# Patient Record
Sex: FEMALE | Race: WHITE | NOT HISPANIC OR LATINO | Employment: OTHER | URBAN - METROPOLITAN AREA
[De-identification: names, ages, dates, MRNs, and addresses within clinical notes are randomized per-mention and may not be internally consistent; named-entity substitution may affect disease eponyms.]

---

## 2018-03-13 ENCOUNTER — OFFICE VISIT (OUTPATIENT)
Dept: ENDOCRINOLOGY | Facility: CLINIC | Age: 83
End: 2018-03-13
Attending: INTERNAL MEDICINE
Payer: MEDICARE

## 2018-03-13 VITALS
BODY MASS INDEX: 31.93 KG/M2 | HEIGHT: 57 IN | SYSTOLIC BLOOD PRESSURE: 130 MMHG | HEART RATE: 57 BPM | WEIGHT: 148 LBS | DIASTOLIC BLOOD PRESSURE: 80 MMHG

## 2018-03-13 DIAGNOSIS — E78.5 HYPERLIPIDEMIA, UNSPECIFIED HYPERLIPIDEMIA TYPE: ICD-10-CM

## 2018-03-13 DIAGNOSIS — M81.0 OSTEOPOROSIS, UNSPECIFIED OSTEOPOROSIS TYPE, UNSPECIFIED PATHOLOGICAL FRACTURE PRESENCE: ICD-10-CM

## 2018-03-13 DIAGNOSIS — E04.2 MULTINODULAR GOITER: Primary | ICD-10-CM

## 2018-03-13 PROCEDURE — 99214 OFFICE O/P EST MOD 30 MIN: CPT | Performed by: INTERNAL MEDICINE

## 2018-03-13 RX ORDER — LORAZEPAM 1 MG/1
1 TABLET ORAL AS NEEDED
COMMUNITY
Start: 2017-03-22 | End: 2018-04-19 | Stop reason: ALTCHOICE

## 2018-03-13 RX ORDER — PANTOPRAZOLE SODIUM 40 MG/1
40 TABLET, DELAYED RELEASE ORAL AS NEEDED
COMMUNITY
Start: 2017-09-06 | End: 2023-04-11 | Stop reason: ALTCHOICE

## 2018-03-13 RX ORDER — ASPIRIN 81 MG/1
81 TABLET ORAL
COMMUNITY
Start: 2017-04-04 | End: 2018-05-02 | Stop reason: ALTCHOICE

## 2018-03-13 RX ORDER — MELOXICAM 15 MG/1
15 TABLET ORAL
COMMUNITY
Start: 2017-03-22 | End: 2019-02-04

## 2018-03-13 RX ORDER — EZETIMIBE 10 MG/1
10 TABLET ORAL 2 TIMES WEEKLY
COMMUNITY
Start: 2017-07-14 | End: 2018-08-02 | Stop reason: ENTERED-IN-ERROR

## 2018-03-13 ASSESSMENT — ENCOUNTER SYMPTOMS
TROUBLE SWALLOWING: 0
CONSTIPATION: 0
TREMORS: 0
DIARRHEA: 0
UNEXPECTED WEIGHT CHANGE: 0
ABDOMINAL PAIN: 0
SHORTNESS OF BREATH: 0
PALPITATIONS: 0
NAUSEA: 0
ARTHRALGIAS: 1
VOICE CHANGE: 0
SLEEP DISTURBANCE: 0
COUGH: 0
FATIGUE: 0
DIZZINESS: 0

## 2018-03-13 NOTE — LETTER
March 14, 2018     Kunal La, DO  [address] [facility] [address]    Patient: Teresa Bird   YOB: 1935   Date of Visit: 3/13/2018       Dear Dr. La:    Thank you for referring Teresa Bird to me for evaluation. Below are my notes for this consultation.    If you have questions, please do not hesitate to call me. I look forward to following your patient along with you.         Sincerely,        Usha Nobles MD        CC: No Recipients  Usha Nobles MD  3/13/2018 10:53 PM  Sign at close encounter     Patient ID: Teresa Bird is a 82 y.o. female.      HPI   I did FNA of her left thyroid nodule on 9/6/17 and it was negative. No increased neck swelling, dysphagia or choking sensation. She has difficulty tolerating statins and follows with dr. Beck. Her arthritis is stable. 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 She has history of reflux and postnasal drip. She is s/p hysterectomy for uterine cancer, did not need chemo or radiation.       Office ultrasound today  Small nodules on the right side  Dominant nodule in left lower lobe is solid, not vascular, measures 1.6x2.1x3.7 cm.     Review of Systems   Constitutional: Negative for fatigue and unexpected weight change.   HENT: Negative for trouble swallowing and voice change.    Eyes: Negative for visual disturbance.   Respiratory: Negative for cough and shortness of breath.    Cardiovascular: Negative for palpitations and leg swelling.   Gastrointestinal: Negative for abdominal pain, constipation, diarrhea and nausea.   Endocrine: Negative for cold intolerance and heat intolerance.   Musculoskeletal: Positive for arthralgias.   Skin: Negative for rash.   Neurological: Negative for dizziness and tremors.   Psychiatric/Behavioral: Negative for sleep disturbance.       Physical Exam   Constitutional: She appears well-developed.   HENT:   Head: Normocephalic and atraumatic.   Eyes: Conjunctivae and EOM are  normal. Pupils are equal, round, and reactive to light.   Neck: No tracheal deviation present. No thyromegaly present.   Left low lying thyroid nodule, best palpable with swallowing   Cardiovascular: Normal rate, regular rhythm and normal heart sounds.    Pulmonary/Chest: Effort normal and breath sounds normal.   Musculoskeletal: She exhibits no edema.   Lymphadenopathy:     She has no cervical adenopathy.   Neurological: She displays normal reflexes.   Skin: No rash noted.   Psychiatric: She has a normal mood and affect.           Assessment/Plan   Problem List     Multinodular goiter - Primary    Overview     She had a CT scan of the chest and abdomen done on 3/09/17 as a routine followup after her surgery for uterine cancer in December of 2013.  The CT scan showed evidence of an abnormal left thyroid lobe that was enlarged with several nodules.    FNA of 3.7 cm left thyroid nodule was done on 9/6/17    Office ultrasound 3/13/17 shows stable  nodules             Current Assessment & Plan     TSH on 10/2/17 is normal    I reassured patient about her negative biopsy and normal thyroid function along with stable ultrasound.     She will follow up with me in 1 year, will do office ultrasound for monitoring         Hyperlipidemia    Overview     Did not tolerate statins         Relevant Medications    ezetimibe (ZETIA) 10 mg tablet    Osteoporosis    Overview     Did not tolerate multiple meds in the past including Fosamax  No fracture                 Usha Nobles MD  3/13/2018  12:40 PM

## 2018-03-13 NOTE — PROGRESS NOTES
Patient ID: Teresa Bird is a 82 y.o. female.      HPI   I did FNA of her left thyroid nodule on 9/6/17 and it was negative. No increased neck swelling, dysphagia or choking sensation. She has difficulty tolerating statins and follows with dr. Beck. Her arthritis is stable. 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 She has history of reflux and postnasal drip. She is s/p hysterectomy for uterine cancer, did not need chemo or radiation.       Office ultrasound today  Small nodules on the right side  Dominant nodule in left lower lobe is solid, not vascular, measures 1.6x2.1x3.7 cm.     Review of Systems   Constitutional: Negative for fatigue and unexpected weight change.   HENT: Negative for trouble swallowing and voice change.    Eyes: Negative for visual disturbance.   Respiratory: Negative for cough and shortness of breath.    Cardiovascular: Negative for palpitations and leg swelling.   Gastrointestinal: Negative for abdominal pain, constipation, diarrhea and nausea.   Endocrine: Negative for cold intolerance and heat intolerance.   Musculoskeletal: Positive for arthralgias.   Skin: Negative for rash.   Neurological: Negative for dizziness and tremors.   Psychiatric/Behavioral: Negative for sleep disturbance.       Physical Exam   Constitutional: She appears well-developed.   HENT:   Head: Normocephalic and atraumatic.   Eyes: Conjunctivae and EOM are normal. Pupils are equal, round, and reactive to light.   Neck: No tracheal deviation present. No thyromegaly present.   Left low lying thyroid nodule, best palpable with swallowing   Cardiovascular: Normal rate, regular rhythm and normal heart sounds.    Pulmonary/Chest: Effort normal and breath sounds normal.   Musculoskeletal: She exhibits no edema.   Lymphadenopathy:     She has no cervical adenopathy.   Neurological: She displays normal reflexes.   Skin: No rash noted.   Psychiatric: She has a normal mood and affect.            Assessment/Plan   Problem List     Multinodular goiter - Primary    Overview     She had a CT scan of the chest and abdomen done on 3/09/17 as a routine followup after her surgery for uterine cancer in December of 2013.  The CT scan showed evidence of an abnormal left thyroid lobe that was enlarged with several nodules.    FNA of 3.7 cm left thyroid nodule was done on 9/6/17    Office ultrasound 3/13/17 shows stable  nodules             Current Assessment & Plan     TSH on 10/2/17 is normal    I reassured patient about her negative biopsy and normal thyroid function along with stable ultrasound.     She will follow up with me in 1 year, will do office ultrasound for monitoring         Hyperlipidemia    Overview     Did not tolerate statins         Relevant Medications    ezetimibe (ZETIA) 10 mg tablet    Osteoporosis    Overview     Did not tolerate multiple meds in the past including Fosamax  No fracture                 Usha Nobles MD  3/13/2018  12:40 PM

## 2018-03-13 NOTE — PATIENT INSTRUCTIONS
1. Office ultrasound today shows the nodules are stable in size  2. No indication to repeat biopsy and no indication for surgery  3. Follow up with me in one year, will do office ultrasound again

## 2018-03-14 NOTE — ASSESSMENT & PLAN NOTE
TSH on 10/2/17 is normal    I reassured patient about her negative biopsy and normal thyroid function along with stable ultrasound.     She will follow up with me in 1 year, will do office ultrasound for monitoring

## 2018-04-02 PROBLEM — C55 MALIGNANT NEOPLASM OF UTERUS (CMS/HCC): Status: ACTIVE | Noted: 2017-03-22

## 2018-04-02 PROBLEM — Q25.79 PULMONARY ARTERY ABNORMALITY: Status: ACTIVE | Noted: 2017-04-04

## 2018-04-03 ENCOUNTER — TRANSCRIBE ORDERS (OUTPATIENT)
Dept: SCHEDULING | Age: 83
End: 2018-04-03

## 2018-04-03 DIAGNOSIS — Q25.79 CONGENITAL ANOMALY OF PULMONARY ARTERY: Primary | ICD-10-CM

## 2018-04-04 ENCOUNTER — TELEPHONE (OUTPATIENT)
Dept: CARDIOLOGY | Facility: CLINIC | Age: 83
End: 2018-04-04

## 2018-04-04 NOTE — TELEPHONE ENCOUNTER
Spoke to pt in regards to her BW prior to her CTA she will be getting some drawn on Friday 4/6 for her cardiologist at [facility], and a CMP was ordered in that. I told pt we should get the results, but I will keep an eye out and make sure we do.

## 2018-04-07 LAB
ALBUMIN SERPL-MCNC: 3.8 G/DL (ref 3.6–5.1)
ALBUMIN/GLOB SERPL: 1.5 (CALC) (ref 1–2.5)
ALP SERPL-CCNC: 68 U/L (ref 33–130)
ALT SERPL-CCNC: 15 U/L (ref 6–29)
AST SERPL-CCNC: 17 U/L (ref 10–35)
BASOPHILS # BLD AUTO: 22 CELLS/UL (ref 0–200)
BASOPHILS NFR BLD AUTO: 0.4 %
BILIRUB SERPL-MCNC: 0.7 MG/DL (ref 0.2–1.2)
BUN SERPL-MCNC: 18 MG/DL (ref 7–25)
BUN/CREAT SERPL: ABNORMAL (CALC) (ref 6–22)
CALCIUM SERPL-MCNC: 9.5 MG/DL (ref 8.6–10.4)
CHLORIDE SERPL-SCNC: 107 MMOL/L (ref 98–110)
CHOLEST SERPL-MCNC: 175 MG/DL
CHOLEST/HDLC SERPL: 2.4 (CALC)
CO2 SERPL-SCNC: 32 MMOL/L (ref 20–31)
CREAT SERPL-MCNC: 0.68 MG/DL (ref 0.6–0.88)
EOSINOPHIL # BLD AUTO: 129 CELLS/UL (ref 15–500)
EOSINOPHIL NFR BLD AUTO: 2.3 %
ERYTHROCYTE [DISTWIDTH] IN BLOOD BY AUTOMATED COUNT: 12.5 % (ref 11–15)
GFR SERPL CREATININE-BSD FRML MDRD: 83 ML/MIN/1.73M2
GLOBULIN SER CALC-MCNC: 2.6 G/DL (CALC) (ref 1.9–3.7)
GLUCOSE SERPL-MCNC: 87 MG/DL (ref 65–99)
HCT VFR BLD AUTO: 44.5 % (ref 35–45)
HDLC SERPL-MCNC: 73 MG/DL
HGB BLD-MCNC: 15 G/DL (ref 11.7–15.5)
LDLC SERPL CALC-MCNC: 84 MG/DL (CALC)
LYMPHOCYTES # BLD AUTO: 1814 CELLS/UL (ref 850–3900)
LYMPHOCYTES NFR BLD AUTO: 32.4 %
MCH RBC QN AUTO: 31.8 PG (ref 27–33)
MCHC RBC AUTO-ENTMCNC: 33.7 G/DL (ref 32–36)
MCV RBC AUTO: 94.5 FL (ref 80–100)
MONOCYTES # BLD AUTO: 319 CELLS/UL (ref 200–950)
MONOCYTES NFR BLD AUTO: 5.7 %
NEUTROPHILS # BLD AUTO: 3315 CELLS/UL (ref 1500–7800)
NEUTROPHILS NFR BLD AUTO: 59.2 %
NONHDLC SERPL-MCNC: 102 MG/DL (CALC)
PLATELET # BLD AUTO: 275 THOUSAND/UL (ref 140–400)
PMV BLD REES-ECKER: 10.9 FL (ref 7.5–12.5)
POTASSIUM SERPL-SCNC: 5.8 MMOL/L (ref 3.5–5.3)
PROT SERPL-MCNC: 6.4 G/DL (ref 6.1–8.1)
RBC # BLD AUTO: 4.71 MILLION/UL (ref 3.8–5.1)
SODIUM SERPL-SCNC: 143 MMOL/L (ref 135–146)
TRIGL SERPL-MCNC: 90 MG/DL
WBC # BLD AUTO: 5.6 THOUSAND/UL (ref 3.8–10.8)

## 2018-04-08 PROBLEM — I25.10 CORONARY ARTERY DISEASE INVOLVING NATIVE CORONARY ARTERY OF NATIVE HEART WITHOUT ANGINA PECTORIS: Status: ACTIVE | Noted: 2018-04-08

## 2018-04-08 PROBLEM — F17.210 DEPENDENCE ON NICOTINE FROM CIGARETTES: Status: ACTIVE | Noted: 2018-04-08

## 2018-04-08 PROBLEM — M81.0 OSTEOPOROSIS: Status: RESOLVED | Noted: 2018-03-13 | Resolved: 2018-04-08

## 2018-04-11 NOTE — PROGRESS NOTES
Cardiology Note        Teresa Bird is a 82 y.o. 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 Fong, no intervention is indicated she'll be due for follow-up CTA and visit in March 2018  She also thyroid nodule with negative biopsy with Dr. Ha Wheatley,  Echocardiogram done today artery 4.2 cm normal LV systolic function top normal aortic root and sinus of Valsalva 3.7 cm    Cardiac History:      Patient Active Problem List    Diagnosis Date Noted   • APC (atrial premature contractions) 04/16/2018   • Coronary artery disease involving native coronary artery of native heart without angina pectoris 04/08/2018   • Dependence on nicotine from cigarettes 04/08/2018   • Multinodular goiter 03/13/2018   • Hyperlipidemia 03/13/2018   • Pulmonary artery abnormality 04/04/2017   • Malignant neoplasm of uterus (CMS/HCC) (HCC) 03/22/2017       Allergy    Penicillin; Pravastatin sodium; and Rosuvastatin calcium          MED LIST       Current Outpatient Prescriptions   Medication Sig Dispense Refill   • aspirin (ASPIR-81) 81 mg enteric coated tablet 81 mg.     • ezetimibe (ZETIA) 10 mg tablet 10 mg 2 (two) times a week.       • LORazepam (ATIVAN) 1 mg tablet 1 mg as needed.       • meloxicam (MOBIC) 15 mg tablet 15 mg.     • pantoprazole (PROTONIX) 40 mg EC tablet 40 mg.     • metoprolol succinate XL (TOPROL-XL) 25 mg 24 hr tablet Take 1 tablet (25 mg total) by mouth daily. 90 tablet 2     No current facility-administered medications for this visit.                 Review of Systems   Constitution: Negative for malaise/fatigue, weight gain and weight loss.   HENT: Negative for hearing loss and hoarse voice.    Eyes: Negative for visual disturbance.    Cardiovascular: Negative for chest pain, claudication, cyanosis, dyspnea on exertion, irregular heartbeat, leg swelling, near-syncope, orthopnea, palpitations, paroxysmal nocturnal dyspnea and syncope.   Respiratory: Negative for cough, hemoptysis, shortness of breath, sleep disturbances due to breathing, snoring, sputum production and wheezing.    Endocrine: Negative for cold intolerance and heat intolerance.   Hematologic/Lymphatic: Negative.  Negative for bleeding problem. Does not bruise/bleed easily.   Skin: Negative.  Negative for rash.   Musculoskeletal: Negative for arthritis, falls, joint pain, muscle cramps and myalgias.   Gastrointestinal: Negative for abdominal pain, anorexia, change in bowel habit, constipation, diarrhea, dysphagia, heartburn, jaundice and nausea.   Genitourinary: Negative for frequency and nocturia.   Neurological: Negative for dizziness, focal weakness, headaches, light-headedness, numbness, tremors and vertigo.   Psychiatric/Behavioral: Negative for memory loss. The patient does not have insomnia and is not nervous/anxious.    Allergic/Immunologic: Negative for hives.       Labs   Lab Results   Component Value Date    WBC 5.6 04/06/2018    HGB 15.0 04/06/2018    HCT 44.5 04/06/2018     04/06/2018    CHOL 175 04/06/2018    TRIG 90 04/06/2018    HDL 73 04/06/2018    LDLCALC 84 04/06/2018    ALT 15 04/06/2018    AST 17 04/06/2018     04/06/2018    K 5.8 (H) 04/06/2018     04/06/2018    CREATININE 0.68 04/06/2018    BUN 18 04/06/2018    CO2 32 (H) 04/06/2018    TSH 1.35 10/02/2017       Lab Results   Component Value Date    GLUCOSE 87 04/06/2018    CALCIUM 9.5 04/06/2018     04/06/2018    K 5.8 (H) 04/06/2018    CO2 32 (H) 04/06/2018     04/06/2018    BUN 18 04/06/2018    CREATININE 0.68 04/06/2018         Objective   Vitals:    04/16/18 1556   BP: 140/80   Pulse: (!) 53   SpO2: 95%     Physical Exam   Constitutional: She is oriented to person, place, and  time. She appears well-developed and well-nourished. No distress.   HENT:   Head: Normocephalic and atraumatic.   Nose: Nose normal.   Eyes: Conjunctivae are normal. No scleral icterus.   Neck: No JVD present.   Cardiovascular: Normal rate, normal heart sounds and intact distal pulses.  An irregular rhythm present. Exam reveals no gallop and no friction rub.    No murmur heard.  Pulmonary/Chest: Effort normal. No stridor. No respiratory distress. She has no wheezes. She has no rales. She exhibits no tenderness.   Abdominal: There is no tenderness.   Musculoskeletal: She exhibits no edema or deformity.   Neurological: She is alert and oriented to person, place, and time.   Skin: Skin is warm and dry.   Psychiatric: She has a normal mood and affect.         Cardiac Procedures      Chest CTA March 2017 dilated pulmonary artery 4.7 thyroid nodules coronary artery calcifications renal cyst    Stress echocardiogram March 2017 moderate pulmonic insufficiency pulmonary artery for mild mitral regurgitation negative for ischemia    Echo 4/18 pulm a 4.2 aorta 3.7cm    Ultrasound thyroid July 2017 11 mm nodule                Echo/MUGA:  Echo (Normal EF, Pulmonary artery about 4 cm moderate pulmonic insufficiency and mild mitral regurgitation preserved ejection fraction) - 3/22/2017   Stress Test:  SEH (Normal EF, pulm artery aneurysm 4.0 grouped apc 4-6 beats occ vpc neg ischemia rbbb) - 7/14/2017   CT/MRI:  Chest CT (dilated pul artery 4.7, thyroid nodules, renal cystsL cor a calcifications) - 3/22/2017   Other:  US thyroid (r nodule 11 mm) - 7/14/2017                 EKG frequent grouped APCs 3-4 beats right bundle branch block      Assessment/Plan:      Pulmonary artery abnormality  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 Tana locke for follow-up visit with him in imaging prior to visit May 1  Because it is a low pressure vessel  no intervention needed at this time    APC (atrial premature contractions)  EKG today showed frequent APCs group APCs this could lead to atrial fibrillation with frequent atrial ectopy, she is always reluctant to start medications but she is willing to begin Toprol 25 mg at bedtime check monitor on the medication follow-up in a few weeks    Coronary artery disease involving native coronary artery of native heart without angina pectoris  Coronary calcium seen on CAT scan she had negative ischemic evaluation with stress echocardiogram July 2017 no chest pain    Hyperlipidemia  She can only tolerate low-dose Zetia LDL cholesterol 84    Multinodular goiter  Had negative biopsy follows with Dr.El Wheatley         Frequent APCs improved APCs concerned about the possibility of developing atrial fibrillation, add Toprol 25 mg at bedtime she is concerned about adding new medication so will use low-dose.  Follow-up with monitor in 1 week see her back a few weeks after that  She will be following up with CTA of dilated pulmonary artery in April seeing Dr. Fajardo May 1    Thank you for allowing me to participate in the care of this patient.  I hope this information is helpful.    Nick Beck MD Merged with [facility]   4/16/2018      Cc Kunal La DO

## 2018-04-16 ENCOUNTER — HOSPITAL ENCOUNTER (OUTPATIENT)
Dept: CARDIOLOGY | Facility: CLINIC | Age: 83
Discharge: HOME | End: 2018-04-16
Payer: MEDICARE

## 2018-04-16 ENCOUNTER — OFFICE VISIT (OUTPATIENT)
Dept: CARDIOLOGY | Facility: CLINIC | Age: 83
End: 2018-04-16
Attending: INTERNAL MEDICINE
Payer: MEDICARE

## 2018-04-16 VITALS
HEIGHT: 60 IN | SYSTOLIC BLOOD PRESSURE: 140 MMHG | BODY MASS INDEX: 28.86 KG/M2 | WEIGHT: 147 LBS | DIASTOLIC BLOOD PRESSURE: 80 MMHG | OXYGEN SATURATION: 95 % | HEART RATE: 53 BPM

## 2018-04-16 VITALS
BODY MASS INDEX: 28.32 KG/M2 | HEIGHT: 61 IN | DIASTOLIC BLOOD PRESSURE: 82 MMHG | WEIGHT: 150 LBS | SYSTOLIC BLOOD PRESSURE: 142 MMHG

## 2018-04-16 DIAGNOSIS — Z00.00 ROUTINE GENERAL MEDICAL EXAMINATION AT A HEALTH CARE FACILITY: ICD-10-CM

## 2018-04-16 DIAGNOSIS — E78.2 MIXED HYPERLIPIDEMIA: ICD-10-CM

## 2018-04-16 DIAGNOSIS — Q25.79 PULMONARY ARTERY ABNORMALITY: ICD-10-CM

## 2018-04-16 DIAGNOSIS — E04.2 MULTINODULAR GOITER: ICD-10-CM

## 2018-04-16 DIAGNOSIS — I49.1 APC (ATRIAL PREMATURE CONTRACTIONS): ICD-10-CM

## 2018-04-16 DIAGNOSIS — I25.10 CORONARY ARTERY DISEASE INVOLVING NATIVE CORONARY ARTERY OF NATIVE HEART WITHOUT ANGINA PECTORIS: Primary | ICD-10-CM

## 2018-04-16 LAB
AORTIC ROOT ANNULUS: 3.7 CM
ASCENDING AORTA: 3.5 CM
AV PEAK GRADIENT: 6 MMHG
AV PEAK VELOCITY-S: 1.19 M/S
AV VALVE AREA: 2.15 CM2
BSA FOR ECHO PROCEDURE: 1.71 M2
CUSP SEPARATION: 2 CM
E WAVE DECELERATION TIME: 180 MS
E/A RATIO: 1.1
E/E' RATIO: 17.8
E/LAT E' RATIO: 9
EDV (BP): 52 ML
EF (A4C): 60.4 %
EF A2C: 61.6 %
EJECTION FRACTION: 60.4 %
EST RIGHT VENT SYSTOLIC PRESSURE BY TRICUSPID REGURGITATION JET: 39 MMHG
ESV (BP): 20.6 CM3
INTERVENTRICULAR SEPTUM: 1 CM
LA ESV (BP): 51.7 CM3
LA ESV INDEX (A2C): 26.67 CM3/M2
LA ESV INDEX (BP): 30.23 CM3/M2
LA/AORTA RATIO: 1.22
LAAS-AP2: 17.1 CM2
LAAS-AP4: 20.5 CM2
LAD 2D: 4.5 CM
LALD A4C: 5.41 CM
LALD A4C: 5.81 CM
LAV-S: 45.6 CM3
LEFT ATRIAL AREA: 17.1 CM2
LEFT ATRIUM VOLUME INDEX: 30.23 CM3/M2
LEFT ATRIUM VOLUME: 51.7 CM3
LEFT INTERNAL DIMENSION IN SYSTOLE: 3.36 CM (ref 2.35–3.56)
LEFT VENTRICLE DIASTOLIC VOLUME INDEX: 32.63 ML/M2
LEFT VENTRICLE DIASTOLIC VOLUME: 55.8 ML
LEFT VENTRICLE SYSTOLIC VOLUME INDEX: 12.92 CM3/M2
LEFT VENTRICLE SYSTOLIC VOLUME: 22.1 CM3
LEFT VENTRICULAR INTERNAL DIMENSION IN DIASTOLE: 5.12 CM (ref 3.97–5.5)
LEFT VENTRICULAR POSTERIOR WALL IN END DIASTOLE: 0.91 CM (ref 0.52–0.96)
LV DIASTOLIC VOLUME: 48.4 ML
LV ESV (APICAL 2 CHAMBER): 18.6 CM3
LVAD-AP2: 18.1 CM2
LVAD-AP4: 19.8 CM2
LVAS-AP2: 10.3 CM2
LVEDVI(A2C): 28.3 ML/M2
LVEDVI(BP): 30.41 ML/M2
LVESVI(A2C): 10.88 CM3/M2
LVESVI(BP): 12.05 CM3/M2
LVLD-AP2: 6.19 CM
LVLD-AP4: 6.14 CM
LVLS-AP2: 5.26 CM
LVLS-AP4: 5.06 CM
LVOT 2D: 1.9 CM
LVOT A: 2.84 CM2
LVOT PEAK VELOCITY: 0.9 M/S
LVOT PG: 3 MMHG
MITRAL VALVE PEAK A WAVE VELOCITY: 0.76 M/S
MPAD: 4.2 CM
MV E'TISSUE VEL-LAT: 0.09 M/S
MV E'TISSUE VEL-MED: 0.05 M/S
MV PEAK A VEL: 0.76 M/S
MV PEAK E VEL: 0.82 M/S
POSTERIOR WALL: 0.9 CM
PULMONARY REGURGITATION LATE DIASTOLIC VELOCITY: 1.07 M/S
RAP: 5 MMHG
RVOT VMAX: 0.66 M/S
TR MAX PG: 34 MMHG
TRICUSPID VALVE PEAK REGURGITATION VELOCITY: 2.93 M/S
Z-SCORE OF LEFT VENTRICULAR DIMENSION IN END DIASTOLE: 0.92
Z-SCORE OF LEFT VENTRICULAR DIMENSION IN END SYSTOLE: 1.18
Z-SCORE OF LEFT VENTRICULAR POSTERIOR WALL IN END DIASTOLE: 1.34

## 2018-04-16 PROCEDURE — 93000 ELECTROCARDIOGRAM COMPLETE: CPT | Performed by: INTERNAL MEDICINE

## 2018-04-16 PROCEDURE — 93306 TTE W/DOPPLER COMPLETE: CPT | Performed by: INTERNAL MEDICINE

## 2018-04-16 PROCEDURE — 99214 OFFICE O/P EST MOD 30 MIN: CPT | Performed by: INTERNAL MEDICINE

## 2018-04-16 RX ORDER — METOPROLOL SUCCINATE 25 MG/1
25 TABLET, EXTENDED RELEASE ORAL DAILY
Qty: 90 TABLET | Refills: 2 | Status: SHIPPED | OUTPATIENT
Start: 2018-04-16 | End: 2018-05-02 | Stop reason: ALTCHOICE

## 2018-04-16 ASSESSMENT — ENCOUNTER SYMPTOMS
VERTIGO: 0
MYALGIAS: 0
DIARRHEA: 0
HEMATOLOGIC/LYMPHATIC NEGATIVE: 1
FREQUENCY: 0
PALPITATIONS: 0
LIGHT-HEADEDNESS: 0
FOCAL WEAKNESS: 0
HEMOPTYSIS: 0
JAUNDICE: 0
DIZZINESS: 0
DYSPNEA ON EXERTION: 0
NERVOUS/ANXIOUS: 0
COUGH: 0
SYNCOPE: 0
NUMBNESS: 0
SHORTNESS OF BREATH: 0
CONSTIPATION: 0
HOARSE VOICE: 0
PND: 0
ABDOMINAL PAIN: 0
CHANGE IN BOWEL HABIT: 0
FALLS: 0
TREMORS: 0
MUSCLE CRAMPS: 0
IRREGULAR HEARTBEAT: 0
ANOREXIA: 0
BRUISES/BLEEDS EASILY: 0
SNORING: 0
NEAR-SYNCOPE: 0
MEMORY LOSS: 0
HEADACHES: 0
NAUSEA: 0
CLAUDICATION: 0
SLEEP DISTURBANCES DUE TO BREATHING: 0
WEIGHT LOSS: 0
WEIGHT GAIN: 0
ORTHOPNEA: 0
WHEEZING: 0
HEARTBURN: 0
SPUTUM PRODUCTION: 0
INSOMNIA: 0

## 2018-04-16 NOTE — ASSESSMENT & PLAN NOTE
Coronary calcium seen on CAT scan she had negative ischemic evaluation with stress echocardiogram July 2017 no chest pain

## 2018-04-16 NOTE — LETTER
April 16, 2018     Kunal La, DO  [address] [facility] [address]    Patient: Teresa Bird   YOB: 1935   Date of Visit: 4/16/2018       Dear Ericka  Thank you for referring Teresa Bird to me for evaluation. Below are my notes for this consultation.    If you have questions, please do not hesitate to call me. I look forward to following your patient along with you.         Sincerely,        Nick Beck MD        CC: MD Usha No MD Andrea J. Becker, MD  4/16/2018  4:21 PM  Sign at close encounter      Cardiology Note        Teresa Bird is a 82 y.o. 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 Fong, no intervention is indicated she'll be due for follow-up CTA and visit in March 2018  She also thyroid nodule with negative biopsy with Dr. Ha Wheatley,  Echocardiogram done today artery 4.2 cm normal LV systolic function top normal aortic root and sinus of Valsalva 3.7 cm    Cardiac History:      Patient Active Problem List    Diagnosis Date Noted   • APC (atrial premature contractions) 04/16/2018   • Coronary artery disease involving native coronary artery of native heart without angina pectoris 04/08/2018   • Dependence on nicotine from cigarettes 04/08/2018   • Multinodular goiter 03/13/2018   • Hyperlipidemia 03/13/2018   • Pulmonary artery abnormality 04/04/2017   • Malignant neoplasm of uterus (CMS/HCC) (HCC) 03/22/2017       Allergy    Penicillin; Pravastatin sodium; and Rosuvastatin calcium          MED LIST       Current Outpatient Prescriptions   Medication Sig Dispense Refill   • aspirin (ASPIR-81) 81 mg enteric coated tablet 81 mg.     • ezetimibe (ZETIA) 10 mg tablet 10 mg  2 (two) times a week.       • LORazepam (ATIVAN) 1 mg tablet 1 mg as needed.       • meloxicam (MOBIC) 15 mg tablet 15 mg.     • pantoprazole (PROTONIX) 40 mg EC tablet 40 mg.     • metoprolol succinate XL (TOPROL-XL) 25 mg 24 hr tablet Take 1 tablet (25 mg total) by mouth daily. 90 tablet 2     No current facility-administered medications for this visit.                 Review of Systems   Constitution: Negative for malaise/fatigue, weight gain and weight loss.   HENT: Negative for hearing loss and hoarse voice.    Eyes: Negative for visual disturbance.   Cardiovascular: Negative for chest pain, claudication, cyanosis, dyspnea on exertion, irregular heartbeat, leg swelling, near-syncope, orthopnea, palpitations, paroxysmal nocturnal dyspnea and syncope.   Respiratory: Negative for cough, hemoptysis, shortness of breath, sleep disturbances due to breathing, snoring, sputum production and wheezing.    Endocrine: Negative for cold intolerance and heat intolerance.   Hematologic/Lymphatic: Negative.  Negative for bleeding problem. Does not bruise/bleed easily.   Skin: Negative.  Negative for rash.   Musculoskeletal: Negative for arthritis, falls, joint pain, muscle cramps and myalgias.   Gastrointestinal: Negative for abdominal pain, anorexia, change in bowel habit, constipation, diarrhea, dysphagia, heartburn, jaundice and nausea.   Genitourinary: Negative for frequency and nocturia.   Neurological: Negative for dizziness, focal weakness, headaches, light-headedness, numbness, tremors and vertigo.   Psychiatric/Behavioral: Negative for memory loss. The patient does not have insomnia and is not nervous/anxious.    Allergic/Immunologic: Negative for hives.       Labs   Lab Results   Component Value Date    WBC 5.6 04/06/2018    HGB 15.0 04/06/2018    HCT 44.5 04/06/2018     04/06/2018    CHOL 175 04/06/2018    TRIG 90 04/06/2018    HDL 73 04/06/2018    LDLCALC 84 04/06/2018    ALT 15 04/06/2018    AST 17  04/06/2018     04/06/2018    K 5.8 (H) 04/06/2018     04/06/2018    CREATININE 0.68 04/06/2018    BUN 18 04/06/2018    CO2 32 (H) 04/06/2018    TSH 1.35 10/02/2017       Lab Results   Component Value Date    GLUCOSE 87 04/06/2018    CALCIUM 9.5 04/06/2018     04/06/2018    K 5.8 (H) 04/06/2018    CO2 32 (H) 04/06/2018     04/06/2018    BUN 18 04/06/2018    CREATININE 0.68 04/06/2018         Objective   Vitals:    04/16/18 1556   BP: 140/80   Pulse: (!) 53   SpO2: 95%     Physical Exam   Constitutional: She is oriented to person, place, and time. She appears well-developed and well-nourished. No distress.   HENT:   Head: Normocephalic and atraumatic.   Nose: Nose normal.   Eyes: Conjunctivae are normal. No scleral icterus.   Neck: No JVD present.   Cardiovascular: Normal rate, normal heart sounds and intact distal pulses.  An irregular rhythm present. Exam reveals no gallop and no friction rub.    No murmur heard.  Pulmonary/Chest: Effort normal. No stridor. No respiratory distress. She has no wheezes. She has no rales. She exhibits no tenderness.   Abdominal: There is no tenderness.   Musculoskeletal: She exhibits no edema or deformity.   Neurological: She is alert and oriented to person, place, and time.   Skin: Skin is warm and dry.   Psychiatric: She has a normal mood and affect.         Cardiac Procedures      Chest CTA March 2017 dilated pulmonary artery 4.7 thyroid nodules coronary artery calcifications renal cyst    Stress echocardiogram March 2017 moderate pulmonic insufficiency pulmonary artery for mild mitral regurgitation negative for ischemia    Echo 4/18 pulm a 4.2 aorta 3.7cm    Ultrasound thyroid July 2017 11 mm nodule                Echo/MUGA:  Echo (Normal EF, Pulmonary artery about 4 cm moderate pulmonic insufficiency and mild mitral regurgitation preserved ejection fraction) - 3/22/2017   Stress Test:  SEH (Normal EF, pulm artery aneurysm 4.0 grouped apc 4-6 beats occ vpc  neg ischemia rbbb) - 7/14/2017   CT/MRI:  Chest CT (dilated pul artery 4.7, thyroid nodules, renal cystsL cor a calcifications) - 3/22/2017   Other:  US thyroid (r nodule 11 mm) - 7/14/2017                 EKG frequent grouped APCs 3-4 beats right bundle branch block      Assessment/Plan:      Pulmonary artery abnormality  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 Fajardo due for follow-up visit with him in imaging prior to visit May 1  Because it is a low pressure vessel no intervention needed at this time    APC (atrial premature contractions)  EKG today showed frequent APCs group APCs this could lead to atrial fibrillation with frequent atrial ectopy, she is always reluctant to start medications but she is willing to begin Toprol 25 mg at bedtime check monitor on the medication follow-up in a few weeks    Coronary artery disease involving native coronary artery of native heart without angina pectoris  Coronary calcium seen on CAT scan she had negative ischemic evaluation with stress echocardiogram July 2017 no chest pain    Hyperlipidemia  She can only tolerate low-dose Zetia LDL cholesterol 84    Multinodular goiter  Had negative biopsy follows with Dr.El Wheatley         Frequent APCs improved APCs concerned about the possibility of developing atrial fibrillation, add Toprol 25 mg at bedtime she is concerned about adding new medication so will use low-dose.  Follow-up with monitor in 1 week see her back a few weeks after that  She will be following up with CTA of dilated pulmonary artery in April seeing Dr. Fajardo May 1    Thank you for allowing me to participate in the care of this patient.  I hope this information is helpful.    Nick Beck MD Located within [facility]   4/16/2018      Cc Kunal La DO

## 2018-04-16 NOTE — ASSESSMENT & PLAN NOTE
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 Tana locke for follow-up visit with him in imaging prior to visit May 1  Because it is a low pressure vessel no intervention needed at this time

## 2018-04-16 NOTE — ASSESSMENT & PLAN NOTE
EKG today showed frequent APCs group APCs this could lead to atrial fibrillation with frequent atrial ectopy, she is always reluctant to start medications but she is willing to begin Toprol 25 mg at bedtime check monitor on the medication follow-up in a few weeks

## 2018-04-16 NOTE — PATIENT INSTRUCTIONS
New toprol 25 mg at night for frequent atrial premature contractions runs of   Grouped apc 4-6 beats    Follow up one week for 24 hour monitor then follow up 2-3 weeks

## 2018-04-20 ENCOUNTER — HOSPITAL ENCOUNTER (OUTPATIENT)
Dept: CARDIOLOGY | Facility: CLINIC | Age: 83
Discharge: HOME | End: 2018-04-20
Attending: INTERNAL MEDICINE
Payer: MEDICARE

## 2018-04-20 DIAGNOSIS — I49.1 APC (ATRIAL PREMATURE CONTRACTIONS): ICD-10-CM

## 2018-04-23 ENCOUNTER — TELEPHONE (OUTPATIENT)
Dept: CARDIOLOGY | Facility: CLINIC | Age: 83
End: 2018-04-23

## 2018-04-23 NOTE — TELEPHONE ENCOUNTER
Spoke to pt, She agrees to try taking 1/2 tab  For 12.5 mg  Toprol xl nightly with a snack at bedtime. Agrees to call with any further complaints.

## 2018-04-23 NOTE — TELEPHONE ENCOUNTER
Pt of Dr Beck. Pt called to report 2 days not feeling well. She is light headed, has an upset stomach and gas.  She takes it at night and would like a call back today with instructions.

## 2018-04-24 ENCOUNTER — TELEPHONE (OUTPATIENT)
Dept: CARDIOLOGY | Facility: CLINIC | Age: 83
End: 2018-04-24

## 2018-04-24 ENCOUNTER — HOSPITAL ENCOUNTER (OUTPATIENT)
Dept: RADIOLOGY | Facility: HOSPITAL | Age: 83
Discharge: HOME | End: 2018-04-24
Attending: THORACIC SURGERY (CARDIOTHORACIC VASCULAR SURGERY)
Payer: MEDICARE

## 2018-04-24 DIAGNOSIS — Q25.79 CONGENITAL ANOMALY OF PULMONARY ARTERY: ICD-10-CM

## 2018-04-24 PROBLEM — I48.0 PAF (PAROXYSMAL ATRIAL FIBRILLATION) (CMS/HCC): Status: ACTIVE | Noted: 2018-04-24

## 2018-04-24 PROCEDURE — 71275 CT ANGIOGRAPHY CHEST: CPT

## 2018-04-24 PROCEDURE — 63600105 HC IODINE BASED CONTRAST: Performed by: THORACIC SURGERY (CARDIOTHORACIC VASCULAR SURGERY)

## 2018-04-24 RX ADMIN — IOHEXOL 100 ML: 350 INJECTION, SOLUTION INTRAVENOUS at 09:22

## 2018-04-24 NOTE — TELEPHONE ENCOUNTER
Spoke to pt. Discussed afib/flutter seen on heart monitor. She states understanding. Discussed the used of blood thinner med to reduce risk of blood clot and stroke assoc. With afib. She is aware that we will discuss this at her 5/2/18 appt. She agrees to increase her Metoprolol dose from 12.5mg tab at HS to 12.5mg BID. She will call me to report any problems.

## 2018-04-24 NOTE — TELEPHONE ENCOUNTER
Received report of abn rhythm from [employer] Rep Charles.     New Onset Intermittant afib/aflutter.   EKG on Card[employer]  Site to print and scan.  Will contact pt to make aware.

## 2018-04-30 NOTE — PROGRESS NOTES
Cardiology Note    Kunal La DO        Teresa Bird is a 82 y.o. 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 she also has thyroid nodules with negative biopsy evaluated by Dr.El Lloyd in the past    Echocardiogram done April 2018 pulmonary artery stable 4.2 cm normal LV systolic function top normal aortic root size    CT angiogram done prior to this visit showed stable dilated pulmonary artery 4.5 and thyroid mass in the left side this is known to be goiter had negative biopsy in the past follows with Dr.El Lloyd    She had a negative ischemic workup with stress echocardiogram March 2017 showed moderate pulmonic insufficiency and mild mitral regurgitation    Patient Active Problem List    Diagnosis Date Noted   • PAF (paroxysmal atrial fibrillation) (CMS/HCC) (HCC) 04/24/2018   • APC (atrial premature contractions) 04/16/2018   • Coronary artery disease involving native coronary artery of native heart without angina pectoris 04/08/2018   • Dependence on nicotine from cigarettes 04/08/2018   • Multinodular goiter 03/13/2018   • Hyperlipidemia 03/13/2018   • Pulmonary artery abnormality 04/04/2017   • Malignant neoplasm of uterus (CMS/HCC) (HCC) 03/22/2017     Problem List     Multinodular goiter    Overview Addendum 4/8/2018 12:24 PM by Nick Beck MD     She had a CT scan of the chest and abdomen done on 3/09/17 as a routine followup after her surgery for uterine cancer in December of 2013.  The CT scan showed evidence of an abnormal left thyroid lobe that was enlarged with several nodules.    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 CTA cor a calcifications dr porras  Echo 4/18 pul a 4.2 mildly dilatd aortic root         Coronary artery disease involving native coronary artery of native heart without angina pectoris    Overview Signed 4/8/2018 12:22 PM by Nick Beck MD     Calcifications seen on cat scan 7/17  Stress echo neg 7/17 rbbb occ vpc neg ischemia         Dependence on nicotine from cigarettes    APC (atrial premature contractions)    PAF (paroxysmal atrial fibrillation) (CMS/HCC) ([facility])    Overview Signed 4/24/2018  4:43 PM by Nick Beck MD     Monitor 4/18 short bursts paf .28% of total               Allergy    Penicillin; Pravastatin sodium; and Rosuvastatin calcium          MED LIST       Current Outpatient Prescriptions   Medication Sig Dispense Refill   • ezetimibe (ZETIA) 10 mg tablet 10 mg 2 (two) times a week.       • LORazepam (ATIVAN) 0.5 mg tablet Take 0.5 mg by mouth every 6 (six) hours as needed.     • meloxicam (MOBIC) 15 mg tablet 15 mg. 1/2 bid      • pantoprazole (PROTONIX) 40 mg EC tablet 40 mg.     • apixaban (ELIQUIS) 5 mg tablet Take 1 tablet (5 mg total) by mouth 2 (two) times a day. 180 tablet 5   • metoprolol succinate XL (TOPROL-XL) 25 mg 24 hr tablet Take 1 tablet (25 mg total) by mouth See administration instructions for further information. 25 mg at night 1/2 im am 90 tablet 5     No current facility-administered medications for this visit.                 Review of Systems   Constitution: Negative for malaise/fatigue, weight gain and weight loss.   HENT: Negative for hearing loss and hoarse voice.    Eyes: Negative for visual disturbance.    Cardiovascular: Positive for palpitations. Negative for chest pain, claudication, cyanosis, dyspnea on exertion, irregular heartbeat, leg swelling, near-syncope, orthopnea, paroxysmal nocturnal dyspnea and syncope.   Respiratory: Negative for cough, hemoptysis, shortness of breath, sleep disturbances due to breathing, snoring, sputum production and wheezing.    Endocrine: Negative for cold intolerance and heat intolerance.   Hematologic/Lymphatic: Negative.  Negative for bleeding problem. Does not bruise/bleed easily.   Skin: Negative.  Negative for rash.   Musculoskeletal: Negative for arthritis, falls, joint pain, muscle cramps and myalgias.   Gastrointestinal: Negative for abdominal pain, anorexia, change in bowel habit, constipation, diarrhea, dysphagia, heartburn, jaundice and nausea.   Genitourinary: Negative for frequency and nocturia.   Neurological: Negative for dizziness, focal weakness, headaches, light-headedness, numbness, tremors and vertigo.   Psychiatric/Behavioral: Negative for memory loss. The patient does not have insomnia and is not nervous/anxious.    Allergic/Immunologic: Negative for hives.       Labs   Lab Results   Component Value Date    WBC 5.6 04/06/2018    HGB 15.0 04/06/2018    HCT 44.5 04/06/2018     04/06/2018    CHOL 175 04/06/2018    TRIG 90 04/06/2018    HDL 73 04/06/2018    LDLCALC 84 04/06/2018    ALT 15 04/06/2018    AST 17 04/06/2018     04/06/2018    K 5.8 (H) 04/06/2018     04/06/2018    CREATININE 0.68 04/06/2018    CO2 32 (H) 04/06/2018    TSH 1.35 10/02/2017       Lab Results   Component Value Date    GLUCOSE 87 04/06/2018    CALCIUM 9.5 04/06/2018     04/06/2018    K 5.8 (H) 04/06/2018    CO2 32 (H) 04/06/2018     04/06/2018    CREATININE 0.68 04/06/2018         Objective   Vitals:    05/02/18 0949   BP: 102/68   Pulse: 62   SpO2: 95%     Physical Exam   Constitutional: She is oriented to person, place, and time. She appears well-developed  and well-nourished. No distress.   HENT:   Head: Normocephalic and atraumatic.   Nose: Nose normal.   Eyes: Conjunctivae are normal. No scleral icterus.   Neck: No JVD present.   Cardiovascular: Normal rate, regular rhythm, normal heart sounds and intact distal pulses.  Exam reveals no gallop and no friction rub.    No murmur heard.  Pulmonary/Chest: Effort normal. No stridor. No respiratory distress. She has no wheezes. She has no rales. She exhibits no tenderness.   Abdominal: There is no tenderness.   Musculoskeletal: She exhibits no edema or deformity.   Neurological: She is alert and oriented to person, place, and time.   Skin: Skin is warm and dry.   Psychiatric: She has a normal mood and affect.         Cardiac Procedures  Chest CTA April 2018 no change from 2017 dilated pulmonary artery 4.7 thyroid nodules coronary artery calcifications renal cyst Lrhyr mass     Stress echocardiogram March 2017 moderate pulmonic insufficiency pulmonary artery for mild mitral regurgitation negative for ischemia     Echo 4/18 pulm a 4.2 aorta 3.7cm     Ultrasound thyroid July 2017 11 mm nodule          EKG      Assessment/Plan:      PAF (paroxysmal atrial fibrillation) (CMS/HCC) (HCC)  Frequent APCs on EKG last visit so I placed the monitor that showed short bursts of nonsustained atrial fibrillation about 1% of total heartbeats went over risks and benefits and began anticoagulation with eliquis for prevention of stroke  She has difficulties with Toprol on having some GI upset but agrees to increase the dose slightly she will take 12-1/2 in the morning and 20 5 at night  He has no known obstructive CAD with negative ischemic evaluation, stress echo in July 2017    Pulmonary artery abnormality  Follow-up CT angiogram chest performed aortic pulmonary artery 4.5 unchanged thyroid mass unchanged follows with Dr. Womack due for repeat imaging in 1 year    Multinodular goiter  She has had biopsy in 2017 that was negative she  follows with Dr. Ha Wheatley    Coronary artery disease involving native coronary artery of native heart without angina pectoris  This was diagnosed by calcifications seen on her CAT scan in the past stress echocardiogram negative for ischemia July 2017 is on statin therapy she can only tolerate low-dose zetia    Hyperlipidemia  She does have probable nonobstructive CAD based on calcification seen on CAT scan negative ischemic workup in July 2017 does not tolerate statins can only tolerate low-dose zetia LDL cholesterol 80         I increased her Toprol slightly since she has some mild intolerance to both GI and some fatigue  I asked her to take 25 mg at night 12-1/2 in the morning and we started on anticoagulation for paroxysmal atrial fibrillation, with eliquis  She is due for follow-up CTA in April 2019 her pulmonary artery dilatation stable  I will see her back in 3 months to see how she is doing    Thank you for allowing me to participate in the care of this patient.  I hope this information is helpful.    Nick Beck MD [facility]   5/2/2018  Cc Kunal La DO

## 2018-05-01 ENCOUNTER — OFFICE VISIT (OUTPATIENT)
Dept: CARDIOLOGY | Facility: CLINIC | Age: 83
End: 2018-05-01
Attending: THORACIC SURGERY (CARDIOTHORACIC VASCULAR SURGERY)
Payer: MEDICARE

## 2018-05-01 VITALS
HEART RATE: 60 BPM | RESPIRATION RATE: 16 BRPM | WEIGHT: 147 LBS | HEIGHT: 60 IN | BODY MASS INDEX: 28.86 KG/M2 | SYSTOLIC BLOOD PRESSURE: 128 MMHG | OXYGEN SATURATION: 96 % | DIASTOLIC BLOOD PRESSURE: 78 MMHG

## 2018-05-01 DIAGNOSIS — I28.1 PULMONARY ARTERY ANEURYSM (CMS/HCC): Primary | ICD-10-CM

## 2018-05-01 PROCEDURE — 99213 OFFICE O/P EST LOW 20 MIN: CPT | Performed by: PHYSICIAN ASSISTANT

## 2018-05-01 ASSESSMENT — ENCOUNTER SYMPTOMS
EYES NEGATIVE: 1
RESPIRATORY NEGATIVE: 1
NEUROLOGICAL NEGATIVE: 1
MUSCULOSKELETAL NEGATIVE: 1
ENDOCRINE NEGATIVE: 1
CONSTITUTIONAL NEGATIVE: 1
PALPITATIONS: 1
PSYCHIATRIC NEGATIVE: 1
ALLERGIC/IMMUNOLOGIC NEGATIVE: 1
GASTROINTESTINAL NEGATIVE: 1

## 2018-05-01 NOTE — LETTER
"May 3, 2018     Nick Beck MD  [address] [facility] [place] [address]    Patient: Teresa Bird   YOB: 1935   Date of Visit: 5/1/2018       Dear Dr. Beck:    Thank you for referring Teresa Bird to me for evaluation. Below are my notes for this consultation.    If you have questions, please do not hesitate to call me. I look forward to following your patient along with you.         Sincerely,        Junior Fajardo MD        CC: DO Carey Betancourt PA C  5/1/2018 11:32 AM  Sign at close encounter     Cardiology Note       Reason for visit:   Chief Complaint   Patient presents with   • Follow-up     Dialted Pulmonary Aneurysm      HPI    Teresa Bird is a 82 y.o. female who presents for annual follow-up due to a known pulmonary artery aneurysm.  The patient currently denies any complaints of chest pain, lightheadedness, shortness of breath.  She follows regularly with Dr. Beck and informs me she was recently diagnosed with atrial fibrillation.  She currently does not note palpitations but was having symptoms previously.  She noted it as intermittent palpitations and \"gurgling\" which she had previously attributed to acid reflux.     Past Medical History:   Diagnosis Date   • Arthritis    • Cancer, uterine (CMS/HCC) (HCC)    • Closed right ankle fracture age 62   • GERD (gastroesophageal reflux disease)    • Lipid disorder     difficulty tolerating statins   • Melanoma (CMS/HCC) (HCC)     left shoulder   • Osteoporosis     did not tolerate fosamax     Past Surgical History:   Procedure Laterality Date   • CHOLECYSTECTOMY     • HYSTERECTOMY  12/2013     Penicillin; Pravastatin sodium; and Rosuvastatin calcium  Current Outpatient Prescriptions   Medication Sig Dispense Refill   • aspirin (ASPIR-81) 81 mg enteric coated tablet 81 mg.     • ezetimibe (ZETIA) 10 mg tablet 10 mg 2 (two) times a week.       • LORazepam (ATIVAN) 0.5 mg tablet Take 0.5 mg " by mouth every 6 (six) hours as needed.     • meloxicam (MOBIC) 15 mg tablet 15 mg.     • metoprolol succinate XL (TOPROL-XL) 25 mg 24 hr tablet Take 1 tablet (25 mg total) by mouth daily. (Patient taking differently: Take 12.5 mg by mouth 2 (two) times a day.  ) 90 tablet 2   • pantoprazole (PROTONIX) 40 mg EC tablet 40 mg.       No current facility-administered medications for this visit.      Social History     Social History   • Marital status:      Spouse name: N/A   • Number of children: N/A   • Years of education: N/A     Social History Main Topics   • Smoking status: Current Some Day Smoker   • Smokeless tobacco: Never Used   • Alcohol use Yes   • Drug use: No   • Sexual activity: Defer     Other Topics Concern   • None     Social History Narrative   • None     Family History   Problem Relation Age of Onset   • Heart attack Mother    • Diabetes Mother    • Lung cancer Mother    • COPD Mother    • Diabetes Father    • Prostate cancer Brother          Review of Systems   Constitution: Negative.   HENT: Negative.    Eyes: Negative.    Cardiovascular: Positive for palpitations.   Respiratory: Negative.    Endocrine: Negative.    Skin: Negative.    Musculoskeletal: Negative.    Gastrointestinal: Negative.    Genitourinary: Negative.    Neurological: Negative.    Psychiatric/Behavioral: Negative.    Allergic/Immunologic: Negative.       Objective    Vitals:    05/01/18 1102   BP: 128/78   Pulse: 60   Resp: 16   SpO2: 96%      Physical Exam   Constitutional: She is oriented to person, place, and time. She appears well-developed and well-nourished. No distress.   HENT:   Head: Normocephalic and atraumatic.   Right Ear: External ear normal.   Left Ear: External ear normal.   Nose: Nose normal.   Mouth/Throat: Oropharynx is clear and moist.   Eyes: Conjunctivae and EOM are normal. Pupils are equal, round, and reactive to light.   Neck: Normal range of motion. Neck supple.   Cardiovascular: Normal rate, regular  rhythm and intact distal pulses.  Exam reveals no gallop and no friction rub.    No murmur heard.  Pulmonary/Chest: Effort normal. No respiratory distress. She has no wheezes. She has no rales. She exhibits no tenderness.   Abdominal: Soft. Bowel sounds are normal. She exhibits no distension and no mass. There is no tenderness. There is no rebound and no guarding.   Musculoskeletal: Normal range of motion. She exhibits no edema or tenderness.   Neurological: She is alert and oriented to person, place, and time. She has normal reflexes.   Skin: Skin is warm and dry. No rash noted. She is not diaphoretic. No erythema. No pallor.   Psychiatric: She has a normal mood and affect. Her behavior is normal. Judgment and thought content normal.               Imaging  CTA of the chest performed on 4/24/2018 was reviewed.  There is no significant interval change in the size of the pulmonary artery.  At maximum, it measures 4.6-4.7 cm.     Assessment/Plan    No problem-specific Assessment & Plan notes found for this encounter.      Ms. 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  She remains asymptomatic in this regard.  She is following with Dr. Beck for management of new onset atrial fibrillation.  I did review her CTA of the chest which shows no significant interval change, however this will be reviewed with Dr. Fajardo and we will call the patient.  We are planning to continue with annual follow-up and repeat CTA of the chest at that time.  She was advised to contact us with any questions or concerns in the meantime.          Thank you for allowing me to participate in the care of this patient.  I hope this information is helpful.     ETHEL Cody 5/1/2018  11:28 AM

## 2018-05-01 NOTE — PROGRESS NOTES
"   Cardiology Note       Reason for visit:   Chief Complaint   Patient presents with   • Follow-up     Dialted Pulmonary Aneurysm      HPI    Teresa Bird is a 82 y.o. female who presents for annual follow-up due to a known pulmonary artery aneurysm.  The patient currently denies any complaints of chest pain, lightheadedness, shortness of breath.  She follows regularly with Dr. Beck and informs me she was recently diagnosed with atrial fibrillation.  She currently does not note palpitations but was having symptoms previously.  She noted it as intermittent palpitations and \"gurgling\" which she had previously attributed to acid reflux.     Past Medical History:   Diagnosis Date   • Arthritis    • Cancer, uterine (CMS/HCC) (HCC)    • Closed right ankle fracture age 62   • GERD (gastroesophageal reflux disease)    • Lipid disorder     difficulty tolerating statins   • Melanoma (CMS/HCC) (HCC)     left shoulder   • Osteoporosis     did not tolerate fosamax     Past Surgical History:   Procedure Laterality Date   • CHOLECYSTECTOMY     • HYSTERECTOMY  12/2013     Penicillin; Pravastatin sodium; and Rosuvastatin calcium  Current Outpatient Prescriptions   Medication Sig Dispense Refill   • aspirin (ASPIR-81) 81 mg enteric coated tablet 81 mg.     • ezetimibe (ZETIA) 10 mg tablet 10 mg 2 (two) times a week.       • LORazepam (ATIVAN) 0.5 mg tablet Take 0.5 mg by mouth every 6 (six) hours as needed.     • meloxicam (MOBIC) 15 mg tablet 15 mg.     • metoprolol succinate XL (TOPROL-XL) 25 mg 24 hr tablet Take 1 tablet (25 mg total) by mouth daily. (Patient taking differently: Take 12.5 mg by mouth 2 (two) times a day.  ) 90 tablet 2   • pantoprazole (PROTONIX) 40 mg EC tablet 40 mg.       No current facility-administered medications for this visit.      Social History     Social History   • Marital status:      Spouse name: N/A   • Number of children: N/A   • Years of education: N/A     Social History Main Topics "   • Smoking status: Current Some Day Smoker   • Smokeless tobacco: Never Used   • Alcohol use Yes   • Drug use: No   • Sexual activity: Defer     Other Topics Concern   • None     Social History Narrative   • None     Family History   Problem Relation Age of Onset   • Heart attack Mother    • Diabetes Mother    • Lung cancer Mother    • COPD Mother    • Diabetes Father    • Prostate cancer Brother          Review of Systems   Constitution: Negative.   HENT: Negative.    Eyes: Negative.    Cardiovascular: Positive for palpitations.   Respiratory: Negative.    Endocrine: Negative.    Skin: Negative.    Musculoskeletal: Negative.    Gastrointestinal: Negative.    Genitourinary: Negative.    Neurological: Negative.    Psychiatric/Behavioral: Negative.    Allergic/Immunologic: Negative.       Objective    Vitals:    05/01/18 1102   BP: 128/78   Pulse: 60   Resp: 16   SpO2: 96%      Physical Exam   Constitutional: She is oriented to person, place, and time. She appears well-developed and well-nourished. No distress.   HENT:   Head: Normocephalic and atraumatic.   Right Ear: External ear normal.   Left Ear: External ear normal.   Nose: Nose normal.   Mouth/Throat: Oropharynx is clear and moist.   Eyes: Conjunctivae and EOM are normal. Pupils are equal, round, and reactive to light.   Neck: Normal range of motion. Neck supple.   Cardiovascular: Normal rate, regular rhythm and intact distal pulses.  Exam reveals no gallop and no friction rub.    No murmur heard.  Pulmonary/Chest: Effort normal. No respiratory distress. She has no wheezes. She has no rales. She exhibits no tenderness.   Abdominal: Soft. Bowel sounds are normal. She exhibits no distension and no mass. There is no tenderness. There is no rebound and no guarding.   Musculoskeletal: Normal range of motion. She exhibits no edema or tenderness.   Neurological: She is alert and oriented to person, place, and time. She has normal reflexes.   Skin: Skin is warm and  dry. No rash noted. She is not diaphoretic. No erythema. No pallor.   Psychiatric: She has a normal mood and affect. Her behavior is normal. Judgment and thought content normal.               Imaging  CTA of the chest performed on 4/24/2018 was reviewed.  There is no significant interval change in the size of the pulmonary artery.  At maximum, it measures 4.6-4.7 cm.     Assessment/Plan    No problem-specific Assessment & Plan notes found for this encounter.      Ms. 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  She remains asymptomatic in this regard.  She is following with Dr. Beck for management of new onset atrial fibrillation.  I did review her CTA of the chest which shows no significant interval change, however this will be reviewed with Dr. Fajardo and we will call the patient.  We are planning to continue with annual follow-up and repeat CTA of the chest at that time.  She was advised to contact us with any questions or concerns in the meantime.          Thank you for allowing me to participate in the care of this patient.  I hope this information is helpful.     ETHEL Cody 5/1/2018  11:28 AM

## 2018-05-02 ENCOUNTER — OFFICE VISIT (OUTPATIENT)
Dept: CARDIOLOGY | Facility: CLINIC | Age: 83
End: 2018-05-02
Payer: MEDICARE

## 2018-05-02 VITALS
HEART RATE: 62 BPM | SYSTOLIC BLOOD PRESSURE: 102 MMHG | BODY MASS INDEX: 28.71 KG/M2 | OXYGEN SATURATION: 95 % | WEIGHT: 147 LBS | DIASTOLIC BLOOD PRESSURE: 68 MMHG

## 2018-05-02 DIAGNOSIS — I25.10 CORONARY ARTERY DISEASE INVOLVING NATIVE CORONARY ARTERY OF NATIVE HEART WITHOUT ANGINA PECTORIS: ICD-10-CM

## 2018-05-02 DIAGNOSIS — F17.210 CIGARETTE NICOTINE DEPENDENCE WITHOUT COMPLICATION: Primary | ICD-10-CM

## 2018-05-02 DIAGNOSIS — Q25.79 PULMONARY ARTERY ABNORMALITY: ICD-10-CM

## 2018-05-02 DIAGNOSIS — I49.1 APC (ATRIAL PREMATURE CONTRACTIONS): ICD-10-CM

## 2018-05-02 DIAGNOSIS — C53.0 MALIGNANT NEOPLASM OF ENDOCERVIX (CMS/HCC): ICD-10-CM

## 2018-05-02 DIAGNOSIS — I48.0 PAF (PAROXYSMAL ATRIAL FIBRILLATION) (CMS/HCC): ICD-10-CM

## 2018-05-02 PROCEDURE — 99214 OFFICE O/P EST MOD 30 MIN: CPT | Performed by: INTERNAL MEDICINE

## 2018-05-02 RX ORDER — METOPROLOL SUCCINATE 25 MG/1
25 TABLET, EXTENDED RELEASE ORAL SEE ADMIN INSTRUCTIONS
Qty: 90 TABLET | Refills: 5 | Status: SHIPPED | OUTPATIENT
Start: 2018-05-02 | End: 2018-11-01 | Stop reason: DRUGHIGH

## 2018-05-02 ASSESSMENT — ENCOUNTER SYMPTOMS
HEMOPTYSIS: 0
NERVOUS/ANXIOUS: 0
WEIGHT LOSS: 0
VERTIGO: 0
NEAR-SYNCOPE: 0
IRREGULAR HEARTBEAT: 0
BRUISES/BLEEDS EASILY: 0
SLEEP DISTURBANCES DUE TO BREATHING: 0
HEADACHES: 0
FOCAL WEAKNESS: 0
NUMBNESS: 0
HOARSE VOICE: 0
DYSPNEA ON EXERTION: 0
SHORTNESS OF BREATH: 0
CHANGE IN BOWEL HABIT: 0
NAUSEA: 0
MUSCLE CRAMPS: 0
COUGH: 0
FREQUENCY: 0
ORTHOPNEA: 0
PALPITATIONS: 1
WHEEZING: 0
HEARTBURN: 0
TREMORS: 0
FALLS: 0
DIZZINESS: 0
SNORING: 0
HEMATOLOGIC/LYMPHATIC NEGATIVE: 1
PND: 0
SPUTUM PRODUCTION: 0
ABDOMINAL PAIN: 0
ANOREXIA: 0
SYNCOPE: 0
DIARRHEA: 0
CONSTIPATION: 0
JAUNDICE: 0
MEMORY LOSS: 0
WEIGHT GAIN: 0
CLAUDICATION: 0
INSOMNIA: 0
MYALGIAS: 0
LIGHT-HEADEDNESS: 0

## 2018-05-02 NOTE — ASSESSMENT & PLAN NOTE
Frequent APCs on EKG last visit so I placed the monitor that showed short bursts of nonsustained atrial fibrillation about 1% of total heartbeats went over risks and benefits and began anticoagulation with eliquis for prevention of stroke  She has difficulties with Toprol on having some GI upset but agrees to increase the dose slightly she will take 12-1/2 in the morning and 20 5 at night  He has no known obstructive CAD with negative ischemic evaluation, stress echo in July 2017

## 2018-05-02 NOTE — LETTER
May 2, 2018     Kunal La DO  [address] [facility] [place] [address]    Patient: Teresa Bird   YOB: 1935   Date of Visit: 5/2/2018       Dear Dr. La:    Thank you for referring Teresa Bird to me for evaluation. Below are my notes for this consultation.    If you have questions, please do not hesitate to call me. I look forward to following your patient along with you.         Sincerely,        Nick Beck MD        CC: MD Usha No MD Andrea J. Becker, MD  5/2/2018 10:46 AM  Sign at close encounter      Cardiology Note    Kunal La DO        Teresa Bird is a 82 y.o. 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 she also has thyroid nodules with negative biopsy evaluated by Dr.El Wheatley in the past    Echocardiogram done April 2018 pulmonary artery stable 4.2 cm normal LV systolic function top normal aortic root size    CT angiogram done prior to this visit showed stable dilated pulmonary artery 4.5 and thyroid mass in the left side this is known to be goiter had negative biopsy in the past follows with Dr.El Wheatley    She had a negative ischemic workup with stress echocardiogram March 2017 showed moderate pulmonic insufficiency and mild mitral regurgitation    Patient Active Problem List    Diagnosis Date Noted   • PAF (paroxysmal atrial fibrillation) (CMS/HCC) (HCC) 04/24/2018   • APC (atrial premature contractions) 04/16/2018   • Coronary artery disease involving native coronary artery of native heart without angina pectoris 04/08/2018   • Dependence on nicotine from cigarettes 04/08/2018   • Multinodular goiter 03/13/2018   • Hyperlipidemia 03/13/2018   • Pulmonary artery  abnormality 04/04/2017   • Malignant neoplasm of uterus (CMS/HCC) (HCC) 03/22/2017     Problem List     Multinodular goiter    Overview Addendum 4/8/2018 12:24 PM by Nick Beck MD     She had a CT scan of the chest and abdomen done on 3/09/17 as a routine followup after her surgery for uterine cancer in December of 2013.  The CT scan showed evidence of an abnormal left thyroid lobe that was enlarged with several nodules.    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 CTA cor a calcifications dr porras  Echo 4/18 pul a 4.2 mildly dilatd aortic root         Coronary artery disease involving native coronary artery of native heart without angina pectoris    Overview Signed 4/8/2018 12:22 PM by Nick Beck MD     Calcifications seen on cat scan 7/17  Stress echo neg 7/17 rbbb occ vpc neg ischemia         Dependence on nicotine from cigarettes    APC (atrial premature contractions)    PAF (paroxysmal atrial fibrillation) (CMS/HCC) (HCC)    Overview Signed 4/24/2018  4:43 PM by Nick Beck MD     Monitor 4/18 short bursts paf .28% of total               Allergy    Penicillin; Pravastatin sodium; and Rosuvastatin calcium          MED LIST       Current Outpatient Prescriptions   Medication Sig Dispense Refill   • ezetimibe (ZETIA) 10 mg tablet 10 mg 2 (two) times a week.       • LORazepam (ATIVAN) 0.5 mg tablet Take 0.5 mg by mouth every 6 (six) hours as needed.     • meloxicam (MOBIC) 15 mg tablet 15 mg. 1/2 bid      • pantoprazole (PROTONIX) 40 mg EC tablet 40  mg.     • apixaban (ELIQUIS) 5 mg tablet Take 1 tablet (5 mg total) by mouth 2 (two) times a day. 180 tablet 5   • metoprolol succinate XL (TOPROL-XL) 25 mg 24 hr tablet Take 1 tablet (25 mg total) by mouth See administration instructions for further information. 25 mg at night 1/2 im am 90 tablet 5     No current facility-administered medications for this visit.                 Review of Systems   Constitution: Negative for malaise/fatigue, weight gain and weight loss.   HENT: Negative for hearing loss and hoarse voice.    Eyes: Negative for visual disturbance.   Cardiovascular: Positive for palpitations. Negative for chest pain, claudication, cyanosis, dyspnea on exertion, irregular heartbeat, leg swelling, near-syncope, orthopnea, paroxysmal nocturnal dyspnea and syncope.   Respiratory: Negative for cough, hemoptysis, shortness of breath, sleep disturbances due to breathing, snoring, sputum production and wheezing.    Endocrine: Negative for cold intolerance and heat intolerance.   Hematologic/Lymphatic: Negative.  Negative for bleeding problem. Does not bruise/bleed easily.   Skin: Negative.  Negative for rash.   Musculoskeletal: Negative for arthritis, falls, joint pain, muscle cramps and myalgias.   Gastrointestinal: Negative for abdominal pain, anorexia, change in bowel habit, constipation, diarrhea, dysphagia, heartburn, jaundice and nausea.   Genitourinary: Negative for frequency and nocturia.   Neurological: Negative for dizziness, focal weakness, headaches, light-headedness, numbness, tremors and vertigo.   Psychiatric/Behavioral: Negative for memory loss. The patient does not have insomnia and is not nervous/anxious.    Allergic/Immunologic: Negative for hives.       Labs   Lab Results   Component Value Date    WBC 5.6 04/06/2018    HGB 15.0 04/06/2018    HCT 44.5 04/06/2018     04/06/2018    CHOL 175 04/06/2018    TRIG 90 04/06/2018    HDL 73 04/06/2018    LDLCALC 84 04/06/2018    ALT 15  04/06/2018    AST 17 04/06/2018     04/06/2018    K 5.8 (H) 04/06/2018     04/06/2018    CREATININE 0.68 04/06/2018    CO2 32 (H) 04/06/2018    TSH 1.35 10/02/2017       Lab Results   Component Value Date    GLUCOSE 87 04/06/2018    CALCIUM 9.5 04/06/2018     04/06/2018    K 5.8 (H) 04/06/2018    CO2 32 (H) 04/06/2018     04/06/2018    CREATININE 0.68 04/06/2018         Objective   Vitals:    05/02/18 0949   BP: 102/68   Pulse: 62   SpO2: 95%     Physical Exam   Constitutional: She is oriented to person, place, and time. She appears well-developed and well-nourished. No distress.   HENT:   Head: Normocephalic and atraumatic.   Nose: Nose normal.   Eyes: Conjunctivae are normal. No scleral icterus.   Neck: No JVD present.   Cardiovascular: Normal rate, regular rhythm, normal heart sounds and intact distal pulses.  Exam reveals no gallop and no friction rub.    No murmur heard.  Pulmonary/Chest: Effort normal. No stridor. No respiratory distress. She has no wheezes. She has no rales. She exhibits no tenderness.   Abdominal: There is no tenderness.   Musculoskeletal: She exhibits no edema or deformity.   Neurological: She is alert and oriented to person, place, and time.   Skin: Skin is warm and dry.   Psychiatric: She has a normal mood and affect.         Cardiac Procedures  Chest CTA April 2018 no change from 2017 dilated pulmonary artery 4.7 thyroid nodules coronary artery calcifications renal cyst Lrhyr mass     Stress echocardiogram March 2017 moderate pulmonic insufficiency pulmonary artery for mild mitral regurgitation negative for ischemia     Echo 4/18 pulm a 4.2 aorta 3.7cm     Ultrasound thyroid July 2017 11 mm nodule          EKG      Assessment/Plan:      PAF (paroxysmal atrial fibrillation) (CMS/HCC) (HCC)  Frequent APCs on EKG last visit so I placed the monitor that showed short bursts of nonsustained atrial fibrillation about 1% of total heartbeats went over risks and benefits  and began anticoagulation with eliquis for prevention of stroke  She has difficulties with Toprol on having some GI upset but agrees to increase the dose slightly she will take 12-1/2 in the morning and 20 5 at night  He has no known obstructive CAD with negative ischemic evaluation, stress echo in July 2017    Pulmonary artery abnormality  Follow-up CT angiogram chest performed aortic pulmonary artery 4.5 unchanged thyroid mass unchanged follows with Dr. Womack due for repeat imaging in 1 year    Multinodular goiter  She has had biopsy in 2017 that was negative she follows with Dr. Ha Wheatley    Coronary artery disease involving native coronary artery of native heart without angina pectoris  This was diagnosed by calcifications seen on her CAT scan in the past stress echocardiogram negative for ischemia July 2017 is on statin therapy she can only tolerate low-dose zetia    Hyperlipidemia  She does have probable nonobstructive CAD based on calcification seen on CAT scan negative ischemic workup in July 2017 does not tolerate statins can only tolerate low-dose zetia LDL cholesterol 80         I increased her Toprol slightly since she has some mild intolerance to both GI and some fatigue  I asked her to take 25 mg at night 12-1/2 in the morning and we started on anticoagulation for paroxysmal atrial fibrillation, with eliquis  She is due for follow-up CTA in April 2019 her pulmonary artery dilatation stable  I will see her back in 3 months to see how she is doing    Thank you for allowing me to participate in the care of this patient.  I hope this information is helpful.    Nick Beck MD [facility]   5/2/2018  Cc Kunal La DO

## 2018-05-02 NOTE — ASSESSMENT & PLAN NOTE
She does have probable nonobstructive CAD based on calcification seen on CAT scan negative ischemic workup in July 2017 does not tolerate statins can only tolerate low-dose zetia LDL cholesterol 80

## 2018-05-02 NOTE — ASSESSMENT & PLAN NOTE
This was diagnosed by calcifications seen on her CAT scan in the past stress echocardiogram negative for ischemia July 2017 is on statin therapy she can only tolerate low-dose zetia

## 2018-05-02 NOTE — ASSESSMENT & PLAN NOTE
Follow-up CT angiogram chest performed aortic pulmonary artery 4.5 unchanged thyroid mass unchanged follows with Dr. Womack due for repeat imaging in 1 year

## 2018-05-08 ENCOUNTER — TELEPHONE (OUTPATIENT)
Dept: SCHEDULING | Facility: CLINIC | Age: 83
End: 2018-05-08

## 2018-05-08 NOTE — TELEPHONE ENCOUNTER
LMOM for pharmacy staff that we have not rec'd any prior auth forms from them regarding eliquis. May call  [phone] as needed or fax forms to [phone]

## 2018-05-08 NOTE — TELEPHONE ENCOUNTER
Pt is calling because she said that [facility] has been faxing over prior authorization forms that need to be filled out for the med, eliquis.  Please fill out forms, fax, and Please call once this is completed.  Thank you.

## 2018-05-10 ENCOUNTER — TELEPHONE (OUTPATIENT)
Dept: CARDIOTHORACIC SURGERY | Facility: CLINIC | Age: 83
End: 2018-05-10

## 2018-05-10 ENCOUNTER — TELEPHONE (OUTPATIENT)
Dept: CARDIOLOGY | Facility: CLINIC | Age: 83
End: 2018-05-10

## 2018-05-10 NOTE — TELEPHONE ENCOUNTER
Called and left patient a message on her direct line- advised that her findings by CT were reviewed by Dr. Jacobs and he agrees no change has been noted, stable. Advised patient to continue with the plan to see her in one year.

## 2018-05-10 NOTE — TELEPHONE ENCOUNTER
Fax received regarding denial for coverage on pt eliquis. The drug approved would be xarelto due to medicare formulary list.. Her creat cl. 69.46. She could be switched to xarelto 20mg daily. For PAF.    Spoke to pt by phone- I will supply her with Samples for 2 more weeks and she still has 1 week supply for a total of 3 weeks. When Dr. Beck returns 5/21/18 we will address RX change. Pt states agreement with this plan.     Additionally, discussed drug interaction with meloxicam and eliquis.

## 2018-05-22 ENCOUNTER — TELEPHONE (OUTPATIENT)
Dept: SCHEDULING | Facility: CLINIC | Age: 83
End: 2018-05-22

## 2018-05-22 NOTE — TELEPHONE ENCOUNTER
----- Message from Magaly Akhtar RN sent at 5/22/2018  6:15 PM EDT -----  Pt prior auth for eliquis denies. No containdication to Xarelto and that is covered by insur. Can I order her xarelto 20mg po daily? I have creat clearance is 69.46. Dx is PAF.

## 2018-05-22 NOTE — TELEPHONE ENCOUNTER
Dr Beck pt. calling Magaly Steel pt is back from trip and wanted to touch base w/ Office about medication option

## 2018-05-23 NOTE — TELEPHONE ENCOUNTER
Called in rx xarelto 20mg daily to pharmacy and called pt to make aware, discussed dosing instructions. Renewed toprol xl rx also. Pt states no further needs. Discontinues eliquis, pt states aware to not take eliquis the day she begins xarelto.

## 2018-07-26 NOTE — PROGRESS NOTES
Cardiology Note    Kunal La DO          Teresa Bird is a 82 y.o. female 1935  She returns for follow-up of paroxysmal atrial fibrillation picked up by cardiac monitor in May   she complained of palpitations and monitor showed a 1% atrial fibrillation at that time I started beta-blocker and anticoagulation  She is followed for idiopathic dilated pulmonary artery and sees Dr. Fajardo it was last imaged April 2018 due for follow-up study April 2019    She has CAD based on calcifications seen on CAT scan with negative ischemic evaluation, stress echo July 2017 she does not tolerate statins she is on zetia  she does not tolerate the zetia with fatigue and muscle aches.  She continues to smoke                 Patient Active Problem List    Diagnosis Date Noted   • RBBB 08/02/2018   • Hyperkalemia 08/02/2018   • PAF (paroxysmal atrial fibrillation) (CMS/HCC) (HCC) 04/24/2018   • APC (atrial premature contractions) 04/16/2018   • Coronary artery disease involving native coronary artery of native heart without angina pectoris 04/08/2018   • Dependence on nicotine from cigarettes 04/08/2018   • Multinodular goiter 03/13/2018   • Mixed hyperlipidemia 03/13/2018   • Pulmonary artery abnormality 04/04/2017   • Malignant neoplasm of uterus (CMS/HCC) (HCC) 03/22/2017     Problem List     Multinodular goiter    Overview Addendum 5/2/2018  7:12 AM by Nick Beck MD     She had a CT scan of the chest and abdomen done on 3/09/17 as a routine followup after her surgery for uterine cancer in December of 2013.  The CT scan showed evidence of an abnormal left thyroid lobe that was enlarged with several nodules.    FNA of 3.7 cm left thyroid nodule was done on 9/6/17    Office ultrasound 3/13/17 shows stable  Nodules  Dr Ha Lloyd             Hyperlipidemia    Overview Signed 3/13/2018 10:48 PM by Usha Nobles MD     Did not tolerate statins         Malignant neoplasm of uterus (CMS/HCC) (HCC)    Overview  Addendum 4/8/2018 12:24 PM by Nick Beck MD     2013         Pulmonary artery abnormality    Overview Addendum 5/2/2018  7:02 AM by Nick Beck MD     Pulmonary artery aneurysm CTA April 2018 NO GRETTA TEMPLETON 45022.7 3/17 CTA cor a calcifications  l THYORID MASSdr plestis  Echo 4/18 pul a 4.2 mildly dilatd aortic root         Coronary artery disease involving native coronary artery of native heart without angina pectoris    Overview Signed 4/8/2018 12:22 PM by Nick Beck MD     Calcifications seen on cat scan 7/17  Stress echo neg 7/17 rbbb occ vpc neg ischemia         Dependence on nicotine from cigarettes    APC (atrial premature contractions)    PAF (paroxysmal atrial fibrillation) (CMS/HCC) (HCC)    Overview Signed 4/24/2018  4:43 PM by Nick Beck MD     Monitor 4/18 short bursts paf .28% of total               Allergy    Penicillin; Pravastatin sodium; Rosuvastatin calcium; and Zetia [ezetimibe]          MED LIST       Current Outpatient Prescriptions   Medication Sig Dispense Refill   • LORazepam (ATIVAN) 0.5 mg tablet Take 0.5 mg by mouth every 6 (six) hours as needed.     • metoprolol succinate XL (TOPROL-XL) 25 mg 24 hr tablet Take 1 tablet (25 mg total) by mouth See administration instructions for further information. 25 mg at night 1/2 im am 90 tablet 5   • pantoprazole (PROTONIX) 40 mg EC tablet 40 mg.     • rivaroxaban (XARELTO) 20 mg tablet Take 20 mg by mouth daily with dinner.     • meloxicam (MOBIC) 15 mg tablet 15 mg. 1/2 bid        No current facility-administered medications for this visit.                 Review of Systems   Constitution: Negative for malaise/fatigue, weight gain and weight loss.   HENT: Negative for hearing loss and hoarse voice.    Eyes: Negative for visual disturbance.   Cardiovascular: Negative for chest pain, claudication, cyanosis, dyspnea on exertion, irregular heartbeat, leg swelling, near-syncope, orthopnea, palpitations, paroxysmal nocturnal dyspnea  and syncope.   Respiratory: Negative for cough, hemoptysis, shortness of breath, sleep disturbances due to breathing, snoring, sputum production and wheezing.    Endocrine: Negative for cold intolerance and heat intolerance.   Hematologic/Lymphatic: Negative.  Negative for bleeding problem. Does not bruise/bleed easily.   Skin: Negative.  Negative for rash.   Musculoskeletal: Positive for myalgias. Negative for arthritis, falls, joint pain and muscle cramps.   Gastrointestinal: Negative for abdominal pain, anorexia, change in bowel habit, constipation, diarrhea, dysphagia, heartburn, jaundice and nausea.   Genitourinary: Negative for frequency and nocturia.   Neurological: Negative for dizziness, focal weakness, headaches, light-headedness, numbness, tremors and vertigo.   Psychiatric/Behavioral: Negative for memory loss. The patient does not have insomnia and is not nervous/anxious.    Allergic/Immunologic: Negative for hives.       Labs   Lab Results   Component Value Date    WBC 5.6 04/06/2018    HGB 15.0 04/06/2018    HCT 44.5 04/06/2018     04/06/2018    CHOL 175 04/06/2018    TRIG 90 04/06/2018    HDL 73 04/06/2018    LDLCALC 84 04/06/2018    ALT 15 04/06/2018    AST 17 04/06/2018     04/06/2018    K 5.8 (H) 04/06/2018     04/06/2018    CREATININE 0.68 04/06/2018    CO2 32 (H) 04/06/2018    TSH 1.35 10/02/2017       Lab Results   Component Value Date    GLUCOSE 87 04/06/2018    CALCIUM 9.5 04/06/2018     04/06/2018    K 5.8 (H) 04/06/2018    CO2 32 (H) 04/06/2018     04/06/2018    CREATININE 0.68 04/06/2018         Objective   Vitals:    08/02/18 1041   BP: 140/86   BP Location: Left upper arm   Patient Position: Sitting   Pulse: 60   SpO2: 97%   Weight: 66.7 kg (147 lb)   Height: 1.524 m (5')     Physical Exam   Constitutional: She is oriented to person, place, and time. She appears well-developed and well-nourished. No distress.   HENT:   Head: Normocephalic and atraumatic.    Nose: Nose normal.   Eyes: Conjunctivae are normal. No scleral icterus.   Neck: No JVD present.   Cardiovascular: Normal rate, regular rhythm, normal heart sounds and intact distal pulses.  Exam reveals no gallop and no friction rub.    No murmur heard.  Systolic click   Pulmonary/Chest: Effort normal. No stridor. No respiratory distress. She has no wheezes. She has no rales. She exhibits no tenderness.   Abdominal: There is no tenderness.   Musculoskeletal: She exhibits no edema or deformity.   Neurological: She is alert and oriented to person, place, and time.   Skin: Skin is warm and dry.   Psychiatric: She has a normal mood and affect.         Cardiac Procedures  Cardiac Procedures  Chest CTA April 2018 no change from 2017 dilated pulmonary artery 4.7 thyroid nodules coronary artery calcifications renal cyst Lrhyr mass     Stress echocardiogram March 2017 moderate pulmonic insufficiency pulmonary artery for mild mitral regurgitation negative for ischemia     Echo 4/18 pulm a 4.2 aorta 3.7cm     Ultrasound thyroid July 2017 11 mm nodule       EKGnsr rbbb no change      Assessment/Plan:      Pulmonary artery abnormality  She is dilatation of pulmonary artery last imaged in March 2017 4.2 cm follows with Dr. Fajardo for repeat imaging next spring    PAF (paroxysmal atrial fibrillation) (CMS/HCC) (HCC)  This was identified on cardiac monitor April 2018 was 2% of her total beats, beta-blocker alone she is clinically controlled no recurrence that she is aware of she is anticoagulated on xarelto  Negative stress echocardiogram in April 2018 March 2017, echocardiogram April 2018 dilated coronary artery that was noted four-point 2 aortic root 3.7 for echocardiogram with next visit    Coronary artery disease involving native coronary artery of native heart without angina pectoris  This was identified by calcifications seen on CAT scan last ischemic evaluation negative July 2017 she is right bundle branch block repeat  stress test in a few years    Mixed hyperlipidemia  She has mild hyper LDL was 80 she was intolerant to statins and intolerant to zetia does not meet criteria for PCSK9 inhibitors at this time will recheck lab work today    Hyperkalemia  With her last lab work potassium is 5.8 had never been elevated before recheck today she will call me for those results    Dependence on nicotine from cigarettes  Unchanged    Multinodular goiter  Negative biopsy in 2017 follows with Dr. Ha Wheatley         Lab work drawn today mainly to recheck a potassium  that was elevated last time   not on any medications to exacerbate this follow-up in 6 months   she will had CTA of chest prior to that visit and follow-up with Dr. Fajardo for dialted PULM artery   I will do echocardiogram at that visit we will repeat ischemic evaluation in a few years    Thank you for allowing me to participate in the care of this patient.  I hope this information is helpful.    Nick Beck MD [facility]   8/2/2018  Cc Kunal La, DO

## 2018-08-02 ENCOUNTER — OFFICE VISIT (OUTPATIENT)
Dept: CARDIOLOGY | Facility: CLINIC | Age: 83
End: 2018-08-02
Payer: MEDICARE

## 2018-08-02 VITALS
SYSTOLIC BLOOD PRESSURE: 140 MMHG | HEART RATE: 60 BPM | DIASTOLIC BLOOD PRESSURE: 86 MMHG | WEIGHT: 147 LBS | BODY MASS INDEX: 28.86 KG/M2 | OXYGEN SATURATION: 97 % | HEIGHT: 60 IN

## 2018-08-02 DIAGNOSIS — I49.1 APC (ATRIAL PREMATURE CONTRACTIONS): ICD-10-CM

## 2018-08-02 DIAGNOSIS — I25.10 CORONARY ARTERY DISEASE INVOLVING NATIVE CORONARY ARTERY OF NATIVE HEART WITHOUT ANGINA PECTORIS: ICD-10-CM

## 2018-08-02 DIAGNOSIS — Q25.79 PULMONARY ARTERY ABNORMALITY: ICD-10-CM

## 2018-08-02 DIAGNOSIS — I48.0 PAF (PAROXYSMAL ATRIAL FIBRILLATION) (CMS/HCC): Primary | ICD-10-CM

## 2018-08-02 PROBLEM — G45.9 TIA (TRANSIENT ISCHEMIC ATTACK): Status: ACTIVE | Noted: 2018-08-02

## 2018-08-02 PROBLEM — I45.10 RBBB: Status: ACTIVE | Noted: 2018-08-02

## 2018-08-02 PROBLEM — E87.5 HYPERKALEMIA: Status: ACTIVE | Noted: 2018-08-02

## 2018-08-02 PROBLEM — E78.2 MIXED HYPERLIPIDEMIA: Status: ACTIVE | Noted: 2018-03-13

## 2018-08-02 PROCEDURE — 93000 ELECTROCARDIOGRAM COMPLETE: CPT | Performed by: INTERNAL MEDICINE

## 2018-08-02 PROCEDURE — 99214 OFFICE O/P EST MOD 30 MIN: CPT | Performed by: INTERNAL MEDICINE

## 2018-08-02 ASSESSMENT — ENCOUNTER SYMPTOMS
MUSCLE CRAMPS: 0
NAUSEA: 0
PALPITATIONS: 0
WHEEZING: 0
TREMORS: 0
CONSTIPATION: 0
ORTHOPNEA: 0
DIZZINESS: 0
JAUNDICE: 0
NUMBNESS: 0
WEIGHT LOSS: 0
SNORING: 0
FREQUENCY: 0
CHANGE IN BOWEL HABIT: 0
HEADACHES: 0
ANOREXIA: 0
WEIGHT GAIN: 0
HEMOPTYSIS: 0
SYNCOPE: 0
FOCAL WEAKNESS: 0
HEMATOLOGIC/LYMPHATIC NEGATIVE: 1
LIGHT-HEADEDNESS: 0
MYALGIAS: 1
HEARTBURN: 0
DIARRHEA: 0
SLEEP DISTURBANCES DUE TO BREATHING: 0
BRUISES/BLEEDS EASILY: 0
SPUTUM PRODUCTION: 0
VERTIGO: 0
HOARSE VOICE: 0
SHORTNESS OF BREATH: 0
PND: 0
NEAR-SYNCOPE: 0
CLAUDICATION: 0
NERVOUS/ANXIOUS: 0
IRREGULAR HEARTBEAT: 0
FALLS: 0
DYSPNEA ON EXERTION: 0
MEMORY LOSS: 0
COUGH: 0
INSOMNIA: 0
ABDOMINAL PAIN: 0

## 2018-08-02 NOTE — ASSESSMENT & PLAN NOTE
She has mild hyper LDL was 80 she was intolerant to statins and intolerant to zetia does not meet criteria for PCSK9 inhibitors at this time will recheck lab work today

## 2018-08-02 NOTE — ASSESSMENT & PLAN NOTE
This was identified on cardiac monitor April 2018 was 2% of her total beats, beta-blocker alone she is clinically controlled no recurrence that she is aware of she is anticoagulated on xarelto  Negative stress echocardiogram in April 2018 March 2017, echocardiogram April 2018 dilated coronary artery that was noted four-point 2 aortic root 3.7 for echocardiogram with next visit

## 2018-08-02 NOTE — ASSESSMENT & PLAN NOTE
With her last lab work potassium is 5.8 had never been elevated before recheck today she will call me for those results

## 2018-08-02 NOTE — ASSESSMENT & PLAN NOTE
This was identified by calcifications seen on CAT scan last ischemic evaluation negative July 2017 she is right bundle branch block repeat stress test in a few years

## 2018-08-02 NOTE — ASSESSMENT & PLAN NOTE
She is dilatation of pulmonary artery last imaged in March 2017 4.2 cm follows with Dr. Fajardo for repeat imaging next spring

## 2018-08-02 NOTE — PATIENT INSTRUCTIONS
6 months labs  Today and 6 months  and visit and echo  Call Monday for results of labs drawn today

## 2018-08-06 ENCOUNTER — TELEPHONE (OUTPATIENT)
Dept: CARDIOLOGY | Facility: CLINIC | Age: 83
End: 2018-08-06

## 2018-08-06 ENCOUNTER — TELEPHONE (OUTPATIENT)
Dept: SCHEDULING | Facility: CLINIC | Age: 83
End: 2018-08-06

## 2018-08-06 LAB
ALBUMIN SERPL-MCNC: 4 G/DL (ref 3.5–4.7)
ALBUMIN/GLOB SERPL: 1.5 {RATIO} (ref 1.2–2.2)
ALP SERPL-CCNC: 62 IU/L (ref 39–117)
ALT SERPL-CCNC: 12 IU/L (ref 0–32)
AST SERPL-CCNC: 17 IU/L (ref 0–40)
BASOPHILS # BLD AUTO: 0 X10E3/UL (ref 0–0.2)
BASOPHILS NFR BLD AUTO: 0 %
BILIRUB SERPL-MCNC: 0.5 MG/DL (ref 0–1.2)
BUN SERPL-MCNC: 20 MG/DL (ref 8–27)
BUN/CREAT SERPL: 31 (ref 12–28)
CALCIUM SERPL-MCNC: 9.7 MG/DL (ref 8.7–10.3)
CHLORIDE SERPL-SCNC: 103 MMOL/L (ref 96–106)
CHOLEST SERPL-MCNC: 181 MG/DL (ref 100–199)
CO2 SERPL-SCNC: 26 MMOL/L (ref 20–29)
CREAT SERPL-MCNC: 0.64 MG/DL (ref 0.57–1)
EOSINOPHIL # BLD AUTO: 0.1 X10E3/UL (ref 0–0.4)
EOSINOPHIL NFR BLD AUTO: 2 %
ERYTHROCYTE [DISTWIDTH] IN BLOOD BY AUTOMATED COUNT: 13.6 % (ref 12.3–15.4)
GLOBULIN SER CALC-MCNC: 2.7 G/DL (ref 1.5–4.5)
GLUCOSE SERPL-MCNC: 90 MG/DL (ref 65–99)
HCT VFR BLD AUTO: 46.1 % (ref 34–46.6)
HDLC SERPL-MCNC: 67 MG/DL
HGB BLD-MCNC: 14.5 G/DL (ref 11.1–15.9)
IMM GRANULOCYTES # BLD: 0 X10E3/UL (ref 0–0.1)
IMM GRANULOCYTES NFR BLD: 0 %
LAB CORP EGFR IF AFRICN AM: 97 ML/MIN/1.73
LAB CORP EGFR IF NONAFRICN AM: 85 ML/MIN/1.73
LDLC SERPL CALC-MCNC: 97 MG/DL (ref 0–99)
LYMPHOCYTES # BLD AUTO: 2.1 X10E3/UL (ref 0.7–3.1)
LYMPHOCYTES NFR BLD AUTO: 28 %
MCH RBC QN AUTO: 31.4 PG (ref 26.6–33)
MCHC RBC AUTO-ENTMCNC: 31.5 G/DL (ref 31.5–35.7)
MCV RBC AUTO: 100 FL (ref 79–97)
MONOCYTES # BLD AUTO: 0.4 X10E3/UL (ref 0.1–0.9)
MONOCYTES NFR BLD AUTO: 5 %
NEUTROPHILS # BLD AUTO: 4.9 X10E3/UL (ref 1.4–7)
NEUTROPHILS NFR BLD AUTO: 65 %
PLATELET # BLD AUTO: 269 X10E3/UL (ref 150–379)
POTASSIUM SERPL-SCNC: 5.8 MMOL/L (ref 3.5–5.2)
PROT SERPL-MCNC: 6.7 G/DL (ref 6–8.5)
RBC # BLD AUTO: 4.62 X10E6/UL (ref 3.77–5.28)
SODIUM SERPL-SCNC: 144 MMOL/L (ref 134–144)
TRIGL SERPL-MCNC: 86 MG/DL (ref 0–149)
TSH SERPL DL<=0.005 MIU/L-ACNC: 1.58 UIU/ML (ref 0.45–4.5)
VLDLC SERPL CALC-MCNC: 17 MG/DL (ref 5–40)
WBC # BLD AUTO: 7.5 X10E3/UL (ref 3.4–10.8)

## 2018-08-06 NOTE — TELEPHONE ENCOUNTER
Spoke to pt, reviewed labwork. Counseled on following a low potassium diet. Discussed foods to avoid/ She states understanding.

## 2018-08-07 ENCOUNTER — TELEPHONE (OUTPATIENT)
Dept: CARDIOLOGY | Facility: CLINIC | Age: 83
End: 2018-08-07

## 2018-08-07 DIAGNOSIS — E87.5 HYPERKALEMIA: Primary | ICD-10-CM

## 2018-08-07 NOTE — TELEPHONE ENCOUNTER
Can you call her tell her that her potassium is still rhiannon  Rarely beta blocker can do this ask her to stop  toprol and re check in 3 weeks

## 2018-08-07 NOTE — TELEPHONE ENCOUNTER
Spoke to pt again regarding potassium. Discussed stopping metoprolol per Dr. Beck; there is some evidence that hyperkalemia may be caused by beta blocker. Pt states understanding. [place]. She will stop the drug upon her return and then call to report any symptoms. She will hold drug for 3 weeks and then get potassium redrawn. I will mail req and instructions to her home.

## 2018-09-04 ENCOUNTER — TELEPHONE (OUTPATIENT)
Dept: SCHEDULING | Facility: CLINIC | Age: 83
End: 2018-09-04

## 2018-09-04 NOTE — TELEPHONE ENCOUNTER
Susan please see below, pt of Dr Beck, per telephone call from 8/7 metoprolol was stopped due to possible hyperkalemia and pt was to have labs drawn in 3 weeks, pt restarted metoprolol due to flutters in chest. Please advise, thanks.

## 2018-09-04 NOTE — TELEPHONE ENCOUNTER
Shamika that's fine but PLEASE have her go have her blood work drawn today or tomorrow so we can check her potassium.

## 2018-09-04 NOTE — TELEPHONE ENCOUNTER
Pt calling to speak with the doctor.Pt states she was instructed to stop taking her medication metoprolol.Pt states after being off for 2 days for she stared to get flutters in the chest.  Pt went back on the medication and everything is back to normal.

## 2018-09-04 NOTE — TELEPHONE ENCOUNTER
I spoke to patient, she did not stop the metoprolol back on 8/7, she has only been off med for 2 days and then went right back on medication due to symptoms, she wants to know should she still get labs drawn urgently, will it show anything since she was only off for 2 days? Thanks

## 2018-09-04 NOTE — TELEPHONE ENCOUNTER
Please have her recheck potassium level to be sure it is not any higher. Can be anytime this week.

## 2018-09-06 LAB — POTASSIUM SERPL-SCNC: 4.6 MMOL/L (ref 3.5–5.3)

## 2018-09-20 ENCOUNTER — TELEPHONE (OUTPATIENT)
Dept: SCHEDULING | Facility: CLINIC | Age: 83
End: 2018-09-20

## 2018-11-01 ENCOUNTER — TELEPHONE (OUTPATIENT)
Dept: CARDIOLOGY | Facility: CLINIC | Age: 83
End: 2018-11-01

## 2018-11-01 DIAGNOSIS — E78.5 HYPERLIPIDEMIA, UNSPECIFIED HYPERLIPIDEMIA TYPE: Primary | ICD-10-CM

## 2018-11-01 RX ORDER — METOPROLOL SUCCINATE 25 MG/1
25 TABLET, EXTENDED RELEASE ORAL 2 TIMES DAILY
Qty: 90 TABLET | Refills: 5
Start: 2018-11-01 | End: 2018-11-28 | Stop reason: SDUPTHER

## 2018-11-01 NOTE — TELEPHONE ENCOUNTER
Spoke to pt, pt stated that she takes one 25 mg tab toprol at night and 1/2 tab of toprol in AM. Pt noticed that for the past week around 4 pm she started to get palpitations and burping, symptoms that she had before due to Afib. Pt asks if she should increase her AM dose of toprol from 1/2 tab to 1 full tab.   Of note, pt was asked to stop her toprol for 3 weeks in August due to hyperkalemia 5.8 and then recheck blood work. Pt stated that she only stopped it for 5-6 days and started to get her symptoms back and was told by a physician to restart her toprol and to get her potassium check at the time which was 4.6. Please advise.    Thanks.

## 2018-11-01 NOTE — TELEPHONE ENCOUNTER
Spoke to pt, advised pt to take her toprol 25 mg twice a day and follow up with Dr. Beck after lab work in 3 weeks. Will mail lab scripts to pt and pt will call back to schedule a follow up ov appt. Pt understands and has no further questions at this time.

## 2018-11-01 NOTE — TELEPHONE ENCOUNTER
HAVE HER TAKE  25 BID  TOPROL CHANGE IN ON HER CHART AFTER YOU REACH HER  ASK HER TO GET LAB WORK IN 3 WEEKS  FOLLOW UP APPT  WITH [facility] WORK

## 2018-11-08 NOTE — LETTER
August 2, 2018     Kunal La DO  [address] [facility] [address]    Patient: Teresa Bird   YOB: 1935   Date of Visit: 8/2/2018       Dear Dr. La:    Thank you for referring Teresa Bird to me for evaluation. Below are my notes for this consultation.    If you have questions, please do not hesitate to call me. I look forward to following your patient along with you.         Sincerely,        Nick Beck MD        CC: MD Usha No MD Andrea J. Becker, MD  8/2/2018 11:57 AM  Sign at close encounter      Cardiology Note    Kunal La DO          Teresa Bird is a 82 y.o. female 1935  She returns for follow-up of paroxysmal atrial fibrillation picked up by cardiac monitor in May   she complained of palpitations and monitor showed a 1% atrial fibrillation at that time I started beta-blocker and anticoagulation  She is followed for idiopathic dilated pulmonary artery and sees Dr. Fajardo it was last imaged April 2018 due for follow-up study April 2019    She has CAD based on calcifications seen on CAT scan with negative ischemic evaluation, stress echo July 2017 she does not tolerate statins she is on zetia  she does not tolerate the zetia with fatigue and muscle aches.  She continues to smoke                 Patient Active Problem List    Diagnosis Date Noted   • RBBB 08/02/2018   • Hyperkalemia 08/02/2018   • PAF (paroxysmal atrial fibrillation) (CMS/HCC) ([facility]) 04/24/2018   • APC (atrial premature contractions) 04/16/2018   • Coronary artery disease involving native coronary artery of native heart without angina pectoris 04/08/2018   • Dependence on nicotine from cigarettes 04/08/2018   • Multinodular goiter 03/13/2018   • Mixed hyperlipidemia 03/13/2018   • Pulmonary artery abnormality 04/04/2017   • Malignant neoplasm of uterus (CMS/HCC) ([facility]) 03/22/2017     Problem List     Multinodular goiter    Overview Addendum 5/2/2018   7:12 AM by Nick Beck MD     She had a CT scan of the chest and abdomen done on 3/09/17 as a routine followup after her surgery for uterine cancer in December of 2013.  The CT scan showed evidence of an abnormal left thyroid lobe that was enlarged with several nodules.    FNA of 3.7 cm left thyroid nodule was done on 9/6/17    Office ultrasound 3/13/17 shows stable  Nodules  Dr Ha Lloyd             Hyperlipidemia    Overview Signed 3/13/2018 10:48 PM by Usha Nobles MD     Did not tolerate statins         Malignant neoplasm of uterus (CMS/HCC) (HCC)    Overview Addendum 4/8/2018 12:24 PM by Nick Beck MD     2013         Pulmonary artery abnormality    Overview Addendum 5/2/2018  7:02 AM by Nick Beck MD     Pulmonary artery aneurysm CTA April 2018 NO GLYNN E 24883.7 3/17 CTA cor a calcifications  l THYORID MASSdr plestis  Echo 4/18 pul a 4.2 mildly dilatd aortic root         Coronary artery disease involving native coronary artery of native heart without angina pectoris    Overview Signed 4/8/2018 12:22 PM by Nick Beck MD     Calcifications seen on cat scan 7/17  Stress echo neg 7/17 rbbb occ vpc neg ischemia         Dependence on nicotine from cigarettes    APC (atrial premature contractions)    PAF (paroxysmal atrial fibrillation) (CMS/HCC) (HCC)    Overview Signed 4/24/2018  4:43 PM by Nick Beck MD     Monitor 4/18 short bursts paf .28% of total               Allergy    Penicillin; Pravastatin sodium; Rosuvastatin calcium; and Zetia [ezetimibe]          MED LIST       Current Outpatient Prescriptions   Medication Sig Dispense Refill   • LORazepam (ATIVAN) 0.5 mg tablet Take 0.5 mg by mouth every 6 (six) hours as needed.     • metoprolol succinate XL (TOPROL-XL) 25 mg 24 hr tablet Take 1 tablet (25 mg total) by mouth See administration instructions for further information. 25 mg at night 1/2 im am 90 tablet 5   • pantoprazole (PROTONIX) 40 mg EC tablet 40 mg.     • rivaroxaban  (XARELTO) 20 mg tablet Take 20 mg by mouth daily with dinner.     • meloxicam (MOBIC) 15 mg tablet 15 mg. 1/2 bid        No current facility-administered medications for this visit.                 Review of Systems   Constitution: Negative for malaise/fatigue, weight gain and weight loss.   HENT: Negative for hearing loss and hoarse voice.    Eyes: Negative for visual disturbance.   Cardiovascular: Negative for chest pain, claudication, cyanosis, dyspnea on exertion, irregular heartbeat, leg swelling, near-syncope, orthopnea, palpitations, paroxysmal nocturnal dyspnea and syncope.   Respiratory: Negative for cough, hemoptysis, shortness of breath, sleep disturbances due to breathing, snoring, sputum production and wheezing.    Endocrine: Negative for cold intolerance and heat intolerance.   Hematologic/Lymphatic: Negative.  Negative for bleeding problem. Does not bruise/bleed easily.   Skin: Negative.  Negative for rash.   Musculoskeletal: Positive for myalgias. Negative for arthritis, falls, joint pain and muscle cramps.   Gastrointestinal: Negative for abdominal pain, anorexia, change in bowel habit, constipation, diarrhea, dysphagia, heartburn, jaundice and nausea.   Genitourinary: Negative for frequency and nocturia.   Neurological: Negative for dizziness, focal weakness, headaches, light-headedness, numbness, tremors and vertigo.   Psychiatric/Behavioral: Negative for memory loss. The patient does not have insomnia and is not nervous/anxious.    Allergic/Immunologic: Negative for hives.       Labs   Lab Results   Component Value Date    WBC 5.6 04/06/2018    HGB 15.0 04/06/2018    HCT 44.5 04/06/2018     04/06/2018    CHOL 175 04/06/2018    TRIG 90 04/06/2018    HDL 73 04/06/2018    LDLCALC 84 04/06/2018    ALT 15 04/06/2018    AST 17 04/06/2018     04/06/2018    K 5.8 (H) 04/06/2018     04/06/2018    CREATININE 0.68 04/06/2018    CO2 32 (H) 04/06/2018    TSH 1.35 10/02/2017       Lab  Results   Component Value Date    GLUCOSE 87 04/06/2018    CALCIUM 9.5 04/06/2018     04/06/2018    K 5.8 (H) 04/06/2018    CO2 32 (H) 04/06/2018     04/06/2018    CREATININE 0.68 04/06/2018         Objective   Vitals:    08/02/18 1041   BP: 140/86   BP Location: Left upper arm   Patient Position: Sitting   Pulse: 60   SpO2: 97%   Weight: 66.7 kg (147 lb)   Height: 1.524 m (5')     Physical Exam   Constitutional: She is oriented to person, place, and time. She appears well-developed and well-nourished. No distress.   HENT:   Head: Normocephalic and atraumatic.   Nose: Nose normal.   Eyes: Conjunctivae are normal. No scleral icterus.   Neck: No JVD present.   Cardiovascular: Normal rate, regular rhythm, normal heart sounds and intact distal pulses.  Exam reveals no gallop and no friction rub.    No murmur heard.  Systolic click   Pulmonary/Chest: Effort normal. No stridor. No respiratory distress. She has no wheezes. She has no rales. She exhibits no tenderness.   Abdominal: There is no tenderness.   Musculoskeletal: She exhibits no edema or deformity.   Neurological: She is alert and oriented to person, place, and time.   Skin: Skin is warm and dry.   Psychiatric: She has a normal mood and affect.         Cardiac Procedures  Cardiac Procedures  Chest CTA April 2018 no change from 2017 dilated pulmonary artery 4.7 thyroid nodules coronary artery calcifications renal cyst Lrhyr mass     Stress echocardiogram March 2017 moderate pulmonic insufficiency pulmonary artery for mild mitral regurgitation negative for ischemia     Echo 4/18 pulm a 4.2 aorta 3.7cm     Ultrasound thyroid July 2017 11 mm nodule       EKGnsr rbbb no change      Assessment/Plan:      Pulmonary artery abnormality  She is dilatation of pulmonary artery last imaged in March 2017 4.2 cm follows with Dr. Fajardo for repeat imaging next spring    PAF (paroxysmal atrial fibrillation) (CMS/HCC) (HCC)  This was identified on cardiac monitor  April 2018 was 2% of her total beats, beta-blocker alone she is clinically controlled no recurrence that she is aware of she is anticoagulated on xarelto  Negative stress echocardiogram in April 2018 March 2017, echocardiogram April 2018 dilated coronary artery that was noted four-point 2 aortic root 3.7 for echocardiogram with next visit    Coronary artery disease involving native coronary artery of native heart without angina pectoris  This was identified by calcifications seen on CAT scan last ischemic evaluation negative July 2017 she is right bundle branch block repeat stress test in a few years    Mixed hyperlipidemia  She has mild hyper LDL was 80 she was intolerant to statins and intolerant to zetia does not meet criteria for PCSK9 inhibitors at this time will recheck lab work today    Hyperkalemia  With her last lab work potassium is 5.8 had never been elevated before recheck today she will call me for those results    Dependence on nicotine from cigarettes  Unchanged    Multinodular goiter  Negative biopsy in 2017 follows with Dr. Ha Wheatley         Lab work drawn today mainly to recheck a potassium  that was elevated last time   not on any medications to exacerbate this follow-up in 6 months   she will had CTA of chest prior to that visit and follow-up with Dr. Fajardo for dialted PULM artery   I will do echocardiogram at that visit we will repeat ischemic evaluation in a few years    Thank you for allowing me to participate in the care of this patient.  I hope this information is helpful.    Nick Beck MD [facility]   8/2/2018  Cc Kunal La DO              Yes

## 2018-11-29 LAB
ALBUMIN SERPL-MCNC: 3.8 G/DL (ref 3.6–5.1)
ALBUMIN/GLOB SERPL: 1.6 (CALC) (ref 1–2.5)
ALP SERPL-CCNC: 58 U/L (ref 33–130)
ALT SERPL-CCNC: 15 U/L (ref 6–29)
AST SERPL-CCNC: 17 U/L (ref 10–35)
BASOPHILS # BLD AUTO: 29 CELLS/UL (ref 0–200)
BASOPHILS NFR BLD AUTO: 0.5 %
BILIRUB SERPL-MCNC: 0.8 MG/DL (ref 0.2–1.2)
BUN SERPL-MCNC: 20 MG/DL (ref 7–25)
BUN/CREAT SERPL: ABNORMAL (CALC) (ref 6–22)
CALCIUM SERPL-MCNC: 9.5 MG/DL (ref 8.6–10.4)
CHLORIDE SERPL-SCNC: 106 MMOL/L (ref 98–110)
CHOLEST SERPL-MCNC: 187 MG/DL
CHOLEST/HDLC SERPL: 2.8 (CALC)
CO2 SERPL-SCNC: 33 MMOL/L (ref 20–32)
CREAT SERPL-MCNC: 0.71 MG/DL (ref 0.6–0.88)
EOSINOPHIL # BLD AUTO: 120 CELLS/UL (ref 15–500)
EOSINOPHIL NFR BLD AUTO: 2.1 %
ERYTHROCYTE [DISTWIDTH] IN BLOOD BY AUTOMATED COUNT: 12.3 % (ref 11–15)
GFR SERPL CREATININE-BSD FRML MDRD: 80 ML/MIN/1.73M2
GLOBULIN SER CALC-MCNC: 2.4 G/DL (CALC) (ref 1.9–3.7)
GLUCOSE SERPL-MCNC: 92 MG/DL (ref 65–99)
HCT VFR BLD AUTO: 44.4 % (ref 35–45)
HDLC SERPL-MCNC: 68 MG/DL
HGB BLD-MCNC: 15 G/DL (ref 11.7–15.5)
LDLC SERPL CALC-MCNC: 103 MG/DL (CALC)
LYMPHOCYTES # BLD AUTO: 1870 CELLS/UL (ref 850–3900)
LYMPHOCYTES NFR BLD AUTO: 32.8 %
MCH RBC QN AUTO: 31.6 PG (ref 27–33)
MCHC RBC AUTO-ENTMCNC: 33.8 G/DL (ref 32–36)
MCV RBC AUTO: 93.5 FL (ref 80–100)
MONOCYTES # BLD AUTO: 308 CELLS/UL (ref 200–950)
MONOCYTES NFR BLD AUTO: 5.4 %
NEUTROPHILS # BLD AUTO: 3374 CELLS/UL (ref 1500–7800)
NEUTROPHILS NFR BLD AUTO: 59.2 %
NONHDLC SERPL-MCNC: 119 MG/DL (CALC)
PLATELET # BLD AUTO: 261 THOUSAND/UL (ref 140–400)
PMV BLD REES-ECKER: 10.9 FL (ref 7.5–12.5)
POTASSIUM SERPL-SCNC: 5.1 MMOL/L (ref 3.5–5.3)
PROT SERPL-MCNC: 6.2 G/DL (ref 6.1–8.1)
RBC # BLD AUTO: 4.75 MILLION/UL (ref 3.8–5.1)
SODIUM SERPL-SCNC: 143 MMOL/L (ref 135–146)
TRIGL SERPL-MCNC: 74 MG/DL
TSH SERPL-ACNC: 1.72 MIU/L (ref 0.4–4.5)
WBC # BLD AUTO: 5.7 THOUSAND/UL (ref 3.8–10.8)

## 2018-11-29 RX ORDER — METOPROLOL SUCCINATE 25 MG/1
25 TABLET, EXTENDED RELEASE ORAL 2 TIMES DAILY
Qty: 180 TABLET | Refills: 3 | Status: SHIPPED | OUTPATIENT
Start: 2018-11-29 | End: 2019-12-13 | Stop reason: SDUPTHER

## 2018-12-05 ENCOUNTER — OFFICE VISIT (OUTPATIENT)
Dept: CARDIOLOGY | Facility: CLINIC | Age: 83
End: 2018-12-05
Payer: MEDICARE

## 2018-12-05 VITALS
HEART RATE: 62 BPM | WEIGHT: 147 LBS | BODY MASS INDEX: 28.86 KG/M2 | OXYGEN SATURATION: 95 % | DIASTOLIC BLOOD PRESSURE: 80 MMHG | HEIGHT: 60 IN | SYSTOLIC BLOOD PRESSURE: 130 MMHG

## 2018-12-05 DIAGNOSIS — I49.1 APC (ATRIAL PREMATURE CONTRACTIONS): ICD-10-CM

## 2018-12-05 DIAGNOSIS — I48.0 PAF (PAROXYSMAL ATRIAL FIBRILLATION) (CMS/HCC): ICD-10-CM

## 2018-12-05 DIAGNOSIS — I25.10 CORONARY ARTERY DISEASE INVOLVING NATIVE CORONARY ARTERY OF NATIVE HEART WITHOUT ANGINA PECTORIS: ICD-10-CM

## 2018-12-05 DIAGNOSIS — F17.210 CIGARETTE NICOTINE DEPENDENCE WITHOUT COMPLICATION: Primary | ICD-10-CM

## 2018-12-05 PROBLEM — G45.9 TIA (TRANSIENT ISCHEMIC ATTACK): Status: RESOLVED | Noted: 2018-08-02 | Resolved: 2018-12-05

## 2018-12-05 PROBLEM — F17.201 NICOTINE DEPENDENCE IN REMISSION: Status: ACTIVE | Noted: 2018-04-08

## 2018-12-05 PROCEDURE — 99214 OFFICE O/P EST MOD 30 MIN: CPT | Performed by: INTERNAL MEDICINE

## 2018-12-05 PROCEDURE — 93000 ELECTROCARDIOGRAM COMPLETE: CPT | Performed by: INTERNAL MEDICINE

## 2018-12-05 ASSESSMENT — ENCOUNTER SYMPTOMS
TREMORS: 0
ABDOMINAL PAIN: 0
NERVOUS/ANXIOUS: 0
WHEEZING: 0
HEMOPTYSIS: 0
DYSPNEA ON EXERTION: 0
FALLS: 0
HEADACHES: 0
MYALGIAS: 0
PALPITATIONS: 0
SNORING: 0
JAUNDICE: 0
WEIGHT GAIN: 0
HOARSE VOICE: 0
BRUISES/BLEEDS EASILY: 0
CHANGE IN BOWEL HABIT: 0
ANOREXIA: 0
SHORTNESS OF BREATH: 0
HEARTBURN: 0
SPUTUM PRODUCTION: 0
LIGHT-HEADEDNESS: 0
ORTHOPNEA: 0
PND: 0
COUGH: 0
HEMATOLOGIC/LYMPHATIC NEGATIVE: 1
VERTIGO: 0
CLAUDICATION: 0
WEIGHT LOSS: 0
DIZZINESS: 0
FOCAL WEAKNESS: 0
SYNCOPE: 0
NUMBNESS: 0
DIARRHEA: 0
NAUSEA: 0
INSOMNIA: 0
NEAR-SYNCOPE: 0
MEMORY LOSS: 0
IRREGULAR HEARTBEAT: 0
FREQUENCY: 0
MUSCLE CRAMPS: 0
CONSTIPATION: 0
SLEEP DISTURBANCES DUE TO BREATHING: 0

## 2018-12-05 NOTE — ASSESSMENT & PLAN NOTE
Follows with Dr. Ha Wheatley had negative biopsy in the past and stable ultrasound from March 2017

## 2018-12-05 NOTE — ASSESSMENT & PLAN NOTE
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 Fajardo, no intervention required for repeat CTA and follow-up visit with him this spring

## 2018-12-05 NOTE — ASSESSMENT & PLAN NOTE
She has CAD based on coronary calcification seen on CAT scan negative ischemic evaluation March 2017 will order repeat one at next visit does not tolerate statins or zetia, LDL cholesterol 100 we spoke briefly about PCS canine inhibitor she would like to hold off for now

## 2018-12-05 NOTE — LETTER
December 5, 2018     Kunal La DO  [address] [facility] [address]    Patient: Teresa Bird   YOB: 1935   Date of Visit: 12/5/2018       Dear Dominik  Thank you for referring Teresa Bird to me for evaluation. Below are my notes for this consultation.    If you have questions, please do not hesitate to call me. I look forward to following your patient along with you.         Sincerely,        Nick Beck MD        CC: MD Usha No MD Becker, Andrea J, MD  12/5/2018 11:10 AM  Sign at close encounter      Cardiology Note    Kunal La DO          Teresa Bird is a 83 y.o. female 1935     Returns for visit and to review her lab work  She had an elevated potassium of 5.8 repeated and is 5.1  She is followed for dilated pulmonary artery of 4.2cm sees   She is due to see him in May 2019  No intervention has been required  She has a history of paroxysmal atrial fibrillation picked up on monitor in April 2018 she is anticoagulated on beta-blocker  Had some symptomatic palpitations we increased her beta-blocker, she is now asymptomatic  She had negative ischemic workup, stress echocardiogram April 2018  She does have arteriosclerosis with calcification seen on her coronaries by CAT scan  She is treated for mixed hyperlipidemia is hyperkalemia multinodular goiter follows with Dr. Ha Wheatley  Quit smoke one year aGO                    Patient Active Problem List    Diagnosis Date Noted   • RBBB 08/02/2018   • Hyperkalemia 08/02/2018   • PAF (paroxysmal atrial fibrillation) (CMS/HCC) ([facility]) 04/24/2018   • Coronary artery disease involving native coronary artery of native heart without angina pectoris 04/08/2018   • Nicotine dependence in remission 04/08/2018   • Multinodular goiter 03/13/2018   • Mixed hyperlipidemia 03/13/2018   • Pulmonary artery abnormality 04/04/2017   • Malignant neoplasm of uterus (CMS/HCC) ([facility]) 03/22/2017        Allergy    Penicillin; Pravastatin sodium; Rosuvastatin calcium; and Zetia [ezetimibe]          MED LIST       Current Outpatient Prescriptions   Medication Sig Dispense Refill   • LORazepam (ATIVAN) 0.5 mg tablet Take 0.5 mg by mouth every 6 (six) hours as needed.     • meloxicam (MOBIC) 15 mg tablet 15 mg. 1/2 bid      • metoprolol succinate XL (TOPROL-XL) 25 mg 24 hr tablet Take 1 tablet (25 mg total) by mouth 2 (two) times a day. 180 tablet 3   • pantoprazole (PROTONIX) 40 mg EC tablet 40 mg.     • rivaroxaban (XARELTO) 20 mg tablet Take 20 mg by mouth daily with dinner.       No current facility-administered medications for this visit.                 Review of Systems   Constitution: Negative for malaise/fatigue, weight gain and weight loss.   HENT: Negative for hearing loss and hoarse voice.    Eyes: Negative for visual disturbance.   Cardiovascular: Negative for chest pain, claudication, cyanosis, dyspnea on exertion, irregular heartbeat, leg swelling, near-syncope, orthopnea, palpitations, paroxysmal nocturnal dyspnea and syncope.   Respiratory: Negative for cough, hemoptysis, shortness of breath, sleep disturbances due to breathing, snoring, sputum production and wheezing.    Endocrine: Negative for cold intolerance and heat intolerance.   Hematologic/Lymphatic: Negative.  Negative for bleeding problem. Does not bruise/bleed easily.   Skin: Negative.  Negative for rash.   Musculoskeletal: Negative for arthritis, falls, joint pain, muscle cramps and myalgias.   Gastrointestinal: Negative for abdominal pain, anorexia, change in bowel habit, constipation, diarrhea, dysphagia, heartburn, jaundice and nausea.   Genitourinary: Negative for frequency and nocturia.   Neurological: Negative for dizziness, focal weakness, headaches, light-headedness, numbness, tremors and vertigo.   Psychiatric/Behavioral: Negative for memory loss. The patient does not have insomnia and is not nervous/anxious.     Allergic/Immunologic: Negative for hives.       Labs   Lab Results   Component Value Date    WBC 5.7 11/28/2018    HGB 15.0 11/28/2018    HCT 44.4 11/28/2018     11/28/2018    CHOL 187 11/28/2018    TRIG 74 11/28/2018    HDL 68 11/28/2018    LDLCALC 103 (H) 11/28/2018    ALT 15 11/28/2018    AST 17 11/28/2018     11/28/2018    K 5.1 11/28/2018     11/28/2018    CREATININE 0.71 11/28/2018    BUN 20 08/02/2018    CO2 33 (H) 11/28/2018    TSH 1.72 11/28/2018       Lab Results   Component Value Date    GLUCOSE 92 11/28/2018    CALCIUM 9.5 11/28/2018     11/28/2018    K 5.1 11/28/2018    CO2 33 (H) 11/28/2018     11/28/2018    BUN 20 08/02/2018    CREATININE 0.71 11/28/2018         Objective   Vitals:    12/05/18 1031   BP: 130/80   BP Location: Left upper arm   Patient Position: Sitting   Pulse: 62   SpO2: 95%   Weight: 66.7 kg (147 lb)   Height: 1.524 m (5')     Physical Exam   Constitutional: She is oriented to person, place, and time. She appears well-developed and well-nourished. No distress.   HENT:   Head: Normocephalic and atraumatic.   Nose: Nose normal.   Eyes: Conjunctivae are normal. No scleral icterus.   Neck: No JVD present.   Cardiovascular: Normal rate, regular rhythm, normal heart sounds and intact distal pulses.  Exam reveals no gallop and no friction rub.    No murmur heard.  Systolic click   Pulmonary/Chest: Effort normal. No stridor. No respiratory distress. She has no wheezes. She has no rales. She exhibits no tenderness.   Abdominal: There is no tenderness.   Musculoskeletal: She exhibits no edema or deformity.   Neurological: She is alert and oriented to person, place, and time.   Skin: Skin is warm and dry.   Psychiatric: She has a normal mood and affect.         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 Fajardo, no intervention required for repeat CTA and follow-up visit with him this spring    Coronary artery disease involving native coronary artery of native heart without angina pectoris  She has CAD based on coronary calcification seen on CAT scan negative ischemic evaluation March 2017 will order repeat one at next visit does not tolerate statins or zetia, LDL cholesterol 100 we spoke briefly about PCS canine inhibitor she would like to hold off for now    PAF (paroxysmal atrial fibrillation) (CMS/HCC) (HCC)  Clinically she had some palpitation she increased her beta-blocker feeling better we diagnosed paroxysmal atrial fibrillation on the monitor from April 2018 about 2% of the time, she is anticoagulated with Xarelto    RBBB  Unchanged for rest echo next visit ischemic evaluation negative March 2017 will probably repeat ischemic evaluation in a year no symptoms    Multinodular goiter  Follows with Dr. Ha Wheatley had negative biopsy in the past and stable ultrasound from March 2017    Hyperkalemia  Repeat levels normal    Malignant neoplasm of uterus (CMS/HCC) (HCC)  Surgery 2013         I will see her back in February with resting echocardiogram she will get CTA of follow-up with Dr. Fajardo for dilated pulmonary artery, sprain, may repeat ischemic evaluation in a year    Thank you for allowing me to participate in the care of this patient.  I hope this information is helpful.    Nick Beck MD [facility]    12/5/2018  Cc Kunal La, DO

## 2018-12-05 NOTE — ASSESSMENT & PLAN NOTE
Unchanged for rest echo next visit ischemic evaluation negative March 2017 will probably repeat ischemic evaluation in a year no symptoms

## 2018-12-05 NOTE — ASSESSMENT & PLAN NOTE
Clinically she had some palpitation she increased her beta-blocker feeling better we diagnosed paroxysmal atrial fibrillation on the monitor from April 2018 about 2% of the time, she is anticoagulated with Xarelto

## 2019-01-14 ENCOUNTER — TELEPHONE (OUTPATIENT)
Dept: SCHEDULING | Facility: CLINIC | Age: 84
End: 2019-01-14

## 2019-01-14 NOTE — TELEPHONE ENCOUNTER
She is in the emergency room at [facility] spoke to ER nurse practitioner records sent over chest pain with no acute EKG changes some burst of atrial fibrillation she has a history of paroxysmal AF

## 2019-01-14 NOTE — TELEPHONE ENCOUNTER
Rec'd a call from Geeta, [facility] [place] from [facility] ED called to have Dr. Beck paged.      Pt is at the ED for weakness and fatigue     Dr. Beck was paged at [phone]

## 2019-01-17 ENCOUNTER — TELEPHONE (OUTPATIENT)
Dept: SCHEDULING | Facility: CLINIC | Age: 84
End: 2019-01-17

## 2019-01-17 ENCOUNTER — TRANSCRIBE ORDERS (OUTPATIENT)
Dept: CARDIOLOGY | Facility: CLINIC | Age: 84
End: 2019-01-17

## 2019-01-17 DIAGNOSIS — R07.9 CHEST PAIN: Primary | ICD-10-CM

## 2019-01-17 NOTE — TELEPHONE ENCOUNTER
Pt was at the ED for coughing, burping and pressure in chest.  Pt states that Dr. Beck is aware.      Pt was instructed to f/u in 2 weeks which will place pt on the day of an already scheduled appt 2/6.      Pt reports symptoms has subsided, however, pt would like to know if Dr. Beck would like to see her sooner than 2/6.     Please contact pt at  to advise.

## 2019-01-17 NOTE — TELEPHONE ENCOUNTER
I called pt-  I spoke with Dr. Beck she said time frame is ok but switch echo to stress echo.    Pt is now scheduled for stress echo/ov - ply mtg- 2/4/19

## 2019-01-29 NOTE — PROGRESS NOTES
Cardiology Note    Kunal La DO Constmichelle Bird is a 83 y.o. 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 this was done January 2019  Dilated pulmonary artery stable from 2017     stress echocardiogram performed today  Somewhat technically limited but no obvious ischemia  On today's study and no chest pain she was in the emergency room for an episode of chest pain    Since I last saw her she was in the [facility] emergency room and admitted for chest pain cardiac workup was negative they did a CTA of her chest that was stable with pulmonary artery aneurysm this was done in January 2019    She has known coronary disease based on calcifications seen on CAT scan her last ischemic evaluation was negative stress echocardiogram March 2017  She also has a history of paroxysmal atrial fibrillation seen on a monitor in April 2018  And remains on chronic anticoagulation with Xarelto   she is treated for mixed hyperlipidemia     she has multinodular goiter with negative biopsy in 2017 follows with Dr. Ha Wheatley                 Patient Active Problem List    Diagnosis Date Noted   • RBBB 08/02/2018   • PAF (paroxysmal atrial fibrillation) (CMS/HCC) ([facility]) 04/24/2018   • Coronary artery disease involving native coronary artery of native heart without angina pectoris 04/08/2018   • Nicotine dependence in remission 04/08/2018   • Multinodular goiter 03/13/2018   • Mixed hyperlipidemia 03/13/2018   • Pulmonary artery abnormality 04/04/2017   • Malignant neoplasm of uterus (CMS/HCC) ([facility]) 03/22/2017       Allergy  Penicillin; Pravastatin sodium; Rosuvastatin calcium; Penicillins; and Zetia [ezetimibe]    MED LIST     Current Outpatient Prescriptions   Medication Sig Dispense Refill   • cetirizine (ZyrTEC) 5 mg  tablet Take 5 mg by mouth daily.     • LORazepam (ATIVAN) 0.5 mg tablet Take 0.5 mg by mouth every 6 (six) hours as needed.     • metoprolol succinate XL (TOPROL-XL) 25 mg 24 hr tablet Take 1 tablet (25 mg total) by mouth 2 (two) times a day. 180 tablet 3   • pantoprazole (PROTONIX) 40 mg EC tablet Take 40 mg by mouth as needed.       • rivaroxaban (XARELTO) 20 mg tablet Take 20 mg by mouth daily with dinner.     • alirocumab (PRALUENT PEN) 150 mg/mL pen injector Inject 150 pens under the skin every 14 (fourteen) days. 4 pen 6     No current facility-administered medications for this visit.         Review of Systems   Constitution: Negative for malaise/fatigue, weight gain and weight loss.   HENT: Negative for hearing loss and hoarse voice.    Eyes: Negative for visual disturbance.   Cardiovascular: Negative for chest pain, claudication, cyanosis, dyspnea on exertion, irregular heartbeat, leg swelling, near-syncope, orthopnea, palpitations, paroxysmal nocturnal dyspnea and syncope.   Respiratory: Negative for cough, hemoptysis, shortness of breath, sleep disturbances due to breathing, snoring, sputum production and wheezing.    Endocrine: Negative for cold intolerance and heat intolerance.   Hematologic/Lymphatic: Negative.  Negative for bleeding problem. Does not bruise/bleed easily.   Skin: Negative.  Negative for rash.   Musculoskeletal: Negative for arthritis, falls, joint pain, muscle cramps and myalgias.   Gastrointestinal: Negative for abdominal pain, anorexia, change in bowel habit, constipation, diarrhea, dysphagia, heartburn, jaundice and nausea.   Genitourinary: Negative for frequency and nocturia.   Neurological: Negative for dizziness, focal weakness, headaches, light-headedness, numbness, tremors and vertigo.   Psychiatric/Behavioral: Negative for memory loss. The patient does not have insomnia and is not nervous/anxious.    Allergic/Immunologic: Negative for hives.       Labs   Lab Results   Component  "Value Date    WBC 5.7 11/28/2018    HGB 15.0 11/28/2018    HCT 44.4 11/28/2018     11/28/2018    CHOL 187 11/28/2018    TRIG 74 11/28/2018    HDL 68 11/28/2018    LDLCALC 103 (H) 11/28/2018    ALT 15 11/28/2018    AST 17 11/28/2018     11/28/2018    K 5.1 11/28/2018     11/28/2018    CREATININE 0.71 11/28/2018    BUN 20 08/02/2018    CO2 33 (H) 11/28/2018    TSH 1.72 11/28/2018       Lab Results   Component Value Date    GLUCOSE 92 11/28/2018    CALCIUM 9.5 11/28/2018     11/28/2018    K 5.1 11/28/2018    CO2 33 (H) 11/28/2018     11/28/2018    BUN 20 08/02/2018    CREATININE 0.71 11/28/2018       Objective   Vitals:    02/04/19 1516   BP: 128/70   BP Location: Right upper arm   Patient Position: Standing   Pulse: 60   SpO2: 96%   Weight: 67 kg (147 lb 12.8 oz)   Height: 1.549 m (5' 1\")     Physical Exam   Constitutional: She is oriented to person, place, and time. She appears well-developed and well-nourished. No distress.   HENT:   Head: Normocephalic and atraumatic.   Nose: Nose normal.   Eyes: Conjunctivae are normal. No scleral icterus.   Neck: No JVD present.   Cardiovascular: Normal rate, regular rhythm, normal heart sounds and intact distal pulses.  Exam reveals no gallop and no friction rub.    No murmur heard.  Systolic click   Pulmonary/Chest: Effort normal. No stridor. No respiratory distress. She has no wheezes. She has no rales. She exhibits no tenderness.   Abdominal: There is no tenderness.   Musculoskeletal: She exhibits no edema or deformity.   Neurological: She is alert and oriented to person, place, and time.   Skin: Skin is warm and dry.   Psychiatric: She has a normal mood and affect.       Cardiac Procedures  Cardiac Procedures  Cardiac Procedures      STRESS    Stress echocardiogram March 2017 moderate pulmonic insufficiency pulmonary artery for mild mitral regurgitation negative for ischemia     Stress echo 2/19 vpc in recovery neg ishcmia  VPCs during test " apical septal abnormal wall motion related to that      ECHO  Echo 4/18 pulm a 4.2 aorta 3.7cm       OTHER  Ultrasound thyroid July 2017 11 mm nodule       CAT SCAN   Chest CTA April 2018 no change from 2017 dilated pulmonary artery 4.7 thyroid nodules coronary artery calcifications renal cyst Lrhyr mass    cta 1/19 [facility] pul artery 4.2 mildy dilated aortic root    EKGnsr rbbb no change  EKG    Assessment/Plan:  Coronary artery disease involving native coronary artery of native heart without angina pectoris  She has CAD diagnosed by calcification seen on CAT scan  She was recently in the [facility] ER for chest pain that actually lasted for days acute cardiac workup was negative CTA of thoracic aorta negative no pulmonary embolism 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 Fajardo  On stress echo today pulmonary artery 4.1 cm  She had a CAT scan of her aorta at [facility] January 2019 asked to get a copy to bring his appointment she is seeing him in May    PAF (paroxysmal atrial fibrillation) (CMS/HCC) ([facility])  This was picked up on monitor in April 2018 1% of total heartbeats remains on anticoagulation    RBBB  No change    Multinodular goiter  Follows with Dr. Ha Wheatley negative biopsy in 2017    Mixed hyperlipidemia  Mixed hyperlipidemia with LDL cholesterol over 100 with diagnosis of coronary artery disease not at goal with she had severe GI intolerance to multiple statins and also to zetia  Sent a prescription in for praluent     I will see her back in 6 months with lab work and echocardiogram    I am trying to get her the PCSK(inhibitors to treat her hyperlipidemia for her diagnosis of CAD and intolerance to statins and Zetia    She is following up with Dr. Fajardo  this spring  I asked her to bring the copy of the CTA of her pulmonary artery done in [facility] last month    Thank you for allowing me to participate in the care of this patient.  I hope this information is helpful.    Nick Beck MD formerly [facility]   2/4/2019  Cc Kunal La, DO

## 2019-02-04 ENCOUNTER — OFFICE VISIT (OUTPATIENT)
Dept: CARDIOLOGY | Facility: CLINIC | Age: 84
End: 2019-02-04
Payer: MEDICARE

## 2019-02-04 ENCOUNTER — HOSPITAL ENCOUNTER (OUTPATIENT)
Dept: CARDIOLOGY | Facility: CLINIC | Age: 84
Discharge: HOME | End: 2019-02-04
Payer: MEDICARE

## 2019-02-04 VITALS
BODY MASS INDEX: 27.9 KG/M2 | OXYGEN SATURATION: 96 % | HEIGHT: 61 IN | SYSTOLIC BLOOD PRESSURE: 128 MMHG | HEART RATE: 60 BPM | WEIGHT: 147.8 LBS | DIASTOLIC BLOOD PRESSURE: 70 MMHG

## 2019-02-04 VITALS — WEIGHT: 147 LBS | BODY MASS INDEX: 28.86 KG/M2 | HEIGHT: 60 IN

## 2019-02-04 DIAGNOSIS — R07.9 CHEST PAIN: ICD-10-CM

## 2019-02-04 DIAGNOSIS — Q25.79 PULMONARY ARTERY ABNORMALITY: Primary | ICD-10-CM

## 2019-02-04 DIAGNOSIS — I45.10 RBBB: ICD-10-CM

## 2019-02-04 DIAGNOSIS — I25.10 CORONARY ARTERY DISEASE INVOLVING NATIVE CORONARY ARTERY OF NATIVE HEART WITHOUT ANGINA PECTORIS: ICD-10-CM

## 2019-02-04 DIAGNOSIS — I48.0 PAF (PAROXYSMAL ATRIAL FIBRILLATION) (CMS/HCC): ICD-10-CM

## 2019-02-04 PROBLEM — E87.5 HYPERKALEMIA: Status: RESOLVED | Noted: 2018-08-02 | Resolved: 2019-02-04

## 2019-02-04 LAB
AORTIC ROOT ANNULUS: 3.4 CM
ASCENDING AORTA: 3.4 CM
AV PEAK GRADIENT: 7 MMHG
AV PEAK VELOCITY-S: 1.35 M/S
AV VALVE AREA: 2.31 CM2
BSA FOR ECHO PROCEDURE: 1.68 M2
E WAVE DECELERATION TIME: 254 MS
E/A RATIO: 0.9
E/E' RATIO: 21.5
E/LAT E' RATIO: 8.2
EDV (BP): 76.4 CM3
EF (A4C): 69.2 %
EF A2C: 63.1 %
EJECTION FRACTION: 66 %
ESV (BP): 26 CM3
LA ESV (BP): 75.7 CM3
LA ESV INDEX (A2C): 40.83 CM3/M2
LA ESV INDEX (BP): 45.06 CM3/M2
LA/AORTA RATIO: 1.32
LAAS-AP2: 21.7 CM2
LAAS-AP4: 23.9 CM2
LAD 2D: 4.5 CM
LALD A4C: 5.28 CM
LALD A4C: 6 CM
LAV-S: 68.6 CM3
LEFT ATRIUM VOLUME INDEX: 45.24 CM3/M2
LEFT ATRIUM VOLUME: 76 CM3
LEFT VENTRICLE DIASTOLIC VOLUME INDEX: 42.92 CM3/M2
LEFT VENTRICLE DIASTOLIC VOLUME: 72.1 CM3
LEFT VENTRICLE SYSTOLIC VOLUME INDEX: 13.21 CM3/M2
LEFT VENTRICLE SYSTOLIC VOLUME: 22.2 CM3
LV DIASTOLIC VOLUME: 78.8 CM3
LV ESV (APICAL 2 CHAMBER): 29.1 CM3
LVAD-AP2: 24.5 CM2
LVAD-AP4: 23.3 CM2
LVAS-AP2: 13.5 CM2
LVAS-AP4: 11.6 CM2
LVEDVI(A2C): 46.9 CM3/M2
LVEDVI(BP): 45.48 CM3/M2
LVESVI(A2C): 17.32 CM3/M2
LVESVI(BP): 15.48 CM3/M2
LVLD-AP2: 6.37 CM
LVLD-AP4: 6.19 CM
LVLS-AP2: 5.51 CM
LVLS-AP4: 5.23 CM
LVOT 2D: 1.9 CM
LVOT A: 2.84 CM2
LVOT PEAK VELOCITY: 1.1 M/S
MV E'TISSUE VEL-LAT: 0.09 M/S
MV E'TISSUE VEL-MED: 0.03 M/S
MV PEAK A VEL: 0.78 M/S
MV PEAK E VEL: 0.73 M/S
PV PEAK GRADIENT: 5 MMHG
PV PV: 1.12 M/S
RVOT VMAX: 0.83 M/S
STRESS ANGINA INDEX: 0
STRESS BASELINE BP: NORMAL MMHG
STRESS BASELINE HR: 57 BPM
STRESS O2 SAT REST: 96 %
STRESS PERCENT HR: 99 %
STRESS POST ESTIMATED WORKLOAD: 10 METS
STRESS POST EXERCISE DUR MIN: 6 MIN
STRESS POST EXERCISE DUR SEC: 10 SEC
STRESS POST PEAK BP: NORMAL MMHG
STRESS POST PEAK HR: 138 BPM
STRESS TARGET HR: 118 BPM
TR MAX PG: 26 MMHG
TRICUSPID VALVE PEAK REGURGITATION VELOCITY: 2.57 M/S
TV REST PULMONARY ARTERY PRESSURE: 31 MMHG

## 2019-02-04 PROCEDURE — 99214 OFFICE O/P EST MOD 30 MIN: CPT | Performed by: INTERNAL MEDICINE

## 2019-02-04 PROCEDURE — 93350 STRESS TTE ONLY: CPT | Performed by: INTERNAL MEDICINE

## 2019-02-04 RX ORDER — CETIRIZINE HYDROCHLORIDE 5 MG/1
5 TABLET ORAL DAILY
COMMUNITY
End: 2023-11-14 | Stop reason: SDUPTHER

## 2019-02-04 ASSESSMENT — ENCOUNTER SYMPTOMS
HEADACHES: 0
SYNCOPE: 0
PND: 0
NUMBNESS: 0
NAUSEA: 0
NEAR-SYNCOPE: 0
BRUISES/BLEEDS EASILY: 0
SNORING: 0
HEMOPTYSIS: 0
ABDOMINAL PAIN: 0
SPUTUM PRODUCTION: 0
WEIGHT GAIN: 0
DIARRHEA: 0
CONSTIPATION: 0
TREMORS: 0
INSOMNIA: 0
MUSCLE CRAMPS: 0
SHORTNESS OF BREATH: 0
JAUNDICE: 0
ANOREXIA: 0
WHEEZING: 0
NERVOUS/ANXIOUS: 0
IRREGULAR HEARTBEAT: 0
LIGHT-HEADEDNESS: 0
DIZZINESS: 0
ORTHOPNEA: 0
WEIGHT LOSS: 0
MYALGIAS: 0
COUGH: 0
FREQUENCY: 0
VERTIGO: 0
HEMATOLOGIC/LYMPHATIC NEGATIVE: 1
CLAUDICATION: 0
CHANGE IN BOWEL HABIT: 0
MEMORY LOSS: 0
FALLS: 0
DYSPNEA ON EXERTION: 0
HOARSE VOICE: 0
SLEEP DISTURBANCES DUE TO BREATHING: 0
HEARTBURN: 0
PALPITATIONS: 0
FOCAL WEAKNESS: 0

## 2019-02-04 NOTE — ASSESSMENT & PLAN NOTE
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

## 2019-02-04 NOTE — ASSESSMENT & PLAN NOTE
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 Fajardo  On stress echo today pulmonary artery 4.1 cm  She had a CAT scan of her aorta at [facility] January 2019 asked to get a copy to bring his appointment she is seeing him in May

## 2019-02-04 NOTE — LETTER
February 4, 2019     Kunal La DO  [address] [facility] [facility] [address]    Patient: Teresa Bird   YOB: 1935   Date of Visit: 2/4/2019       Dear Dominik:    Thank you for referring Teresa Bird to me for evaluation. Below are my notes for this consultation.    If you have questions, please do not hesitate to call me. I look forward to following your patient along with you.         Sincerely,        Nick Beck MD        CC: MD Usha No MD Becker, Andrea J, MD  2/4/2019  4:53 PM  Sign at close encounter      Cardiology Note    Kunal La DO          Teresa Bird is a 83 y.o. 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 this was done January 2019  Dilated pulmonary artery stable from 2017     stress echocardiogram performed today  Somewhat technically limited but no obvious ischemia  On today's study and no chest pain she was in the emergency room for an episode of chest pain    Since I last saw her she was in the [facility] emergency room and admitted for chest pain cardiac workup was negative they did a CTA of her chest that was stable with pulmonary artery aneurysm this was done in January 2019    She has known coronary disease based on calcifications seen on CAT scan her last ischemic evaluation was negative stress echocardiogram March 2017  She also has a history of paroxysmal atrial fibrillation seen on a monitor in April 2018  And remains on chronic anticoagulation with Xarelto   she is treated for mixed hyperlipidemia     she has multinodular goiter with negative biopsy in 2017 follows with Dr. Ha Wheatley                 Patient Active Problem List    Diagnosis Date Noted   • RBBB 08/02/2018   • PAF (paroxysmal atrial fibrillation)  (CMS/[facility]) ([facility]) 04/24/2018   • Coronary artery disease involving native coronary artery of native heart without angina pectoris 04/08/2018   • Nicotine dependence in remission 04/08/2018   • Multinodular goiter 03/13/2018   • Mixed hyperlipidemia 03/13/2018   • Pulmonary artery abnormality 04/04/2017   • Malignant neoplasm of uterus (CMS/[facility]) ([facility]) 03/22/2017       Allergy  Penicillin; Pravastatin sodium; Rosuvastatin calcium; Penicillins; and Zetia [ezetimibe]    MED LIST     Current Outpatient Prescriptions   Medication Sig Dispense Refill   • cetirizine (ZyrTEC) 5 mg tablet Take 5 mg by mouth daily.     • LORazepam (ATIVAN) 0.5 mg tablet Take 0.5 mg by mouth every 6 (six) hours as needed.     • metoprolol succinate XL (TOPROL-XL) 25 mg 24 hr tablet Take 1 tablet (25 mg total) by mouth 2 (two) times a day. 180 tablet 3   • pantoprazole (PROTONIX) 40 mg EC tablet Take 40 mg by mouth as needed.       • rivaroxaban (XARELTO) 20 mg tablet Take 20 mg by mouth daily with dinner.     • alirocumab (PRALUENT PEN) 150 mg/mL pen injector Inject 150 pens under the skin every 14 (fourteen) days. 4 pen 6     No current facility-administered medications for this visit.         Review of Systems   Constitution: Negative for malaise/fatigue, weight gain and weight loss.   HENT: Negative for hearing loss and hoarse voice.    Eyes: Negative for visual disturbance.   Cardiovascular: Negative for chest pain, claudication, cyanosis, dyspnea on exertion, irregular heartbeat, leg swelling, near-syncope, orthopnea, palpitations, paroxysmal nocturnal dyspnea and syncope.   Respiratory: Negative for cough, hemoptysis, shortness of breath, sleep disturbances due to breathing, snoring, sputum production and wheezing.    Endocrine: Negative for cold intolerance and heat intolerance.   Hematologic/Lymphatic: Negative.  Negative for bleeding problem. Does not bruise/bleed easily.   Skin: Negative.  Negative for rash.   Musculoskeletal: Negative  "for arthritis, falls, joint pain, muscle cramps and myalgias.   Gastrointestinal: Negative for abdominal pain, anorexia, change in bowel habit, constipation, diarrhea, dysphagia, heartburn, jaundice and nausea.   Genitourinary: Negative for frequency and nocturia.   Neurological: Negative for dizziness, focal weakness, headaches, light-headedness, numbness, tremors and vertigo.   Psychiatric/Behavioral: Negative for memory loss. The patient does not have insomnia and is not nervous/anxious.    Allergic/Immunologic: Negative for hives.       Labs   Lab Results   Component Value Date    WBC 5.7 11/28/2018    HGB 15.0 11/28/2018    HCT 44.4 11/28/2018     11/28/2018    CHOL 187 11/28/2018    TRIG 74 11/28/2018    HDL 68 11/28/2018    LDLCALC 103 (H) 11/28/2018    ALT 15 11/28/2018    AST 17 11/28/2018     11/28/2018    K 5.1 11/28/2018     11/28/2018    CREATININE 0.71 11/28/2018    BUN 20 08/02/2018    CO2 33 (H) 11/28/2018    TSH 1.72 11/28/2018       Lab Results   Component Value Date    GLUCOSE 92 11/28/2018    CALCIUM 9.5 11/28/2018     11/28/2018    K 5.1 11/28/2018    CO2 33 (H) 11/28/2018     11/28/2018    BUN 20 08/02/2018    CREATININE 0.71 11/28/2018       Objective   Vitals:    02/04/19 1516   BP: 128/70   BP Location: Right upper arm   Patient Position: Standing   Pulse: 60   SpO2: 96%   Weight: 67 kg (147 lb 12.8 oz)   Height: 1.549 m (5' 1\")     Physical Exam   Constitutional: She is oriented to person, place, and time. She appears well-developed and well-nourished. No distress.   HENT:   Head: Normocephalic and atraumatic.   Nose: Nose normal.   Eyes: Conjunctivae are normal. No scleral icterus.   Neck: No JVD present.   Cardiovascular: Normal rate, regular rhythm, normal heart sounds and intact distal pulses.  Exam reveals no gallop and no friction rub.    No murmur heard.  Systolic click   Pulmonary/Chest: Effort normal. No stridor. No respiratory distress. She has no " wheezes. She has no rales. She exhibits no tenderness.   Abdominal: There is no tenderness.   Musculoskeletal: She exhibits no edema or deformity.   Neurological: She is alert and oriented to person, place, and time.   Skin: Skin is warm and dry.   Psychiatric: She has a normal mood and affect.       Cardiac Procedures  Cardiac Procedures  Cardiac Procedures      STRESS    Stress echocardiogram March 2017 moderate pulmonic insufficiency pulmonary artery for mild mitral regurgitation negative for ischemia     Stress echo 2/19 vpc in recovery neg ishcmia  VPCs during test apical septal abnormal wall motion related to that      ECHO  Echo 4/18 pulm a 4.2 aorta 3.7cm       OTHER  Ultrasound thyroid July 2017 11 mm nodule       CAT SCAN   Chest CTA April 2018 no change from 2017 dilated pulmonary artery 4.7 thyroid nodules coronary artery calcifications renal cyst Lrhyr mass    cta 1/19 [facility] pul artery 4.2 mildy dilated aortic root    EKGnsr rbbb no change  EKG    Assessment/Plan:  Coronary artery disease involving native coronary artery of native heart without angina pectoris  She has CAD diagnosed by calcification seen on CAT scan  She was recently in the [facility] ER for chest pain that actually lasted for days acute cardiac workup was negative CTA of thoracic aorta negative no pulmonary embolism 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 Fajardo  On stress echo today pulmonary artery 4.1 cm  She had a CAT scan of her aorta at [facility] January 2019 asked to get a copy to bring his appointment she is seeing him in May    PAF (paroxysmal atrial fibrillation) (CMS/HCC) ([facility])  This was picked up on monitor in April 2018 1% of total heartbeats remains on  anticoagulation    RBBB  No change    Multinodular goiter  Follows with Dr. Ha Wheatley negative biopsy in 2017    Mixed hyperlipidemia  Mixed hyperlipidemia with LDL cholesterol over 100 with diagnosis of coronary artery disease not at goal with she had severe GI intolerance to multiple statins and also to zetia  Sent a prescription in for praluent     I will see her back in 6 months with lab work and echocardiogram    I am trying to get her the PCSK(inhibitors to treat her hyperlipidemia for her diagnosis of CAD and intolerance to statins and Zetia    She is following up with Dr. Jacobs this spring  I asked her to bring the copy of the CTA of her pulmonary artery done in [facility] last month    Thank you for allowing me to participate in the care of this patient.  I hope this information is helpful.    Nick Beck MD [facility]   2/4/2019  Kunal Kramer,

## 2019-02-04 NOTE — ASSESSMENT & PLAN NOTE
Mixed hyperlipidemia with LDL cholesterol over 100 with diagnosis of coronary artery disease not at goal with she had severe GI intolerance to multiple statins and also to zetia  Sent a prescription in for praluent

## 2019-02-06 ENCOUNTER — TELEPHONE (OUTPATIENT)
Dept: SCHEDULING | Facility: CLINIC | Age: 84
End: 2019-02-06

## 2019-02-06 NOTE — TELEPHONE ENCOUNTER
Spoke to pharmacist, Praluent prescription called in to be filled, Praluent 150 mg/ml pen, inject 150 mg under the skin every 14 days. No further questions, thanks.

## 2019-02-06 NOTE — TELEPHONE ENCOUNTER
Pt of Dr. Beck. Call from pharmacist who is reviewing prior auth for patient Rx of Praluent sent by Michael Magana RN. Wanted to know the following?    1.) What reactions specifically did pt have to pravastatin and rosuvastatin? - (I advised per office notes and Allergy profile listed in [facility] and [facility], pt had severe GI distress, reflux with all statins and myalgias noted with Crestor).    2.) What dose of pravastatin and rosuvastatin were tried? - (Advised per notes scanned from [facility] that pt was on rosuvastatin 10mg daily. Could not find dose for pravastatin).     Thank you.

## 2019-02-06 NOTE — TELEPHONE ENCOUNTER
Received a fax from [facility] pharmacy, pt's prior auth for Praluent is approved for 2/6/19 to 8/6/19, thanks.

## 2019-02-06 NOTE — TELEPHONE ENCOUNTER
Ruy from [facility] pharmacy received fax for Prior Auth for Praluent pen. They would like to know if they need to fill the medication as well. Please call [facility] to advise. Ruy [phone]

## 2019-02-08 NOTE — TELEPHONE ENCOUNTER
Remi from [facility] requesting to speak to Otto Magana in regards to prior auth. Please call Remi to advise.  P [phone]

## 2019-02-08 NOTE — TELEPHONE ENCOUNTER
Spoke to pharmacy, pt has a copay of 585 for Praluent. Pt unable to afford it. Will call pt to discuss PASS program and see if she is eligible.

## 2019-02-08 NOTE — TELEPHONE ENCOUNTER
Spoke to pt regarding enrolling for PASS program for Praluent, pt may be eligible, will mail PASS form to pt home and advised her once completed she can drop off the form in the office and we will complete the other portion. Pt understands and has no further questions at this time.

## 2019-04-03 ENCOUNTER — TELEPHONE (OUTPATIENT)
Dept: CARDIOLOGY | Facility: CLINIC | Age: 84
End: 2019-04-03

## 2019-04-03 NOTE — TELEPHONE ENCOUNTER
Spoke to pt, pt called in regarding the status of her PASS program for Praluent. PASS program had denied the medication due to no prior authorization was on file. Informed pt that I submitted her prior authorization approval letter for Praluent to PASS program to keep on file about a week ago and is pending status. Will call PASS again today to check on the status. Pt understands and has no further questions at this time.

## 2019-04-03 NOTE — TELEPHONE ENCOUNTER
LMOM inform pt that I called PASS program regarding the status of her application, it is still processing, they will let us know the status by next week. Will give pt a call back next week to give an update.

## 2019-04-03 NOTE — TELEPHONE ENCOUNTER
[facility] [facility] approved praulent.pt would like to talk to Dr. Beck before starting med. Pt can be reached at [phone]

## 2019-04-05 ENCOUNTER — TELEPHONE (OUTPATIENT)
Dept: CARDIOLOGY | Facility: CLINIC | Age: 84
End: 2019-04-05

## 2019-04-05 NOTE — TELEPHONE ENCOUNTER
Spoke to pt, she is eligible and enrolled in the PASS program for Praluent until the end of the year, a specialist from PASS program will contact pt to arrange medication delivery. I also provided pt with the the contact number for live nurse assistance for Praluent and instructions on how to use the pen. Pt is thankful and states understanding, no further questions at this time.

## 2019-04-12 ENCOUNTER — TELEPHONE (OUTPATIENT)
Dept: CARDIOLOGY | Facility: CLINIC | Age: 84
End: 2019-04-12

## 2019-04-12 NOTE — TELEPHONE ENCOUNTER
Patient had a CT (with and without contrast) at [facility] on January 14.  Does she have to have another test prior to 5/14/19 appointment at ?

## 2019-04-15 ENCOUNTER — TELEPHONE (OUTPATIENT)
Dept: SCHEDULING | Facility: CLINIC | Age: 84
End: 2019-04-15

## 2019-04-15 NOTE — TELEPHONE ENCOUNTER
Pt calling to be precerted at  for CTA.    Please contact pt once completed   
Pt has medicare and [employer] supplemental insurance. [employer]   Called pt to inform.    
No

## 2019-04-18 ENCOUNTER — OFFICE VISIT (OUTPATIENT)
Dept: ENDOCRINOLOGY | Facility: CLINIC | Age: 84
End: 2019-04-18
Payer: MEDICARE

## 2019-04-18 VITALS
HEIGHT: 60 IN | SYSTOLIC BLOOD PRESSURE: 118 MMHG | BODY MASS INDEX: 29.06 KG/M2 | WEIGHT: 148 LBS | HEART RATE: 68 BPM | DIASTOLIC BLOOD PRESSURE: 68 MMHG

## 2019-04-18 DIAGNOSIS — E04.2 MULTINODULAR GOITER: Primary | ICD-10-CM

## 2019-04-18 DIAGNOSIS — E78.2 MIXED HYPERLIPIDEMIA: ICD-10-CM

## 2019-04-18 PROCEDURE — 99214 OFFICE O/P EST MOD 30 MIN: CPT | Performed by: INTERNAL MEDICINE

## 2019-04-18 NOTE — PROGRESS NOTES
ENDOCRINOLOGY NOTE       Subjective    HPI:  Teresa Bird is a 83 y.o. female who presents for follow up for thyroid disease    Last visit: 3/13/18     I did FNA of her left thyroid nodule on 9/6/17 and it was negative. No increased neck swelling, dysphagia or choking sensation. She has difficulty tolerating statins and follows with Dr. Beck and cardiology. Her arthritis is stable, she mostly has left knee pain. 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 She has history of reflux and postnasal drip. She is s/p hysterectomy for uterine cancer, did not need chemo or radiation.  She gained 3 pounds.       Medications:    Current Outpatient Prescriptions:   •  alirocumab (PRALUENT PEN) 150 mg/mL pen injector, Inject 150 pens under the skin every 14 (fourteen) days., Disp: 4 pen, Rfl: 6  •  cetirizine (ZyrTEC) 5 mg tablet, Take 5 mg by mouth daily., Disp: , Rfl:   •  LORazepam (ATIVAN) 0.5 mg tablet, Take 0.5 mg by mouth every 6 (six) hours as needed., Disp: , Rfl:   •  metoprolol succinate XL (TOPROL-XL) 25 mg 24 hr tablet, Take 1 tablet (25 mg total) by mouth 2 (two) times a day., Disp: 180 tablet, Rfl: 3  •  pantoprazole (PROTONIX) 40 mg EC tablet, Take 40 mg by mouth as needed.  , Disp: , Rfl:   •  rivaroxaban (XARELTO) 20 mg tablet, Take 20 mg by mouth daily with dinner., Disp: , Rfl:      Allergies:  Penicillin; Pravastatin sodium; Rosuvastatin calcium; Penicillins; and Zetia [ezetimibe]     Medical History:  Past Medical History:   Diagnosis Date   • Arthritis    • Cancer, uterine (CMS/HCC) (HCC)    • Closed right ankle fracture age 62   • GERD (gastroesophageal reflux disease)    • Lipid disorder     difficulty tolerating statins   • Melanoma (CMS/HCC) (HCC)     left shoulder   • Osteoporosis     did not tolerate fosamax       Surgical History:   Past Surgical History:   Procedure Laterality Date   • CHOLECYSTECTOMY     • HYSTERECTOMY  12/2013       Family History:  Family History    Problem Relation Age of Onset   • Heart attack Mother    • Diabetes Mother    • Lung cancer Mother    • COPD Mother    • Diabetes Father    • Prostate cancer Brother        Social history:  Social History     Social History   • Marital status:      Spouse name: N/A   • Number of children: N/A   • Years of education: N/A     Occupational History   • Not on file.     Social History Main Topics   • Smoking status: Former Smoker   • Smokeless tobacco: Never Used      Comment: Quit 2018   • Alcohol use Yes   • Drug use: No   • Sexual activity: Defer     Other Topics Concern   • Not on file     Social History Narrative   • No narrative on file       Review of Systems  All other systems reviewed and negative except as noted in HPI    Objective   Vitals:    04/18/19 1155   BP: 118/68   BP Location: Right upper arm   Patient Position: Sitting   Pulse: 68   Weight: 67.1 kg (148 lb)   Height: 1.524 m (5')     Body mass index is 28.9 kg/m².    Physical Exam   Constitutional: She appears well-developed.   HENT:   Head: Normocephalic and atraumatic.   Eyes: Conjunctivae and EOM are normal. Pupils are equal, round, and reactive to light.   Neck: No tracheal deviation present. No thyromegaly present.   Left low lying thyroid nodule, best palpable with swallowing   Cardiovascular: Normal rate, regular rhythm and normal heart sounds.    Pulmonary/Chest: Effort normal and breath sounds normal.   Musculoskeletal: She exhibits no edema.   Lymphadenopathy:     She has no cervical adenopathy.   Neurological: She displays normal reflexes.   Skin: No rash noted.   Psychiatric: She has a normal mood and affect.     Lab Results   Component Value Date    TSH 1.72 11/28/2018    TSH 1.580 08/02/2018    TRIG 74 11/28/2018    TRIG 86 08/02/2018    LDLCALC 103 (H) 11/28/2018    LDLCALC 97 08/02/2018    ALT 15 11/28/2018        Lab Results   Component Value Date    CREATININE 0.71 11/28/2018    K 5.1 11/28/2018     Lab Results   Component  Value Date    TSH 1.72 11/28/2018   Office ultrasound 3/13/2018  Small nodules on the right side  Dominant nodule in left lower lobe is solid, not vascular, measures 1.6x2.1x3.7 cm.     Office thyroid ultrasound today:  Small nodules on the right side, stable, dominant nodule in the left lower lobe, solid, difficult to measure this time measuring smaller  at 1.9 x 1.97 x 2.82 cm    Assessment/Plan    Multinodular goiter - Primary  She had a CT scan of the chest and abdomen done on 3/09/17 as a routine followup after her surgery for uterine cancer in December of 2013.  The CT scan showed evidence of an abnormal left thyroid lobe that was enlarged with several nodules.     FNA of 3.7 cm left thyroid nodule was done on 9/6/17     Her thyroid ultrasound has been stable, no symptoms of anterior neck compression.      She will follow up with me in 1 year, will do office ultrasound for monitoring      Hyperlipidemia  Did not tolerate statins      Osteoporosis  Did not tolerate multiple meds in the past including Fosamax  No fracture        Total encounter time 25 minutes, greater than 50% spent counseling/coordination of care.  I reviewed the patient's labs/imaging/medications/allergies/problem list.   Patient Instructions   Thyroid ultrasound is stable    Thyroid blood test was good in August    Follow up in one year in [facility], will do office ultrasound again.       Usha Nobles MD  4/18/2019

## 2019-04-18 NOTE — LETTER
April 20, 2019     Kunal La, DO  [address] [facility] [address]    Patient: Teresa Bird   YOB: 1935   Date of Visit: 4/18/2019       Dear Dr. La:    Thank you for referring Teresa Bird to me for evaluation. Below are my notes for this consultation.    If you have questions, please do not hesitate to call me. I look forward to following your patient along with you.         Sincerely,        Usha Nobles MD        CC: No Recipients  Usha Nobles MD  4/20/2019  9:42 AM  Signed       ENDOCRINOLOGY NOTE       Subjective    HPI:  Teresa Bird is a 83 y.o. female who presents for follow up for thyroid disease    Last visit: 3/13/18     I did FNA of her left thyroid nodule on 9/6/17 and it was negative. No increased neck swelling, dysphagia or choking sensation. She has difficulty tolerating statins and follows with Dr. Beck and cardiology. Her arthritis is stable, she mostly has left knee pain. 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 She has history of reflux and postnasal drip. She is s/p hysterectomy for uterine cancer, did not need chemo or radiation.  She gained 3 pounds.       Medications:    Current Outpatient Prescriptions:   •  alirocumab (PRALUENT PEN) 150 mg/mL pen injector, Inject 150 pens under the skin every 14 (fourteen) days., Disp: 4 pen, Rfl: 6  •  cetirizine (ZyrTEC) 5 mg tablet, Take 5 mg by mouth daily., Disp: , Rfl:   •  LORazepam (ATIVAN) 0.5 mg tablet, Take 0.5 mg by mouth every 6 (six) hours as needed., Disp: , Rfl:   •  metoprolol succinate XL (TOPROL-XL) 25 mg 24 hr tablet, Take 1 tablet (25 mg total) by mouth 2 (two) times a day., Disp: 180 tablet, Rfl: 3  •  pantoprazole (PROTONIX) 40 mg EC tablet, Take 40 mg by mouth as needed.  , Disp: , Rfl:   •  rivaroxaban (XARELTO) 20 mg tablet, Take 20 mg by mouth daily with dinner., Disp: , Rfl:      Allergies:  Penicillin; Pravastatin sodium; Rosuvastatin calcium; Penicillins; and  Zetia [ezetimibe]     Medical History:  Past Medical History:   Diagnosis Date   • Arthritis    • Cancer, uterine (CMS/HCC) (HCC)    • Closed right ankle fracture age 62   • GERD (gastroesophageal reflux disease)    • Lipid disorder     difficulty tolerating statins   • Melanoma (CMS/HCC) (HCC)     left shoulder   • Osteoporosis     did not tolerate fosamax       Surgical History:   Past Surgical History:   Procedure Laterality Date   • CHOLECYSTECTOMY     • HYSTERECTOMY  12/2013       Family History:  Family History   Problem Relation Age of Onset   • Heart attack Mother    • Diabetes Mother    • Lung cancer Mother    • COPD Mother    • Diabetes Father    • Prostate cancer Brother        Social history:  Social History     Social History   • Marital status:      Spouse name: N/A   • Number of children: N/A   • Years of education: N/A     Occupational History   • Not on file.     Social History Main Topics   • Smoking status: Former Smoker   • Smokeless tobacco: Never Used      Comment: Quit 2018   • Alcohol use Yes   • Drug use: No   • Sexual activity: Defer     Other Topics Concern   • Not on file     Social History Narrative   • No narrative on file       Review of Systems  All other systems reviewed and negative except as noted in HPI    Objective   Vitals:    04/18/19 1155   BP: 118/68   BP Location: Right upper arm   Patient Position: Sitting   Pulse: 68   Weight: 67.1 kg (148 lb)   Height: 1.524 m (5')     Body mass index is 28.9 kg/m².    Physical Exam   Constitutional: She appears well-developed.   HENT:   Head: Normocephalic and atraumatic.   Eyes: Conjunctivae and EOM are normal. Pupils are equal, round, and reactive to light.   Neck: No tracheal deviation present. No thyromegaly present.   Left low lying thyroid nodule, best palpable with swallowing   Cardiovascular: Normal rate, regular rhythm and normal heart sounds.    Pulmonary/Chest: Effort normal and breath sounds normal.   Musculoskeletal:  She exhibits no edema.   Lymphadenopathy:     She has no cervical adenopathy.   Neurological: She displays normal reflexes.   Skin: No rash noted.   Psychiatric: She has a normal mood and affect.     Lab Results   Component Value Date    TSH 1.72 11/28/2018    TSH 1.580 08/02/2018    TRIG 74 11/28/2018    TRIG 86 08/02/2018    LDLCALC 103 (H) 11/28/2018    LDLCALC 97 08/02/2018    ALT 15 11/28/2018        Lab Results   Component Value Date    CREATININE 0.71 11/28/2018    K 5.1 11/28/2018     Lab Results   Component Value Date    TSH 1.72 11/28/2018   Office ultrasound 3/13/2018  Small nodules on the right side  Dominant nodule in left lower lobe is solid, not vascular, measures 1.6x2.1x3.7 cm.     Office thyroid ultrasound today:  Small nodules on the right side, stable, dominant nodule in the left lower lobe, solid, difficult to measure this time measuring smaller  at 1.9 x 1.97 x 2.82 cm    Assessment/Plan    Multinodular goiter - Primary  She had a CT scan of the chest and abdomen done on 3/09/17 as a routine followup after her surgery for uterine cancer in December of 2013.  The CT scan showed evidence of an abnormal left thyroid lobe that was enlarged with several nodules.     FNA of 3.7 cm left thyroid nodule was done on 9/6/17     Her thyroid ultrasound has been stable, no symptoms of anterior neck compression.      She will follow up with me in 1 year, will do office ultrasound for monitoring      Hyperlipidemia  Did not tolerate statins      Osteoporosis  Did not tolerate multiple meds in the past including Fosamax  No fracture        Total encounter time 25 minutes, greater than 50% spent counseling/coordination of care.  I reviewed the patient's labs/imaging/medications/allergies/problem list.   Patient Instructions   Thyroid ultrasound is stable    Thyroid blood test was good in August    Follow up in one year in [facility], will do office ultrasound again.       Usha Nobles MD  4/18/2019

## 2019-04-18 NOTE — PATIENT INSTRUCTIONS
Thyroid ultrasound is stable    Thyroid blood test was good in August    Follow up in one year in [facility], will do office ultrasound again.

## 2019-04-19 ENCOUNTER — TELEPHONE (OUTPATIENT)
Dept: TRANSFER UNIT | Age: 84
End: 2019-04-19

## 2019-04-20 LAB
BUN SERPL-MCNC: 22 MG/DL
CHLORIDE SERPL-SCNC: 107 MMOL/L
CO2 SERPL-SCNC: 29
CREAT SERPL-MCNC: 0.7 MG/DL
EXTERNAL CALCIUM: 9.3 MG/DL
GFR SERPL CREATININE-BSD FRML MDRD: 95 ML/MIN/1.73M*2
GLUCOSE SERPL-MCNC: 85 MG/DL
POTASSIUM SERPL-SCNC: 5.3 MMOL/L
SODIUM SERPL-SCNC: 142 MMOL/L

## 2019-04-30 ENCOUNTER — HOSPITAL ENCOUNTER (OUTPATIENT)
Dept: RADIOLOGY | Facility: HOSPITAL | Age: 84
Discharge: HOME | End: 2019-04-30
Attending: PHYSICIAN ASSISTANT
Payer: MEDICARE

## 2019-04-30 DIAGNOSIS — I28.1 PULMONARY ARTERY ANEURYSM (CMS/HCC): ICD-10-CM

## 2019-04-30 PROCEDURE — 63600105 HC IODINE BASED CONTRAST: Performed by: PHYSICIAN ASSISTANT

## 2019-04-30 PROCEDURE — 71275 CT ANGIOGRAPHY CHEST: CPT

## 2019-04-30 RX ADMIN — IOHEXOL 100 ML: 350 INJECTION, SOLUTION INTRAVENOUS at 11:07

## 2019-05-21 ENCOUNTER — OFFICE VISIT (OUTPATIENT)
Dept: CARDIOLOGY | Facility: CLINIC | Age: 84
End: 2019-05-21
Payer: MEDICARE

## 2019-05-21 VITALS
BODY MASS INDEX: 28.86 KG/M2 | WEIGHT: 147 LBS | DIASTOLIC BLOOD PRESSURE: 66 MMHG | TEMPERATURE: 98.3 F | RESPIRATION RATE: 16 BRPM | SYSTOLIC BLOOD PRESSURE: 120 MMHG | OXYGEN SATURATION: 96 % | HEART RATE: 107 BPM | HEIGHT: 60 IN

## 2019-05-21 DIAGNOSIS — I28.1 PULMONARY ARTERY ANEURYSM (CMS/HCC): Primary | ICD-10-CM

## 2019-05-21 PROCEDURE — 99213 OFFICE O/P EST LOW 20 MIN: CPT | Performed by: PHYSICIAN ASSISTANT

## 2019-05-21 ASSESSMENT — ENCOUNTER SYMPTOMS
NEUROLOGICAL NEGATIVE: 1
MUSCULOSKELETAL NEGATIVE: 1
PSYCHIATRIC NEGATIVE: 1
ENDOCRINE NEGATIVE: 1
EYES NEGATIVE: 1
GASTROINTESTINAL NEGATIVE: 1
ALLERGIC/IMMUNOLOGIC NEGATIVE: 1
CONSTITUTIONAL NEGATIVE: 1
RESPIRATORY NEGATIVE: 1

## 2019-05-21 NOTE — PROGRESS NOTES
Cardiology Note       Reason for visit:   Chief Complaint   Patient presents with   • Follow-up     Pulmonary Artery Aneurysm      HPI    Teresa Bird is a 83 y.o. 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  She currently denies any complaints of shortness of breath, chest pain, lightheadedness or syncope.  She follows regularly with Dr. Nick Beck.  She continues to do well and denies any complaints at this time.     Past Medical History:   Diagnosis Date   • Arthritis    • Cancer, uterine (CMS/HCC) (HCC)    • Closed right ankle fracture age 62   • GERD (gastroesophageal reflux disease)    • Lipid disorder     difficulty tolerating statins   • Melanoma (CMS/HCC) (HCC)     left shoulder   • Osteoporosis     did not tolerate fosamax     Past Surgical History:   Procedure Laterality Date   • CHOLECYSTECTOMY     • HYSTERECTOMY  12/2013     Penicillin; Pravastatin sodium; Rosuvastatin calcium; Penicillins; and Zetia [ezetimibe]  Current Outpatient Prescriptions   Medication Sig Dispense Refill   • alirocumab (PRALUENT PEN) 150 mg/mL pen injector Inject 150 pens under the skin every 14 (fourteen) days. 4 pen 6   • cetirizine (ZyrTEC) 5 mg tablet Take 5 mg by mouth daily.     • LORazepam (ATIVAN) 0.5 mg tablet Take 0.5 mg by mouth every 6 (six) hours as needed.     • metoprolol succinate XL (TOPROL-XL) 25 mg 24 hr tablet Take 1 tablet (25 mg total) by mouth 2 (two) times a day. 180 tablet 3   • rivaroxaban (XARELTO) 20 mg tablet Take 20 mg by mouth daily with dinner.     • pantoprazole (PROTONIX) 40 mg EC tablet Take 40 mg by mouth as needed.         No current facility-administered medications for this visit.      Social History     Social History   • Marital status:      Spouse name: N/A   • Number of children: N/A   • Years of education: N/A     Social History Main Topics   • Smoking status: Former Smoker   • Smokeless tobacco: Never Used      Comment: Quit 2018    • Alcohol use Yes   • Drug use: No   • Sexual activity: Defer     Other Topics Concern   • None     Social History Narrative   • None     Family History   Problem Relation Age of Onset   • Heart attack Mother    • Diabetes Mother    • Lung cancer Mother    • COPD Mother    • Diabetes Father    • Prostate cancer Brother          Review of Systems   Constitution: Negative.   HENT: Negative.    Eyes: Negative.    Respiratory: Negative.    Endocrine: Negative.    Skin: Negative.    Musculoskeletal: Negative.    Gastrointestinal: Negative.    Genitourinary: Negative.    Neurological: Negative.    Psychiatric/Behavioral: Negative.    Allergic/Immunologic: Negative.       Objective    Vitals:    05/21/19 1117   BP: 120/66   Pulse: (!) 107   Resp: 16   Temp: 36.8 °C (98.3 °F)   SpO2: 96%      Physical Exam   Constitutional: She is oriented to person, place, and time. She appears well-developed and well-nourished. No distress.   HENT:   Head: Normocephalic and atraumatic.   Right Ear: External ear normal.   Left Ear: External ear normal.   Nose: Nose normal.   Mouth/Throat: Oropharynx is clear and moist.   Eyes: Pupils are equal, round, and reactive to light. Conjunctivae and EOM are normal.   Neck: Normal range of motion. Neck supple.   Cardiovascular: Normal rate, regular rhythm and intact distal pulses.  Exam reveals no gallop and no friction rub.    No murmur heard.  Pulmonary/Chest: Effort normal. No respiratory distress. She has no wheezes. She has no rales. She exhibits no tenderness.   Abdominal: Soft. Bowel sounds are normal. She exhibits no distension and no mass. There is no tenderness. There is no rebound and no guarding.   Musculoskeletal: Normal range of motion. She exhibits no edema or tenderness.   Neurological: She is alert and oriented to person, place, and time. She has normal reflexes.   Skin: Skin is warm and dry. No rash noted. She is not diaphoretic. No erythema. No pallor.   Psychiatric: She has a  normal mood and affect. Her behavior is normal. Judgment and thought content normal.               Imaging  CTA chest performed 4/30/2019 was reviewed with the patient.  At maximum diameter the main pulmonary artery measures 4.7 cm.  On last years imaging, this measured approximately 4.6 to 4.7 cm.  Of note, the aortic root measures 4.3 cm.  There is no evidence of dissection.  There are no new suspicious pulmonary nodules or masses identified.  The central airways are patent.    Stress echocardiogram performed on 2/4/2019 was also reviewed.  This was negative for evidence of infarction or ischemia.  The patient completed 10 mets of exercise and achieve the target heart rate.  EF is 65%.  There was mild mitral regurgitation.  There was mild tricuspid regurgitation.  The patient had rare PVCs during the stress.  ST segment depression was noted during stress, nondiagnostic due to baseline ECG abnormalities.  There is also moderate pulmonic valve regurgitation.     Assessment/Plan    No problem-specific Assessment & Plan notes found for this encounter.      Ms. Bird is an 83-year-old female who presents for follow-up evaluation for a known pulmonary artery aneurysm.  There has been minimal interval growth and this measures 4.7 cm.  The aortic root measures 4.3 cm in the coronal plane.  We would like to continue seeing her on a yearly basis with repeat CTA chest.  We again reiterated the importance of maintaining good blood pressure control and avoiding any heavy lifting or straining.  She was advised to contact us with any questions or concerns in the meantime.          Thank you for allowing me to participate in the care of this patient.  I hope this information is helpful.     ETHEL Cody 5/21/2019  3:06 PM

## 2019-05-21 NOTE — LETTER
May 21, 2019     Nick Beck MD  [address] [facility] [place] [address]    Patient: Teresa Bird   YOB: 1935   Date of Visit: 5/21/2019       Dear Dr. Beck:    Thank you for referring Teresa Bird to me for evaluation. Below are my notes for this consultation.    If you have questions, please do not hesitate to call me. I look forward to following your patient along with you.         Sincerely,        ETHEL Cody        CC: No Recipients  Carey Guzman PA C  5/21/2019  3:18 PM  Sign at close encounter     Cardiology Note       Reason for visit:   Chief Complaint   Patient presents with   • Follow-up     Pulmonary Artery Aneurysm      HPI    Teresa Bird is a 83 y.o. 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  She currently denies any complaints of shortness of breath, chest pain, lightheadedness or syncope.  She follows regularly with Dr. Nick Beck.  She continues to do well and denies any complaints at this time.     Past Medical History:   Diagnosis Date   • Arthritis    • Cancer, uterine (CMS/HCC) (HCC)    • Closed right ankle fracture age 62   • GERD (gastroesophageal reflux disease)    • Lipid disorder     difficulty tolerating statins   • Melanoma (CMS/HCC) (HCC)     left shoulder   • Osteoporosis     did not tolerate fosamax     Past Surgical History:   Procedure Laterality Date   • CHOLECYSTECTOMY     • HYSTERECTOMY  12/2013     Penicillin; Pravastatin sodium; Rosuvastatin calcium; Penicillins; and Zetia [ezetimibe]  Current Outpatient Prescriptions   Medication Sig Dispense Refill   • alirocumab (PRALUENT PEN) 150 mg/mL pen injector Inject 150 pens under the skin every 14 (fourteen) days. 4 pen 6   • cetirizine (ZyrTEC) 5 mg tablet Take 5 mg by mouth daily.     • LORazepam (ATIVAN) 0.5 mg tablet Take 0.5 mg by mouth every 6 (six) hours as needed.     • metoprolol succinate XL (TOPROL-XL) 25 mg 24 hr tablet  Take 1 tablet (25 mg total) by mouth 2 (two) times a day. 180 tablet 3   • rivaroxaban (XARELTO) 20 mg tablet Take 20 mg by mouth daily with dinner.     • pantoprazole (PROTONIX) 40 mg EC tablet Take 40 mg by mouth as needed.         No current facility-administered medications for this visit.      Social History     Social History   • Marital status:      Spouse name: N/A   • Number of children: N/A   • Years of education: N/A     Social History Main Topics   • Smoking status: Former Smoker   • Smokeless tobacco: Never Used      Comment: Quit 2018   • Alcohol use Yes   • Drug use: No   • Sexual activity: Defer     Other Topics Concern   • None     Social History Narrative   • None     Family History   Problem Relation Age of Onset   • Heart attack Mother    • Diabetes Mother    • Lung cancer Mother    • COPD Mother    • Diabetes Father    • Prostate cancer Brother          Review of Systems   Constitution: Negative.   HENT: Negative.    Eyes: Negative.    Respiratory: Negative.    Endocrine: Negative.    Skin: Negative.    Musculoskeletal: Negative.    Gastrointestinal: Negative.    Genitourinary: Negative.    Neurological: Negative.    Psychiatric/Behavioral: Negative.    Allergic/Immunologic: Negative.       Objective    Vitals:    05/21/19 1117   BP: 120/66   Pulse: (!) 107   Resp: 16   Temp: 36.8 °C (98.3 °F)   SpO2: 96%      Physical Exam   Constitutional: She is oriented to person, place, and time. She appears well-developed and well-nourished. No distress.   HENT:   Head: Normocephalic and atraumatic.   Right Ear: External ear normal.   Left Ear: External ear normal.   Nose: Nose normal.   Mouth/Throat: Oropharynx is clear and moist.   Eyes: Pupils are equal, round, and reactive to light. Conjunctivae and EOM are normal.   Neck: Normal range of motion. Neck supple.   Cardiovascular: Normal rate, regular rhythm and intact distal pulses.  Exam reveals no gallop and no friction rub.    No murmur  heard.  Pulmonary/Chest: Effort normal. No respiratory distress. She has no wheezes. She has no rales. She exhibits no tenderness.   Abdominal: Soft. Bowel sounds are normal. She exhibits no distension and no mass. There is no tenderness. There is no rebound and no guarding.   Musculoskeletal: Normal range of motion. She exhibits no edema or tenderness.   Neurological: She is alert and oriented to person, place, and time. She has normal reflexes.   Skin: Skin is warm and dry. No rash noted. She is not diaphoretic. No erythema. No pallor.   Psychiatric: She has a normal mood and affect. Her behavior is normal. Judgment and thought content normal.               Imaging  CTA chest performed 4/30/2019 was reviewed with the patient.  At maximum diameter the main pulmonary artery measures 4.7 cm.  On last years imaging, this measured approximately 4.6 to 4.7 cm.  Of note, the aortic root measures 4.3 cm.  There is no evidence of dissection.  There are no new suspicious pulmonary nodules or masses identified.  The central airways are patent.    Stress echocardiogram performed on 2/4/2019 was also reviewed.  This was negative for evidence of infarction or ischemia.  The patient completed 10 mets of exercise and achieve the target heart rate.  EF is 65%.  There was mild mitral regurgitation.  There was mild tricuspid regurgitation.  The patient had rare PVCs during the stress.  ST segment depression was noted during stress, nondiagnostic due to baseline ECG abnormalities.  There is also moderate pulmonic valve regurgitation.     Assessment/Plan    No problem-specific Assessment & Plan notes found for this encounter.      Ms. Bird is an 83-year-old female who presents for follow-up evaluation for a known pulmonary artery aneurysm.  There has been minimal interval growth and this measures 4.7 cm.  The aortic root measures 4.3 cm in the coronal plane.  We would like to continue seeing her on a yearly basis with repeat CTA  chest.  We again reiterated the importance of maintaining good blood pressure control and avoiding any heavy lifting or straining.  She was advised to contact us with any questions or concerns in the meantime.          Thank you for allowing me to participate in the care of this patient.  I hope this information is helpful.     ETHEL Cody 5/21/2019  3:06 PM

## 2019-06-20 ENCOUNTER — TELEPHONE (OUTPATIENT)
Dept: CARDIOLOGY | Facility: CLINIC | Age: 84
End: 2019-06-20

## 2019-06-20 NOTE — TELEPHONE ENCOUNTER
Dr. Beck, received a fax from [facility] requesting cardiac clearance for colonoscopy/EGD. Pt was last seen 2/2019, pt is on Xarelto for PAF. Please advise, thanks.

## 2019-06-21 NOTE — TELEPHONE ENCOUNTER
Spoke to Dr. Beck, pt is cleared for colonoscopy, hold Xarelto 48 hours prior to procedure. Cardiac clearance letter and last OV note faxed to [phone] as requested.

## 2019-07-27 LAB
ALBUMIN SERPL-MCNC: 3.7 G/DL (ref 3.6–5.1)
ALBUMIN/GLOB SERPL: 1.6 (CALC) (ref 1–2.5)
ALP SERPL-CCNC: 57 U/L (ref 33–130)
ALT SERPL-CCNC: 11 U/L (ref 6–29)
AST SERPL-CCNC: 13 U/L (ref 10–35)
BASOPHILS # BLD AUTO: 28 CELLS/UL (ref 0–200)
BASOPHILS NFR BLD AUTO: 0.4 %
BILIRUB SERPL-MCNC: 0.6 MG/DL (ref 0.2–1.2)
BUN SERPL-MCNC: 18 MG/DL (ref 7–25)
BUN/CREAT SERPL: ABNORMAL (CALC) (ref 6–22)
CALCIUM SERPL-MCNC: 9.3 MG/DL (ref 8.6–10.4)
CHLORIDE SERPL-SCNC: 108 MMOL/L (ref 98–110)
CHOLEST SERPL-MCNC: 129 MG/DL
CHOLEST/HDLC SERPL: 1.9 (CALC)
CO2 SERPL-SCNC: 32 MMOL/L (ref 20–32)
CREAT SERPL-MCNC: 0.66 MG/DL (ref 0.6–0.88)
EOSINOPHIL # BLD AUTO: 117 CELLS/UL (ref 15–500)
EOSINOPHIL NFR BLD AUTO: 1.7 %
ERYTHROCYTE [DISTWIDTH] IN BLOOD BY AUTOMATED COUNT: 12.1 % (ref 11–15)
GLOBULIN SER CALC-MCNC: 2.3 G/DL (CALC) (ref 1.9–3.7)
GLUCOSE SERPL-MCNC: 90 MG/DL (ref 65–99)
HCT VFR BLD AUTO: 41.2 % (ref 35–45)
HDLC SERPL-MCNC: 67 MG/DL
HGB BLD-MCNC: 13.8 G/DL (ref 11.7–15.5)
LDLC SERPL CALC-MCNC: 48 MG/DL (CALC)
LYMPHOCYTES # BLD AUTO: 1677 CELLS/UL (ref 850–3900)
LYMPHOCYTES NFR BLD AUTO: 24.3 %
MCH RBC QN AUTO: 31.8 PG (ref 27–33)
MCHC RBC AUTO-ENTMCNC: 33.5 G/DL (ref 32–36)
MCV RBC AUTO: 94.9 FL (ref 80–100)
MONOCYTES # BLD AUTO: 414 CELLS/UL (ref 200–950)
MONOCYTES NFR BLD AUTO: 6 %
NEUTROPHILS # BLD AUTO: 4664 CELLS/UL (ref 1500–7800)
NEUTROPHILS NFR BLD AUTO: 67.6 %
NONHDLC SERPL-MCNC: 62 MG/DL (CALC)
PLATELET # BLD AUTO: 244 THOUSAND/UL (ref 140–400)
PMV BLD REES-ECKER: 11 FL (ref 7.5–12.5)
POTASSIUM SERPL-SCNC: 4.7 MMOL/L (ref 3.5–5.3)
PROT SERPL-MCNC: 6 G/DL (ref 6.1–8.1)
QUEST EGFR NON-AFR. AMERICAN: 83 ML/MIN/1.73M2
RBC # BLD AUTO: 4.34 MILLION/UL (ref 3.8–5.1)
SODIUM SERPL-SCNC: 144 MMOL/L (ref 135–146)
TRIGL SERPL-MCNC: 60 MG/DL
TSH SERPL-ACNC: 1.51 MIU/L (ref 0.4–4.5)
WBC # BLD AUTO: 6.9 THOUSAND/UL (ref 3.8–10.8)

## 2019-07-29 ASSESSMENT — ENCOUNTER SYMPTOMS
NERVOUS/ANXIOUS: 0
TREMORS: 0
FOCAL WEAKNESS: 0
SLEEP DISTURBANCES DUE TO BREATHING: 0
INSOMNIA: 0
SHORTNESS OF BREATH: 0
FALLS: 0
NUMBNESS: 0
MYALGIAS: 0
SYNCOPE: 0
WEIGHT GAIN: 0
NEAR-SYNCOPE: 0
DIARRHEA: 0
SPUTUM PRODUCTION: 0
CONSTIPATION: 0
PALPITATIONS: 0
PND: 0
IRREGULAR HEARTBEAT: 0
WEIGHT LOSS: 0
HEMATOLOGIC/LYMPHATIC NEGATIVE: 1
HEARTBURN: 0
ANOREXIA: 0
JAUNDICE: 0
VERTIGO: 0
BRUISES/BLEEDS EASILY: 0
HEADACHES: 0
ABDOMINAL PAIN: 0
HEMOPTYSIS: 0
WHEEZING: 0
ORTHOPNEA: 0
NAUSEA: 0
MUSCLE CRAMPS: 0
LIGHT-HEADEDNESS: 0
MEMORY LOSS: 0
CHANGE IN BOWEL HABIT: 0
DIZZINESS: 0
HOARSE VOICE: 0
CLAUDICATION: 0
COUGH: 0
FREQUENCY: 0
SNORING: 0
DYSPNEA ON EXERTION: 0

## 2019-07-29 NOTE — PROGRESS NOTES
Cardiology Note    Kunal La DO          Teresa Bird is a 83 y.o. female 1935     She returns to review her lab work and for echocardiogram images personally reviewed  She has CAD was intolerant to Crestor and Zetia  On the PCSK(inhibitor Praluent LDL cholesterol is 43!!!!!  I love it!!!!!!!    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 Ha Wheatley  Due for follow-up imaging April 2019 other thyroid  Mixed hyperlipidemia she is intolerant with GI issues on Zetia and Crestor started her on Praluent    She is to only 7 colonoscopy next week she will be off her Xarelto 48 hours prior to procedure if no biopsy is to be restarted at night                     Patient Active Problem List    Diagnosis Date Noted   • RBBB 08/02/2018   • PAF (paroxysmal atrial fibrillation) (CMS/HCC) (HCC) 04/24/2018   • Coronary artery disease involving native coronary artery of native heart without angina pectoris 04/08/2018   • Nicotine dependence in remission 04/08/2018   • Multinodular goiter 03/13/2018   • Mixed hyperlipidemia 03/13/2018   • Pulmonary artery abnormality 04/04/2017   • Malignant neoplasm of uterus (CMS/HCC)  ([facility]) 03/22/2017       Allergy  Penicillin; Pravastatin sodium; Rosuvastatin calcium; Penicillins; and Zetia [ezetimibe]    MED LIST     Current Outpatient Prescriptions   Medication Sig Dispense Refill   • alirocumab (PRALUENT PEN) 150 mg/mL pen injector Inject 150 pens under the skin every 14 (fourteen) days. 4 pen 6   • cetirizine (ZyrTEC) 5 mg tablet Take 5 mg by mouth daily.     • LORazepam (ATIVAN) 0.5 mg tablet Take 0.5 mg by mouth every 6 (six) hours as needed.     • metoprolol succinate XL (TOPROL-XL) 25 mg 24 hr tablet Take 1 tablet (25 mg total) by mouth 2 (two) times a day. 180 tablet 3   • pantoprazole (PROTONIX) 40 mg EC tablet Take 40 mg by mouth as needed.       • rivaroxaban (XARELTO) 20 mg tablet Take 1 tablet (20 mg total) by mouth daily with dinner. 90 tablet 1     No current facility-administered medications for this visit.         Review of Systems   Constitution: Negative for malaise/fatigue, weight gain and weight loss.   HENT: Negative for hearing loss and hoarse voice.    Eyes: Negative for visual disturbance.   Cardiovascular: Negative for chest pain, claudication, cyanosis, dyspnea on exertion, irregular heartbeat, leg swelling, near-syncope, orthopnea, palpitations, paroxysmal nocturnal dyspnea and syncope.   Respiratory: Negative for cough, hemoptysis, shortness of breath, sleep disturbances due to breathing, snoring, sputum production and wheezing.    Endocrine: Negative for cold intolerance and heat intolerance.   Hematologic/Lymphatic: Negative.  Negative for bleeding problem. Does not bruise/bleed easily.   Skin: Negative.  Negative for rash.   Musculoskeletal: Negative for arthritis, falls, joint pain, muscle cramps and myalgias.   Gastrointestinal: Negative for abdominal pain, anorexia, change in bowel habit, constipation, diarrhea, dysphagia, heartburn, jaundice and nausea.   Genitourinary: Negative for frequency and nocturia.   Neurological: Negative for dizziness, focal  weakness, headaches, light-headedness, numbness, tremors and vertigo.   Psychiatric/Behavioral: Negative for memory loss. The patient does not have insomnia and is not nervous/anxious.    Allergic/Immunologic: Negative for hives.       Labs   Lab Results   Component Value Date    WBC 6.9 07/26/2019    HGB 13.8 07/26/2019    HCT 41.2 07/26/2019     07/26/2019    CHOL 129 07/26/2019    TRIG 60 07/26/2019    HDL 67 07/26/2019    LDLCALC 48 07/26/2019    ALT 11 07/26/2019    AST 13 07/26/2019     07/26/2019    K 4.7 07/26/2019     07/26/2019    CREATININE 0.66 07/26/2019    BUN 18 07/26/2019    CO2 32 07/26/2019    TSH 1.51 07/26/2019       Lab Results   Component Value Date    GLUCOSE 90 07/26/2019    CALCIUM 9.3 07/26/2019     07/26/2019    K 4.7 07/26/2019    CO2 32 07/26/2019     07/26/2019    BUN 18 07/26/2019    CREATININE 0.66 07/26/2019       Objective   Vitals:    08/02/19 1304   BP: 102/80   Pulse: (!) 58   SpO2: 96%   Weight: 66.7 kg (147 lb)   Height: 1.524 m (5')     Physical Exam   Constitutional: She is oriented to person, place, and time. She appears well-developed and well-nourished. No distress.   HENT:   Head: Normocephalic and atraumatic.   Nose: Nose normal.   Eyes: Conjunctivae are normal. No scleral icterus.   Neck: No JVD present.   Cardiovascular: Normal rate, regular rhythm, normal heart sounds and intact distal pulses.  Exam reveals no gallop and no friction rub.    No murmur heard.  Systolic click   Pulmonary/Chest: Effort normal. No stridor. No respiratory distress. She has no wheezes. She has no rales. She exhibits no tenderness.   Abdominal: There is no tenderness.   Musculoskeletal: She exhibits no edema or deformity.   Neurological: She is alert and oriented to person, place, and time.   Skin: Skin is warm and dry.   Psychiatric: She has a normal mood and affect.       Cardiac Procedures  Cardiac Procedures  Cardiac Procedures      STRESS    Stress  echocardiogram March 2017 moderate pulmonic insufficiency pulmonary artery for mild mitral regurgitation negative for ischemia     Stress echo 2/19 vpc in recovery neg ishcmia  VPCs during test apical septal abnormal wall motion related to that      ECHO  Echo 4/18 pulm a 4.2 aorta 3.7cm  Echo 8/19 dilated LA mild mr aorta 3.7 PA 4.1 mild PI prom eus valve     OTHER  Ultrasound thyroid July 2017 11 mm nodule       CAT SCAN   Chest CTA April 2018 no change from 2017 dilated pulmonary artery 4.7 thyroid nodules coronary artery calcifications renal cyst Lrhyr mass    [facility] dilated aortic root      cta 5/19 pulmonary artery 47 mm ectatic right pulmonary artery 31 mm aortic root 4.3    EKGnsr rbbb no change  EKG    Assessment/Plan:          Pulmonary artery abnormality  She is followed for pulmonary artery aneurysm and thoracic aneurysm last imaged May 2019 follows with Dr. Fajardo  Echo today all numbers [facility] pulmonary artery four-point 1 aortic root 3.7 by CTA aortic root was 4  Continue yearly imaging  Next year we will get coronary CT angiogram to image her coronary arteries also will be due May 2020    Coronary artery disease involving native coronary artery of native heart without angina pectoris  This was diagnosed by calcification seen on CAT scan stress echo was negative in February 2019 she does have right bundle branch block  Plan coronary CT angiogram with imaging of her pulmonary thoracic aneurysm in May 2020    PAF (paroxysmal atrial fibrillation) (CMS/HCC) (HCC)  Seen on a monitor in April 2018 2% of total heartbeats no clinical recurrence remains anticoagulated with Xarelto      Mixed hyperlipidemia  Phenomenal result to the response to the PCSK9 inhibitor presently LDL cholesterol is now 40 she is intolerant to statins and Zetia with GI upset    Multinodular goiter  Had negative biopsy 2017 follows with Dr. Ha Wheatley      No cardiovascular contraindications to colonoscopy  she stopped her Xarelto 48 hours prior to procedure  I made no changes in her medication  She will have imaging of her pulmonary artery and aortic aneurysms in May  I will arrange for a coronary CT angiogram at that time so we can assess her coronary artery disease also  Follow-up in 6 months with lab work  Repeat echocardiogram 1 year            Cc Kunal La, DO

## 2019-08-02 ENCOUNTER — OFFICE VISIT (OUTPATIENT)
Dept: CARDIOLOGY | Facility: CLINIC | Age: 84
End: 2019-08-02
Payer: MEDICARE

## 2019-08-02 ENCOUNTER — HOSPITAL ENCOUNTER (OUTPATIENT)
Dept: CARDIOLOGY | Facility: CLINIC | Age: 84
Discharge: HOME | End: 2019-08-02
Attending: INTERNAL MEDICINE
Payer: MEDICARE

## 2019-08-02 VITALS
BODY MASS INDEX: 28.86 KG/M2 | HEIGHT: 60 IN | SYSTOLIC BLOOD PRESSURE: 102 MMHG | HEART RATE: 58 BPM | WEIGHT: 147 LBS | DIASTOLIC BLOOD PRESSURE: 80 MMHG | OXYGEN SATURATION: 96 %

## 2019-08-02 VITALS
BODY MASS INDEX: 28.86 KG/M2 | WEIGHT: 147 LBS | DIASTOLIC BLOOD PRESSURE: 66 MMHG | HEIGHT: 60 IN | SYSTOLIC BLOOD PRESSURE: 120 MMHG

## 2019-08-02 DIAGNOSIS — E78.2 MIXED HYPERLIPIDEMIA: Primary | ICD-10-CM

## 2019-08-02 DIAGNOSIS — Q25.79 PULMONARY ARTERY ABNORMALITY: ICD-10-CM

## 2019-08-02 DIAGNOSIS — I25.10 CORONARY ARTERY DISEASE INVOLVING NATIVE CORONARY ARTERY OF NATIVE HEART WITHOUT ANGINA PECTORIS: ICD-10-CM

## 2019-08-02 DIAGNOSIS — I48.0 PAF (PAROXYSMAL ATRIAL FIBRILLATION) (CMS/HCC): ICD-10-CM

## 2019-08-02 DIAGNOSIS — I45.10 RBBB: ICD-10-CM

## 2019-08-02 PROBLEM — I77.810 DILATED AORTIC ROOT (CMS/HCC): Status: ACTIVE | Noted: 2019-08-02

## 2019-08-02 LAB
AORTIC ROOT ANNULUS: 3.7 CM
ASCENDING AORTA: 3.6 CM
AV PEAK GRADIENT: 8 MMHG
AV PEAK VELOCITY-S: 1.42 M/S
AV VALVE AREA: 2.08 CM2
BSA FOR ECHO PROCEDURE: 1.68 M2
CUSP SEPARATION: 2 CM
E WAVE DECELERATION TIME: 236 MS
E/A RATIO: 0.9
E/E' RATIO: 8.6
E/LAT E' RATIO: 6.9
EDV (BP): 77.1 CM3
EF (A4C): 71.1 %
EF A2C: 67.1 %
EJECTION FRACTION: 68.9 %
EST RIGHT VENT SYSTOLIC PRESSURE BY TRICUSPID REGURGITATION JET: 36 MMHG
ESV (BP): 24 CM3
FRACTIONAL SHORTENING: 33.8 %
INTERVENTRICULAR SEPTUM: 1.19 CM
LA ESV (BP): 91.6 CM3
LA ESV INDEX (A2C): 63.1 CM3/M2
LA ESV INDEX (BP): 54.52 CM3/M2
LA/AORTA RATIO: 1.14
LAAS-AP2: 27.7 CM2
LAAS-AP4: 24.5 CM2
LAD 2D: 4.2 CM
LALD A4C: 5.85 CM
LALD A4C: 6.09 CM
LAV-S: 106 CM3
LEFT ATRIUM VOLUME INDEX: 45.6 CM3/M2
LEFT ATRIUM VOLUME: 76.6 CM3
LEFT INTERNAL DIMENSION IN SYSTOLE: 2.55 CM (ref 2.33–3.52)
LEFT VENTRICLE DIASTOLIC VOLUME INDEX: 50 CM3/M2
LEFT VENTRICLE DIASTOLIC VOLUME: 84 CM3
LEFT VENTRICLE SYSTOLIC VOLUME INDEX: 14.46 CM3/M2
LEFT VENTRICLE SYSTOLIC VOLUME: 24.3 CM3
LEFT VENTRICULAR INTERNAL DIMENSION IN DIASTOLE: 3.85 CM (ref 3.91–5.43)
LEFT VENTRICULAR POSTERIOR WALL IN END DIASTOLE: 1.17 CM (ref 0.51–0.94)
LV DIASTOLIC VOLUME: 70.9 CM3
LV ESV (APICAL 2 CHAMBER): 23.3 CM3
LVAD-AP2: 23.3 CM2
LVAD-AP4: 26.1 CM2
LVAS-AP2: 10.9 CM2
LVAS-AP4: 11.9 CM2
LVEDVI(A2C): 42.2 CM3/M2
LVEDVI(BP): 45.89 CM3/M2
LVESVI(A2C): 13.87 CM3/M2
LVESVI(BP): 14.29 CM3/M2
LVLD-AP2: 6.56 CM
LVLD-AP4: 6.67 CM
LVLS-AP2: 4.94 CM
LVLS-AP4: 5.12 CM
LVOT 2D: 1.9 CM
LVOT A: 2.84 CM2
LVOT PEAK VELOCITY: 1.04 M/S
MPAD: 4.1 CM
MV E'TISSUE VEL-LAT: 0.11 M/S
MV E'TISSUE VEL-MED: 0.09 M/S
MV PEAK A VEL: 0.88 M/S
MV PEAK E VEL: 0.76 M/S
POSTERIOR WALL: 1.17 CM
RAP: 5 MMHG
SEPTAL TISSUE DOPPLER FREE WALL LATE DIA VELOCITY (APICAL 4 CHAMBER VIEW): 0.14 M/S
TR MAX PG: 31 MMHG
TRICUSPID VALVE PEAK REGURGITATION VELOCITY: 2.8 M/S
Z-SCORE OF LEFT VENTRICULAR DIMENSION IN END DIASTOLE: -1.8
Z-SCORE OF LEFT VENTRICULAR DIMENSION IN END SYSTOLE: -0.9
Z-SCORE OF LEFT VENTRICULAR POSTERIOR WALL IN END DIASTOLE: 2.74

## 2019-08-02 PROCEDURE — 99214 OFFICE O/P EST MOD 30 MIN: CPT | Performed by: INTERNAL MEDICINE

## 2019-08-02 PROCEDURE — 93306 TTE W/DOPPLER COMPLETE: CPT | Performed by: INTERNAL MEDICINE

## 2019-08-02 PROCEDURE — 93000 ELECTROCARDIOGRAM COMPLETE: CPT | Performed by: INTERNAL MEDICINE

## 2019-08-02 NOTE — ASSESSMENT & PLAN NOTE
This was diagnosed by calcification seen on CAT scan stress echo was negative in February 2019 she does have right bundle branch block  Plan coronary CT angiogram with imaging of her pulmonary thoracic aneurysm in May 2020

## 2019-08-02 NOTE — ASSESSMENT & PLAN NOTE
Phenomenal result to the response to the PCSK9 inhibitor presently LDL cholesterol is now 40 she is intolerant to statins and Zetia with GI upset

## 2019-08-02 NOTE — ASSESSMENT & PLAN NOTE
Seen on a monitor in April 2018 2% of total heartbeats no clinical recurrence remains anticoagulated with Xarelto

## 2019-08-02 NOTE — ASSESSMENT & PLAN NOTE
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 Fajardo  Echo today all numbers [facility] pulmonary artery four-point 1 aortic root 3.7 by CTA aortic root was 4  Continue yearly imaging  Next year we will get coronary CT angiogram to image her coronary arteries also will be due May 2020

## 2019-08-02 NOTE — LETTER
August 2, 2019     Kunal La DO  [address] [facility] [address]    Patient: Teresa Bird  YOB: 1935  Date of Visit: 8/2/2019      Dear Dominik    Thank you for referring Teresa Bird to me for evaluation. Below are my notes for this consultation.    If you have questions, please do not hesitate to call me. I look forward to following your patient along with you.         Sincerely,        Nick Beck MD        CC: MD Usha No MD Jong Nam, MD Becker, Nick CRUZ MD  8/2/2019  1:38 PM  Sign at close encounter      Cardiology Note    Kunal La DO          Teresa Bird is a 83 y.o. female 1935     She returns to review her lab work and for echocardiogram images personally reviewed  She has CAD was intolerant to Crestor and Zetia  On the PCSK(inhibitor Praluent LDL cholesterol is 43!!!!!  I love it!!!!!!!    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 Ha Wheatley  Due for follow-up imaging April 2019 other thyroid  Mixed hyperlipidemia she is intolerant with GI issues on Zetia and Crestor started her on Praluent    She is to only 7 colonoscopy  next week she will be off her Xarelto 48 hours prior to procedure if no biopsy is to be restarted at night                     Patient Active Problem List    Diagnosis Date Noted   • RBBB 08/02/2018   • PAF (paroxysmal atrial fibrillation) (CMS/HCC) ([facility]) 04/24/2018   • Coronary artery disease involving native coronary artery of native heart without angina pectoris 04/08/2018   • Nicotine dependence in remission 04/08/2018   • Multinodular goiter 03/13/2018   • Mixed hyperlipidemia 03/13/2018   • Pulmonary artery abnormality 04/04/2017   • Malignant neoplasm of uterus (CMS/HCC) ([facility]) 03/22/2017       Allergy  Penicillin; Pravastatin sodium; Rosuvastatin calcium; Penicillins; and Zetia [ezetimibe]    MED LIST     Current Outpatient Prescriptions   Medication Sig Dispense Refill   • alirocumab (PRALUENT PEN) 150 mg/mL pen injector Inject 150 pens under the skin every 14 (fourteen) days. 4 pen 6   • cetirizine (ZyrTEC) 5 mg tablet Take 5 mg by mouth daily.     • LORazepam (ATIVAN) 0.5 mg tablet Take 0.5 mg by mouth every 6 (six) hours as needed.     • metoprolol succinate XL (TOPROL-XL) 25 mg 24 hr tablet Take 1 tablet (25 mg total) by mouth 2 (two) times a day. 180 tablet 3   • pantoprazole (PROTONIX) 40 mg EC tablet Take 40 mg by mouth as needed.       • rivaroxaban (XARELTO) 20 mg tablet Take 1 tablet (20 mg total) by mouth daily with dinner. 90 tablet 1     No current facility-administered medications for this visit.         Review of Systems   Constitution: Negative for malaise/fatigue, weight gain and weight loss.   HENT: Negative for hearing loss and hoarse voice.    Eyes: Negative for visual disturbance.   Cardiovascular: Negative for chest pain, claudication, cyanosis, dyspnea on exertion, irregular heartbeat, leg swelling, near-syncope, orthopnea, palpitations, paroxysmal nocturnal dyspnea and syncope.   Respiratory: Negative for cough, hemoptysis, shortness of breath, sleep disturbances due to breathing,  snoring, sputum production and wheezing.    Endocrine: Negative for cold intolerance and heat intolerance.   Hematologic/Lymphatic: Negative.  Negative for bleeding problem. Does not bruise/bleed easily.   Skin: Negative.  Negative for rash.   Musculoskeletal: Negative for arthritis, falls, joint pain, muscle cramps and myalgias.   Gastrointestinal: Negative for abdominal pain, anorexia, change in bowel habit, constipation, diarrhea, dysphagia, heartburn, jaundice and nausea.   Genitourinary: Negative for frequency and nocturia.   Neurological: Negative for dizziness, focal weakness, headaches, light-headedness, numbness, tremors and vertigo.   Psychiatric/Behavioral: Negative for memory loss. The patient does not have insomnia and is not nervous/anxious.    Allergic/Immunologic: Negative for hives.       Labs   Lab Results   Component Value Date    WBC 6.9 07/26/2019    HGB 13.8 07/26/2019    HCT 41.2 07/26/2019     07/26/2019    CHOL 129 07/26/2019    TRIG 60 07/26/2019    HDL 67 07/26/2019    LDLCALC 48 07/26/2019    ALT 11 07/26/2019    AST 13 07/26/2019     07/26/2019    K 4.7 07/26/2019     07/26/2019    CREATININE 0.66 07/26/2019    BUN 18 07/26/2019    CO2 32 07/26/2019    TSH 1.51 07/26/2019       Lab Results   Component Value Date    GLUCOSE 90 07/26/2019    CALCIUM 9.3 07/26/2019     07/26/2019    K 4.7 07/26/2019    CO2 32 07/26/2019     07/26/2019    BUN 18 07/26/2019    CREATININE 0.66 07/26/2019       Objective   Vitals:    08/02/19 1304   BP: 102/80   Pulse: (!) 58   SpO2: 96%   Weight: 66.7 kg (147 lb)   Height: 1.524 m (5')     Physical Exam   Constitutional: She is oriented to person, place, and time. She appears well-developed and well-nourished. No distress.   HENT:   Head: Normocephalic and atraumatic.   Nose: Nose normal.   Eyes: Conjunctivae are normal. No scleral icterus.   Neck: No JVD present.   Cardiovascular: Normal rate, regular rhythm, normal heart sounds  and intact distal pulses.  Exam reveals no gallop and no friction rub.    No murmur heard.  Systolic click   Pulmonary/Chest: Effort normal. No stridor. No respiratory distress. She has no wheezes. She has no rales. She exhibits no tenderness.   Abdominal: There is no tenderness.   Musculoskeletal: She exhibits no edema or deformity.   Neurological: She is alert and oriented to person, place, and time.   Skin: Skin is warm and dry.   Psychiatric: She has a normal mood and affect.       Cardiac Procedures  Cardiac Procedures  Cardiac Procedures      STRESS    Stress echocardiogram March 2017 moderate pulmonic insufficiency pulmonary artery for mild mitral regurgitation negative for ischemia     Stress echo 2/19 vpc in recovery neg ishcmia  VPCs during test apical septal abnormal wall motion related to that      ECHO  Echo 4/18 pulm a 4.2 aorta 3.7cm  Echo 8/19 dilated LA mild mr aorta 3.7 PA 4.1 mild PI prom eus valve     OTHER  Ultrasound thyroid July 2017 11 mm nodule       CAT SCAN   Chest CTA April 2018 no change from 2017 dilated pulmonary artery 4.7 thyroid nodules coronary artery calcifications renal cyst Lrhyr mass    cta 1/19 [facility] pul artery 4.2 mildy dilated aortic root      cta 5/19 pulmonary artery 47 mm ectatic right pulmonary artery 31 mm aortic root 4.3    EKGnsr rbbb no change  EKG    Assessment/Plan:          Pulmonary artery abnormality  She is followed for pulmonary artery aneurysm and thoracic aneurysm last imaged May 2019 follows with Dr. Fajardo  Echo today all numbers [facility] pulmonary artery four-point 1 aortic root 3.7 by CTA aortic root was 4  Continue yearly imaging  Next year we will get coronary CT angiogram to image her coronary arteries also will be due May 2020    Coronary artery disease involving native coronary artery of native heart without angina pectoris  This was diagnosed by calcification seen on CAT scan stress echo was negative in February 2019 she does have right bundle  branch block  Plan coronary CT angiogram with imaging of her pulmonary thoracic aneurysm in May 2020    PAF (paroxysmal atrial fibrillation) (CMS/HCC) (HCC)  Seen on a monitor in April 2018 2% of total heartbeats no clinical recurrence remains anticoagulated with Xarelto      Mixed hyperlipidemia  Phenomenal result to the response to the PCSK9 inhibitor presently LDL cholesterol is now 40 she is intolerant to statins and Zetia with GI upset    Multinodular goiter  Had negative biopsy 2017 follows with Dr. Ha Wheatley      No cardiovascular contraindications to colonoscopy she stopped her Xarelto 48 hours prior to procedure  I made no changes in her medication  She will have imaging of her pulmonary artery and aortic aneurysms in May  I will arrange for a coronary CT angiogram at that time so we can assess her coronary artery disease also  Follow-up in 6 months with lab work  Repeat echocardiogram 1 year            Cc Kunal La, DO

## 2019-11-07 ENCOUNTER — TELEPHONE (OUTPATIENT)
Dept: SCHEDULING | Facility: CLINIC | Age: 84
End: 2019-11-07

## 2019-11-07 NOTE — TELEPHONE ENCOUNTER
Pt call re she will no longer be eligible for PASS (patient assistant support)program she was apart of to receive PRALUENT.    Pt would like to discuss getting back on PASS with assistant from .    Please call pt to discuss  #[phone]

## 2019-11-08 NOTE — TELEPHONE ENCOUNTER
Spoke to pt regarding PASS program for 2020. Informed pt prior auth for Praluent is required prior to applying for PASS program yearly. We will work on the prior auth and keep pt updated. Rx sent into [facility] pharmacy. Pt understands and has no further questions at this time.

## 2019-11-12 NOTE — TELEPHONE ENCOUNTER
Pt calling to further discuss this issue, wants to spk with Martha, Pt states she has enough meds for nov,dec,and jan.  Please call pt back at [phone] to further discuss

## 2019-11-12 NOTE — TELEPHONE ENCOUNTER
Spoke to pt, received a fax from [facility] pharmacy, prior auth for Praluent is approved through 12/31/2020, copay is 111 per month, pt would like to apply for PASS program for 2020. Mailed PASS application form to pt's home. Pt understands and has no further questions at this time.

## 2019-12-04 NOTE — TELEPHONE ENCOUNTER
LM application form for Praluent year 2020 faxed to PASS patient assistance program, will update pt when we received the status of the application. Advised pt to call back with further questions if needed.

## 2019-12-13 RX ORDER — METOPROLOL SUCCINATE 25 MG/1
25 TABLET, EXTENDED RELEASE ORAL 2 TIMES DAILY
Qty: 182 TABLET | Refills: 3 | Status: SHIPPED | OUTPATIENT
Start: 2019-12-13 | End: 2020-01-31 | Stop reason: SDUPTHER

## 2019-12-13 NOTE — TELEPHONE ENCOUNTER
Medicine Refill Request    Last Office Visit: Visit date not found  Next Office Visit: Visit date not found        Current Outpatient Medications:   •  alirocumab (PRALUENT PEN) 150 mg/mL pen injector, Inject 150 pens under the skin every 14 (fourteen) days., Disp: 4 pen, Rfl: 6  •  cetirizine (ZyrTEC) 5 mg tablet, Take 5 mg by mouth daily., Disp: , Rfl:   •  LORazepam (ATIVAN) 0.5 mg tablet, Take 0.5 mg by mouth every 6 (six) hours as needed., Disp: , Rfl:   •  metoprolol succinate XL (TOPROL-XL) 25 mg 24 hr tablet, Take 1 tablet (25 mg total) by mouth 2 (two) times a day., Disp: 180 tablet, Rfl: 3  •  pantoprazole (PROTONIX) 40 mg EC tablet, Take 40 mg by mouth as needed.  , Disp: , Rfl:   •  rivaroxaban (XARELTO) 20 mg tablet, Take 1 tablet (20 mg total) by mouth daily with dinner., Disp: 90 tablet, Rfl: 1      BP Readings from Last 3 Encounters:   08/02/19 120/66   08/02/19 102/80   05/21/19 120/66       Recent Lab results:  Lab Results   Component Value Date    CHOL 129 07/26/2019   ,   Lab Results   Component Value Date    HDL 67 07/26/2019   ,   Lab Results   Component Value Date    LDLCALC 48 07/26/2019   ,   Lab Results   Component Value Date    TRIG 60 07/26/2019        Lab Results   Component Value Date    GLUCOSE 90 07/26/2019   , No results found for: HGBA1C      Lab Results   Component Value Date    CREATININE 0.66 07/26/2019       Lab Results   Component Value Date    TSH 1.51 07/26/2019

## 2020-01-23 LAB
ALBUMIN SERPL-MCNC: 4 G/DL (ref 3.6–5.1)
ALBUMIN/GLOB SERPL: 1.5 (CALC) (ref 1–2.5)
ALP SERPL-CCNC: 61 U/L (ref 33–130)
ALT SERPL-CCNC: 11 U/L (ref 6–29)
AST SERPL-CCNC: 14 U/L (ref 10–35)
BASOPHILS # BLD AUTO: 20 CELLS/UL (ref 0–200)
BASOPHILS NFR BLD AUTO: 0.3 %
BILIRUB SERPL-MCNC: 0.8 MG/DL (ref 0.2–1.2)
BUN SERPL-MCNC: 17 MG/DL (ref 7–25)
BUN/CREAT SERPL: NORMAL (CALC) (ref 6–22)
CALCIUM SERPL-MCNC: 9.6 MG/DL (ref 8.6–10.4)
CHLORIDE SERPL-SCNC: 106 MMOL/L (ref 98–110)
CHOLEST SERPL-MCNC: 143 MG/DL
CHOLEST/HDLC SERPL: 2 (CALC)
CO2 SERPL-SCNC: 30 MMOL/L (ref 20–32)
CREAT SERPL-MCNC: 0.65 MG/DL (ref 0.6–0.88)
EOSINOPHIL # BLD AUTO: 117 CELLS/UL (ref 15–500)
EOSINOPHIL NFR BLD AUTO: 1.8 %
ERYTHROCYTE [DISTWIDTH] IN BLOOD BY AUTOMATED COUNT: 12.1 % (ref 11–15)
GLOBULIN SER CALC-MCNC: 2.7 G/DL (CALC) (ref 1.9–3.7)
GLUCOSE SERPL-MCNC: 90 MG/DL (ref 65–99)
HCT VFR BLD AUTO: 46.7 % (ref 35–45)
HDLC SERPL-MCNC: 71 MG/DL
HGB BLD-MCNC: 15.7 G/DL (ref 11.7–15.5)
LDLC SERPL CALC-MCNC: 57 MG/DL (CALC)
LYMPHOCYTES # BLD AUTO: 1729 CELLS/UL (ref 850–3900)
LYMPHOCYTES NFR BLD AUTO: 26.6 %
MCH RBC QN AUTO: 31.8 PG (ref 27–33)
MCHC RBC AUTO-ENTMCNC: 33.6 G/DL (ref 32–36)
MCV RBC AUTO: 94.5 FL (ref 80–100)
MONOCYTES # BLD AUTO: 293 CELLS/UL (ref 200–950)
MONOCYTES NFR BLD AUTO: 4.5 %
NEUTROPHILS # BLD AUTO: 4342 CELLS/UL (ref 1500–7800)
NEUTROPHILS NFR BLD AUTO: 66.8 %
NONHDLC SERPL-MCNC: 72 MG/DL (CALC)
PLATELET # BLD AUTO: 286 THOUSAND/UL (ref 140–400)
PMV BLD REES-ECKER: 11.4 FL (ref 7.5–12.5)
POTASSIUM SERPL-SCNC: 4.7 MMOL/L (ref 3.5–5.3)
PROT SERPL-MCNC: 6.7 G/DL (ref 6.1–8.1)
QUEST EGFR NON-AFR. AMERICAN: 83 ML/MIN/1.73M2
RBC # BLD AUTO: 4.94 MILLION/UL (ref 3.8–5.1)
SODIUM SERPL-SCNC: 142 MMOL/L (ref 135–146)
TRIGL SERPL-MCNC: 73 MG/DL
TSH SERPL-ACNC: 1.01 MIU/L (ref 0.4–4.5)
WBC # BLD AUTO: 6.5 THOUSAND/UL (ref 3.8–10.8)

## 2020-01-28 ASSESSMENT — ENCOUNTER SYMPTOMS
NEAR-SYNCOPE: 0
DYSPNEA ON EXERTION: 0
WEIGHT LOSS: 0
ORTHOPNEA: 0
CLAUDICATION: 0
DIARRHEA: 0
ANOREXIA: 0
FALLS: 0
VERTIGO: 0
MEMORY LOSS: 0
NAUSEA: 0
IRREGULAR HEARTBEAT: 0
HOARSE VOICE: 0
LIGHT-HEADEDNESS: 0
JAUNDICE: 0
HEMOPTYSIS: 0
WHEEZING: 0
SPUTUM PRODUCTION: 0
FREQUENCY: 0
COUGH: 0
PND: 0
HEMATOLOGIC/LYMPHATIC NEGATIVE: 1
DIZZINESS: 0
SLEEP DISTURBANCES DUE TO BREATHING: 0
CONSTIPATION: 0
INSOMNIA: 0
MUSCLE CRAMPS: 0
HEARTBURN: 0
CHANGE IN BOWEL HABIT: 0
ABDOMINAL PAIN: 0
FOCAL WEAKNESS: 0
PALPITATIONS: 0
NUMBNESS: 0
HEADACHES: 0
WEIGHT GAIN: 0
NERVOUS/ANXIOUS: 0
MYALGIAS: 0
SYNCOPE: 0
TREMORS: 0
SHORTNESS OF BREATH: 0
SNORING: 0
BRUISES/BLEEDS EASILY: 0

## 2020-01-28 NOTE — PROGRESS NOTES
Cardiology Note    Kunal La DO          Teresa Bird is a 84 y.o. 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 Ha Wheatley      Lab work reviewed January 2020 cholesterol at goal LDL 57 thyroid studies normal      Electronically signed by Nick Beck MD at 8/2/2019  1:39 PM       Office Visit on 8/2/2019                                     Patient Active Problem List    Diagnosis Date Noted   • Dilated aortic root (CMS/HCC) 08/02/2019   • RBBB 08/02/2018   • PAF (paroxysmal atrial fibrillation) (CMS/[facility]) 04/24/2018   • Coronary artery disease involving native coronary artery of native heart without angina pectoris 04/08/2018   • Nicotine dependence in remission 04/08/2018   • Multinodular goiter 03/13/2018   • Mixed hyperlipidemia 03/13/2018   • Pulmonary artery abnormality 04/04/2017   • Malignant neoplasm of uterus (CMS/HCC) 03/22/2017       Allergy  Penicillin; Pravastatin sodium; Rosuvastatin calcium; Penicillins; and Zetia [ezetimibe]    MED LIST     Current Outpatient Medications   Medication Sig Dispense Refill   • alirocumab (PRALUENT PEN) 150 mg/mL pen injector Inject 150 pens under the skin every 14 (fourteen) days. 4 pen 6   • cetirizine (ZyrTEC) 5 mg tablet Take 5 mg by mouth daily.     • LORazepam (ATIVAN) 0.5 mg tablet Take 0.5 mg by mouth every  6 (six) hours as needed.     • metoprolol succinate XL (TOPROL-XL) 25 mg 24 hr tablet Take 1 tablet (25 mg total) by mouth 2 (two) times a day. 182 tablet 3   • pantoprazole (PROTONIX) 40 mg EC tablet Take 40 mg by mouth as needed.       • rivaroxaban (XARELTO) 20 mg tablet Take 1 tablet (20 mg total) by mouth daily with dinner. 90 tablet 1     No current facility-administered medications for this visit.         Review of Systems   Constitution: Negative for malaise/fatigue, weight gain and weight loss.   HENT: Negative for hearing loss and hoarse voice.    Eyes: Negative for visual disturbance.   Cardiovascular: Negative for chest pain, claudication, cyanosis, dyspnea on exertion, irregular heartbeat, leg swelling, near-syncope, orthopnea, palpitations, paroxysmal nocturnal dyspnea and syncope.   Respiratory: Negative for cough, hemoptysis, shortness of breath, sleep disturbances due to breathing, snoring, sputum production and wheezing.    Endocrine: Negative for cold intolerance and heat intolerance.   Hematologic/Lymphatic: Negative.  Negative for bleeding problem. Does not bruise/bleed easily.   Skin: Negative.  Negative for rash.   Musculoskeletal: Negative for arthritis, falls, joint pain, muscle cramps and myalgias.   Gastrointestinal: Negative for abdominal pain, anorexia, change in bowel habit, constipation, diarrhea, dysphagia, heartburn, jaundice and nausea.   Genitourinary: Negative for frequency and nocturia.   Neurological: Negative for dizziness, focal weakness, headaches, light-headedness, numbness, tremors and vertigo.   Psychiatric/Behavioral: Negative for memory loss. The patient does not have insomnia and is not nervous/anxious.    Allergic/Immunologic: Negative for hives.       Labs   Lab Results   Component Value Date    WBC 6.5 01/22/2020    HGB 15.7 (H) 01/22/2020    HCT 46.7 (H) 01/22/2020     01/22/2020    CHOL 143 01/22/2020    TRIG 73 01/22/2020    HDL 71 01/22/2020    LDLCALC 57  01/22/2020    ALT 11 01/22/2020    AST 14 01/22/2020     01/22/2020    K 4.7 01/22/2020     01/22/2020    CREATININE 0.65 01/22/2020    BUN 17 01/22/2020    CO2 30 01/22/2020    TSH 1.01 01/22/2020       Lab Results   Component Value Date    GLUCOSE 90 01/22/2020    CALCIUM 9.6 01/22/2020     01/22/2020    K 4.7 01/22/2020    CO2 30 01/22/2020     01/22/2020    BUN 17 01/22/2020    CREATININE 0.65 01/22/2020       Objective   There were no vitals filed for this visit.  Physical Exam   Constitutional: She is oriented to person, place, and time. She appears well-developed and well-nourished. No distress.   HENT:   Head: Normocephalic and atraumatic.   Nose: Nose normal.   Eyes: Conjunctivae are normal. No scleral icterus.   Neck: No JVD present.   Cardiovascular: Normal rate, regular rhythm, normal heart sounds and intact distal pulses. Exam reveals no gallop and no friction rub.   No murmur heard.  Systolic click   Pulmonary/Chest: Effort normal. No stridor. No respiratory distress. She has no wheezes. She has no rales. She exhibits no tenderness.   Abdominal: There is no tenderness.   Musculoskeletal: She exhibits no edema or deformity.   Neurological: She is alert and oriented to person, place, and time.   Skin: Skin is warm and dry.   Psychiatric: She has a normal mood and affect.       Cardiac Procedures  Cardiac Procedures  Cardiac Procedures      STRESS    Stress echocardiogram March 2017 moderate pulmonic insufficiency pulmonary artery for mild mitral regurgitation negative for ischemia     Stress echo 2/19 vpc in recovery neg ishcmia  VPCs during test apical septal abnormal wall motion related to that      ECHO  Echo 4/18 pulm a 4.2 aorta 3.7cm    Echo 8/19 dilated LA mild mr aorta 3.7 PA 4.1   mild PI prom eus valve     OTHER  Ultrasound thyroid July 2017 11 mm nodule       CAT SCAN   Chest CTA April 2018 no change from 2017 dilated pulmonary artery 4.7 thyroid nodules coronary artery  calcifications renal cyst Lrhyr mass    cta 1/19 [facility] pul artery 4.2 mildy dilated aortic root      cta 5/19 pulmonary artery 47 mm ectatic right pulmonary artery 31 mm aortic root 4.3    EKGnsr rbbbnon spec st t changes stable nochange  EKG    Assessment/Plan:          Pulmonary artery abnormality  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 Fajardo  Echo today all numbers [facility] pulmonary artery four-point 1 aortic root 3.7 by CTA aortic root was 4  Continue yearly imaging  Next year we will get coronary CT angiogram to image her coronary arteries also will be due May 2020    Coronary artery disease involving native coronary artery of native heart without angina pectoris  This was diagnosed by calcification seen on CAT scan stress echo was negative in February 2019 she does have right bundle branch block  Plan coronary CT angiogram with imaging of her pulmonary thoracic aneurysm in May 2020    PAF (paroxysmal atrial fibrillation) (CMS/HCC) (HCC)  Seen on a monitor in April 2018 2% of total heartbeats no clinical recurrence remains anticoagulated with Xarelto      Mixed hyperlipidemia  Phenomenal result to the response to the PCSK9 inhibitor presently LDL cholesterol is now 40 she is intolerant to statins and Zetia with GI upset    Multinodular goiter  Had negative biopsy 2017 follows with Dr. Ha Wheatley      She is due for a follow-up with with CT surgery in May we will try to get coronary CT angiogram to examine her coronary arteries and pulmonary artery aneurysm prior to that visit I will see her back in 6 months lab work and echocardiogram  We are working on the [employer] program to help her pay for her Praluent        Cc Kunal La DO

## 2020-01-31 ENCOUNTER — TELEPHONE (OUTPATIENT)
Dept: CARDIOLOGY | Facility: CLINIC | Age: 85
End: 2020-01-31

## 2020-01-31 ENCOUNTER — OFFICE VISIT (OUTPATIENT)
Dept: CARDIOLOGY | Facility: CLINIC | Age: 85
End: 2020-01-31
Payer: COMMERCIAL

## 2020-01-31 VITALS
BODY MASS INDEX: 29.25 KG/M2 | DIASTOLIC BLOOD PRESSURE: 90 MMHG | WEIGHT: 149 LBS | HEIGHT: 60 IN | OXYGEN SATURATION: 97 % | SYSTOLIC BLOOD PRESSURE: 142 MMHG | HEART RATE: 97 BPM

## 2020-01-31 DIAGNOSIS — Q25.79 PULMONARY ARTERY ABNORMALITY: ICD-10-CM

## 2020-01-31 DIAGNOSIS — E78.2 MIXED HYPERLIPIDEMIA: ICD-10-CM

## 2020-01-31 DIAGNOSIS — I45.10 RBBB: ICD-10-CM

## 2020-01-31 DIAGNOSIS — E04.2 MULTINODULAR GOITER: ICD-10-CM

## 2020-01-31 DIAGNOSIS — I25.10 CORONARY ARTERY DISEASE INVOLVING NATIVE CORONARY ARTERY OF NATIVE HEART WITHOUT ANGINA PECTORIS: Primary | ICD-10-CM

## 2020-01-31 DIAGNOSIS — I77.810 DILATED AORTIC ROOT (CMS/HCC): ICD-10-CM

## 2020-01-31 PROCEDURE — 93000 ELECTROCARDIOGRAM COMPLETE: CPT | Performed by: INTERNAL MEDICINE

## 2020-01-31 PROCEDURE — 99214 OFFICE O/P EST MOD 30 MIN: CPT | Performed by: INTERNAL MEDICINE

## 2020-01-31 RX ORDER — METOPROLOL SUCCINATE 25 MG/1
25 TABLET, EXTENDED RELEASE ORAL 2 TIMES DAILY
Qty: 182 TABLET | Refills: 3 | Status: SHIPPED | OUTPATIENT
Start: 2020-01-31 | End: 2020-11-09 | Stop reason: SDUPTHER

## 2020-01-31 NOTE — TELEPHONE ENCOUNTER
Pls call for precert- Echo-[place] [facility]-Aug    Pls [facility] for precert[id number] will go in May 2020.

## 2020-01-31 NOTE — LETTER
January 31, 2020     Kunal La DO  [address] [address]    Patient: Teresa Bird  YOB: 1935  Date of Visit: 1/31/2020      Dear Dominik    Thank you for referring Teresa Bird to me for evaluation. Below are my notes for this consultation.    If you have questions, please do not hesitate to call me. I look forward to following your patient along with you.         Sincerely,        Nick Beck MD        CC: MD Usha No MD Jong Nam, MD Maysoon M Dayoub, PA C Becker, Andrea J, MD  1/31/2020 12:34 PM  Sign at close encounter      Cardiology Note    Kunal La DO          Teresa Brid is a 84 y.o. 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 Ha Wheatley      Lab work reviewed January 2020 cholesterol at goal LDL 57 thyroid studies normal      Electronically signed by Nick Beck MD at 8/2/2019  1:39 PM       Office Visit on 8/2/2019                                     Patient Active Problem List    Diagnosis Date Noted   • Dilated aortic root (CMS/HCC) 08/02/2019   • RBBB 08/02/2018   • PAF (paroxysmal atrial fibrillation) (CMS/HCC) 04/24/2018   • Coronary artery disease involving native coronary artery of native heart without angina pectoris 04/08/2018   • Nicotine dependence in  remission 04/08/2018   • Multinodular goiter 03/13/2018   • Mixed hyperlipidemia 03/13/2018   • Pulmonary artery abnormality 04/04/2017   • Malignant neoplasm of uterus (CMS/HCC) 03/22/2017       Allergy  Penicillin; Pravastatin sodium; Rosuvastatin calcium; Penicillins; and Zetia [ezetimibe]    MED LIST     Current Outpatient Medications   Medication Sig Dispense Refill   • alirocumab (PRALUENT PEN) 150 mg/mL pen injector Inject 150 pens under the skin every 14 (fourteen) days. 4 pen 6   • cetirizine (ZyrTEC) 5 mg tablet Take 5 mg by mouth daily.     • LORazepam (ATIVAN) 0.5 mg tablet Take 0.5 mg by mouth every 6 (six) hours as needed.     • metoprolol succinate XL (TOPROL-XL) 25 mg 24 hr tablet Take 1 tablet (25 mg total) by mouth 2 (two) times a day. 182 tablet 3   • pantoprazole (PROTONIX) 40 mg EC tablet Take 40 mg by mouth as needed.       • rivaroxaban (XARELTO) 20 mg tablet Take 1 tablet (20 mg total) by mouth daily with dinner. 90 tablet 1     No current facility-administered medications for this visit.         Review of Systems   Constitution: Negative for malaise/fatigue, weight gain and weight loss.   HENT: Negative for hearing loss and hoarse voice.    Eyes: Negative for visual disturbance.   Cardiovascular: Negative for chest pain, claudication, cyanosis, dyspnea on exertion, irregular heartbeat, leg swelling, near-syncope, orthopnea, palpitations, paroxysmal nocturnal dyspnea and syncope.   Respiratory: Negative for cough, hemoptysis, shortness of breath, sleep disturbances due to breathing, snoring, sputum production and wheezing.    Endocrine: Negative for cold intolerance and heat intolerance.   Hematologic/Lymphatic: Negative.  Negative for bleeding problem. Does not bruise/bleed easily.   Skin: Negative.  Negative for rash.   Musculoskeletal: Negative for arthritis, falls, joint pain, muscle cramps and myalgias.   Gastrointestinal: Negative for abdominal pain, anorexia, change in bowel habit,  constipation, diarrhea, dysphagia, heartburn, jaundice and nausea.   Genitourinary: Negative for frequency and nocturia.   Neurological: Negative for dizziness, focal weakness, headaches, light-headedness, numbness, tremors and vertigo.   Psychiatric/Behavioral: Negative for memory loss. The patient does not have insomnia and is not nervous/anxious.    Allergic/Immunologic: Negative for hives.       Labs   Lab Results   Component Value Date    WBC 6.5 01/22/2020    HGB 15.7 (H) 01/22/2020    HCT 46.7 (H) 01/22/2020     01/22/2020    CHOL 143 01/22/2020    TRIG 73 01/22/2020    HDL 71 01/22/2020    LDLCALC 57 01/22/2020    ALT 11 01/22/2020    AST 14 01/22/2020     01/22/2020    K 4.7 01/22/2020     01/22/2020    CREATININE 0.65 01/22/2020    BUN 17 01/22/2020    CO2 30 01/22/2020    TSH 1.01 01/22/2020       Lab Results   Component Value Date    GLUCOSE 90 01/22/2020    CALCIUM 9.6 01/22/2020     01/22/2020    K 4.7 01/22/2020    CO2 30 01/22/2020     01/22/2020    BUN 17 01/22/2020    CREATININE 0.65 01/22/2020       Objective   There were no vitals filed for this visit.  Physical Exam   Constitutional: She is oriented to person, place, and time. She appears well-developed and well-nourished. No distress.   HENT:   Head: Normocephalic and atraumatic.   Nose: Nose normal.   Eyes: Conjunctivae are normal. No scleral icterus.   Neck: No JVD present.   Cardiovascular: Normal rate, regular rhythm, normal heart sounds and intact distal pulses. Exam reveals no gallop and no friction rub.   No murmur heard.  Systolic click   Pulmonary/Chest: Effort normal. No stridor. No respiratory distress. She has no wheezes. She has no rales. She exhibits no tenderness.   Abdominal: There is no tenderness.   Musculoskeletal: She exhibits no edema or deformity.   Neurological: She is alert and oriented to person, place, and time.   Skin: Skin is warm and dry.   Psychiatric: She has a normal mood and  affect.       Cardiac Procedures  Cardiac Procedures  Cardiac Procedures      STRESS    Stress echocardiogram March 2017 moderate pulmonic insufficiency pulmonary artery for mild mitral regurgitation negative for ischemia     Stress echo 2/19 vpc in recovery neg ishcmia  VPCs during test apical septal abnormal wall motion related to that      ECHO  Echo 4/18 pulm a 4.2 aorta 3.7cm    Echo 8/19 dilated LA mild mr aorta 3.7 PA 4.1   mild PI prom eus valve     OTHER  Ultrasound thyroid July 2017 11 mm nodule       CAT SCAN   Chest CTA April 2018 no change from 2017 dilated pulmonary artery 4.7 thyroid nodules coronary artery calcifications renal cyst Lrhyr mass    cta 1/19 [facility] pul artery 4.2 mildy dilated aortic root      cta 5/19 pulmonary artery 47 mm ectatic right pulmonary artery 31 mm aortic root 4.3    EKGnsr rbbbnon spec st t changes stable nochange  EKG    Assessment/Plan:          Pulmonary artery abnormality  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 Fajardo  Echo today all numbers [facility] pulmonary artery four-point 1 aortic root 3.7 by CTA aortic root was 4  Continue yearly imaging  Next year we will get coronary CT angiogram to image her coronary arteries also will be due May 2020    Coronary artery disease involving native coronary artery of native heart without angina pectoris  This was diagnosed by calcification seen on CAT scan stress echo was negative in February 2019 she does have right bundle branch block  Plan coronary CT angiogram with imaging of her pulmonary thoracic aneurysm in May 2020    PAF (paroxysmal atrial fibrillation) (CMS/HCC) (HCC)  Seen on a monitor in April 2018 2% of total heartbeats no clinical recurrence remains anticoagulated with Xarelto      Mixed hyperlipidemia  Phenomenal result to the response to the PCSK9 inhibitor presently LDL cholesterol is now 40 she is intolerant to statins and Zetia with GI  upset    Multinodular goiter  Had negative biopsy 2017 follows with Dr. Ha Wheatley      She is due for a follow-up with with CT surgery in May we will try to get coronary CT angiogram to examine her coronary arteries and pulmonary artery aneurysm prior to that visit I will see her back in 6 months lab work and echocardiogram  We are working on the [employer] program to help her pay for her Praluent        Cc Kunal La,

## 2020-01-31 NOTE — PROGRESS NOTES
Pt was seen in office today, pt states she has new insurance, pt is on Praluent 150 mg/ml pen will need a new prior auth for Praluent.

## 2020-02-04 ENCOUNTER — TELEPHONE (OUTPATIENT)
Dept: CARDIOLOGY | Facility: CLINIC | Age: 85
End: 2020-02-04

## 2020-02-04 NOTE — TELEPHONE ENCOUNTER
Called [employer] program, they received the new prior auth approval letter for Praluent, application is in review status pending.  Called pt suggest pt call [employer] by the end of the week to check on the status of application. Pt understands and has no further questions at this time.

## 2020-02-26 NOTE — PROGRESS NOTES
Cardiology Note    Kunal La DO          Teresa Bird is a 83 y.o. female 1935     Returns for visit and to review her lab work  She had an elevated potassium of 5.8 repeated and is 5.1  She is followed for dilated pulmonary artery of 4.2cm sees   She is due to see him in May 2019  No intervention has been required  She has a history of paroxysmal atrial fibrillation picked up on monitor in April 2018 she is anticoagulated on beta-blocker  Had some symptomatic palpitations we increased her beta-blocker, she is now asymptomatic  She had negative ischemic workup, stress echocardiogram April 2018  She does have arteriosclerosis with calcification seen on her coronaries by CAT scan  She is treated for mixed hyperlipidemia is hyperkalemia multinodular goiter follows with Dr. Ha Wheatley  Quit smoke one year aGO                    Patient Active Problem List    Diagnosis Date Noted   • RBBB 08/02/2018   • Hyperkalemia 08/02/2018   • PAF (paroxysmal atrial fibrillation) (CMS/[facility]) ([facility]) 04/24/2018   • Coronary artery disease involving native coronary artery of native heart without angina pectoris 04/08/2018   • Nicotine dependence in remission 04/08/2018   • Multinodular goiter 03/13/2018   • Mixed hyperlipidemia 03/13/2018   • Pulmonary artery abnormality 04/04/2017   • Malignant neoplasm of uterus (CMS/[facility]) ([facility]) 03/22/2017       Allergy    Penicillin; Pravastatin sodium; Rosuvastatin calcium; and Zetia [ezetimibe]          MED LIST       Current Outpatient Prescriptions   Medication Sig Dispense Refill   • LORazepam (ATIVAN) 0.5 mg tablet Take 0.5 mg by mouth every 6 (six) hours as needed.     • meloxicam (MOBIC) 15 mg tablet 15 mg. 1/2 bid      • metoprolol succinate XL (TOPROL-XL) 25 mg 24 hr tablet Take 1 tablet (25 mg total) by mouth 2 (two) times a day. 180 tablet 3   • pantoprazole (PROTONIX) 40 mg EC tablet 40 mg.     • rivaroxaban (XARELTO) 20 mg tablet Take 20 mg by mouth daily with  dinner.       No current facility-administered medications for this visit.                 Review of Systems   Constitution: Negative for malaise/fatigue, weight gain and weight loss.   HENT: Negative for hearing loss and hoarse voice.    Eyes: Negative for visual disturbance.   Cardiovascular: Negative for chest pain, claudication, cyanosis, dyspnea on exertion, irregular heartbeat, leg swelling, near-syncope, orthopnea, palpitations, paroxysmal nocturnal dyspnea and syncope.   Respiratory: Negative for cough, hemoptysis, shortness of breath, sleep disturbances due to breathing, snoring, sputum production and wheezing.    Endocrine: Negative for cold intolerance and heat intolerance.   Hematologic/Lymphatic: Negative.  Negative for bleeding problem. Does not bruise/bleed easily.   Skin: Negative.  Negative for rash.   Musculoskeletal: Negative for arthritis, falls, joint pain, muscle cramps and myalgias.   Gastrointestinal: Negative for abdominal pain, anorexia, change in bowel habit, constipation, diarrhea, dysphagia, heartburn, jaundice and nausea.   Genitourinary: Negative for frequency and nocturia.   Neurological: Negative for dizziness, focal weakness, headaches, light-headedness, numbness, tremors and vertigo.   Psychiatric/Behavioral: Negative for memory loss. The patient does not have insomnia and is not nervous/anxious.    Allergic/Immunologic: Negative for hives.       Labs   Lab Results   Component Value Date    WBC 5.7 11/28/2018    HGB 15.0 11/28/2018    HCT 44.4 11/28/2018     11/28/2018    CHOL 187 11/28/2018    TRIG 74 11/28/2018    HDL 68 11/28/2018    LDLCALC 103 (H) 11/28/2018    ALT 15 11/28/2018    AST 17 11/28/2018     11/28/2018    K 5.1 11/28/2018     11/28/2018    CREATININE 0.71 11/28/2018    BUN 20 08/02/2018    CO2 33 (H) 11/28/2018    TSH 1.72 11/28/2018       Lab Results   Component Value Date    GLUCOSE 92 11/28/2018    CALCIUM 9.5 11/28/2018     11/28/2018     K 5.1 11/28/2018    CO2 33 (H) 11/28/2018     11/28/2018    BUN 20 08/02/2018    CREATININE 0.71 11/28/2018         Objective   Vitals:    12/05/18 1031   BP: 130/80   BP Location: Left upper arm   Patient Position: Sitting   Pulse: 62   SpO2: 95%   Weight: 66.7 kg (147 lb)   Height: 1.524 m (5')     Physical Exam   Constitutional: She is oriented to person, place, and time. She appears well-developed and well-nourished. No distress.   HENT:   Head: Normocephalic and atraumatic.   Nose: Nose normal.   Eyes: Conjunctivae are normal. No scleral icterus.   Neck: No JVD present.   Cardiovascular: Normal rate, regular rhythm, normal heart sounds and intact distal pulses.  Exam reveals no gallop and no friction rub.    No murmur heard.  Systolic click   Pulmonary/Chest: Effort normal. No stridor. No respiratory distress. She has no wheezes. She has no rales. She exhibits no tenderness.   Abdominal: There is no tenderness.   Musculoskeletal: She exhibits no edema or deformity.   Neurological: She is alert and oriented to person, place, and time.   Skin: Skin is warm and dry.   Psychiatric: She has a normal mood and affect.         Cardiac Procedures     Cardiac Procedures  Cardiac Procedures    Chest CTA April 2018 no change from 2017 dilated pulmonary artery 4.7 thyroid nodules coronary artery calcifications renal cyst Lrhyr mass     CTA [facility] 1/19 pa aneurysm stable    Stress echocardiogram March 2017 moderate pulmonic insufficiency pulmonary artery for mild mitral regurgitation negative for ischemia     Echo 4/18 pulm a 4.2 aorta 3.7cm       ELECTROPHYSIOLOGY  HOLTER 4/18 2% AFIB    Ultrasound thyroid July 2017 11 mm nodule       EKG nsr rbbb  t wave no change inversions  Assessment/Plan:      Pulmonary artery abnormality  She has pulmonary artery aneurysm diagnosed by CT angiogram in April 2018 at that time had been stable for 1 year follows with Dr. Fajardo, no intervention required for repeat CTA and  follow-up visit with him this spring    Coronary artery disease involving native coronary artery of native heart without angina pectoris  She has CAD based on coronary calcification seen on CAT scan negative ischemic evaluation March 2017 will order repeat one at next visit does not tolerate statins or zetia, LDL cholesterol 100 we spoke briefly about PCS canine inhibitor she would like to hold off for now    PAF (paroxysmal atrial fibrillation) (CMS/HCC) (HCC)  Clinically she had some palpitation she increased her beta-blocker feeling better we diagnosed paroxysmal atrial fibrillation on the monitor from April 2018 about 2% of the time, she is anticoagulated with Xarelto    RBBB  Unchanged for rest echo next visit ischemic evaluation negative March 2017 will probably repeat ischemic evaluation in a year no symptoms    Multinodular goiter  Follows with Dr. Ha Wheatley had negative biopsy in the past and stable ultrasound from March 2017    Hyperkalemia  Repeat levels normal    Malignant neoplasm of uterus (CMS/HCC) (HCC)  Surgery 2013         I will see her back in February with resting echocardiogram she will get CTA of follow-up with Dr. Fajardo for dilated pulmonary artery, sprain, may repeat ischemic evaluation in a year    Thank you for allowing me to participate in the care of this patient.  I hope this information is helpful.    Nick Beck MD [facility]   12/5/2018  Kunal Kramer DO   Never

## 2020-04-01 NOTE — TELEPHONE ENCOUNTER
Spoke to pt.  Relayed precert information to her with regards below.    Katlin:    Please change labs to reflect [facility] and not [facility].  This is from the 01/31/2020 visit.  Please mail out labs to pt.  Thank you.

## 2020-04-01 NOTE — TELEPHONE ENCOUNTER
CT Auth#  [id number] 4/1/2020 -- 9/27/2020    Please call pt to give auth info and to assist with scheduling testing

## 2020-04-02 DIAGNOSIS — I48.0 PAF (PAROXYSMAL ATRIAL FIBRILLATION) (CMS/HCC): ICD-10-CM

## 2020-04-02 DIAGNOSIS — E87.5 HYPERKALEMIA: ICD-10-CM

## 2020-04-02 DIAGNOSIS — I25.10 CORONARY ARTERY DISEASE INVOLVING NATIVE CORONARY ARTERY OF NATIVE HEART WITHOUT ANGINA PECTORIS: Primary | ICD-10-CM

## 2020-04-02 DIAGNOSIS — E78.2 MIXED HYPERLIPIDEMIA: ICD-10-CM

## 2020-04-02 DIAGNOSIS — I45.10 RBBB: ICD-10-CM

## 2020-04-02 DIAGNOSIS — E04.2 MULTINODULAR GOITER: ICD-10-CM

## 2020-04-14 ENCOUNTER — TELEPHONE (OUTPATIENT)
Dept: ENDOCRINOLOGY | Facility: CLINIC | Age: 85
End: 2020-04-14

## 2020-04-14 NOTE — TELEPHONE ENCOUNTER
Patient cx appt for 4/17 in [place] patient stated she would wait for a later date to reschedule. Telemed offered

## 2020-04-21 ENCOUNTER — TELEPHONE (OUTPATIENT)
Dept: CARDIOLOGY | Facility: CLINIC | Age: 85
End: 2020-04-21

## 2020-04-21 NOTE — TELEPHONE ENCOUNTER
Left msg asking the patient to please call to discuss May 19 office visit that will be a telemed appointment and to discuss CT ordered for prior to May 19.

## 2020-04-21 NOTE — TELEPHONE ENCOUNTER
Patient returned call.  Rescheduled May 19 office visit to August 4 to coincide with appointment with Dr. Beck in August.

## 2020-07-07 ENCOUNTER — TRANSCRIBE ORDERS (OUTPATIENT)
Dept: SCHEDULING | Age: 85
End: 2020-07-07

## 2020-07-07 DIAGNOSIS — I28.1 ANEURYSM OF PULMONARY ARTERY (CMS/HCC): Primary | ICD-10-CM

## 2020-07-07 DIAGNOSIS — I71.20 THORACIC AORTIC ANEURYSM WITHOUT RUPTURE (CMS/HCC): Primary | ICD-10-CM

## 2020-07-27 ENCOUNTER — HOSPITAL ENCOUNTER (OUTPATIENT)
Dept: RADIOLOGY | Facility: HOSPITAL | Age: 85
Discharge: HOME | End: 2020-07-27
Attending: PHYSICIAN ASSISTANT
Payer: COMMERCIAL

## 2020-07-27 DIAGNOSIS — I28.1 PULMONARY ARTERY ANEURYSM (CMS/HCC): ICD-10-CM

## 2020-07-27 DIAGNOSIS — I28.1 ANEURYSM OF PULMONARY ARTERY (CMS/HCC): ICD-10-CM

## 2020-07-27 PROCEDURE — 71275 CT ANGIOGRAPHY CHEST: CPT

## 2020-07-27 PROCEDURE — 63600105 HC IODINE BASED CONTRAST: Performed by: PHYSICIAN ASSISTANT

## 2020-07-27 RX ADMIN — IOHEXOL 100 ML: 350 INJECTION, SOLUTION INTRAVENOUS at 13:45

## 2020-07-28 ENCOUNTER — DOCUMENTATION (OUTPATIENT)
Dept: HEMATOLOGY/ONCOLOGY | Facility: HOSPITAL | Age: 85
End: 2020-07-28

## 2020-07-28 NOTE — LETTER
[facility]    [address]  [address]  Phone [phone]    2020    Kunal La DO  Via Fax [phone]    Re: Teresa Bird     1935    Dear Dr La:    Your patient Teresa Bird was referred to the [facility] after a CT Angiography Chest done on 2020 at [facility] showed a 6 mm right upper lobe pulmonary nodule.  Fleischner Society Guidelines recommend follow up CT at 12 months, due to her high risk secondary to uterine cancer history.      While Ms. Bird was seen at [facility] when the chest CT was done, we understand that she is your patient and wanted to make sure that you were aware and would follow her.     Please feel free to contact the nurse navigator at [phone] with any questions.    Sincerely,    Mesha Rowland, MARLENEN, RN, OCN  Program Navigator

## 2020-07-28 NOTE — PROGRESS NOTES
CT Angiography Chest 7/27/2020 showed a 6 mm RUL nodule.  Patient has history of uterine cancer, thus considered high risk.  CT at 12 months recommended.  Will contact PCP to determine follow up needs.

## 2020-07-29 ENCOUNTER — TELEPHONE (OUTPATIENT)
Dept: SCHEDULING | Facility: CLINIC | Age: 85
End: 2020-07-29

## 2020-07-29 NOTE — TELEPHONE ENCOUNTER
Spoke to pt, discussed with Dr. Beck, pt recently had CTA chest performed on 7/27/2020. Per Dr. Beck will delay the CTA coronary test. Pt understands, she will call back to reschedule her appts. No further questions at this time.

## 2020-07-29 NOTE — TELEPHONE ENCOUNTER
Pt called for Michael.  CT unsure of what testing Dr Beck needs by her request of Analysis of Data from CT Heart Blood Vessel. Please call Teresa Bird back to assist [phone]

## 2020-07-29 NOTE — TELEPHONE ENCOUNTER
Returned pt call, pt states she received an approval letter from her insurance regarding CT study of heart blood vessel. Per last OV note coronary CTA was ordered, precert was approved. Pt will call Edyta to schedule for the test.     Pt would like to cancel her 8/11 OV and echo appt, she will call back to reschedule.

## 2020-07-29 NOTE — TELEPHONE ENCOUNTER
Pt. Request call back from Martha to discuss paperwork..   Pt would not elaborate.  P#[phone]

## 2020-08-10 ENCOUNTER — TELEPHONE (OUTPATIENT)
Dept: CARDIOLOGY | Facility: CLINIC | Age: 85
End: 2020-08-10

## 2020-08-10 NOTE — TELEPHONE ENCOUNTER
I called pt to confirm Echo/OV -- 8/11/2020 -- LMOM for pt to call me back.    I need to speak to pt when he/she calls back.    Labs and cta in chart

## 2020-09-15 ENCOUNTER — TELEPHONE (OUTPATIENT)
Dept: SCHEDULING | Facility: CLINIC | Age: 85
End: 2020-09-15

## 2020-09-15 ENCOUNTER — OFFICE VISIT (OUTPATIENT)
Dept: CARDIOLOGY | Facility: CLINIC | Age: 85
End: 2020-09-15
Payer: COMMERCIAL

## 2020-09-15 VITALS
OXYGEN SATURATION: 98 % | BODY MASS INDEX: 27.19 KG/M2 | WEIGHT: 144 LBS | HEART RATE: 54 BPM | DIASTOLIC BLOOD PRESSURE: 70 MMHG | RESPIRATION RATE: 16 BRPM | SYSTOLIC BLOOD PRESSURE: 124 MMHG | HEIGHT: 61 IN

## 2020-09-15 DIAGNOSIS — I28.1 PULMONARY ARTERY ANEURYSM (CMS/HCC): Primary | ICD-10-CM

## 2020-09-15 PROCEDURE — 99214 OFFICE O/P EST MOD 30 MIN: CPT | Performed by: PHYSICIAN ASSISTANT

## 2020-09-15 NOTE — LETTER
September 18, 2020     Nick Beck MD  [address] [facility] [facility] [place] [address]    Patient: Teresa Bird  YOB: 1935  Date of Visit: 9/15/2020      Dear Dr. Beck:    Thank you for referring Teresa Bird to me for evaluation. Below are my notes for this consultation.    If you have questions, please do not hesitate to call me. I look forward to following your patient along with you.         Sincerely,        ETHEL Cody        CC: DO Megan Betancourt Maysoon M, PA C  9/18/2020 11:44 AM  Sign at close encounter     Cardiology Note       Reason for visit:   No chief complaint on file.     HPI    Teresa Bird is a 85 y.o. 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  She currently denies any shortness of breath, increased fatigue, chest pain, lightheadedness or syncope.  She follows regularly for her overall cardiovascular care with Dr. Nick Beck.  She denies any new complaints at this time and performs normal daily activities without any limitation.     Past Medical History:   Diagnosis Date   • Arthritis    • Cancer, uterine (CMS/HCC)    • Closed right ankle fracture age 62   • GERD (gastroesophageal reflux disease)    • Lipid disorder     difficulty tolerating statins   • Melanoma (CMS/HCC)     left shoulder   • Osteoporosis     did not tolerate fosamax     Past Surgical History:   Procedure Laterality Date   • CHOLECYSTECTOMY     • HYSTERECTOMY  12/2013     Penicillin; Pravastatin sodium; Rosuvastatin calcium; Penicillins; and Zetia [ezetimibe]  Current Outpatient Medications   Medication Sig Dispense Refill   • alirocumab (PRALUENT PEN) 150 mg/mL pen injector Inject 150 pens under the skin every 14 (fourteen) days. 4 pen 6   • cetirizine (ZyrTEC) 5 mg tablet Take 5 mg by mouth daily.     • LORazepam (ATIVAN) 0.5 mg tablet Take 0.5 mg by mouth every 6 (six) hours as needed.     • metoprolol succinate XL (TOPROL-XL)  25 mg 24 hr tablet Take 1 tablet (25 mg total) by mouth 2 (two) times a day. 182 tablet 3   • pantoprazole (PROTONIX) 40 mg EC tablet Take 40 mg by mouth as needed.       • rivaroxaban (XARELTO) 20 mg tablet Take 1 tablet (20 mg total) by mouth daily with dinner. 90 tablet 1     No current facility-administered medications for this visit.      Social History     Socioeconomic History   • Marital status:      Spouse name: None   • Number of children: None   • Years of education: None   • Highest education level: None   Occupational History   • None   Social Needs   • Financial resource strain: None   • Food insecurity:     Worry: None     Inability: None   • Transportation needs:     Medical: None     Non-medical: None   Tobacco Use   • Smoking status: Former Smoker     Last attempt to quit: 2018     Years since quittin.7   • Smokeless tobacco: Never Used   • Tobacco comment: Quit 2018   Substance and Sexual Activity   • Alcohol use: Yes     Comment: social   • Drug use: No   • Sexual activity: Defer   Lifestyle   • Physical activity:     Days per week: None     Minutes per session: None   • Stress: None   Relationships   • Social connections:     Talks on phone: None     Gets together: None     Attends Tenriism service: None     Active member of club or organization: None     Attends meetings of clubs or organizations: None     Relationship status: None   • Intimate partner violence:     Fear of current or ex partner: None     Emotionally abused: None     Physically abused: None     Forced sexual activity: None   Other Topics Concern   • None   Social History Narrative   • None     Family History   Problem Relation Age of Onset   • Heart attack Biological Mother    • Diabetes Biological Mother    • Lung cancer Biological Mother    • COPD Biological Mother    • Diabetes Biological Father    • Prostate cancer Biological Brother          Review of Systems   Constitution: Negative.   HENT: Negative.    Eyes:  Negative.    Respiratory: Negative.    Endocrine: Negative.    Skin: Negative.    Musculoskeletal: Negative.    Gastrointestinal: Negative.    Genitourinary: Negative.    Neurological: Negative.    Psychiatric/Behavioral: Negative.    Allergic/Immunologic: Negative.       Objective    Vitals:    09/15/20 1303   BP: 124/70   Pulse: (!) 54   Resp: 16   SpO2: 98%      Physical Exam   Constitutional: She is oriented to person, place, and time. She appears well-developed and well-nourished. No distress.   HENT:   Head: Normocephalic and atraumatic.   Right Ear: External ear normal.   Left Ear: External ear normal.   Nose: Nose normal.   Mouth/Throat: Oropharynx is clear and moist.   Eyes: Pupils are equal, round, and reactive to light. Conjunctivae and EOM are normal.   Neck: Normal range of motion. Neck supple.   Cardiovascular: Normal rate, regular rhythm and intact distal pulses. Exam reveals no gallop and no friction rub.   No murmur heard.  Pulmonary/Chest: Effort normal. No respiratory distress. She has no wheezes. She has no rales. She exhibits no tenderness.   Abdominal: Soft. Bowel sounds are normal. She exhibits no distension and no mass. There is no tenderness. There is no rebound and no guarding.   Musculoskeletal: Normal range of motion. She exhibits no edema or tenderness.   Neurological: She is alert and oriented to person, place, and time. She has normal reflexes.   Skin: Skin is warm and dry. No rash noted. She is not diaphoretic. No erythema. No pallor.   Psychiatric: She has a normal mood and affect. Her behavior is normal. Judgment and thought content normal.               Imaging  CTA chest performed on 7/27/2020 was reviewed with the patient.  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  The left pulmonary artery is seen to measure 2.5 cm, previously 2.4 cm.  The main pulmonary artery measures 4.7 cm unchanged when compared to prior  exam.  The right pulmonary artery measures 3.1 cm, unchanged from previous.  The thoracic aorta also remains unchanged in appearance.  The aortic root measures 4.3 cm and is unchanged.  There is no evidence of dissection.     Assessment/Plan    No problem-specific Assessment & Plan notes found for this encounter.      Ms. 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  Her aortic root is also stable as there has been no growth at this time.  I would like to present her for educational purposes at our next aortic conference where we will discuss her case with both cardiothoracic and vascular surgery.  In the meantime she was counseled on the importance of maintaining good blood pressure control and avoiding any heavy lifting or straining.  The patient was advised that in the event of sudden onset of symptoms she is to contact us as well as her cardiologist.  We would like to see her in 1 year with repeat CTA chest.          Thank you for allowing me to participate in the care of this patient.  I hope this information is helpful.     ETHEL Cody 9/18/2020  11:35 AM

## 2020-09-18 ASSESSMENT — ENCOUNTER SYMPTOMS
ALLERGIC/IMMUNOLOGIC NEGATIVE: 1
ENDOCRINE NEGATIVE: 1
NEUROLOGICAL NEGATIVE: 1
RESPIRATORY NEGATIVE: 1
GASTROINTESTINAL NEGATIVE: 1
EYES NEGATIVE: 1
PSYCHIATRIC NEGATIVE: 1
MUSCULOSKELETAL NEGATIVE: 1
CONSTITUTIONAL NEGATIVE: 1

## 2020-09-18 NOTE — PROGRESS NOTES
Cardiology Note       Reason for visit:   No chief complaint on file.     HPI    Teresa Bird is a 85 y.o. 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  She currently denies any shortness of breath, increased fatigue, chest pain, lightheadedness or syncope.  She follows regularly for her overall cardiovascular care with Dr. Nick Beck.  She denies any new complaints at this time and performs normal daily activities without any limitation.     Past Medical History:   Diagnosis Date   • Arthritis    • Cancer, uterine (CMS/HCC)    • Closed right ankle fracture age 62   • GERD (gastroesophageal reflux disease)    • Lipid disorder     difficulty tolerating statins   • Melanoma (CMS/HCC)     left shoulder   • Osteoporosis     did not tolerate fosamax     Past Surgical History:   Procedure Laterality Date   • CHOLECYSTECTOMY     • HYSTERECTOMY  12/2013     Penicillin; Pravastatin sodium; Rosuvastatin calcium; Penicillins; and Zetia [ezetimibe]  Current Outpatient Medications   Medication Sig Dispense Refill   • alirocumab (PRALUENT PEN) 150 mg/mL pen injector Inject 150 pens under the skin every 14 (fourteen) days. 4 pen 6   • cetirizine (ZyrTEC) 5 mg tablet Take 5 mg by mouth daily.     • LORazepam (ATIVAN) 0.5 mg tablet Take 0.5 mg by mouth every 6 (six) hours as needed.     • metoprolol succinate XL (TOPROL-XL) 25 mg 24 hr tablet Take 1 tablet (25 mg total) by mouth 2 (two) times a day. 182 tablet 3   • pantoprazole (PROTONIX) 40 mg EC tablet Take 40 mg by mouth as needed.       • rivaroxaban (XARELTO) 20 mg tablet Take 1 tablet (20 mg total) by mouth daily with dinner. 90 tablet 1     No current facility-administered medications for this visit.      Social History     Socioeconomic History   • Marital status:      Spouse name: None   • Number of children: None   • Years of education: None   • Highest education level: None   Occupational History   • None   Social  Needs   • Financial resource strain: None   • Food insecurity:     Worry: None     Inability: None   • Transportation needs:     Medical: None     Non-medical: None   Tobacco Use   • Smoking status: Former Smoker     Last attempt to quit: 2018     Years since quittin.7   • Smokeless tobacco: Never Used   • Tobacco comment: Quit 2018   Substance and Sexual Activity   • Alcohol use: Yes     Comment: social   • Drug use: No   • Sexual activity: Defer   Lifestyle   • Physical activity:     Days per week: None     Minutes per session: None   • Stress: None   Relationships   • Social connections:     Talks on phone: None     Gets together: None     Attends Christian service: None     Active member of club or organization: None     Attends meetings of clubs or organizations: None     Relationship status: None   • Intimate partner violence:     Fear of current or ex partner: None     Emotionally abused: None     Physically abused: None     Forced sexual activity: None   Other Topics Concern   • None   Social History Narrative   • None     Family History   Problem Relation Age of Onset   • Heart attack Biological Mother    • Diabetes Biological Mother    • Lung cancer Biological Mother    • COPD Biological Mother    • Diabetes Biological Father    • Prostate cancer Biological Brother          Review of Systems   Constitution: Negative.   HENT: Negative.    Eyes: Negative.    Respiratory: Negative.    Endocrine: Negative.    Skin: Negative.    Musculoskeletal: Negative.    Gastrointestinal: Negative.    Genitourinary: Negative.    Neurological: Negative.    Psychiatric/Behavioral: Negative.    Allergic/Immunologic: Negative.       Objective    Vitals:    09/15/20 1303   BP: 124/70   Pulse: (!) 54   Resp: 16   SpO2: 98%      Physical Exam   Constitutional: She is oriented to person, place, and time. She appears well-developed and well-nourished. No distress.   HENT:   Head: Normocephalic and atraumatic.   Right Ear:  External ear normal.   Left Ear: External ear normal.   Nose: Nose normal.   Mouth/Throat: Oropharynx is clear and moist.   Eyes: Pupils are equal, round, and reactive to light. Conjunctivae and EOM are normal.   Neck: Normal range of motion. Neck supple.   Cardiovascular: Normal rate, regular rhythm and intact distal pulses. Exam reveals no gallop and no friction rub.   No murmur heard.  Pulmonary/Chest: Effort normal. No respiratory distress. She has no wheezes. She has no rales. She exhibits no tenderness.   Abdominal: Soft. Bowel sounds are normal. She exhibits no distension and no mass. There is no tenderness. There is no rebound and no guarding.   Musculoskeletal: Normal range of motion. She exhibits no edema or tenderness.   Neurological: She is alert and oriented to person, place, and time. She has normal reflexes.   Skin: Skin is warm and dry. No rash noted. She is not diaphoretic. No erythema. No pallor.   Psychiatric: She has a normal mood and affect. Her behavior is normal. Judgment and thought content normal.               Imaging  CTA chest performed on 7/27/2020 was reviewed with the patient.  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  The left pulmonary artery is seen to measure 2.5 cm, previously 2.4 cm.  The main pulmonary artery measures 4.7 cm unchanged when compared to prior exam.  The right pulmonary artery measures 3.1 cm, unchanged from previous.  The thoracic aorta also remains unchanged in appearance.  The aortic root measures 4.3 cm and is unchanged.  There is no evidence of dissection.     Assessment/Plan    No problem-specific Assessment & Plan notes found for this encounter.      Ms. 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  Her aortic root is also stable as there has been no growth at this time.  I would like to present her for  educational purposes at our next aortic conference where we will discuss her case with both cardiothoracic and vascular surgery.  In the meantime she was counseled on the importance of maintaining good blood pressure control and avoiding any heavy lifting or straining.  The patient was advised that in the event of sudden onset of symptoms she is to contact us as well as her cardiologist.  We would like to see her in 1 year with repeat CTA chest.          Thank you for allowing me to participate in the care of this patient.  I hope this information is helpful.     ETHEL Cody 9/18/2020  11:35 AM

## 2020-11-04 PROBLEM — R91.1 PULMONARY NODULE: Status: ACTIVE | Noted: 2020-11-04

## 2020-11-04 ASSESSMENT — ENCOUNTER SYMPTOMS
SLEEP DISTURBANCES DUE TO BREATHING: 0
DYSPNEA ON EXERTION: 0
PND: 0
DIZZINESS: 0
WEIGHT GAIN: 0
MUSCLE CRAMPS: 0
WEIGHT LOSS: 0
SNORING: 0
WHEEZING: 0
VERTIGO: 0
FREQUENCY: 0
COUGH: 0
LIGHT-HEADEDNESS: 0
ORTHOPNEA: 0
IRREGULAR HEARTBEAT: 0
FOCAL WEAKNESS: 0
HEMATOLOGIC/LYMPHATIC NEGATIVE: 1
ANOREXIA: 0
HEMOPTYSIS: 0
CHANGE IN BOWEL HABIT: 0
HEARTBURN: 0
DIARRHEA: 0
PALPITATIONS: 0
NAUSEA: 0
MYALGIAS: 0
MEMORY LOSS: 0
NERVOUS/ANXIOUS: 0
HEADACHES: 0
SYNCOPE: 0
FALLS: 0
NEAR-SYNCOPE: 0
CLAUDICATION: 0
SHORTNESS OF BREATH: 0
INSOMNIA: 0
SPUTUM PRODUCTION: 0
TREMORS: 0
BRUISES/BLEEDS EASILY: 0
HOARSE VOICE: 0
NUMBNESS: 0
JAUNDICE: 0
CONSTIPATION: 0
ABDOMINAL PAIN: 0

## 2020-11-09 ENCOUNTER — OFFICE VISIT (OUTPATIENT)
Dept: CARDIOLOGY | Facility: CLINIC | Age: 85
End: 2020-11-09
Payer: COMMERCIAL

## 2020-11-09 ENCOUNTER — HOSPITAL ENCOUNTER (OUTPATIENT)
Dept: CARDIOLOGY | Facility: CLINIC | Age: 85
Discharge: HOME | End: 2020-11-09
Attending: INTERNAL MEDICINE
Payer: COMMERCIAL

## 2020-11-09 VITALS
DIASTOLIC BLOOD PRESSURE: 68 MMHG | BODY MASS INDEX: 27.19 KG/M2 | SYSTOLIC BLOOD PRESSURE: 118 MMHG | WEIGHT: 144 LBS | HEIGHT: 61 IN

## 2020-11-09 VITALS
OXYGEN SATURATION: 99 % | DIASTOLIC BLOOD PRESSURE: 68 MMHG | WEIGHT: 144 LBS | BODY MASS INDEX: 27.19 KG/M2 | SYSTOLIC BLOOD PRESSURE: 118 MMHG | HEIGHT: 61 IN | HEART RATE: 60 BPM

## 2020-11-09 DIAGNOSIS — I25.10 CORONARY ARTERY DISEASE INVOLVING NATIVE CORONARY ARTERY OF NATIVE HEART WITHOUT ANGINA PECTORIS: Primary | ICD-10-CM

## 2020-11-09 DIAGNOSIS — Q25.79 PULMONARY ARTERY ABNORMALITY: ICD-10-CM

## 2020-11-09 DIAGNOSIS — F17.201 NICOTINE DEPENDENCE IN REMISSION, UNSPECIFIED NICOTINE PRODUCT TYPE: ICD-10-CM

## 2020-11-09 DIAGNOSIS — E78.2 MIXED HYPERLIPIDEMIA: ICD-10-CM

## 2020-11-09 DIAGNOSIS — I25.10 CORONARY ARTERY DISEASE INVOLVING NATIVE CORONARY ARTERY OF NATIVE HEART WITHOUT ANGINA PECTORIS: ICD-10-CM

## 2020-11-09 DIAGNOSIS — I45.10 RBBB: ICD-10-CM

## 2020-11-09 DIAGNOSIS — R91.1 PULMONARY NODULE: ICD-10-CM

## 2020-11-09 DIAGNOSIS — I77.810 DILATED AORTIC ROOT (CMS/HCC): ICD-10-CM

## 2020-11-09 DIAGNOSIS — I48.0 PAF (PAROXYSMAL ATRIAL FIBRILLATION) (CMS/HCC): ICD-10-CM

## 2020-11-09 LAB
AORTIC ROOT ANNULUS: 3.7 CM
ASCENDING AORTA: 3.5 CM
AV PEAK GRADIENT: 5 MMHG
AV PEAK VELOCITY-S: 1.17 M/S
AV VALVE AREA: 2.67 CM2
BSA FOR ECHO PROCEDURE: 1.68 M2
E WAVE DECELERATION TIME: 278 MS
E/A RATIO: 0.9
E/E' RATIO: 9.4
E/LAT E' RATIO: 6.2
EDV (BP): 60.9 CM3
EF (A4C): 62.5 %
EF A2C: 58.4 %
EJECTION FRACTION: 61.9 %
EST RIGHT VENT SYSTOLIC PRESSURE BY TRICUSPID REGURGITATION JET: 30 MMHG
ESV (BP): 23.2 CM3
FRACTIONAL SHORTENING: 33.3 %
INTERVENTRICULAR SEPTUM: 1.27 CM
LA ESV (BP): 62.5 CM3
LA ESV INDEX (A2C): 34.76 CM3/M2
LA ESV INDEX (BP): 37.2 CM3/M2
LA/AORTA RATIO: 1.27
LAAS-AP2: 20.5 CM2
LAAS-AP4: 21.7 CM2
LAD 2D: 4.7 CM
LALD A4C: 5.58 CM
LALD A4C: 5.73 CM
LAV-S: 58.4 CM3
LEFT ATRIUM VOLUME INDEX: 38.81 CM3/M2
LEFT ATRIUM VOLUME: 65.2 CM3
LEFT INTERNAL DIMENSION IN SYSTOLE: 2.89 CM (ref 2.33–3.52)
LEFT VENTRICLE DIASTOLIC VOLUME INDEX: 32.68 CM3/M2
LEFT VENTRICLE DIASTOLIC VOLUME: 54.9 CM3
LEFT VENTRICLE SYSTOLIC VOLUME INDEX: 12.26 CM3/M2
LEFT VENTRICLE SYSTOLIC VOLUME: 20.6 CM3
LEFT VENTRICULAR INTERNAL DIMENSION IN DIASTOLE: 4.33 CM (ref 3.92–5.44)
LEFT VENTRICULAR POSTERIOR WALL IN END DIASTOLE: 1.12 CM (ref 0.51–0.95)
LV DIASTOLIC VOLUME: 61.1 CM3
LV ESV (APICAL 2 CHAMBER): 25.4 CM3
LVAD-AP2: 22.4 CM2
LVAD-AP4: 20.1 CM2
LVAS-AP2: 12.5 CM2
LVAS-AP4: 11.2 CM2
LVEDVI(A2C): 36.37 CM3/M2
LVEDVI(BP): 36.25 CM3/M2
LVESVI(A2C): 15.12 CM3/M2
LVESVI(BP): 13.81 CM3/M2
LVLD-AP2: 6.92 CM
LVLD-AP4: 6.23 CM
LVLS-AP2: 5.54 CM
LVLS-AP4: 5.37 CM
LVOT 2D: 2.1 CM
LVOT A: 3.46 CM2
LVOT PEAK VELOCITY: 0.9 M/S
MPAD: 4.1 CM
MV E'TISSUE VEL-LAT: 0.11 M/S
MV E'TISSUE VEL-MED: 0.07 M/S
MV PEAK A VEL: 0.79 M/S
MV PEAK E VEL: 0.69 M/S
POSTERIOR WALL: 1.12 CM
PULMONARY REGURGITATION LATE DIASTOLIC VELOCITY: 1.36 M/S
PV PEAK GRADIENT: 3 MMHG
PV PV: 0.86 M/S
RVOT VMAX: 0.84 M/S
SEPTAL TISSUE DOPPLER FREE WALL LATE DIA VELOCITY (APICAL 4 CHAMBER VIEW): 0.12 M/S
TR MAX PG: 24 MMHG
TRICUSPID VALVE PEAK REGURGITATION VELOCITY: 2.47 M/S
Z-SCORE OF LEFT VENTRICULAR DIMENSION IN END DIASTOLE: -0.65
Z-SCORE OF LEFT VENTRICULAR DIMENSION IN END SYSTOLE: 0.07
Z-SCORE OF LEFT VENTRICULAR POSTERIOR WALL IN END DIASTOLE: 2.5

## 2020-11-09 PROCEDURE — 99214 OFFICE O/P EST MOD 30 MIN: CPT | Performed by: INTERNAL MEDICINE

## 2020-11-09 PROCEDURE — 93000 ELECTROCARDIOGRAM COMPLETE: CPT | Performed by: INTERNAL MEDICINE

## 2020-11-09 PROCEDURE — 93306 TTE W/DOPPLER COMPLETE: CPT | Performed by: INTERNAL MEDICINE

## 2020-11-09 RX ORDER — METOPROLOL SUCCINATE 25 MG/1
25 TABLET, EXTENDED RELEASE ORAL 2 TIMES DAILY
Qty: 182 TABLET | Refills: 6 | Status: SHIPPED | OUTPATIENT
Start: 2020-11-09 | End: 2021-02-11 | Stop reason: SDUPTHER

## 2020-11-09 NOTE — ASSESSMENT & PLAN NOTE
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

## 2020-11-09 NOTE — ASSESSMENT & PLAN NOTE
Calcification seen on CAT scan in the past negative ischemic evaluation stress echo February 2019 plan coronary CT angiogram prior to next visit evaluate dilated pulmonary artery mildly dilated thoracic aorta

## 2020-11-09 NOTE — LETTER
November 9, 2020     Kunal La DO  [address] [facility] [address]    Patient: Teresa Bird  YOB: 1935  Date of Visit: 11/9/2020      Dear Dr. La:    Thank you for referring Teresa Bird to me for evaluation. Below are my notes for this consultation.    If you have questions, please do not hesitate to call me. I look forward to following your patient along with you.         Sincerely,        Nick Beck MD        CC: MD Ochoa Herrera MD Maysoon M Dayoub, PA C Becker, Andrea J, MD  11/9/2020 12:27 PM  Sign when Signing Visit      Cardiology Note    Kunal La DO          Teresa Bird is a 85 y.o. 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 fib seen on monitor 2018 no clinical recurrence multinodular goiter with negative biopsy mixed hyperlipidemia      Lab work July CBC normal  Creatinine 0.7  Potassium 5.8  Cholesterol 118  LDL 40 thyroids normal    She has no cardiovascular complaints              Instructions    Follow up with CTa of cor arteries   Echo at visit  6 months labs and visit                                                                     Patient Active Problem List    Diagnosis Date Noted   • Dilated aortic root (CMS/HCC) 08/02/2019   • RBBB 08/02/2018   • PAF (paroxysmal atrial fibrillation) (CMS/HCC) 04/24/2018   • Coronary artery disease involving native coronary artery of native heart without angina pectoris 04/08/2018   • Nicotine dependence in remission 04/08/2018   • Multinodular goiter 03/13/2018   • Mixed hyperlipidemia 03/13/2018   • Pulmonary artery abnormality 04/04/2017   • Malignant neoplasm of uterus (CMS/HCC) 03/22/2017        Allergy  Penicillin, Pravastatin sodium, Rosuvastatin calcium, Penicillins, and Zetia [ezetimibe]    MED LIST     Current Outpatient Medications   Medication Sig Dispense Refill   • alirocumab (PRALUENT PEN) 150 mg/mL pen injector Inject 150 pens under the skin every 14 (fourteen) days. 4 pen 6   • cetirizine (ZyrTEC) 5 mg tablet Take 5 mg by mouth daily.     • LORazepam (ATIVAN) 0.5 mg tablet Take 0.5 mg by mouth every 6 (six) hours as needed.     • metoprolol succinate XL (TOPROL-XL) 25 mg 24 hr tablet Take 1 tablet (25 mg total) by mouth 2 (two) times a day. 182 tablet 6   • pantoprazole (PROTONIX) 40 mg EC tablet Take 40 mg by mouth as needed.       • rivaroxaban (XARELTO) 20 mg tablet Take 1 tablet (20 mg total) by mouth daily with dinner. 90 tablet 6     No current facility-administered medications for this visit.         Review of Systems   Constitution: Negative for malaise/fatigue, weight gain and weight loss.   HENT: Negative for hearing loss and hoarse voice.    Eyes: Negative for visual disturbance.   Cardiovascular: Negative for chest pain, claudication, cyanosis, dyspnea on exertion, irregular heartbeat, leg swelling, near-syncope, orthopnea, palpitations, paroxysmal nocturnal dyspnea and syncope.   Respiratory: Negative for cough, hemoptysis, shortness of breath, sleep disturbances due to breathing, snoring, sputum production and wheezing.    Endocrine: Negative for cold intolerance and heat intolerance.   Hematologic/Lymphatic: Negative.  Negative for bleeding problem. Does not bruise/bleed easily.   Skin: Negative.  Negative for rash.   Musculoskeletal: Negative for arthritis, falls, joint pain, muscle cramps and myalgias.   Gastrointestinal: Negative for abdominal pain, anorexia, change in bowel habit, constipation, diarrhea, dysphagia, heartburn, jaundice and nausea.   Genitourinary: Negative for frequency and nocturia.   Neurological: Negative for dizziness, focal weakness, headaches,  "light-headedness, numbness, tremors and vertigo.   Psychiatric/Behavioral: Negative for memory loss. The patient does not have insomnia and is not nervous/anxious.    Allergic/Immunologic: Negative for hives.       Labs   Lab Results   Component Value Date    WBC 6.5 01/22/2020    HGB 15.7 (H) 01/22/2020    HCT 46.7 (H) 01/22/2020     01/22/2020    CHOL 143 01/22/2020    TRIG 73 01/22/2020    HDL 71 01/22/2020    LDLCALC 57 01/22/2020    ALT 11 01/22/2020    AST 14 01/22/2020     01/22/2020    K 4.7 01/22/2020     01/22/2020    CREATININE 0.65 01/22/2020    BUN 17 01/22/2020    CO2 30 01/22/2020    TSH 1.01 01/22/2020       Lab Results   Component Value Date    GLUCOSE 90 01/22/2020    CALCIUM 9.6 01/22/2020     01/22/2020    K 4.7 01/22/2020    CO2 30 01/22/2020     01/22/2020    BUN 17 01/22/2020    CREATININE 0.65 01/22/2020       Objective   Vitals:    11/09/20 1129   BP: 118/68   BP Location: Left upper arm   Patient Position: Sitting   Pulse: 60   SpO2: 99%   Weight: 65.3 kg (144 lb)   Height: 1.549 m (5' 1\")     Physical Exam   Constitutional: She is oriented to person, place, and time. She appears well-developed and well-nourished. No distress.   HENT:   Head: Normocephalic and atraumatic.   Nose: Nose normal.   Eyes: Conjunctivae are normal. No scleral icterus.   Neck: No JVD present.   Cardiovascular: Normal rate, regular rhythm, normal heart sounds and intact distal pulses. Exam reveals no gallop and no friction rub.   No murmur heard.  Systolic click   Pulmonary/Chest: Effort normal. No stridor. No respiratory distress. She has no wheezes. She has no rales. She exhibits no tenderness.   Abdominal: There is no abdominal tenderness.   Musculoskeletal:         General: No deformity or edema.   Neurological: She is alert and oriented to person, place, and time.   Skin: Skin is warm and dry.   Psychiatric: She has a normal mood and affect.       Cardiac Procedures  Cardiac " Procedures  Cardiac Procedures      STRESS    Stress echocardiogram March 2017 moderate pulmonic insufficiency pulmonary artery for mild mitral regurgitation negative for ischemia     Stress echo 2/19   vpc in recovery neg ishcmia  VPCs during test apical septal abnormal wall motion related to that      ECHO  Echo 4/18 pulm a 4.2 aorta 3.7cm    Echo oc 2020dilated LA mild mr aorta 3.7 PA 4.1   mild PI prom eus valve     OTHER  Ultrasound thyroid July 2017 11 mm nodule       CAT SCAN   Chest CTA April 2018 no change from 2017 dilated pulmonary artery 4.7 thyroid nodules coronary artery calcifications renal cyst Lrhyr mass    cta 1/19 einstein pul artery 4.2 mildy dilated aortic root      cta 5/19 pulmonary artery 47 mm ectatic right pulmonary artery 31 mm aortic root 4.3    CTA July 2020 aorta 4.7  MM 6 mm right upper lobe probably seen prior          EKGnsr rbbbnon spec st t changes stable November 2020             nochange  EKG    Assessment/Plan:          Pulmonary artery abnormality  She is followed for pulmonary artery aneurysm and thoracic aneurysm last imaged May 2019 follows with Dr. Fajardo  Echo today all numbers mash pulmonary artery four-point 1 aortic root 3.7 by CTA aortic root was 4  Continue yearly imaging  Next year we will get coronary CT angiogram to image her coronary arteries also will be due May 2020    Coronary artery disease involving native coronary artery of native heart without angina pectoris  This was diagnosed by calcification seen on CAT scan stress echo was negative in February 2019 she does have right bundle branch block  Plan coronary CT angiogram with imaging of her pulmonary thoracic aneurysm in May 2020    PAF (paroxysmal atrial fibrillation) (CMS/HCC) (HCC)  Seen on a monitor in April 2018 2% of total heartbeats no clinical recurrence remains anticoagulated with Xarelto      Mixed hyperlipidemia  Phenomenal result to the response to the PCSK9 inhibitor presently LDL cholesterol  is now 40 she is intolerant to statins and Zetia with GI upset    Multinodular goiter  Had negative biopsy 2017 follows with Dr. 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

## 2020-11-17 ENCOUNTER — TELEPHONE (OUTPATIENT)
Dept: SCHEDULING | Facility: CLINIC | Age: 85
End: 2020-11-17

## 2020-11-17 NOTE — TELEPHONE ENCOUNTER
Pt states last year nurse Martha helped her fill out form for Praluent Pen, and pt states she got a notice in the mail stating another form needs to get completed, but pt is unsure if Dr. Beck sends her a form, or if she needs to request one.    Pt can be reached at [phone]

## 2020-11-17 NOTE — TELEPHONE ENCOUNTER
Spoke to pt, enrollment form for Patient Assistance Program mailed to pt's home for new year 2021. Prior auth for Praluent is approved until 1/31/2021.Will need prior auth renewal closer to the end date. Pt understands.

## 2020-12-14 RX ORDER — ALIROCUMAB 150 MG/ML
150 INJECTION, SOLUTION SUBCUTANEOUS
Qty: 4 PEN | Refills: 3 | Status: SHIPPED | OUTPATIENT
Start: 2020-12-14 | End: 2022-03-28 | Stop reason: SDUPTHER

## 2020-12-14 NOTE — TELEPHONE ENCOUNTER
Byron from [facility]'s pharmacy can not start pre Auth for med until at least 1/1/2021  If possible can all info for pre Auth for medication (PRALUENT PEN) 150 mg  be sent back to the pharmacy    Attn; byron, fax # [phone]

## 2021-01-20 NOTE — TELEPHONE ENCOUNTER
Pt is requesting a call back from Michael regarding application for PASS program. Pt can be reached at #[phone].

## 2021-01-27 NOTE — TELEPHONE ENCOUNTER
Spoke to pt, I called Praluent Patient Assistance Program to check on the status of application. As of the 1/1/2021, pt is required to spend more than 500 dollars out of pocket on prescriptions in order to be eligible for the program. Pt notified. She will check with her pharmacy the cost of Praluent. Advised pt we have samples we can help assist pt if needed. Pt understands.

## 2021-02-08 ENCOUNTER — TELEPHONE (OUTPATIENT)
Dept: CARDIOLOGY | Facility: CLINIC | Age: 86
End: 2021-02-08

## 2021-02-08 RX ORDER — RIVAROXABAN 20 MG/1
TABLET, FILM COATED ORAL
Qty: 90 TABLET | Refills: 3 | Status: SHIPPED | OUTPATIENT
Start: 2021-02-08 | End: 2022-02-15 | Stop reason: SDUPTHER

## 2021-02-11 RX ORDER — METOPROLOL SUCCINATE 25 MG/1
25 TABLET, EXTENDED RELEASE ORAL 2 TIMES DAILY
Qty: 182 TABLET | Refills: 3 | Status: SHIPPED | OUTPATIENT
Start: 2021-02-11 | End: 2022-02-15 | Stop reason: SDUPTHER

## 2021-03-12 ENCOUNTER — DOCUMENTATION (OUTPATIENT)
Dept: CARDIOLOGY | Facility: CLINIC | Age: 86
End: 2021-03-12

## 2021-03-12 ENCOUNTER — TELEPHONE (OUTPATIENT)
Dept: SCHEDULING | Facility: CLINIC | Age: 86
End: 2021-03-12

## 2021-03-12 NOTE — TELEPHONE ENCOUNTER
Called and spoke to Teresa.  We have Praluent samples and she will come to [place] office this afternoon to pick them up.

## 2021-03-17 ENCOUNTER — TELEPHONE (OUTPATIENT)
Dept: CARDIOLOGY | Facility: CLINIC | Age: 86
End: 2021-03-17

## 2021-03-17 NOTE — TELEPHONE ENCOUNTER
Patient of Dr Beck- Patient calling she had bleeding bottom gums for 15 minutes last night (She has crowns bottom gums) No further bleeding, no blood clots    HX Pulmonary Artery abnormality/CAD/PAF    No med changes since 11/9/20 Pt takes xarelto 20 mg daily    No bp hr readings available    No further bleeding but asking if she should hold xarelto today?    Please contact at  Thank you

## 2021-03-17 NOTE — TELEPHONE ENCOUNTER
Called and spoke to Teresa.  Dr. Beck said it is ok to hold Xarelto for 1 day.  She understands.

## 2021-04-12 ENCOUNTER — TELEPHONE (OUTPATIENT)
Dept: SCHEDULING | Facility: CLINIC | Age: 86
End: 2021-04-12

## 2021-04-12 NOTE — TELEPHONE ENCOUNTER
Pt is scheduled for CTA on 04/15 at [facility]. Pre cert needed?     Pt can be reached at #[phone].     Ty.

## 2021-04-29 ENCOUNTER — APPOINTMENT (RX ONLY)
Dept: URBAN - METROPOLITAN AREA CLINIC 374 | Facility: CLINIC | Age: 86
Setting detail: DERMATOLOGY
End: 2021-04-29

## 2021-04-29 DIAGNOSIS — D22 MELANOCYTIC NEVI: ICD-10-CM

## 2021-04-29 DIAGNOSIS — L57.0 ACTINIC KERATOSIS: ICD-10-CM

## 2021-04-29 DIAGNOSIS — Z71.89 OTHER SPECIFIED COUNSELING: ICD-10-CM

## 2021-04-29 DIAGNOSIS — L81.4 OTHER MELANIN HYPERPIGMENTATION: ICD-10-CM

## 2021-04-29 DIAGNOSIS — L82.1 OTHER SEBORRHEIC KERATOSIS: ICD-10-CM

## 2021-04-29 DIAGNOSIS — Z85.820 PERSONAL HISTORY OF MALIGNANT MELANOMA OF SKIN: ICD-10-CM

## 2021-04-29 DIAGNOSIS — D18.0 HEMANGIOMA: ICD-10-CM

## 2021-04-29 PROBLEM — D22.5 MELANOCYTIC NEVI OF TRUNK: Status: ACTIVE | Noted: 2021-04-29

## 2021-04-29 PROBLEM — D48.5 NEOPLASM OF UNCERTAIN BEHAVIOR OF SKIN: Status: ACTIVE | Noted: 2021-04-29

## 2021-04-29 PROBLEM — D18.01 HEMANGIOMA OF SKIN AND SUBCUTANEOUS TISSUE: Status: ACTIVE | Noted: 2021-04-29

## 2021-04-29 PROCEDURE — 17000 DESTRUCT PREMALG LESION: CPT | Mod: 59

## 2021-04-29 PROCEDURE — ? FULL BODY SKIN EXAM

## 2021-04-29 PROCEDURE — ? BIOPSY BY SHAVE METHOD

## 2021-04-29 PROCEDURE — ? COUNSELING

## 2021-04-29 PROCEDURE — 99213 OFFICE O/P EST LOW 20 MIN: CPT | Mod: 25

## 2021-04-29 PROCEDURE — 11102 TANGNTL BX SKIN SINGLE LES: CPT

## 2021-04-29 PROCEDURE — ? SUNSCREEN RECOMMENDATIONS

## 2021-04-29 PROCEDURE — 17003 DESTRUCT PREMALG LES 2-14: CPT

## 2021-04-29 PROCEDURE — ? PHOTO-DOCUMENTATION

## 2021-04-29 PROCEDURE — ? PREMALIGNANT DESTRUCTION

## 2021-04-29 ASSESSMENT — LOCATION DETAILED DESCRIPTION DERM
LOCATION DETAILED: EPIGASTRIC SKIN
LOCATION DETAILED: LEFT MEDIAL SUPERIOR CHEST
LOCATION DETAILED: RIGHT MEDIAL SUPERIOR CHEST
LOCATION DETAILED: LEFT ANTERIOR PROXIMAL THIGH
LOCATION DETAILED: RIGHT LATERAL SUPERIOR CHEST
LOCATION DETAILED: RIGHT INFERIOR MEDIAL UPPER BACK
LOCATION DETAILED: RIGHT VENTRAL DISTAL FOREARM

## 2021-04-29 ASSESSMENT — LOCATION SIMPLE DESCRIPTION DERM
LOCATION SIMPLE: RIGHT UPPER BACK
LOCATION SIMPLE: CHEST
LOCATION SIMPLE: RIGHT FOREARM
LOCATION SIMPLE: LEFT THIGH
LOCATION SIMPLE: ABDOMEN

## 2021-04-29 ASSESSMENT — LOCATION ZONE DERM
LOCATION ZONE: LEG
LOCATION ZONE: ARM
LOCATION ZONE: TRUNK

## 2021-04-29 NOTE — PROCEDURE: BIOPSY BY SHAVE METHOD

## 2021-04-29 NOTE — PROCEDURE: PREMALIGNANT DESTRUCTION
Detail Level: Detailed
Anesthesia Volume In Cc: 0.5
Post-Procedure Care (Optional): The wound was cleaned, and a pressure dressing was applied.  The patient received detailed post-op instructions.
Post-Care Instructions: I reviewed with the patient in detail post-care instructions. Patient is to wear sunprotection, and avoid picking at any of the treated lesions. Pt may apply Vaseline to crusted or scabbing areas.
Render Post-Care In The Note: No
Method: liquid nitrogen
Consent: The patient's consent was obtained including but not limited to risks of crusting, scabbing, blistering, scarring, darker or lighter pigmentary change, recurrence, incomplete removal and infection.

## 2021-05-10 ENCOUNTER — TELEPHONE (OUTPATIENT)
Dept: SCHEDULING | Facility: CLINIC | Age: 86
End: 2021-05-10

## 2021-05-10 DIAGNOSIS — I48.0 PAROXYSMAL ATRIAL FIBRILLATION (CMS/HCC): ICD-10-CM

## 2021-05-10 DIAGNOSIS — I25.10 ATHEROSCLEROSIS OF NATIVE CORONARY ARTERY OF NATIVE HEART WITHOUT ANGINA PECTORIS: ICD-10-CM

## 2021-05-10 DIAGNOSIS — E78.2 MIXED HYPERLIPIDEMIA: ICD-10-CM

## 2021-05-10 DIAGNOSIS — E87.5 HYPERPOTASSEMIA: ICD-10-CM

## 2021-05-10 DIAGNOSIS — E04.2 NONTOXIC MULTINODULAR GOITER: Primary | ICD-10-CM

## 2021-05-20 NOTE — TELEPHONE ENCOUNTER
Printed and mailed to patient thank you  
Pt calling and would like labs script mailed to her home address      Pt can be reached at [phone]    
Pt calling to request her Lab Scripts be changed to [facility] for Collection.     Pt no Longer uses [facility] based on her insurance.     Please chg Scripts to [facility] and Re-Mail New Scripts to the patients home please.     Pt can be reached @ [phone].     TY   
mailed  
Daughter/Patient

## 2021-05-29 LAB
ALBUMIN SERPL-MCNC: 4.4 G/DL (ref 3.6–4.6)
ALBUMIN/GLOB SERPL: 1.8 {RATIO} (ref 1.2–2.2)
ALP SERPL-CCNC: 73 IU/L (ref 48–121)
ALT SERPL-CCNC: 16 IU/L (ref 0–32)
AST SERPL-CCNC: 19 IU/L (ref 0–40)
BASOPHILS # BLD AUTO: 0 X10E3/UL (ref 0–0.2)
BASOPHILS NFR BLD AUTO: 0 %
BILIRUB SERPL-MCNC: 0.6 MG/DL (ref 0–1.2)
BUN SERPL-MCNC: 17 MG/DL (ref 8–27)
BUN/CREAT SERPL: 22 (ref 12–28)
CALCIUM SERPL-MCNC: 9.6 MG/DL (ref 8.7–10.3)
CHLORIDE SERPL-SCNC: 105 MMOL/L (ref 96–106)
CHOLEST SERPL-MCNC: 158 MG/DL (ref 100–199)
CO2 SERPL-SCNC: 25 MMOL/L (ref 20–29)
CREAT SERPL-MCNC: 0.77 MG/DL (ref 0.57–1)
EOSINOPHIL # BLD AUTO: 0.1 X10E3/UL (ref 0–0.4)
EOSINOPHIL NFR BLD AUTO: 2 %
ERYTHROCYTE [DISTWIDTH] IN BLOOD BY AUTOMATED COUNT: 12.6 % (ref 11.7–15.4)
GLOBULIN SER CALC-MCNC: 2.5 G/DL (ref 1.5–4.5)
GLUCOSE SERPL-MCNC: 101 MG/DL (ref 65–99)
HCT VFR BLD AUTO: 43.9 % (ref 34–46.6)
HDLC SERPL-MCNC: 86 MG/DL
HGB BLD-MCNC: 15 G/DL (ref 11.1–15.9)
IMM GRANULOCYTES # BLD AUTO: 0 X10E3/UL (ref 0–0.1)
IMM GRANULOCYTES NFR BLD AUTO: 0 %
LAB CORP EGFR IF AFRICN AM: 83 ML/MIN/1.73
LAB CORP EGFR IF NONAFRICN AM: 72 ML/MIN/1.73
LDLC SERPL CALC-MCNC: 58 MG/DL (ref 0–99)
LYMPHOCYTES # BLD AUTO: 2.6 X10E3/UL (ref 0.7–3.1)
LYMPHOCYTES NFR BLD AUTO: 38 %
MCH RBC QN AUTO: 32.7 PG (ref 26.6–33)
MCHC RBC AUTO-ENTMCNC: 34.2 G/DL (ref 31.5–35.7)
MCV RBC AUTO: 96 FL (ref 79–97)
MONOCYTES # BLD AUTO: 0.4 X10E3/UL (ref 0.1–0.9)
MONOCYTES NFR BLD AUTO: 5 %
NEUTROPHILS # BLD AUTO: 3.6 X10E3/UL (ref 1.4–7)
NEUTROPHILS NFR BLD AUTO: 55 %
PLATELET # BLD AUTO: 281 X10E3/UL (ref 150–450)
POTASSIUM SERPL-SCNC: 5.1 MMOL/L (ref 3.5–5.2)
PROT SERPL-MCNC: 6.9 G/DL (ref 6–8.5)
RBC # BLD AUTO: 4.59 X10E6/UL (ref 3.77–5.28)
SODIUM SERPL-SCNC: 143 MMOL/L (ref 134–144)
T4 FREE SERPL-MCNC: 1.25 NG/DL (ref 0.82–1.77)
TRIGL SERPL-MCNC: 70 MG/DL (ref 0–149)
TSH SERPL DL<=0.005 MIU/L-ACNC: 1.7 UIU/ML (ref 0.45–4.5)
VLDLC SERPL CALC-MCNC: 14 MG/DL (ref 5–40)
WBC # BLD AUTO: 6.7 X10E3/UL (ref 3.4–10.8)

## 2021-06-01 ENCOUNTER — APPOINTMENT (RX ONLY)
Dept: URBAN - METROPOLITAN AREA CLINIC 26 | Facility: CLINIC | Age: 86
Setting detail: DERMATOLOGY
End: 2021-06-01

## 2021-06-01 PROBLEM — D04.39 CARCINOMA IN SITU OF SKIN OF OTHER PARTS OF FACE: Status: ACTIVE | Noted: 2021-06-01

## 2021-06-01 PROCEDURE — ? ADDITIONAL NOTES

## 2021-06-01 PROCEDURE — 17311 MOHS 1 STAGE H/N/HF/G: CPT

## 2021-06-01 PROCEDURE — ? MOHS SURGERY

## 2021-06-01 PROCEDURE — 17312 MOHS ADDL STAGE: CPT

## 2021-06-01 NOTE — PROCEDURE: MOHS SURGERY
1. Have you been to the ER, urgent care clinic since your last visit? Hospitalized since your last visit? Yes daniela  2. Have you seen or consulted any other health care providers outside of the [address] [place] [address] since your last visit? Include any pap smears or colon screening. No     3. Since your last visit, have you had any of the following symptoms? shortness of breath and dizziness. Spiral Flap Text: The defect edges were debeveled with a #15 scalpel blade.  Given the location of the defect, shape of the defect and the proximity to free margins a spiral flap was deemed most appropriate.  Using a sterile surgical marker, an appropriate rotation flap was drawn incorporating the defect and placing the expected incisions within the relaxed skin tension lines where possible. The area thus outlined was incised deep to adipose tissue with a #15 scalpel blade.  The skin margins were undermined to an appropriate distance in all directions utilizing iris scissors.

## 2021-06-01 NOTE — PROCEDURE: MOHS SURGERY
[FreeTextEntry1] : Patient for yearly exam. Patient has history of attention deficit disorder on methylphenidate ER 24 mg daily [de-identified] : The patient generally doing well. Her left hip pain is improved. She walks mainly with a cane. She feels the attention deficit disorder is well controlled with methylphenidate ER 54 mg. She has hypothyroidism as well. She denies chest pain, shortness of breath. There is no history of hypertension. Her weight is stable at 205. Bi-Rhombic Flap Text: The defect edges were debeveled with a #15 scalpel blade.  Given the location of the defect and the proximity to free margins a bi-rhombic flap was deemed most appropriate.  Using a sterile surgical marker, an appropriate rhombic flap was drawn incorporating the defect. The area thus outlined was incised deep to adipose tissue with a #15 scalpel blade.  The skin margins were undermined to an appropriate distance in all directions utilizing iris scissors.

## 2021-06-07 ENCOUNTER — HOSPITAL ENCOUNTER (OUTPATIENT)
Dept: RADIOLOGY | Facility: HOSPITAL | Age: 86
Discharge: HOME | End: 2021-06-07
Attending: INTERNAL MEDICINE
Payer: COMMERCIAL

## 2021-06-07 VITALS
BODY MASS INDEX: 27.38 KG/M2 | WEIGHT: 145 LBS | DIASTOLIC BLOOD PRESSURE: 60 MMHG | SYSTOLIC BLOOD PRESSURE: 129 MMHG | HEART RATE: 54 BPM | OXYGEN SATURATION: 99 % | HEIGHT: 61 IN

## 2021-06-07 DIAGNOSIS — I25.10 CORONARY ARTERY DISEASE INVOLVING NATIVE CORONARY ARTERY OF NATIVE HEART WITHOUT ANGINA PECTORIS: ICD-10-CM

## 2021-06-07 DIAGNOSIS — Q25.79 PULMONARY ARTERY ABNORMALITY: ICD-10-CM

## 2021-06-07 PROCEDURE — 0503T HC COR FFR ALYS GNRJ FFR MDL: CPT

## 2021-06-07 PROCEDURE — 75574 CT ANGIO HRT W/3D IMAGE: CPT | Mod: ME

## 2021-06-07 PROCEDURE — 4A033BC MEASUREMENT OF ARTERIAL PRESSURE, CORONARY, PERCUTANEOUS APPROACH: ICD-10-PCS | Performed by: INTERNAL MEDICINE

## 2021-06-07 PROCEDURE — 63700000 HC SELF-ADMINISTRABLE DRUG: Performed by: INTERNAL MEDICINE

## 2021-06-07 PROCEDURE — 63600105 HC IODINE BASED CONTRAST: Performed by: INTERNAL MEDICINE

## 2021-06-07 RX ORDER — NITROGLYCERIN 0.4 MG/1
0.4 TABLET SUBLINGUAL ONCE
Status: COMPLETED | OUTPATIENT
Start: 2021-06-07 | End: 2021-06-07

## 2021-06-07 RX ADMIN — NITROGLYCERIN 0.4 MG: 0.4 TABLET SUBLINGUAL at 10:53

## 2021-06-07 RX ADMIN — IOHEXOL 95 ML: 350 INJECTION, SOLUTION INTRAVENOUS at 11:43

## 2021-06-07 NOTE — NURSING NOTE
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 fib seen on monitor 2018 no clinical recurrence anette the past  Her last ischemic evaluation was negative stress echocardiogram February 2019  She remote history of short runs ofltinodular goiter with negative biopsy mixed hyperlipidemia\"            IV #18 G inserted LAC      nitroglycerin (NITROSTAT) .4mg  SL    Had run PAT during injection.    Attempted to inject again but heart rhythm remained very irregular.  Started having PVCs and runs PAT and PACs.  Checked with Dr Valentino - decided not to inject again.            "

## 2021-06-08 ENCOUNTER — TELEPHONE (OUTPATIENT)
Dept: CARDIOLOGY | Facility: CLINIC | Age: 86
End: 2021-06-08

## 2021-06-08 NOTE — TELEPHONE ENCOUNTER
Called and spoke to Teresa.Dr. Beck got results of coronary CT angiogram.  It is stable.  Pulmonary artery size unchanged. Mild coronary artery disease.  She understands.  No questions.

## 2021-06-08 NOTE — TELEPHONE ENCOUNTER
Her know her coronary CT angiogram is stable pulmonary artery size unchanged mild coronary artery disease I think I am seeing her in July

## 2021-06-23 ENCOUNTER — PATIENT OUTREACH (OUTPATIENT)
Dept: HEMATOLOGY/ONCOLOGY | Facility: HOSPITAL | Age: 86
End: 2021-06-23
Payer: COMMERCIAL

## 2021-06-23 NOTE — PROGRESS NOTES
Pulmonary Nodule Program - CTA Coronary Artery 6/7/2021showed right upper lobe nodule is somewhat less prominent and decreased to 4 mm.  Will continue to follow.

## 2021-07-06 ENCOUNTER — APPOINTMENT (RX ONLY)
Dept: URBAN - METROPOLITAN AREA CLINIC 26 | Facility: CLINIC | Age: 86
Setting detail: DERMATOLOGY
End: 2021-07-06

## 2021-07-06 PROBLEM — D04.39 CARCINOMA IN SITU OF SKIN OF OTHER PARTS OF FACE: Status: ACTIVE | Noted: 2021-07-06

## 2021-07-06 PROCEDURE — ? POST-OP WOUND CHECK (NO GLOBAL PERIOD)

## 2021-07-06 PROCEDURE — 99212 OFFICE O/P EST SF 10 MIN: CPT

## 2021-07-06 PROCEDURE — ? ADDITIONAL NOTES

## 2021-07-06 NOTE — PROCEDURE: POST-OP WOUND CHECK (NO GLOBAL PERIOD)
Detail Level: Detailed
Wound Evaluated By: Pio Hays M.D.\\n\\nWound care instructions were reviewed in detail.
Additional Comments: Patient advised to minimize Vaseline application more towards the edges and avoid the small granulation bud overgrowth on the edge of the wound.

## 2021-07-12 ASSESSMENT — ENCOUNTER SYMPTOMS
IRREGULAR HEARTBEAT: 0
PALPITATIONS: 0
MUSCLE CRAMPS: 0
BRUISES/BLEEDS EASILY: 0
MEMORY LOSS: 0
HEMATOLOGIC/LYMPHATIC NEGATIVE: 1
NAUSEA: 0
WEIGHT LOSS: 0
FOCAL WEAKNESS: 0
CHANGE IN BOWEL HABIT: 0
NUMBNESS: 0
FREQUENCY: 0
FALLS: 0
CONSTIPATION: 0
HEADACHES: 0
SYNCOPE: 0
DIZZINESS: 0
SPUTUM PRODUCTION: 0
ANOREXIA: 0
NEAR-SYNCOPE: 0
DYSPNEA ON EXERTION: 0
INSOMNIA: 0
SLEEP DISTURBANCES DUE TO BREATHING: 0
DIARRHEA: 0
HOARSE VOICE: 0
COUGH: 0
HEARTBURN: 0
TREMORS: 0
WHEEZING: 0
NERVOUS/ANXIOUS: 0
VERTIGO: 0
WEIGHT GAIN: 0
ABDOMINAL PAIN: 0
MYALGIAS: 0
SHORTNESS OF BREATH: 0
HEMOPTYSIS: 0
ORTHOPNEA: 0
PND: 0
SNORING: 0
LIGHT-HEADEDNESS: 0
CLAUDICATION: 0
JAUNDICE: 0

## 2021-07-12 NOTE — PROGRESS NOTES
Cardiology Note    Kunal La DO          Teresa Bird is a 85 y.o. 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 Ha Wheatley    Of note today when she sat up she went in to junctional tachycardia rate of 100 she is completely asymptomatic we will place monitor the status is seen her burden of atrial arrhythmias she is anticoagulated with Xarelto                                         Patient Active Problem List    Diagnosis Date Noted   • SVT (supraventricular tachycardia) (CMS/[facility]) 07/20/2021   • Dilated aortic root (CMS/[facility]) 08/02/2019   • RBBB 08/02/2018   • PAF (paroxysmal atrial fibrillation) (CMS/[facility]) 04/24/2018   • Coronary artery disease involving native coronary artery of native heart without angina pectoris 04/08/2018   • Nicotine dependence in remission 04/08/2018   • Multinodular goiter 03/13/2018   • Mixed hyperlipidemia 03/13/2018   • Pulmonary artery abnormality 04/04/2017   • Malignant neoplasm of uterus (CMS/[facility]) 03/22/2017       Allergy  Penicillin, Pravastatin sodium, Rosuvastatin calcium, Penicillins, and Zetia [ezetimibe]    MED LIST     Current Outpatient Medications   Medication Sig Dispense Refill   • alirocumab (PRALUENT PEN) 150 mg/mL pen injector Inject 150 pens under the skin every 14 (fourteen) days. 4 pen 3   • cetirizine (ZyrTEC) 5 mg tablet Take 5 mg by mouth daily.     • LORazepam (ATIVAN) 0.5 mg tablet Take 0.5 mg by mouth every 6 (six) hours as needed.     • metoprolol succinate XL (TOPROL-XL) 25 mg 24 hr tablet Take 1 tablet (25 mg total) by mouth 2 (two) times a day. 182  tablet 3   • pantoprazole (PROTONIX) 40 mg EC tablet Take 40 mg by mouth as needed.       • XARELTO 20 mg tablet TAKE 1 TABLET(20 MG) BY MOUTH DAILY WITH DINNER 90 tablet 3     No current facility-administered medications for this visit.        Review of Systems   Constitutional: Negative for malaise/fatigue, weight gain and weight loss.   HENT: Negative for hearing loss and hoarse voice.    Eyes: Negative for visual disturbance.   Cardiovascular: Negative for chest pain, claudication, cyanosis, dyspnea on exertion, irregular heartbeat, leg swelling, near-syncope, orthopnea, palpitations, paroxysmal nocturnal dyspnea and syncope.   Respiratory: Negative for cough, hemoptysis, shortness of breath, sleep disturbances due to breathing, snoring, sputum production and wheezing.    Endocrine: Negative for cold intolerance and heat intolerance.   Hematologic/Lymphatic: Negative.  Negative for bleeding problem. Does not bruise/bleed easily.   Skin: Negative.  Negative for rash.   Musculoskeletal: Negative for arthritis, falls, joint pain, muscle cramps and myalgias.   Gastrointestinal: Negative for abdominal pain, anorexia, change in bowel habit, constipation, diarrhea, dysphagia, heartburn, jaundice and nausea.   Genitourinary: Negative for frequency and nocturia.   Neurological: Negative for dizziness, focal weakness, headaches, light-headedness, numbness, tremors and vertigo.   Psychiatric/Behavioral: Negative for memory loss. The patient does not have insomnia and is not nervous/anxious.    Allergic/Immunologic: Negative for hives.       Labs   Lab Results   Component Value Date    WBC 6.7 05/28/2021    HGB 15.0 05/28/2021    HCT 43.9 05/28/2021     05/28/2021    CHOL 158 05/28/2021    TRIG 70 05/28/2021    HDL 86 05/28/2021    LDLCALC 58 05/28/2021    ALT 16 05/28/2021    AST 19 05/28/2021     05/28/2021    K 5.1 05/28/2021     05/28/2021    CREATININE 0.77 05/28/2021    BUN 17 05/28/2021    CO2 25  "05/28/2021    TSH 1.700 05/28/2021   [id number]    Lab Results   Component Value Date    GLUCOSE 101 (H) 05/28/2021    CALCIUM 9.6 01/22/2020     05/28/2021    K 5.1 05/28/2021    CO2 25 05/28/2021     05/28/2021    BUN 17 05/28/2021    CREATININE 0.77 05/28/2021       Objective   Vitals:    07/20/21 1512   BP: 126/82   BP Location: Left upper arm   Patient Position: Sitting   Pulse: 65   SpO2: 98%   Weight: 68 kg (150 lb)   Height: 1.549 m (5' 1\")     Physical Exam  Constitutional:       General: She is not in acute distress.     Appearance: She is well-developed.   HENT:      Head: Normocephalic and atraumatic.      Nose: Nose normal.   Eyes:      General: No scleral icterus.     Conjunctiva/sclera: Conjunctivae normal.   Neck:      Vascular: No JVD.   Cardiovascular:      Rate and Rhythm: Normal rate and regular rhythm.      Pulses: Intact distal pulses.      Heart sounds: Normal heart sounds. No murmur heard.   No friction rub. No gallop.       Comments: Systolic click  Pulmonary:      Effort: Pulmonary effort is normal. No respiratory distress.      Breath sounds: No stridor. No wheezing or rales.   Chest:      Chest wall: No tenderness.   Abdominal:      Tenderness: There is no abdominal tenderness.   Musculoskeletal:         General: No deformity.   Skin:     General: Skin is warm and dry.   Neurological:      Mental Status: She is alert and oriented to person, place, and time.         Cardiac Procedures  Cardiac Procedures  Cardiac Procedures      STRESS    Stress echocardiogram March 2017 moderate pulmonic insufficiency pulmonary artery for mild mitral regurgitation negative for ischemia     Stress echo 2/19   vpc in recovery neg ishcmia  VPCs during test apical septal abnormal wall motion related to that      ECHO  Echo 4/18 pulm a 4.2 aorta 3.7cm    Echo oc 2020  dilated LA mild mr aorta 3.7 PA 4.1   mild PI prom eus valve     OTHER  Ultrasound thyroid July 2017 11 mm nodule       CAT SCAN   Chest " CTA April 2018 no change from 2017 dilated pulmonary artery 4.7 thyroid nodules coronary artery calcifications renal cyst Lrhyr mass    cta 1/19 [facility] pul artery 4.2 mildy dilated aortic root      cta 5/19 pulmonary artery 47 mm ectatic right pulmonary artery 31 mm aortic root 4.3    CTA July 2020 aorta 4.7  MM 6 mm right upper lobe probably seen prior        Cor cta June 2021  non obst cad    :pulm artery 4.8 cm aorta 3.9 cm  1.  Coronary Arteries: Mild nonobstructive coronary artery disease.  No evidence  of flow-limiting stenosis by FFR CT.  2.  Cardiac Structures: Small left ventricular cavity with concentric left  ventricular hypertrophy.  Bi-atrial enlargement.  Focally calcified trileaflet  aortic valve.  3.  Mildly dilated aortic root at the sinuses of Valsalva.  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  4.  Normal left ventricular systolic function.  5.  Coronary calcium score 41.  This places the patient in the WAYNE 25% risk  percentile for age and gender matched individuals.      ELECTROPHYSIOLOGY  HOLTER July 2021  SHORT BURSTS NON SUST AVG UP TO 20 BEATS  The patient was monitored for 24 hours.   2. The predominant rhythm was sinus bradycardia. Ectopic atrial rhythm was noted.   3. The average heart rate was 59 bpm.   4. The minimum heart rate was 49 bpm.   5. The maximum heart rate was 111 bpm.   6. There were 35 supraventricular beats. There were 9 SVE tachycardias. The longest lasted 16 beatd 6.7 secs with an average  heart rate of 142 bpm at   4:22 pm on 7/20/2021. The fastest lasted 3 beats 1.2 secs with an average heart rate of 148 bpm at  5:11 pm on 7/20/2021.   7. There were 156 premature ventricular beats. 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 Fajardo  Echo today all numbers [facility] pulmonary artery four-point 1 aortic root 3.7 by CTA aortic root was 4  Continue yearly imaging  Next year we will get coronary CT angiogram to image her coronary arteries also will be due May 2020    Coronary artery disease involving native coronary artery of native heart without angina pectoris  This was diagnosed by calcification seen on CAT scan stress echo was negative in February 2019 she does have right bundle branch block  Plan coronary CT angiogram with imaging of her pulmonary thoracic aneurysm in May 2020    PAF (paroxysmal atrial fibrillation) (CMS/HCC) (HCC)  Seen on a monitor in April 2018 2% of total heartbeats no clinical recurrence remains anticoagulated with Xarelto      Mixed hyperlipidemia  Phenomenal result to the response to the PCSK9 inhibitor presently LDL cholesterol is now 40 she is intolerant to statins and Zetia with GI upset    Multinodular goiter  Had negative biopsy 2017 follows with Dr. 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 hyperlipidemia  Due for CAT scan of pulmonary artery June 2022  She has nonobstructive CAD based on recent coronary CT angiogram  Today in the office she is asymptomatic SVT at a rate of about 120 occurred when she actually sat up she was completely asymptomatic  We will Place cardiac monitor to see her burden of atrial arrhythmias she is on beta-blocker she is anticoagulant Xarelto follow-up in a few weeks        Kunal Kramer DO               I spent 45minutes on this date of service performing the following activities:obtaining history, performing examination, entering orders, documenting, preparing for visit, obtaining / reviewing records, providing counseling and education and communicating results

## 2021-07-20 ENCOUNTER — OFFICE VISIT (OUTPATIENT)
Dept: CARDIOLOGY | Facility: CLINIC | Age: 86
End: 2021-07-20
Payer: COMMERCIAL

## 2021-07-20 ENCOUNTER — HOSPITAL ENCOUNTER (OUTPATIENT)
Dept: CARDIOLOGY | Facility: CLINIC | Age: 86
Discharge: HOME | End: 2021-07-20
Payer: COMMERCIAL

## 2021-07-20 VITALS
HEART RATE: 65 BPM | DIASTOLIC BLOOD PRESSURE: 82 MMHG | WEIGHT: 150 LBS | SYSTOLIC BLOOD PRESSURE: 126 MMHG | OXYGEN SATURATION: 98 % | HEIGHT: 61 IN | BODY MASS INDEX: 28.32 KG/M2

## 2021-07-20 DIAGNOSIS — I77.810 DILATED AORTIC ROOT (CMS/HCC): ICD-10-CM

## 2021-07-20 DIAGNOSIS — I45.10 RBBB: Primary | ICD-10-CM

## 2021-07-20 DIAGNOSIS — I48.0 PAF (PAROXYSMAL ATRIAL FIBRILLATION) (CMS/HCC): ICD-10-CM

## 2021-07-20 DIAGNOSIS — Q25.79 PULMONARY ARTERY ABNORMALITY: ICD-10-CM

## 2021-07-20 PROBLEM — I47.10 SVT (SUPRAVENTRICULAR TACHYCARDIA) (CMS/HCC): Status: ACTIVE | Noted: 2021-07-20

## 2021-07-20 PROCEDURE — 93000 ELECTROCARDIOGRAM COMPLETE: CPT | Performed by: INTERNAL MEDICINE

## 2021-07-20 PROCEDURE — 3008F BODY MASS INDEX DOCD: CPT | Performed by: INTERNAL MEDICINE

## 2021-07-20 PROCEDURE — 99214 OFFICE O/P EST MOD 30 MIN: CPT | Performed by: INTERNAL MEDICINE

## 2021-07-20 ASSESSMENT — PAIN SCALES - GENERAL: PAINLEVEL: 0-NO PAIN

## 2021-07-20 NOTE — ASSESSMENT & PLAN NOTE
Pulmonary artery dilated4.8cm stable from 2018 was evaluated by surgery at that time no intervention required aorta 3.9 cm recheck CTA June 2022 CTA June 2021

## 2021-07-20 NOTE — ASSESSMENT & PLAN NOTE
Coronary CT angiogram performed June 2021 nonobstructive disease with coronary calcium score of 41 dilated pulmonary artery stable

## 2021-07-20 NOTE — ASSESSMENT & PLAN NOTE
Progressive A. fib were seen on monitor in 2018 today while examining her heart rate went to 110 is found to be in SVT she is on Xarelto.  24-hour monitor placed previously    Atrial arrhythmia she is completely asymptomatic she is on beta-blocker therapy follow-up after monitor

## 2021-07-23 ENCOUNTER — TELEPHONE (OUTPATIENT)
Dept: CARDIOLOGY | Facility: CLINIC | Age: 86
End: 2021-07-23

## 2021-07-23 PROCEDURE — 93224 XTRNL ECG REC UP TO 48 HRS: CPT | Performed by: INTERNAL MEDICINE

## 2021-07-28 ENCOUNTER — TELEPHONE (OUTPATIENT)
Dept: SCHEDULING | Facility: CLINIC | Age: 86
End: 2021-07-28

## 2021-07-28 RX ORDER — ALIROCUMAB 150 MG/ML
150 INJECTION, SOLUTION SUBCUTANEOUS
Qty: 4 PEN | Refills: 3 | Status: CANCELLED | OUTPATIENT
Start: 2021-07-28

## 2021-07-28 NOTE — TELEPHONE ENCOUNTER
Pt of Dr Beck    Call received from pharmacy tech Charline from [facility] to inform MD that they received the electronic prescription for Ms Doan however they are unable to accept e-scriptions in the state [facility] for which they are located    Verbal order provided at this time to pharmacist Sandra for a 90-day supply w/ 3 refills    Return contact # for [facility] is [phone], option #2

## 2021-07-29 ENCOUNTER — DOCUMENTATION (OUTPATIENT)
Dept: CARDIOLOGY | Facility: CLINIC | Age: 86
End: 2021-07-29

## 2021-07-29 NOTE — PROGRESS NOTES
My [employer] has been approved under the Patient Assistance Program  Patient ID# [id number]  They will contact the patient to set up shipment.

## 2021-08-09 NOTE — PROGRESS NOTES
Cardiology Note    Kunal La DO          Teresa Bird is a 85 y.o. female 1935     She returns after cardiac monitor positional SVT  Today in the office when she sat up she went into SVT at a heart rate of 120 she is totally asymptomatic  She is followed for pulmonary artery aneurysm that has been stable at 4.1 cm last imaged June 2021  She has right bundle branch block  Nonobstructive coronary artery disease imaging June 2021 with a corresponding score of 41  Paroxysmal A. fib seen on monitor April 2018 remains on Xarelto mixed hyperlipidemia multinodular goiter negative biopsy follow-up with Dr Ha Wheatley  Recent cardiac monitor July 2021 versus SVT up to 20 beats  He is essentially asymptomatic occasional palpitations at night before she lies down                                                     Patient Active Problem List    Diagnosis Date Noted   • SVT (supraventricular tachycardia) (CMS/[facility]) 07/20/2021   • Dilated aortic root (CMS/[facility]) 08/02/2019   • RBBB 08/02/2018   • PAF (paroxysmal atrial fibrillation) (CMS/[facility]) 04/24/2018   • Coronary artery disease involving native coronary artery of native heart without angina pectoris 04/08/2018   • Nicotine dependence in remission 04/08/2018   • Multinodular goiter 03/13/2018   • Mixed hyperlipidemia 03/13/2018   • 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HCC) 04/04/2017   • Malignant neoplasm of uterus (CMS/[facility]) 03/22/2017       Allergy  Penicillin, Pravastatin sodium, Rosuvastatin calcium, Penicillins, and Zetia [ezetimibe]    MED LIST     Current Outpatient Medications   Medication Sig Dispense Refill   • alirocumab (PRALUENT PEN) 150 mg/mL pen injector Inject 150 pens under the skin every 14 (fourteen) days. 4 pen 3   • cetirizine (ZyrTEC) 5 mg tablet Take 5 mg by mouth daily.     • LORazepam (ATIVAN) 0.5 mg tablet Take 0.5 mg by mouth every 6 (six) hours as needed.     • metoprolol succinate XL (TOPROL-XL) 25 mg 24 hr tablet Take 1 tablet (25 mg  total) by mouth 2 (two) times a day. 182 tablet 3   • pantoprazole (PROTONIX) 40 mg EC tablet Take 40 mg by mouth as needed.       • XARELTO 20 mg tablet TAKE 1 TABLET(20 MG) BY MOUTH DAILY WITH DINNER 90 tablet 3     No current facility-administered medications for this visit.        Review of Systems       Labs   Lab Results   Component Value Date    WBC 6.7 05/28/2021    HGB 15.0 05/28/2021    HCT 43.9 05/28/2021     05/28/2021    CHOL 158 05/28/2021    TRIG 70 05/28/2021    HDL 86 05/28/2021    LDLCALC 58 05/28/2021    ALT 16 05/28/2021    AST 19 05/28/2021     05/28/2021    K 5.1 05/28/2021     05/28/2021    CREATININE 0.77 05/28/2021    BUN 17 05/28/2021    CO2 25 05/28/2021    TSH 1.700 05/28/2021   [id number]    Lab Results   Component Value Date    GLUCOSE 101 (H) 05/28/2021    CALCIUM 9.6 01/22/2020     05/28/2021    K 5.1 05/28/2021    CO2 25 05/28/2021     05/28/2021    BUN 17 05/28/2021    CREATININE 0.77 05/28/2021       Objective   Vitals:    08/10/21 1507   BP: 140/80   BP Location: Left upper arm   Patient Position: Sitting   Pulse: 62   SpO2: 99%   Weight: 68 kg (150 lb)     Physical Exam  Constitutional:       General: She is not in acute distress.     Appearance: She is well-developed.   HENT:      Head: Normocephalic and atraumatic.      Nose: Nose normal.   Eyes:      General: No scleral icterus.     Conjunctiva/sclera: Conjunctivae normal.   Neck:      Vascular: No JVD.   Cardiovascular:      Rate and Rhythm: Normal rate and regular rhythm.      Pulses: Intact distal pulses.      Heart sounds: Normal heart sounds. No murmur heard.   No friction rub. No gallop.       Comments: Systolic click  Pulmonary:      Effort: Pulmonary effort is normal. No respiratory distress.      Breath sounds: No stridor. No wheezing or rales.   Chest:      Chest wall: No tenderness.   Abdominal:      Tenderness: There is no abdominal tenderness.   Musculoskeletal:         General: No  deformity.   Skin:     General: Skin is warm and dry.   Neurological:      Mental Status: She is alert and oriented to person, place, and time.         Cardiac Procedures  Cardiac Procedures  Cardiac Procedures      STRESS    Stress echocardiogram March 2017 moderate pulmonic insufficiency pulmonary artery for mild mitral regurgitation negative for ischemia     Stress echo 2/19   vpc in recovery neg ishcmia  VPCs during test apical septal abnormal wall motion related to that      ECHO  Echo 4/18 pulm a 4.2 aorta 3.7cm    Echo oc 2020  dilated LA mild mr aorta 3.7 PA 4.1   mild PI prom eus valve     OTHER  Ultrasound thyroid July 2017 11 mm nodule       CAT SCAN   Chest CTA April 2018 no change from 2017 dilated pulmonary artery 4.7 thyroid nodules coronary artery calcifications renal cyst Lrhyr mass    cta 1/19 [facility] pul artery 4.2 mildy dilated aortic root      cta 5/19 pulmonary artery 47 mm ectatic right pulmonary artery 31 mm aortic root 4.3    CTA July 2020 aorta 4.7  MM 6 mm right upper lobe probably seen prior        Cor cta June 2021  non obst cad    :pulm artery 4.8 cm aorta 3.9 cm  1.  Coronary Arteries: Mild nonobstructive coronary artery disease.  No evidence  of flow-limiting stenosis by FFR CT.  2.  Cardiac Structures: Small left ventricular cavity with concentric left  ventricular hypertrophy.  Bi-atrial enlargement.  Focally calcified trileaflet  aortic valve.  3.  Mildly dilated aortic root at the sinuses of Valsalva.  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  4.  Normal left ventricular systolic function.  5.  Coronary calcium score 41.  This places the patient in the WAYNE 25% risk  percentile for age and gender matched individuals.      ELECTROPHYSIOLOGY  HOLTER July 2021  SHORT BURSTS NON SUST AVG UP TO 20 BEATS  The patient was monitored for 24 hours.   2. The predominant rhythm was sinus bradycardia. Ectopic atrial rhythm was noted.    3. The average heart rate was 59 bpm.   4. The minimum heart rate was 49 bpm.   5. The maximum heart rate was 111 bpm.   6. There were 35 supraventricular beats. There were 9 SVE tachycardias. The longest lasted 16 beatd 6.7 secs with an average  heart rate of 142 bpm at   4:22 pm on 7/20/2021. The fastest lasted 3 beats 1.2 secs with an average heart rate of 148 bpm at  5:11 pm on 7/20/2021.   7. There were 156 premature ventricular beats. There were 11           Aug 2021              July 2021                           nochange  EKG    Assessment/Plan:          Pulmonary artery abnormality  She is followed for pulmonary artery aneurysm and thoracic aneurysm last imaged May 2019 follows with Dr. Fajardo  Echo today all numbers [facility] pulmonary artery four-point 1 aortic root 3.7 by CTA aortic root was 4  Continue yearly imaging  Next year we will get coronary CT angiogram to image her coronary arteries also will be due May 2020    Coronary artery disease involving native coronary artery of native heart without angina pectoris  This was diagnosed by calcification seen on CAT scan stress echo was negative in February 2019 she does have right bundle branch block  Plan coronary CT angiogram with imaging of her pulmonary thoracic aneurysm in May 2020    PAF (paroxysmal atrial fibrillation) (CMS/HCC) (HCC)  Seen on a monitor in April 2018 2% of total heartbeats no clinical recurrence remains anticoagulated with Xarelto      Mixed hyperlipidemia  Phenomenal result to the response to the PCSK9 inhibitor presently LDL cholesterol is now 40 she is intolerant to statins and Zetia with GI upset    Multinodular goiter  Had negative biopsy 2017 follows with Dr. 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 hyperlipidemia  She follows up with surgery in September 2021 for dilated pulmonary artery  She is on PCSK9 inhibitor for hyperlipidemia, intolerant to  statins  Due for CAT scan of pulmonary artery June 2022  She has nonobstructive CAD based on recent coronary CT angiogram  Today in the office she is asymptomatic SVT at a rate of about 120   Cardiac monitor placed showed sinus rhythm average heart rate 69 short burst of nonsustained SVT less than 20 beats  Conservative therapy essentially asymptomatic continue beta-blocker alone  I will see her back in 6 months with visit labs and echocardiogram      Kunal Kramer,

## 2021-08-10 ENCOUNTER — OFFICE VISIT (OUTPATIENT)
Dept: CARDIOLOGY | Facility: CLINIC | Age: 86
End: 2021-08-10
Payer: COMMERCIAL

## 2021-08-10 VITALS
HEART RATE: 62 BPM | WEIGHT: 150 LBS | DIASTOLIC BLOOD PRESSURE: 80 MMHG | SYSTOLIC BLOOD PRESSURE: 140 MMHG | BODY MASS INDEX: 28.34 KG/M2 | OXYGEN SATURATION: 99 %

## 2021-08-10 DIAGNOSIS — I77.810 DILATED AORTIC ROOT (CMS/HCC): ICD-10-CM

## 2021-08-10 DIAGNOSIS — I47.10 SVT (SUPRAVENTRICULAR TACHYCARDIA) (CMS/HCC): ICD-10-CM

## 2021-08-10 DIAGNOSIS — I25.10 CORONARY ARTERY DISEASE INVOLVING NATIVE CORONARY ARTERY OF NATIVE HEART WITHOUT ANGINA PECTORIS: Primary | ICD-10-CM

## 2021-08-10 DIAGNOSIS — I28.1 PULMONARY ARTERY ANEURYSM (CMS/HCC): ICD-10-CM

## 2021-08-10 PROCEDURE — 93000 ELECTROCARDIOGRAM COMPLETE: CPT | Performed by: INTERNAL MEDICINE

## 2021-08-10 PROCEDURE — 3008F BODY MASS INDEX DOCD: CPT | Performed by: INTERNAL MEDICINE

## 2021-08-10 PROCEDURE — 99214 OFFICE O/P EST MOD 30 MIN: CPT | Performed by: INTERNAL MEDICINE

## 2021-08-10 NOTE — ASSESSMENT & PLAN NOTE
Seen in the office in July when she sat up to the cardiac monitor August bursts less than 20 beats average heart rate 69 on beta-blocker essentially asymptomatic conservative therapy she has a history of PAF seen on a cardiac monitor back in 2018 anticoagulated with Xarelto

## 2021-08-10 NOTE — ASSESSMENT & PLAN NOTE
Pulmonary artery aneurysm stable in size last imaged June 2021 4.8 cm aortic root 3.9 follow-up and aortic wellness clinic September 2021

## 2021-09-21 ENCOUNTER — OFFICE VISIT (OUTPATIENT)
Dept: CARDIOLOGY | Facility: CLINIC | Age: 86
End: 2021-09-21
Payer: COMMERCIAL

## 2021-09-21 VITALS
DIASTOLIC BLOOD PRESSURE: 78 MMHG | SYSTOLIC BLOOD PRESSURE: 136 MMHG | HEIGHT: 61 IN | OXYGEN SATURATION: 98 % | RESPIRATION RATE: 16 BRPM | WEIGHT: 150 LBS | HEART RATE: 65 BPM | BODY MASS INDEX: 28.32 KG/M2

## 2021-09-21 DIAGNOSIS — I28.1 PULMONARY ARTERY ANEURYSM (CMS/HCC): Primary | ICD-10-CM

## 2021-09-21 PROCEDURE — 99214 OFFICE O/P EST MOD 30 MIN: CPT | Performed by: PHYSICIAN ASSISTANT

## 2021-09-21 PROCEDURE — 3008F BODY MASS INDEX DOCD: CPT | Performed by: PHYSICIAN ASSISTANT

## 2021-09-21 ASSESSMENT — ENCOUNTER SYMPTOMS
CONSTITUTIONAL NEGATIVE: 1
MUSCULOSKELETAL NEGATIVE: 1
EYES NEGATIVE: 1
GASTROINTESTINAL NEGATIVE: 1
ENDOCRINE NEGATIVE: 1
PSYCHIATRIC NEGATIVE: 1
ALLERGIC/IMMUNOLOGIC NEGATIVE: 1
NEUROLOGICAL NEGATIVE: 1
RESPIRATORY NEGATIVE: 1

## 2021-09-21 NOTE — PROGRESS NOTES
[facility]  Dr. Alf Bolton- System Chief Cardiothoracic Surgery  Carey Guzman PA-C- Aortic   [facility]  [phone]         Reason for visit:   Chief Complaint   Patient presents with   • Office Visit     1 yr f/u      HPI    Teresa Bird is a 86 y.o. 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  She sees Dr. Beck for her cardiovascular care and last saw her on 8/10/2021.  At that time she was being evaluated for findings of supraventricular tachycardia.  She had had a cardiac monitor sent home but states that she does not have associated symptoms with it her past medical history is also significant for paroxysmal atrial fibrillation and remains on Xarelto.  When she has episodes of A. fib she states that she does not feel any different and rarely feels palpitations.  At this time she denies chest pain, lightheadedness, shortness of breath or syncope.  Approximately 2 weeks ago, she unfortunately tripped while at home getting up from the sofa, her sneaker caught, and she ended up with 2 small fractures in her right leg.  She is currently wearing a boot and is seeing an orthopod with [facility].     Past Medical History:   Diagnosis Date   • Arthritis    • Cancer, uterine (CMS/HCC)    • Closed right ankle fracture age 62   • GERD (gastroesophageal reflux disease)    • Lipid disorder     difficulty tolerating statins   • Melanoma (CMS/HCC)     left shoulder   • Osteoporosis     did not tolerate fosamax     Past Surgical History:   Procedure Laterality Date   • CHOLECYSTECTOMY     • HYSTERECTOMY  12/2013   • MOHS SURGERY       Penicillin, Pravastatin sodium, Rosuvastatin calcium, Penicillins, and Zetia [ezetimibe]  Current Outpatient Medications   Medication Sig Dispense Refill   • alirocumab (PRALUENT PEN) 150 mg/mL pen injector Inject 150 pens under the skin every 14 (fourteen) days. 4 pen 3    • cetirizine (ZyrTEC) 5 mg tablet Take 5 mg by mouth daily.     • LORazepam (ATIVAN) 0.5 mg tablet Take 0.5 mg by mouth every 6 (six) hours as needed.     • metoprolol succinate XL (TOPROL-XL) 25 mg 24 hr tablet Take 1 tablet (25 mg total) by mouth 2 (two) times a day. 182 tablet 3   • pantoprazole (PROTONIX) 40 mg EC tablet Take 40 mg by mouth as needed.       • XARELTO 20 mg tablet TAKE 1 TABLET(20 MG) BY MOUTH DAILY WITH DINNER 90 tablet 3     No current facility-administered medications for this visit.     Social History     Socioeconomic History   • Marital status:      Spouse name: None   • Number of children: None   • Years of education: None   • Highest education level: None   Occupational History   • None   Tobacco Use   • Smoking status: Former Smoker     Quit date: 2018     Years since quitting: 3.7   • Smokeless tobacco: Never Used   • Tobacco comment: Quit 2018   Vaping Use   • Vaping Use: Never used   Substance and Sexual Activity   • Alcohol use: Yes     Comment: social   • Drug use: No   • Sexual activity: Defer   Other Topics Concern   • None   Social History Narrative   • None     Social Determinants of Health     Financial Resource Strain:    • Difficulty of Paying Living Expenses: Not on file   Food Insecurity:    • Worried About Running Out of Food in the Last Year: Not on file   • Ran Out of Food in the Last Year: Not on file   Transportation Needs:    • Lack of Transportation (Medical): Not on file   • Lack of Transportation (Non-Medical): Not on file   Physical Activity:    • Days of Exercise per Week: Not on file   • Minutes of Exercise per Session: Not on file   Stress:    • Feeling of Stress : Not on file   Social Connections:    • Frequency of Communication with Friends and Family: Not on file   • Frequency of Social Gatherings with Friends and Family: Not on file   • Attends Anabaptism Services: Not on file   • Active Member of Clubs or Organizations: Not on file   • Attends Club  or Organization Meetings: Not on file   • Marital Status: Not on file   Intimate Partner Violence:    • Fear of Current or Ex-Partner: Not on file   • Emotionally Abused: Not on file   • Physically Abused: Not on file   • Sexually Abused: Not on file   Housing Stability:    • Unable to Pay for Housing in the Last Year: Not on file   • Number of Places Lived in the Last Year: Not on file   • Unstable Housing in the Last Year: Not on file     Family History   Problem Relation Age of Onset   • Heart attack Biological Mother    • Diabetes Biological Mother    • Lung cancer Biological Mother    • COPD Biological Mother    • Diabetes Biological Father    • Prostate cancer Biological Brother         Current Outpatient Medications:   •  alirocumab (PRALUENT PEN) 150 mg/mL pen injector, Inject 150 pens under the skin every 14 (fourteen) days., Disp: 4 pen, Rfl: 3  •  cetirizine (ZyrTEC) 5 mg tablet, Take 5 mg by mouth daily., Disp: , Rfl:   •  LORazepam (ATIVAN) 0.5 mg tablet, Take 0.5 mg by mouth every 6 (six) hours as needed., Disp: , Rfl:   •  metoprolol succinate XL (TOPROL-XL) 25 mg 24 hr tablet, Take 1 tablet (25 mg total) by mouth 2 (two) times a day., Disp: 182 tablet, Rfl: 3  •  pantoprazole (PROTONIX) 40 mg EC tablet, Take 40 mg by mouth as needed.  , Disp: , Rfl:   •  XARELTO 20 mg tablet, TAKE 1 TABLET(20 MG) BY MOUTH DAILY WITH DINNER, Disp: 90 tablet, Rfl: 3    Review of Systems   Constitutional: Negative.   HENT: Negative.    Eyes: Negative.    Respiratory: Negative.    Endocrine: Negative.    Skin: Negative.    Musculoskeletal: Negative.    Gastrointestinal: Negative.    Genitourinary: Negative.    Neurological: Negative.    Psychiatric/Behavioral: Negative.    Allergic/Immunologic: Negative.       Objective    Vitals:    09/21/21 1321   BP: 136/78   Pulse: 65   Resp: 16   SpO2: 98%      Physical Exam  Constitutional:       General: She is not in acute distress.     Appearance: She is well-developed. She is  not diaphoretic.   HENT:      Head: Normocephalic and atraumatic.      Right Ear: External ear normal.      Left Ear: External ear normal.      Nose: Nose normal.   Eyes:      Conjunctiva/sclera: Conjunctivae normal.      Pupils: Pupils are equal, round, and reactive to light.   Cardiovascular:      Rate and Rhythm: Normal rate and regular rhythm.      Pulses: Intact distal pulses.      Heart sounds: No murmur heard.  No friction rub. No gallop.    Pulmonary:      Effort: Pulmonary effort is normal. No respiratory distress.      Breath sounds: No wheezing or rales.   Chest:      Chest wall: No tenderness.   Abdominal:      General: Bowel sounds are normal. There is no distension.      Palpations: Abdomen is soft. There is no mass.      Tenderness: There is no abdominal tenderness. There is no guarding or rebound.   Musculoskeletal:         General: No tenderness. Normal range of motion.      Cervical back: Normal range of motion and neck supple.   Skin:     General: Skin is warm and dry.      Coloration: Skin is not pale.      Findings: No erythema or rash.   Neurological:      Mental Status: She is alert and oriented to person, place, and time.      Deep Tendon Reflexes: Reflexes are normal and symmetric.   Psychiatric:         Behavior: Behavior normal.         Thought Content: Thought content normal.         Judgment: Judgment normal.                 Imaging  CTA coronary artery scan performed on 6/7/2021 was reviewed with the patient.  There is mild nonobstructive coronary artery disease.  LV ejection fraction 58%.  Mid LAD with less than 30% stenosis.  Misregistration artifact.  Diagonal 1 and 2 are small vessels and are patent.  The mid left circumflex artery has 30 to 50% stenosis.  The RCA is patent.  The pulmonary artery remains unchanged measuring 4.8 cm.  The aortic root remains stable at 4.3 cm without interval growth.  There is no evidence of dissection.      Transthoracic echocardiogram performed  11/9/2020 was reviewed.  Estimated EF 65%, grade 1 diastolic dysfunction.  Trileaflet aortic valve mildly sclerotic.  Mild mitral regurgitation.  Mild tricuspid regurgitation.  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  No significant change from study dating back to 2018.     Assessment/Plan    No problem-specific Assessment & Plan notes found for this encounter.      Ms. Bird is an 86-year-old female who presents for annual follow-up of a stable pulmonary artery aneurysm, measuring 4.8 cm.  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  At this time I have recommended a 1 year follow-up with a CTA chest.  She was counseled on the importance of maintaining good blood pressure control and was advised to continue with regular follow-up with Dr. Beck.  At this time the patient does not require surgery for her right lower extremity fracture and is currently wearing a boot and is following up with orthopedics.  She was advised to contact us with any questions or concerns.    The following recommendations were made to the patient: Do not lift in excess of 40 pounds.  Heavy lifting or straining causes brief spikes in blood pressure, which we would like to avoid.  The patient was also counseled on exercise parameters and was advised low impact cardiovascular exercise is ok and may do light weightlifting.  We have recommended that the patient monitor their heart rate during exercise.  The patient was also counseled on the importance of maintaining good blood pressure control and was advised to follow regularly with their cardiologist/PCP. Given the FDA safety statement regarding the use of Fluoroquinolones (increased risk of aneurysmal growth or aortic dissection) in patients with aortic disease, they were advised to avoid this class of antibiotics when possible.  In the unlikely event of sudden onset of chest pain or pain that radiates to the upper back, they were advised to report to the  closest ED and have our service notified.          Thank you for allowing me to participate in the care of this patient.  I hope this information is helpful.     ETHEL Cody 9/21/2021  1:39 PM

## 2021-09-21 NOTE — LETTER
September 21, 2021     Kunal La, DO  [address] [facility] [address]    Patient: Teresa Bird  YOB: 1935  Date of Visit: 9/21/2021      Dear Dr. La:    Thank you for referring Teresa Bird to me for evaluation. Below are my notes for this consultation.    If you have questions, please do not hesitate to call me. I look forward to following your patient along with you.         Sincerely,        ETHEL Cody        CC: MD Megan Gonsalez Maysoon M, PA C  9/21/2021  1:44 PM  Sign when Signing Visit     [facility]  Dr. Alf Bolton- System Chief Cardiothoracic Surgery  Carey Guzman PA-C- Aortic   [facility]  [phone]         Reason for visit:   Chief Complaint   Patient presents with   • Office Visit     1 yr f/u      HPI    Teresa Bird is a 86 y.o. 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  She sees Dr. Beck for her cardiovascular care and last saw her on 8/10/2021.  At that time she was being evaluated for findings of supraventricular tachycardia.  She had had a cardiac monitor sent home but states that she does not have associated symptoms with it her past medical history is also significant for paroxysmal atrial fibrillation and remains on Xarelto.  When she has episodes of A. fib she states that she does not feel any different and rarely feels palpitations.  At this time she denies chest pain, lightheadedness, shortness of breath or syncope.  Approximately 2 weeks ago, she unfortunately tripped while at home getting up from the sofa, her sneaker caught, and she ended up with 2 small fractures in her right leg.  She is currently wearing a boot and is seeing an orthopod with [facility].     Past Medical History:   Diagnosis Date   • Arthritis    • Cancer, uterine (CMS/HCC)    • Closed right ankle fracture age 62   • GERD  (gastroesophageal reflux disease)    • Lipid disorder     difficulty tolerating statins   • Melanoma (CMS/HCC)     left shoulder   • Osteoporosis     did not tolerate fosamax     Past Surgical History:   Procedure Laterality Date   • CHOLECYSTECTOMY     • HYSTERECTOMY  12/2013   • MOHS SURGERY       Penicillin, Pravastatin sodium, Rosuvastatin calcium, Penicillins, and Zetia [ezetimibe]  Current Outpatient Medications   Medication Sig Dispense Refill   • alirocumab (PRALUENT PEN) 150 mg/mL pen injector Inject 150 pens under the skin every 14 (fourteen) days. 4 pen 3   • cetirizine (ZyrTEC) 5 mg tablet Take 5 mg by mouth daily.     • LORazepam (ATIVAN) 0.5 mg tablet Take 0.5 mg by mouth every 6 (six) hours as needed.     • metoprolol succinate XL (TOPROL-XL) 25 mg 24 hr tablet Take 1 tablet (25 mg total) by mouth 2 (two) times a day. 182 tablet 3   • pantoprazole (PROTONIX) 40 mg EC tablet Take 40 mg by mouth as needed.       • XARELTO 20 mg tablet TAKE 1 TABLET(20 MG) BY MOUTH DAILY WITH DINNER 90 tablet 3     No current facility-administered medications for this visit.     Social History     Socioeconomic History   • Marital status:      Spouse name: None   • Number of children: None   • Years of education: None   • Highest education level: None   Occupational History   • None   Tobacco Use   • Smoking status: Former Smoker     Quit date: 2018     Years since quitting: 3.7   • Smokeless tobacco: Never Used   • Tobacco comment: Quit 2018   Vaping Use   • Vaping Use: Never used   Substance and Sexual Activity   • Alcohol use: Yes     Comment: social   • Drug use: No   • Sexual activity: Defer   Other Topics Concern   • None   Social History Narrative   • None     Social Determinants of Health     Financial Resource Strain:    • Difficulty of Paying Living Expenses: Not on file   Food Insecurity:    • Worried About Running Out of Food in the Last Year: Not on file   • Ran Out of Food in the Last Year: Not on  file   Transportation Needs:    • Lack of Transportation (Medical): Not on file   • Lack of Transportation (Non-Medical): Not on file   Physical Activity:    • Days of Exercise per Week: Not on file   • Minutes of Exercise per Session: Not on file   Stress:    • Feeling of Stress : Not on file   Social Connections:    • Frequency of Communication with Friends and Family: Not on file   • Frequency of Social Gatherings with Friends and Family: Not on file   • Attends Roman Catholic Services: Not on file   • Active Member of Clubs or Organizations: Not on file   • Attends Club or Organization Meetings: Not on file   • Marital Status: Not on file   Intimate Partner Violence:    • Fear of Current or Ex-Partner: Not on file   • Emotionally Abused: Not on file   • Physically Abused: Not on file   • Sexually Abused: Not on file   Housing Stability:    • Unable to Pay for Housing in the Last Year: Not on file   • Number of Places Lived in the Last Year: Not on file   • Unstable Housing in the Last Year: Not on file     Family History   Problem Relation Age of Onset   • Heart attack Biological Mother    • Diabetes Biological Mother    • Lung cancer Biological Mother    • COPD Biological Mother    • Diabetes Biological Father    • Prostate cancer Biological Brother         Current Outpatient Medications:   •  alirocumab (PRALUENT PEN) 150 mg/mL pen injector, Inject 150 pens under the skin every 14 (fourteen) days., Disp: 4 pen, Rfl: 3  •  cetirizine (ZyrTEC) 5 mg tablet, Take 5 mg by mouth daily., Disp: , Rfl:   •  LORazepam (ATIVAN) 0.5 mg tablet, Take 0.5 mg by mouth every 6 (six) hours as needed., Disp: , Rfl:   •  metoprolol succinate XL (TOPROL-XL) 25 mg 24 hr tablet, Take 1 tablet (25 mg total) by mouth 2 (two) times a day., Disp: 182 tablet, Rfl: 3  •  pantoprazole (PROTONIX) 40 mg EC tablet, Take 40 mg by mouth as needed.  , Disp: , Rfl:   •  XARELTO 20 mg tablet, TAKE 1 TABLET(20 MG) BY MOUTH DAILY WITH DINNER, Disp: 90  tablet, Rfl: 3    Review of Systems   Constitutional: Negative.   HENT: Negative.    Eyes: Negative.    Respiratory: Negative.    Endocrine: Negative.    Skin: Negative.    Musculoskeletal: Negative.    Gastrointestinal: Negative.    Genitourinary: Negative.    Neurological: Negative.    Psychiatric/Behavioral: Negative.    Allergic/Immunologic: Negative.       Objective    Vitals:    09/21/21 1321   BP: 136/78   Pulse: 65   Resp: 16   SpO2: 98%      Physical Exam  Constitutional:       General: She is not in acute distress.     Appearance: She is well-developed. She is not diaphoretic.   HENT:      Head: Normocephalic and atraumatic.      Right Ear: External ear normal.      Left Ear: External ear normal.      Nose: Nose normal.   Eyes:      Conjunctiva/sclera: Conjunctivae normal.      Pupils: Pupils are equal, round, and reactive to light.   Cardiovascular:      Rate and Rhythm: Normal rate and regular rhythm.      Pulses: Intact distal pulses.      Heart sounds: No murmur heard.  No friction rub. No gallop.    Pulmonary:      Effort: Pulmonary effort is normal. No respiratory distress.      Breath sounds: No wheezing or rales.   Chest:      Chest wall: No tenderness.   Abdominal:      General: Bowel sounds are normal. There is no distension.      Palpations: Abdomen is soft. There is no mass.      Tenderness: There is no abdominal tenderness. There is no guarding or rebound.   Musculoskeletal:         General: No tenderness. Normal range of motion.      Cervical back: Normal range of motion and neck supple.   Skin:     General: Skin is warm and dry.      Coloration: Skin is not pale.      Findings: No erythema or rash.   Neurological:      Mental Status: She is alert and oriented to person, place, and time.      Deep Tendon Reflexes: Reflexes are normal and symmetric.   Psychiatric:         Behavior: Behavior normal.         Thought Content: Thought content normal.         Judgment: Judgment normal.                  Imaging  CTA coronary artery scan performed on 6/7/2021 was reviewed with the patient.  There is mild nonobstructive coronary artery disease.  LV ejection fraction 58%.  Mid LAD with less than 30% stenosis.  Misregistration artifact.  Diagonal 1 and 2 are small vessels and are patent.  The mid left circumflex artery has 30 to 50% stenosis.  The RCA is patent.  The pulmonary artery remains unchanged measuring 4.8 cm.  The aortic root remains stable at 4.3 cm without interval growth.  There is no evidence of dissection.      Transthoracic echocardiogram performed 11/9/2020 was reviewed.  Estimated EF 65%, grade 1 diastolic dysfunction.  Trileaflet aortic valve mildly sclerotic.  Mild mitral regurgitation.  Mild tricuspid regurgitation.  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  No significant change from study dating back to 2018.     Assessment/Plan    No problem-specific Assessment & Plan notes found for this encounter.      Ms. Bird is an 86-year-old female who presents for annual follow-up of a stable pulmonary artery aneurysm, measuring 4.8 cm.  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  At this time I have recommended a 1 year follow-up with a CTA chest.  She was counseled on the importance of maintaining good blood pressure control and was advised to continue with regular follow-up with Dr. Beck.  At this time the patient does not require surgery for her right lower extremity fracture and is currently wearing a boot and is following up with orthopedics.  She was advised to contact us with any questions or concerns.    The following recommendations were made to the patient: Do not lift in excess of 40 pounds.  Heavy lifting or straining causes brief spikes in blood pressure, which we would like to avoid.  The patient was also counseled on exercise parameters and was advised low impact cardiovascular exercise is ok and may do light weightlifting.  We have recommended  that the patient monitor their heart rate during exercise.  The patient was also counseled on the importance of maintaining good blood pressure control and was advised to follow regularly with their cardiologist/PCP. Given the FDA safety statement regarding the use of Fluoroquinolones (increased risk of aneurysmal growth or aortic dissection) in patients with aortic disease, they were advised to avoid this class of antibiotics when possible.  In the unlikely event of sudden onset of chest pain or pain that radiates to the upper back, they were advised to report to the closest ED and have our service notified.          Thank you for allowing me to participate in the care of this patient.  I hope this information is helpful.     ETHEL Cody 9/21/2021  1:39 PM

## 2021-10-20 ENCOUNTER — APPOINTMENT (RX ONLY)
Dept: URBAN - METROPOLITAN AREA CLINIC 374 | Facility: CLINIC | Age: 86
Setting detail: DERMATOLOGY
End: 2021-10-20

## 2021-10-20 DIAGNOSIS — L81.4 OTHER MELANIN HYPERPIGMENTATION: ICD-10-CM

## 2021-10-20 DIAGNOSIS — D22 MELANOCYTIC NEVI: ICD-10-CM

## 2021-10-20 DIAGNOSIS — L24 IRRITANT CONTACT DERMATITIS: ICD-10-CM

## 2021-10-20 DIAGNOSIS — L57.0 ACTINIC KERATOSIS: ICD-10-CM

## 2021-10-20 DIAGNOSIS — Z71.89 OTHER SPECIFIED COUNSELING: ICD-10-CM

## 2021-10-20 DIAGNOSIS — L91.0 HYPERTROPHIC SCAR: ICD-10-CM

## 2021-10-20 DIAGNOSIS — D18.0 HEMANGIOMA: ICD-10-CM

## 2021-10-20 PROBLEM — D22.72 MELANOCYTIC NEVI OF LEFT LOWER LIMB, INCLUDING HIP: Status: ACTIVE | Noted: 2021-10-20

## 2021-10-20 PROBLEM — D22.62 MELANOCYTIC NEVI OF LEFT UPPER LIMB, INCLUDING SHOULDER: Status: ACTIVE | Noted: 2021-10-20

## 2021-10-20 PROBLEM — D22.61 MELANOCYTIC NEVI OF RIGHT UPPER LIMB, INCLUDING SHOULDER: Status: ACTIVE | Noted: 2021-10-20

## 2021-10-20 PROBLEM — D22.5 MELANOCYTIC NEVI OF TRUNK: Status: ACTIVE | Noted: 2021-10-20

## 2021-10-20 PROBLEM — D18.01 HEMANGIOMA OF SKIN AND SUBCUTANEOUS TISSUE: Status: ACTIVE | Noted: 2021-10-20

## 2021-10-20 PROBLEM — L24.9 IRRITANT CONTACT DERMATITIS, UNSPECIFIED CAUSE: Status: ACTIVE | Noted: 2021-10-20

## 2021-10-20 PROBLEM — D22.71 MELANOCYTIC NEVI OF RIGHT LOWER LIMB, INCLUDING HIP: Status: ACTIVE | Noted: 2021-10-20

## 2021-10-20 PROCEDURE — ? PRESCRIPTION MEDICATION MANAGEMENT

## 2021-10-20 PROCEDURE — 17000 DESTRUCT PREMALG LESION: CPT

## 2021-10-20 PROCEDURE — ? COUNSELING

## 2021-10-20 PROCEDURE — ? FULL BODY SKIN EXAM

## 2021-10-20 PROCEDURE — 99213 OFFICE O/P EST LOW 20 MIN: CPT | Mod: 25

## 2021-10-20 PROCEDURE — ? SUNSCREEN RECOMMENDATIONS

## 2021-10-20 PROCEDURE — ? DIAGNOSIS COMMENT

## 2021-10-20 PROCEDURE — 17003 DESTRUCT PREMALG LES 2-14: CPT

## 2021-10-20 PROCEDURE — ? PREMALIGNANT DESTRUCTION

## 2021-10-20 PROCEDURE — ? PRESCRIPTION

## 2021-10-20 PROCEDURE — ? RECOMMENDATIONS

## 2021-10-20 RX ORDER — TRIAMCINOLONE ACETONIDE 1 MG/G
CREAM TOPICAL BID
Qty: 45 | Refills: 2 | Status: ERX | COMMUNITY
Start: 2021-10-20

## 2021-10-20 RX ADMIN — TRIAMCINOLONE ACETONIDE: 1 CREAM TOPICAL at 00:00

## 2021-10-20 ASSESSMENT — LOCATION DETAILED DESCRIPTION DERM
LOCATION DETAILED: RIGHT DISTAL PRETIBIAL REGION
LOCATION DETAILED: LEFT ANTERIOR PROXIMAL UPPER ARM
LOCATION DETAILED: RIGHT ANTERIOR PROXIMAL UPPER ARM
LOCATION DETAILED: RIGHT SUPERIOR UPPER BACK
LOCATION DETAILED: RIGHT ANTERIOR DISTAL THIGH
LOCATION DETAILED: RIGHT ANTERIOR DISTAL UPPER ARM
LOCATION DETAILED: RIGHT SUPERIOR MEDIAL MIDBACK
LOCATION DETAILED: LEFT ANTERIOR DISTAL THIGH
LOCATION DETAILED: EPIGASTRIC SKIN
LOCATION DETAILED: LEFT POSTERIOR SHOULDER
LOCATION DETAILED: RIGHT CLAVICULAR SKIN
LOCATION DETAILED: RIGHT MEDIAL SUPERIOR CHEST
LOCATION DETAILED: LEFT ANTERIOR PROXIMAL THIGH
LOCATION DETAILED: INFERIOR THORACIC SPINE
LOCATION DETAILED: NASAL DORSUM
LOCATION DETAILED: MIDDLE STERNUM
LOCATION DETAILED: LEFT MEDIAL FOREHEAD
LOCATION DETAILED: SUPERIOR THORACIC SPINE
LOCATION DETAILED: RIGHT POSTERIOR SHOULDER

## 2021-10-20 ASSESSMENT — LOCATION SIMPLE DESCRIPTION DERM
LOCATION SIMPLE: RIGHT CLAVICULAR SKIN
LOCATION SIMPLE: ABDOMEN
LOCATION SIMPLE: LEFT SHOULDER
LOCATION SIMPLE: UPPER BACK
LOCATION SIMPLE: RIGHT UPPER BACK
LOCATION SIMPLE: RIGHT LOWER BACK
LOCATION SIMPLE: RIGHT SHOULDER
LOCATION SIMPLE: NOSE
LOCATION SIMPLE: CHEST
LOCATION SIMPLE: LEFT UPPER ARM
LOCATION SIMPLE: LEFT FOREHEAD
LOCATION SIMPLE: RIGHT THIGH
LOCATION SIMPLE: LEFT THIGH
LOCATION SIMPLE: RIGHT PRETIBIAL REGION
LOCATION SIMPLE: RIGHT UPPER ARM

## 2021-10-20 ASSESSMENT — LOCATION ZONE DERM
LOCATION ZONE: ARM
LOCATION ZONE: NOSE
LOCATION ZONE: LEG
LOCATION ZONE: FACE
LOCATION ZONE: TRUNK

## 2021-10-20 NOTE — PROCEDURE: FULL BODY SKIN EXAM
Instructions: This plan will send the code FBSE to the PM system.  DO NOT or CHANGE the price.
Body Of Note (Please Add Your Own Text Here): monitor biannually
Detail Level: Generalized
Price (Do Not Change): 0.00

## 2021-10-20 NOTE — PROCEDURE: DIAGNOSIS COMMENT
Render Risk Assessment In Note?: no
Detail Level: Simple
Comment: Secondary to orthopedic boot
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

## 2021-10-20 NOTE — PROCEDURE: PRESCRIPTION MEDICATION MANAGEMENT
Render In Strict Bullet Format?: No
Detail Level: Zone
Initiate Treatment: triamcinolone acetonide 0.1 % topical cream: Apply BID to affected area of rashes of the right legs\\nWear compression stocking qhs after applying the cream

## 2021-10-20 NOTE — PROCEDURE: PREMALIGNANT DESTRUCTION
Consent: The patient's consent was obtained including but not limited to risks of crusting, scabbing, blistering, scarring, darker or lighter pigmentary change, recurrence, incomplete removal and infection.
Method: liquid nitrogen
Render Post-Care In The Note: No
Detail Level: Zone
Anesthesia Volume In Cc: 0.5
Post-Care Instructions: I reviewed with the patient in detail post-care instructions. Patient is to wear sunprotection, and avoid picking at any of the treated lesions. Pt may apply Vaseline to crusted or scabbing areas.

## 2021-10-20 NOTE — HPI: EVALUATION OF SKIN LESION(S)
What Type Of Note Output Would You Prefer (Optional)?: Standard Output
Hpi Title: Evaluation of Skin Lesions
How Severe Are Your Spot(S)?: moderate
Have Your Spot(S) Been Treated In The Past?: has not been treated
Family Member: Brother
Location: Left upper back

## 2021-10-20 NOTE — PROCEDURE: RECOMMENDATIONS
Recommendation Preamble: The following recommendations were made during the visit:
Detail Level: Zone
Render Risk Assessment In Note?: no
Recommendations (Free Text): Massage the area gently Qday

## 2022-02-07 ENCOUNTER — TELEPHONE (OUTPATIENT)
Dept: SCHEDULING | Facility: CLINIC | Age: 87
End: 2022-02-07
Payer: COMMERCIAL

## 2022-02-07 DIAGNOSIS — E78.2 MIXED HYPERLIPIDEMIA: ICD-10-CM

## 2022-02-07 DIAGNOSIS — I25.10 CORONARY ARTERY DISEASE INVOLVING NATIVE CORONARY ARTERY OF NATIVE HEART WITHOUT ANGINA PECTORIS: Primary | ICD-10-CM

## 2022-02-07 NOTE — TELEPHONE ENCOUNTER
Alexandra called to request her labs be reordered via [facility]   Please call her to notify once this is complete so that she knows she can go to lab

## 2022-02-07 NOTE — TELEPHONE ENCOUNTER
Called and spoke to Alexandra. Labs changed to [facility] as requested.  She understands.  No questions,

## 2022-02-09 LAB
ALBUMIN SERPL-MCNC: 4.2 G/DL (ref 3.6–4.6)
ALBUMIN/GLOB SERPL: 1.8 {RATIO} (ref 1.2–2.2)
ALP SERPL-CCNC: 72 IU/L (ref 44–121)
ALT SERPL-CCNC: 12 IU/L (ref 0–32)
AST SERPL-CCNC: 18 IU/L (ref 0–40)
BASOPHILS # BLD AUTO: 0 X10E3/UL (ref 0–0.2)
BASOPHILS NFR BLD AUTO: 0 %
BILIRUB SERPL-MCNC: 0.5 MG/DL (ref 0–1.2)
BUN SERPL-MCNC: 19 MG/DL (ref 8–27)
BUN/CREAT SERPL: 24 (ref 12–28)
CALCIUM SERPL-MCNC: 9.7 MG/DL (ref 8.7–10.3)
CHLORIDE SERPL-SCNC: 106 MMOL/L (ref 96–106)
CHOLEST SERPL-MCNC: 149 MG/DL (ref 100–199)
CO2 SERPL-SCNC: 25 MMOL/L (ref 20–29)
CREAT SERPL-MCNC: 0.78 MG/DL (ref 0.57–1)
EOSINOPHIL # BLD AUTO: 0.2 X10E3/UL (ref 0–0.4)
EOSINOPHIL NFR BLD AUTO: 3 %
ERYTHROCYTE [DISTWIDTH] IN BLOOD BY AUTOMATED COUNT: 12.3 % (ref 11.7–15.4)
GLOBULIN SER CALC-MCNC: 2.4 G/DL (ref 1.5–4.5)
GLUCOSE SERPL-MCNC: 97 MG/DL (ref 65–99)
HCT VFR BLD AUTO: 42.9 % (ref 34–46.6)
HDLC SERPL-MCNC: 77 MG/DL
HGB BLD-MCNC: 14.5 G/DL (ref 11.1–15.9)
IMM GRANULOCYTES # BLD AUTO: 0 X10E3/UL (ref 0–0.1)
IMM GRANULOCYTES NFR BLD AUTO: 0 %
LAB CORP EGFR IF AFRICN AM: 81 ML/MIN/1.73
LAB CORP EGFR IF NONAFRICN AM: 70 ML/MIN/1.73
LDLC SERPL CALC-MCNC: 56 MG/DL (ref 0–99)
LYMPHOCYTES # BLD AUTO: 2.2 X10E3/UL (ref 0.7–3.1)
LYMPHOCYTES NFR BLD AUTO: 36 %
MCH RBC QN AUTO: 31.6 PG (ref 26.6–33)
MCHC RBC AUTO-ENTMCNC: 33.8 G/DL (ref 31.5–35.7)
MCV RBC AUTO: 94 FL (ref 79–97)
MONOCYTES # BLD AUTO: 0.4 X10E3/UL (ref 0.1–0.9)
MONOCYTES NFR BLD AUTO: 6 %
NEUTROPHILS # BLD AUTO: 3.5 X10E3/UL (ref 1.4–7)
NEUTROPHILS NFR BLD AUTO: 55 %
PLATELET # BLD AUTO: 257 X10E3/UL (ref 150–450)
POTASSIUM SERPL-SCNC: 5.2 MMOL/L (ref 3.5–5.2)
PROT SERPL-MCNC: 6.6 G/DL (ref 6–8.5)
RBC # BLD AUTO: 4.59 X10E6/UL (ref 3.77–5.28)
SODIUM SERPL-SCNC: 142 MMOL/L (ref 134–144)
TRIGL SERPL-MCNC: 88 MG/DL (ref 0–149)
VLDLC SERPL CALC-MCNC: 16 MG/DL (ref 5–40)
WBC # BLD AUTO: 6.3 X10E3/UL (ref 3.4–10.8)

## 2022-02-11 NOTE — PROGRESS NOTES
Cardiology Note    Kunal La DO          Teresa Bird is a 86 y.o. 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    CT angiogram performed June 2021   with corresponding calcium score of 41  Mildly dilated aortic root 3.9 cm with that Study      History of PAF seen on monitor 2018 2% burden anticoagulate with Xarelto    Mixed hyperlipidemia on pcsk9 inhibitor , praluent  Multinodular goiter with negative biopsy in 2017      Lab work February 2022  CBC normal creatinine 0.78 potassium 5.2  Cholestero                     Patient Active Problem List    Diagnosis Date Noted   • SVT (supraventricular tachycardia) (CMS/[facility]) 07/20/2021   • Pulmonary nodule 11/04/2020   • Dilated aortic root (CMS/[facility]) 08/02/2019   • RBBB 08/02/2018   • PAF (paroxysmal atrial fibrillation) (CMS/[facility]) 04/24/2018   • Coronary artery disease involving native coronary artery of native heart without angina pectoris 04/08/2018   • Nicotine dependence in remission 04/08/2018   • Multinodular goiter 03/13/2018   • Mixed hyperlipidemia 03/13/2018   • 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 4 pen 3   • cetirizine (ZyrTEC) 5 mg tablet Take 5 mg by mouth daily.      • LORazepam (ATIVAN) 0.5 mg tablet Take 0.5 mg by mouth every 6 (six) hours as needed.     • metoprolol succinate XL (TOPROL-XL) 25 mg 24 hr tablet Take 1 tablet (25 mg total) by mouth 2 (two) times a day. 182 tablet 3   • pantoprazole (PROTONIX) 40 mg EC tablet Take 40 mg by mouth as needed.       • rivaroxaban (XARELTO) 20 mg tablet TAKE 1 TABLET(20 MG) BY MOUTH DAILY WITH DINNER 90 tablet 6     No current facility-administered medications for this visit.        ROS    Labs   Lab Results   Component Value Date    WBC 6.3 02/09/2022    HGB 14.5 02/09/2022    HCT 42.9 02/09/2022     02/09/2022    CHOL 149 02/09/2022    TRIG 88 02/09/2022    HDL 77 02/09/2022    LDLCALC 56 02/09/2022    ALT 12 02/09/2022    AST 18 02/09/2022     02/09/2022    K 5.2 02/09/2022     02/09/2022    CREATININE 0.78 02/09/2022    BUN 19 02/09/2022    CO2 25 02/09/2022    TSH 1.700 05/28/2021   [id number]    Lab Results   Component Value Date    GLUCOSE 97 02/09/2022    CALCIUM 9.6 01/22/2020     02/09/2022    K 5.2 02/09/2022    CO2 25 02/09/2022     02/09/2022    BUN 19 02/09/2022    CREATININE 0.78 02/09/2022       Objective   Vitals:    02/15/22 1258   BP: 122/76   BP Location: Left upper arm   Patient Position: Sitting   Pulse: 64   SpO2: 99%   Weight: 68 kg (150 lb)     Physical Exam  Constitutional:       General: She is not in acute distress.     Appearance: She is well-developed.   HENT:      Head: Normocephalic and atraumatic.      Nose: Nose normal.   Eyes:      General: No scleral icterus.     Conjunctiva/sclera: Conjunctivae normal.   Neck:      Vascular: No JVD.   Cardiovascular:      Rate and Rhythm: Normal rate and regular rhythm.      Pulses: Intact distal pulses.      Heart sounds: Normal heart sounds. No murmur heard.    No friction rub. No gallop.      Comments: Systolic click  Pulmonary:      Effort: Pulmonary effort is normal. No respiratory distress.      Breath sounds: No stridor. No  wheezing or rales.   Chest:      Chest wall: No tenderness.   Abdominal:      Tenderness: There is no abdominal tenderness.   Musculoskeletal:         General: No deformity.   Skin:     General: Skin is warm and dry.   Neurological:      Mental Status: She is alert and oriented to person, place, and time.         Cardiac Procedures      STRESS    Stress echocardiogram March 2017 moderate pulmonic insufficiency pulmonary artery for mild mitral regurgitation negative for ischemia     Stress echo 2/19   vpc in recovery neg ishcmia  VPCs during test apical septal abnormal wall motion related to that      ECHO  Echo 4/18 pulm a 4.2 aorta 3.7cm    Echo oc 2020  dilated LA mild mr aorta 3.7 PA 4.1   mild PI prom eus valve     Echo feb 2022  Mac mild mr  Mod pi pa 4.1 cm stable  Nl ef                 OTHER  Ultrasound thyroid July 2017 11 mm nodule       CAT SCAN   Chest CTA April 2018 no change from 2017 dilated pulmonary artery 4.7 thyroid nodules coronary artery calcifications renal cyst Lrhyr mass    cta 1/19 [facility] pul artery 4.2 mildy dilated aortic root      cta 5/19 pulmonary artery 47 mm ectatic right pulmonary artery 31 mm aortic root 4.3    CTA July 2020 aorta 4.7  MM 6 mm right upper lobe probably seen prior        Cor cta June 2021  non obst cad    :pulm artery 4.8 cm aorta 3.9 cm  1.  Coronary Arteries: Mild nonobstructive coronary artery disease.  No evidence  of flow-limiting stenosis by FFR CT.  2.  Cardiac Structures: Small left ventricular cavity with concentric left  ventricular hypertrophy.  Bi-atrial enlargement.  Focally calcified trileaflet  aortic valve.  3.  Mildly dilated aortic root at the sinuses of Valsalva.  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  4.  Normal left ventricular systolic function.  5.  Coronary calcium score 41.  This places the patient in the WAYNE 25% risk  percentile for age and gender matched individuals.    LUNG  FIELDS: Previously described right upper lobe nodule is somewhat less  prominent on today's examination measuring approximately 4 mm, previously  measuring 6 mm. Dependent hypoventilatory changes are noted.  LYMPH NODES: No pathologically enlarged lymph nodes visualized.  OSSEOUS STRUCTURES: No evidence of suspicious destructive bony lesions.     ELECTROPHYSIOLOGY    HOLTER July 2021  SHORT BURSTS NON SUST AVG UP TO 20 BEATS  The patient was monitored for 24 hours.   2. The predominant rhythm was sinus bradycardia. Ectopic atrial rhythm was noted.   3. The average heart rate was 59 bpm.   4. The minimum heart rate was 49 bpm.   5. The maximum heart rate was 111 bpm.   6. There were 35 supraventricular beats. There were 9 SVE tachycardias. The longest lasted 16 beatd 6.7 secs with an average  heart rate of 142 bpm at   4:22 pm on 7/20/2021. The fastest lasted 3 beats 1.2 secs with an average heart rate of 148 bpm at  5:11 pm on 7/20/2021.   7. There were 156 premature ventricular beats. There were 11               FEB 2022                                 nochange  EKG    Assessment/Plan:          Pulmonary artery abnormality  She is followed for pulmonary artery aneurysm and thoracic aneurysm last imaged May 2019 follows with Dr. Fajardo  Echo today all numbers [facility] pulmonary artery four-point 1 aortic root 3.7 by CTA aortic root was 4  Continue yearly imaging  Next year we will get coronary CT angiogram to image her coronary arteries also will be due May 2020    Coronary artery disease involving native coronary artery of native heart without angina pectoris  This was diagnosed by calcification seen on CAT scan stress echo was negative in February 2019 she does have right bundle branch block  Plan coronary CT angiogram with imaging of her pulmonary thoracic aneurysm in May 2020    PAF (paroxysmal atrial fibrillation) (CMS/HCC) (HCC)  Seen on a monitor in April 2018 2% of total heartbeats no clinical recurrence  remains anticoagulated with Xarelto      Mixed hyperlipidemia  Phenomenal result to the response to the PCSK9 inhibitor presently LDL cholesterol is now 40 she is intolerant to statins and Zetia with GI upset    Multinodular goiter  Had negative biopsy 2017 follows with Dr. 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 hyperlipidemia  She follows up with ctsurgery in September 2021 for dilated pulmonary artery  She is on PCSK9 inhibitor for hyperlipidemia, intolerant to statins  Her last CAT scan was June 2021 size of aorta and pulmonary arteries stable echocardiogram performed today February 2022 stable  For follow-up in aortic wellness clinic in September  I will see her back in 6 months with lab work only  To consider repeat coronary CT angiogram perhaps in June 2024  We will see recommendation of timing of CTA pulmonary artery after she is seen by aortic wellness clinic September 2022  Echocardiogram repeat in 1 year February 2023      Cc Kunal La DO

## 2022-02-15 ENCOUNTER — OFFICE VISIT (OUTPATIENT)
Dept: CARDIOLOGY | Facility: CLINIC | Age: 87
End: 2022-02-15
Payer: COMMERCIAL

## 2022-02-15 ENCOUNTER — HOSPITAL ENCOUNTER (OUTPATIENT)
Dept: CARDIOLOGY | Facility: CLINIC | Age: 87
Discharge: HOME | End: 2022-02-15
Payer: COMMERCIAL

## 2022-02-15 VITALS
SYSTOLIC BLOOD PRESSURE: 122 MMHG | BODY MASS INDEX: 28.34 KG/M2 | DIASTOLIC BLOOD PRESSURE: 76 MMHG | OXYGEN SATURATION: 99 % | HEART RATE: 64 BPM | WEIGHT: 150 LBS

## 2022-02-15 VITALS
HEIGHT: 61 IN | SYSTOLIC BLOOD PRESSURE: 128 MMHG | DIASTOLIC BLOOD PRESSURE: 76 MMHG | BODY MASS INDEX: 28.32 KG/M2 | WEIGHT: 150 LBS

## 2022-02-15 DIAGNOSIS — I77.810 DILATED AORTIC ROOT (CMS/HCC): ICD-10-CM

## 2022-02-15 DIAGNOSIS — E78.2 MIXED HYPERLIPIDEMIA: ICD-10-CM

## 2022-02-15 DIAGNOSIS — I28.1 PULMONARY ARTERY ANEURYSM (CMS/HCC): ICD-10-CM

## 2022-02-15 DIAGNOSIS — I45.10 RBBB: ICD-10-CM

## 2022-02-15 DIAGNOSIS — I28.1 PULMONARY ARTERY ANEURYSM (CMS/HCC): Primary | ICD-10-CM

## 2022-02-15 DIAGNOSIS — I48.0 PAF (PAROXYSMAL ATRIAL FIBRILLATION) (CMS/HCC): ICD-10-CM

## 2022-02-15 DIAGNOSIS — E04.2 MULTINODULAR GOITER: ICD-10-CM

## 2022-02-15 DIAGNOSIS — I25.10 CORONARY ARTERY DISEASE INVOLVING NATIVE CORONARY ARTERY OF NATIVE HEART WITHOUT ANGINA PECTORIS: ICD-10-CM

## 2022-02-15 LAB
AORTIC ROOT ANNULUS: 3.7 CM
ASCENDING AORTA: 3.5 CM
AV PEAK GRADIENT: 5 MMHG
AV PEAK VELOCITY-S: 1.11 M/S
AV VALVE AREA: 2.69 CM2
BSA FOR ECHO PROCEDURE: 1.71 M2
E WAVE DECELERATION TIME: 215 MS
E/A RATIO: 1.2
E/E' RATIO: 15.7
E/LAT E' RATIO: 7.7
EDV (BP): 74.2 CM3
EF (A4C): 64.8 %
EF A2C: 64.3 %
EJECTION FRACTION: 64.6 %
EST RIGHT VENT SYSTOLIC PRESSURE BY TRICUSPID REGURGITATION JET: 40 MMHG
ESV (BP): 26.3 CM3
FRACTIONAL SHORTENING: 28.2 %
INTERVENTRICULAR SEPTUM: 1.18 CM
LA ESV (BP): 79.1 CM3
LA ESV INDEX (A2C): 39.82 CM3/M2
LA ESV INDEX (BP): 46.26 CM3/M2
LA/AORTA RATIO: 1.32
LAAS-AP2: 22.8 CM2
LAAS-AP4: 26.9 CM2
LAD 2D: 4.9 CM
LALD A4C: 6.09 CM
LALD A4C: 6.41 CM
LAV-S: 68.1 CM3
LEFT ATRIUM VOLUME INDEX: 51.17 CM3/M2
LEFT ATRIUM VOLUME: 87.5 CM3
LEFT INTERNAL DIMENSION IN SYSTOLE: 3.31 CM (ref 2.35–3.56)
LEFT VENTRICLE DIASTOLIC VOLUME INDEX: 46.67 CM3/M2
LEFT VENTRICLE DIASTOLIC VOLUME: 79.8 CM3
LEFT VENTRICLE SYSTOLIC VOLUME INDEX: 16.43 CM3/M2
LEFT VENTRICLE SYSTOLIC VOLUME: 28.1 CM3
LEFT VENTRICULAR INTERNAL DIMENSION IN DIASTOLE: 4.61 CM (ref 3.96–5.5)
LEFT VENTRICULAR POSTERIOR WALL IN END DIASTOLE: 1.02 CM (ref 0.52–0.96)
LV DIASTOLIC VOLUME: 67.7 CM3
LV ESV (APICAL 2 CHAMBER): 24.1 CM3
LVAD-AP2: 22.2 CM2
LVAD-AP4: 24.9 CM2
LVAS-AP2: 11.9 CM2
LVAS-AP4: 13.3 CM2
LVEDVI(A2C): 39.59 CM3/M2
LVEDVI(BP): 43.39 CM3/M2
LVESVI(A2C): 14.09 CM3/M2
LVESVI(BP): 15.38 CM3/M2
LVLD-AP2: 6.33 CM
LVLD-AP4: 6.48 CM
LVLS-AP2: 5.39 CM
LVLS-AP4: 5.53 CM
LVOT 2D: 2.1 CM
LVOT A: 3.46 CM2
LVOT PEAK VELOCITY: 0.86 M/S
MPAD: 4.1 CM
MV E'TISSUE VEL-LAT: 0.11 M/S
MV E'TISSUE VEL-MED: 0.05 M/S
MV PEAK A VEL: 0.71 M/S
MV PEAK E VEL: 0.85 M/S
POSTERIOR WALL: 1.02 CM
PULMONARY REGURGITATION LATE DIASTOLIC VELOCITY: 1.42 M/S
PV PEAK GRADIENT: 4 MMHG
PV PV: 0.97 M/S
RAP: 5 MMHG
RVOT VMAX: 0.75 M/S
RVOT VTI: 19.2 CM
SEPTAL TISSUE DOPPLER FREE WALL LATE DIA VELOCITY (APICAL 4 CHAMBER VIEW): 0.16 M/S
TAPSE: 1.6 CM
TR MAX PG: 31 MMHG
TRICUSPID VALVE PEAK REGURGITATION VELOCITY: 2.77 M/S
Z-SCORE OF LEFT VENTRICULAR DIMENSION IN END DIASTOLE: -0.13
Z-SCORE OF LEFT VENTRICULAR DIMENSION IN END SYSTOLE: 1.07
Z-SCORE OF LEFT VENTRICULAR POSTERIOR WALL IN END DIASTOLE: 1.94

## 2022-02-15 PROCEDURE — 93306 TTE W/DOPPLER COMPLETE: CPT | Performed by: INTERNAL MEDICINE

## 2022-02-15 PROCEDURE — 3008F BODY MASS INDEX DOCD: CPT | Performed by: INTERNAL MEDICINE

## 2022-02-15 PROCEDURE — 99214 OFFICE O/P EST MOD 30 MIN: CPT | Performed by: INTERNAL MEDICINE

## 2022-02-15 PROCEDURE — 93000 ELECTROCARDIOGRAM COMPLETE: CPT | Performed by: INTERNAL MEDICINE

## 2022-02-15 RX ORDER — METOPROLOL SUCCINATE 25 MG/1
25 TABLET, EXTENDED RELEASE ORAL 2 TIMES DAILY
Qty: 182 TABLET | Refills: 3 | Status: SHIPPED | OUTPATIENT
Start: 2022-02-15 | End: 2022-04-06

## 2022-02-15 NOTE — ASSESSMENT & PLAN NOTE
Last CTA June 2021 pulmonary artery 4.8 aorta 3.9 echocardiogram today pulmonary artery measures 4.1 moderate pulmonic insufficiency aortic root 3.7 normal LV systolic function  Follow-up in aortic wellness clinic September 2022 nuclear imaging as per the recommendation size has been stable since 2017

## 2022-02-15 NOTE — ASSESSMENT & PLAN NOTE
Nonobstructive CAD diagnosed by coronary CT angiogram June 2021 with corresponding calcium score of 41 normal LV systolic function echocardiogram February 2020

## 2022-03-14 ENCOUNTER — DOCUMENTATION (OUTPATIENT)
Dept: CARDIOLOGY | Facility: CLINIC | Age: 87
End: 2022-03-14
Payer: COMMERCIAL

## 2022-03-14 ENCOUNTER — TELEPHONE (OUTPATIENT)
Dept: CARDIOLOGY | Facility: CLINIC | Age: 87
End: 2022-03-14
Payer: COMMERCIAL

## 2022-03-14 NOTE — TELEPHONE ENCOUNTER
Received a fax from [employer] that Praluent has been approved through 3/14/2023.  Letter to be scanned into chart.

## 2022-03-18 ENCOUNTER — TELEPHONE (OUTPATIENT)
Dept: SCHEDULING | Facility: CLINIC | Age: 87
End: 2022-03-18
Payer: COMMERCIAL

## 2022-03-18 NOTE — TELEPHONE ENCOUNTER
Dr Beck patient states Praluent will only be covered tempoarily for this month.     She states an exception will need to be made.     Please call [phone] for this exception.     Patient can be reached at [phone]. ty

## 2022-03-18 NOTE — TELEPHONE ENCOUNTER
Spoke to patient via phone conversation to report prior authorization which is covered for 1 year with her insurance. Pt instructed if any further concern, to call our office.

## 2022-03-28 DIAGNOSIS — E78.2 MIXED HYPERLIPIDEMIA: Primary | ICD-10-CM

## 2022-03-28 RX ORDER — ALIROCUMAB 150 MG/ML
INJECTION, SOLUTION SUBCUTANEOUS
Qty: 6 PEN | Refills: 6 | Status: SHIPPED | OUTPATIENT
Start: 2022-03-28 | End: 2023-03-23 | Stop reason: SDUPTHER

## 2022-07-27 ENCOUNTER — APPOINTMENT (RX ONLY)
Dept: URBAN - METROPOLITAN AREA CLINIC 374 | Facility: CLINIC | Age: 87
Setting detail: DERMATOLOGY
End: 2022-07-27

## 2022-07-27 ENCOUNTER — TELEPHONE (OUTPATIENT)
Dept: SCHEDULING | Facility: CLINIC | Age: 87
End: 2022-07-27
Payer: COMMERCIAL

## 2022-07-27 DIAGNOSIS — Z85.820 PERSONAL HISTORY OF MALIGNANT MELANOMA OF SKIN: ICD-10-CM

## 2022-07-27 DIAGNOSIS — D22 MELANOCYTIC NEVI: ICD-10-CM

## 2022-07-27 DIAGNOSIS — L81.4 OTHER MELANIN HYPERPIGMENTATION: ICD-10-CM

## 2022-07-27 DIAGNOSIS — L57.0 ACTINIC KERATOSIS: ICD-10-CM

## 2022-07-27 DIAGNOSIS — Z71.89 OTHER SPECIFIED COUNSELING: ICD-10-CM

## 2022-07-27 DIAGNOSIS — D18.0 HEMANGIOMA: ICD-10-CM

## 2022-07-27 DIAGNOSIS — Z85.828 PERSONAL HISTORY OF OTHER MALIGNANT NEOPLASM OF SKIN: ICD-10-CM

## 2022-07-27 PROBLEM — D22.5 MELANOCYTIC NEVI OF TRUNK: Status: ACTIVE | Noted: 2022-07-27

## 2022-07-27 PROBLEM — D22.61 MELANOCYTIC NEVI OF RIGHT UPPER LIMB, INCLUDING SHOULDER: Status: ACTIVE | Noted: 2022-07-27

## 2022-07-27 PROBLEM — D22.62 MELANOCYTIC NEVI OF LEFT UPPER LIMB, INCLUDING SHOULDER: Status: ACTIVE | Noted: 2022-07-27

## 2022-07-27 PROBLEM — D22.72 MELANOCYTIC NEVI OF LEFT LOWER LIMB, INCLUDING HIP: Status: ACTIVE | Noted: 2022-07-27

## 2022-07-27 PROBLEM — D18.01 HEMANGIOMA OF SKIN AND SUBCUTANEOUS TISSUE: Status: ACTIVE | Noted: 2022-07-27

## 2022-07-27 PROBLEM — D22.71 MELANOCYTIC NEVI OF RIGHT LOWER LIMB, INCLUDING HIP: Status: ACTIVE | Noted: 2022-07-27

## 2022-07-27 PROCEDURE — ? COUNSELING

## 2022-07-27 PROCEDURE — 99213 OFFICE O/P EST LOW 20 MIN: CPT | Mod: 25

## 2022-07-27 PROCEDURE — 17003 DESTRUCT PREMALG LES 2-14: CPT

## 2022-07-27 PROCEDURE — 17000 DESTRUCT PREMALG LESION: CPT

## 2022-07-27 PROCEDURE — ? SUNSCREEN RECOMMENDATIONS

## 2022-07-27 PROCEDURE — ? PREMALIGNANT DESTRUCTION

## 2022-07-27 PROCEDURE — ? FULL BODY SKIN EXAM

## 2022-07-27 ASSESSMENT — LOCATION DETAILED DESCRIPTION DERM
LOCATION DETAILED: RIGHT ANTERIOR DISTAL UPPER ARM
LOCATION DETAILED: LEFT ANTERIOR PROXIMAL UPPER ARM
LOCATION DETAILED: LEFT SUPERIOR UPPER BACK
LOCATION DETAILED: SUPERIOR THORACIC SPINE
LOCATION DETAILED: RIGHT SUPERIOR MEDIAL MIDBACK
LOCATION DETAILED: LEFT ANTERIOR PROXIMAL THIGH
LOCATION DETAILED: LEFT RADIAL DORSAL HAND
LOCATION DETAILED: LEFT INFERIOR HELIX
LOCATION DETAILED: RIGHT POSTERIOR SHOULDER
LOCATION DETAILED: MIDDLE STERNUM
LOCATION DETAILED: RIGHT RADIAL DORSAL HAND
LOCATION DETAILED: LEFT ANTERIOR DISTAL THIGH
LOCATION DETAILED: RIGHT ANTERIOR PROXIMAL UPPER ARM
LOCATION DETAILED: EPIGASTRIC SKIN
LOCATION DETAILED: RIGHT ANTERIOR DISTAL THIGH
LOCATION DETAILED: LEFT MEDIAL FOREHEAD
LOCATION DETAILED: RIGHT MEDIAL SUPERIOR CHEST
LOCATION DETAILED: INFERIOR THORACIC SPINE
LOCATION DETAILED: LEFT POSTERIOR SHOULDER

## 2022-07-27 ASSESSMENT — LOCATION SIMPLE DESCRIPTION DERM
LOCATION SIMPLE: LEFT UPPER ARM
LOCATION SIMPLE: RIGHT UPPER ARM
LOCATION SIMPLE: LEFT EAR
LOCATION SIMPLE: RIGHT HAND
LOCATION SIMPLE: LEFT HAND
LOCATION SIMPLE: LEFT SHOULDER
LOCATION SIMPLE: RIGHT LOWER BACK
LOCATION SIMPLE: LEFT UPPER BACK
LOCATION SIMPLE: CHEST
LOCATION SIMPLE: ABDOMEN
LOCATION SIMPLE: LEFT FOREHEAD
LOCATION SIMPLE: RIGHT SHOULDER
LOCATION SIMPLE: UPPER BACK
LOCATION SIMPLE: LEFT THIGH
LOCATION SIMPLE: RIGHT THIGH

## 2022-07-27 ASSESSMENT — LOCATION ZONE DERM
LOCATION ZONE: HAND
LOCATION ZONE: TRUNK
LOCATION ZONE: LEG
LOCATION ZONE: ARM
LOCATION ZONE: EAR
LOCATION ZONE: FACE

## 2022-07-27 NOTE — TELEPHONE ENCOUNTER
FABIANOM.  Good afternoon Alexandra.  This is Erika from Dr. Beck's office.  As per Dr. Beck's last office visit notes you are to have labs and a visit.  At this visit she will discuss Cat scan.  If you have any questions please call [phone].  Have a great day.

## 2022-07-27 NOTE — TELEPHONE ENCOUNTER
Pt called with questions regarding a Cat scan being done before her next appt.    Pt can be reached at

## 2022-07-27 NOTE — PROCEDURE: FULL BODY SKIN EXAM
Instructions: This plan will send the code FBSE to the PM system.  DO NOT or CHANGE the price.
Body Of Note (Please Add Your Own Text Here): monitor annually
Detail Level: Generalized
Price (Do Not Change): 0.00

## 2022-08-04 LAB
BASOPHILS # BLD AUTO: 0 X10E3/UL (ref 0–0.2)
BASOPHILS NFR BLD AUTO: 1 %
EOSINOPHIL # BLD AUTO: 0.2 X10E3/UL (ref 0–0.4)
EOSINOPHIL NFR BLD AUTO: 3 %
ERYTHROCYTE [DISTWIDTH] IN BLOOD BY AUTOMATED COUNT: 12.9 % (ref 11.7–15.4)
HCT VFR BLD AUTO: 44 % (ref 34–46.6)
HGB BLD-MCNC: 14.9 G/DL (ref 11.1–15.9)
IMM GRANULOCYTES # BLD AUTO: 0 X10E3/UL (ref 0–0.1)
IMM GRANULOCYTES NFR BLD AUTO: 0 %
LYMPHOCYTES # BLD AUTO: 2.4 X10E3/UL (ref 0.7–3.1)
LYMPHOCYTES NFR BLD AUTO: 33 %
MCH RBC QN AUTO: 32 PG (ref 26.6–33)
MCHC RBC AUTO-ENTMCNC: 33.9 G/DL (ref 31.5–35.7)
MCV RBC AUTO: 94 FL (ref 79–97)
MONOCYTES # BLD AUTO: 0.6 X10E3/UL (ref 0.1–0.9)
MONOCYTES NFR BLD AUTO: 8 %
NEUTROPHILS # BLD AUTO: 4 X10E3/UL (ref 1.4–7)
NEUTROPHILS NFR BLD AUTO: 55 %
PLATELET # BLD AUTO: 286 X10E3/UL (ref 150–450)
RBC # BLD AUTO: 4.66 X10E6/UL (ref 3.77–5.28)
WBC # BLD AUTO: 7.2 X10E3/UL (ref 3.4–10.8)

## 2022-08-05 LAB
ALBUMIN SERPL-MCNC: 3.6 G/DL (ref 3.6–4.6)
ALBUMIN/GLOB SERPL: 1.3 {RATIO} (ref 1.2–2.2)
ALP SERPL-CCNC: 77 IU/L (ref 44–121)
ALT SERPL-CCNC: 10 IU/L (ref 0–32)
AST SERPL-CCNC: 15 IU/L (ref 0–40)
BILIRUB SERPL-MCNC: 0.2 MG/DL (ref 0–1.2)
BUN SERPL-MCNC: 22 MG/DL (ref 8–27)
BUN/CREAT SERPL: 25 (ref 12–28)
CALCIUM SERPL-MCNC: 9.5 MG/DL (ref 8.7–10.3)
CHLORIDE SERPL-SCNC: 109 MMOL/L (ref 96–106)
CHOLEST SERPL-MCNC: 137 MG/DL (ref 100–199)
CO2 SERPL-SCNC: 26 MMOL/L (ref 20–29)
CREAT SERPL-MCNC: 0.88 MG/DL (ref 0.57–1)
EGFRCR SERPLBLD CKD-EPI 2021: 65 ML/MIN/1.73
GLOBULIN SER CALC-MCNC: 2.8 G/DL (ref 1.5–4.5)
GLUCOSE SERPL-MCNC: 99 MG/DL (ref 65–99)
HDLC SERPL-MCNC: 75 MG/DL
LDLC SERPL CALC-MCNC: 49 MG/DL (ref 0–99)
POTASSIUM SERPL-SCNC: 5.2 MMOL/L (ref 3.5–5.2)
PROT SERPL-MCNC: 6.4 G/DL (ref 6–8.5)
SODIUM SERPL-SCNC: 146 MMOL/L (ref 134–144)
T4 FREE SERPL-MCNC: 1.46 NG/DL (ref 0.82–1.77)
TRIGL SERPL-MCNC: 62 MG/DL (ref 0–149)
TSH SERPL DL<=0.005 MIU/L-ACNC: 2.21 UIU/ML (ref 0.45–4.5)
VLDLC SERPL CALC-MCNC: 13 MG/DL (ref 5–40)

## 2022-08-15 NOTE — PROGRESS NOTES
Cardiology Note    Kunal La DO Constmichelle Bird is a 86 y.o. 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 5 aortic root 4.3 cm  She has nonobstructive CAD diagnosed by coronary CT angiogram June 2021 with corresponding calcium score of 40  History of paroxysmal atrial fibrillation seen on monitor in 2018 2% burden she is anticoagulated with Xarelto      Lab work August 2022  Thyroids normal  LDL 49  Creatinine 0.88 potassium 5.2  CBC normal                      Patient Active Problem List    Diagnosis Date Noted   • SVT (supraventricular tachycardia) (CMS/[facility]) 07/20/2021   • Pulmonary nodule 11/04/2020   • Dilated aortic root (CMS/[facility]) 08/02/2019   • RBBB 08/02/2018   • PAF (paroxysmal atrial fibrillation) (CMS/[facility]) 04/24/2018   • Coronary artery disease involving native coronary artery of native heart without angina pectoris 04/08/2018   • Nicotine dependence in remission 04/08/2018   • Multinodular goiter 03/13/2018   • Mixed hyperlipidemia 03/13/2018   • 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     • LORazepam (ATIVAN) 1 mg tablet Take 1 mg by mouth every 12 (twelve) hours as needed.     • metoprolol succinate XL (TOPROL-XL) 25 mg 24 hr tablet Take 1 tablet (25 mg total) by mouth 2 (two) times  a day. 180 tablet 3   • pantoprazole (PROTONIX) 40 mg EC tablet Take 40 mg by mouth as needed.       • rivaroxaban (XARELTO) 20 mg tablet TAKE 1 TABLET(20 MG) BY MOUTH DAILY WITH DINNER 90 tablet 6     No current facility-administered medications for this visit.        ROS    Labs   Lab Results   Component Value Date    WBC 7.2 08/04/2022    HGB 14.9 08/04/2022    HCT 44.0 08/04/2022     08/04/2022    CHOL 137 08/04/2022    TRIG 62 08/04/2022    HDL 75 08/04/2022    LDLCALC 49 08/04/2022    ALT 10 08/04/2022    AST 15 08/04/2022     (H) 08/04/2022    K 5.2 08/04/2022     (H) 08/04/2022    CREATININE 0.88 08/04/2022    BUN 22 08/04/2022    CO2 26 08/04/2022    TSH 2.210 08/04/2022   [id number]    Lab Results   Component Value Date    GLUCOSE 99 08/04/2022    CALCIUM 9.6 01/22/2020     (H) 08/04/2022    K 5.2 08/04/2022    CO2 26 08/04/2022     (H) 08/04/2022    BUN 22 08/04/2022    CREATININE 0.88 08/04/2022       Objective   Vitals:    08/23/22 1529   BP: 118/66   BP Location: Left upper arm   Patient Position: Sitting   Pulse: 67   Resp: 18   SpO2: 98%   Weight: 67.6 kg (149 lb)     Physical Exam  Constitutional:       General: She is not in acute distress.     Appearance: She is well-developed.   HENT:      Head: Normocephalic and atraumatic.      Nose: Nose normal.   Eyes:      General: No scleral icterus.     Conjunctiva/sclera: Conjunctivae normal.   Neck:      Vascular: No JVD.   Cardiovascular:      Rate and Rhythm: Normal rate and regular rhythm.      Pulses: Intact distal pulses.      Heart sounds: Normal heart sounds. No murmur heard.    No friction rub. No gallop.      Comments: Systolic click  1/6 systolic murmur second left intercostal space  Pulmonary:      Effort: Pulmonary effort is normal. No respiratory distress.      Breath sounds: No stridor. No wheezing or rales.   Chest:      Chest wall: No tenderness.   Abdominal:      Tenderness: There is no abdominal tenderness.    Musculoskeletal:         General: No deformity.   Skin:     General: Skin is warm and dry.   Neurological:      Mental Status: She is alert and oriented to person, place, and time.         Cardiac Procedures      STRESS    Stress echocardiogram March 2017 moderate pulmonic insufficiency pulmonary artery for mild mitral regurgitation negative for ischemia     Stress echo 2/19   vpc in recovery neg ishcmia  VPCs during test apical septal abnormal wall motion related to that      ECHO  Echo 4/18 pulm a 4.2 aorta 3.7cm    Echo oc 2020  dilated LA mild mr aorta 3.7 PA 4.1   mild PI prom eus valve     Echo feb 2022  Mac mild mr  Mod pi pa 4.1 cm stable  Nl ef                 OTHER  Ultrasound thyroid July 2017 11 mm nodule       CAT SCAN   Chest CTA April 2018 no change from 2017 dilated pulmonary artery 4.7 thyroid nodules coronary artery calcifications renal cyst Lrhyr mass    cta 1/19 [facility] pul artery 4.2 mildy dilated aortic root      cta 5/19 pulmonary artery 47 mm ectatic right pulmonary artery 31 mm aortic root 4.3    CTA July 2020 aorta 4.7  MM 6 mm right upper lobe probably seen prior        Cor cta June 2021  non obst cad    :pulm artery 4.8 cm aorta 3.9 cm aortic root 4.3 cm  1.  Coronary Arteries: Mild nonobstructive coronary artery disease.  No evidence  of flow-limiting stenosis by FFR CT.  2.  Cardiac Structures: Small left ventricular cavity with concentric left  ventricular hypertrophy.  Bi-atrial enlargement.  Focally calcified trileaflet  aortic valve.  3.  Mildly dilated aortic root at the sinuses of Valsalva.  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  4.  Normal left ventricular systolic function.  5.  Coronary calcium score 41.  This places the patient in the WAYNE 25% risk  percentile for age and gender matched individuals.  LAD:  Proximal: Patent.  Mid: Calcified plaque causing minimal (less than 30%) stenosis.   Misregistration  artifact.  Distal: Patent.  FFR CT in the distal vessel is 0.86 suggesting there is no flow  limiting stenosis.  DIAG 1: Small vessel.  Patent.  DIAG 2: Small vessel.  Patent.     LCX:  Proximal: Calcified plaque at the origin of the first obtuse marginal causing  mild (30-50%) stenosis.  Mid: Patent.  Distal: Patent.  FFR CT in the distal vessel is 0.94 suggesting no flow limiting  stenosis.  OM1:  Patent.  OM2:  Patent.     RCA:  Proximal: Patent.    LUNG BRUNER: Previously described right upper lobe nodule is somewhat less  prominent on today's examination measuring approximately 4 mm, previously  measuring 6 mm. Dependent hypoventilatory changes are noted.  LYMPH NODES: No pathologically enlarged lymph nodes visualized.  OSSEOUS STRUCTURES: No evidence of suspicious destructive bony lesions.     ELECTROPHYSIOLOGY    HOLTER July 2021  SHORT BURSTS NON SUST AVG UP TO 20 BEATS  The patient was monitored for 24 hours.   2. The predominant rhythm was sinus bradycardia. Ectopic atrial rhythm was noted.   3. The average heart rate was 59 bpm.   4. The minimum heart rate was 49 bpm.   5. The maximum heart rate was 111 bpm.   6. There were 35 supraventricular beats. There were 9 SVE tachycardias. The longest lasted 16 beatd 6.7 secs with an average  heart rate of 142 bpm at   4:22 pm on 7/20/2021. The fastest lasted 3 beats 1.2 secs with an average heart rate of 148 bpm at  5:11 pm on 7/20/2021.   7. There were 156 premature ventricular beats. 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 Fajardo  Echo today all numbers [facility] pulmonary artery four-point 1 aortic root 3.7 by CTA aortic root was 4  Continue yearly imaging  Next year we will get coronary CT angiogram to image her coronary arteries also will be  due May 2020    Coronary artery disease involving native coronary artery of native heart without angina pectoris  This was diagnosed by calcification seen on CAT scan stress echo was negative in February 2019 she does have right bundle branch block  Plan coronary CT angiogram with imaging of her pulmonary thoracic aneurysm in May 2020    PAF (paroxysmal atrial fibrillation) (CMS/HCC) (HCC)  Seen on a monitor in April 2018 2% of total heartbeats no clinical recurrence remains anticoagulated with Xarelto      Mixed hyperlipidemia  Phenomenal result to the response to the PCSK9 inhibitor presently LDL cholesterol is now 40 she is intolerant to statins and Zetia with GI upset    Multinodular goiter  Had negative biopsy 2017 follows with Dr. 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 Bolton September will have CTA of chest prior to that visit    She is on PCSK9 inhibitor for hyperlipidemia, intolerant to statins  Her last CAT scan was June 2021 size of aorta and pulmonary arteries stable echocardiogram was performed February 2022 stable  For follow-up in aortic wellness clinic in September 2022 she will have CTA of chest prior to that visit prescription given  I will see her back in 9 months with lab work only and echocardiogram  To consider repeat coronary CT angiogram in a few years    Kunal Kramer,

## 2022-08-22 ENCOUNTER — TELEPHONE (OUTPATIENT)
Dept: CARDIOTHORACIC SURGERY | Facility: CLINIC | Age: 87
End: 2022-08-22
Payer: COMMERCIAL

## 2022-08-22 DIAGNOSIS — I71.20 THORACIC AORTIC ANEURYSM WITHOUT RUPTURE (CMS/HCC): Primary | ICD-10-CM

## 2022-08-22 NOTE — TELEPHONE ENCOUNTER
Pt has upcoming appointment. Pt needs CTA chest. Please extend the order and labs.        Thank you :)

## 2022-08-23 ENCOUNTER — OFFICE VISIT (OUTPATIENT)
Dept: CARDIOLOGY | Facility: CLINIC | Age: 87
End: 2022-08-23
Payer: COMMERCIAL

## 2022-08-23 VITALS
WEIGHT: 149 LBS | DIASTOLIC BLOOD PRESSURE: 66 MMHG | BODY MASS INDEX: 28.15 KG/M2 | SYSTOLIC BLOOD PRESSURE: 118 MMHG | HEART RATE: 67 BPM | RESPIRATION RATE: 18 BRPM | OXYGEN SATURATION: 98 %

## 2022-08-23 DIAGNOSIS — E04.2 MULTINODULAR GOITER: ICD-10-CM

## 2022-08-23 DIAGNOSIS — I25.10 CORONARY ARTERY DISEASE INVOLVING NATIVE CORONARY ARTERY OF NATIVE HEART WITHOUT ANGINA PECTORIS: ICD-10-CM

## 2022-08-23 DIAGNOSIS — I28.1 PULMONARY ARTERY ANEURYSM (CMS/HCC): Primary | ICD-10-CM

## 2022-08-23 DIAGNOSIS — I48.0 PAF (PAROXYSMAL ATRIAL FIBRILLATION) (CMS/HCC): ICD-10-CM

## 2022-08-23 DIAGNOSIS — E78.2 MIXED HYPERLIPIDEMIA: ICD-10-CM

## 2022-08-23 DIAGNOSIS — I77.810 DILATED AORTIC ROOT (CMS/HCC): ICD-10-CM

## 2022-08-23 PROCEDURE — 3008F BODY MASS INDEX DOCD: CPT | Performed by: INTERNAL MEDICINE

## 2022-08-23 PROCEDURE — 93000 ELECTROCARDIOGRAM COMPLETE: CPT | Performed by: INTERNAL MEDICINE

## 2022-08-23 PROCEDURE — 99214 OFFICE O/P EST MOD 30 MIN: CPT | Performed by: INTERNAL MEDICINE

## 2022-08-23 RX ORDER — LORAZEPAM 1 MG/1
1 TABLET ORAL EVERY 12 HOURS PRN
COMMUNITY
Start: 2022-07-19 | End: 2023-04-11 | Stop reason: SDUPTHER

## 2022-08-23 NOTE — ASSESSMENT & PLAN NOTE
Nonobstructive CAD coronary CT angiogram nonobstructive disease single-vessel 30 to 50% lesion proximal circumflex overall coronary calcium score 41 CTA performed June 2021 normal LV systolic function echo June 2021

## 2022-08-23 NOTE — ASSESSMENT & PLAN NOTE
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

## 2022-09-15 LAB
BUN SERPL-MCNC: 17 MG/DL (ref 8–27)
CREAT SERPL-MCNC: 0.74 MG/DL (ref 0.57–1)
EGFRCR-CYS SERPLBLD CKD-EPI 2021: 79 ML/MIN/1.73

## 2022-09-20 ENCOUNTER — HOSPITAL ENCOUNTER (OUTPATIENT)
Dept: RADIOLOGY | Facility: HOSPITAL | Age: 87
Discharge: HOME | End: 2022-09-20
Attending: PHYSICIAN ASSISTANT
Payer: COMMERCIAL

## 2022-09-20 ENCOUNTER — OFFICE VISIT (OUTPATIENT)
Dept: CARDIOLOGY | Facility: CLINIC | Age: 87
End: 2022-09-20
Payer: COMMERCIAL

## 2022-09-20 VITALS
RESPIRATION RATE: 16 BRPM | BODY MASS INDEX: 28.51 KG/M2 | HEIGHT: 61 IN | OXYGEN SATURATION: 95 % | SYSTOLIC BLOOD PRESSURE: 110 MMHG | HEART RATE: 109 BPM | DIASTOLIC BLOOD PRESSURE: 74 MMHG | WEIGHT: 151 LBS

## 2022-09-20 DIAGNOSIS — I71.20 THORACIC AORTIC ANEURYSM WITHOUT RUPTURE (CMS/HCC): ICD-10-CM

## 2022-09-20 DIAGNOSIS — I28.1 PULMONARY ARTERY ANEURYSM (CMS/HCC): Primary | ICD-10-CM

## 2022-09-20 PROCEDURE — 3008F BODY MASS INDEX DOCD: CPT | Performed by: PHYSICIAN ASSISTANT

## 2022-09-20 PROCEDURE — G1004 CDSM NDSC: HCPCS

## 2022-09-20 PROCEDURE — 63600105 HC IODINE BASED CONTRAST: Performed by: PHYSICIAN ASSISTANT

## 2022-09-20 PROCEDURE — 99214 OFFICE O/P EST MOD 30 MIN: CPT | Performed by: PHYSICIAN ASSISTANT

## 2022-09-20 RX ADMIN — IOHEXOL 100 ML: 350 INJECTION, SOLUTION INTRAVENOUS at 13:52

## 2022-09-20 NOTE — LETTER
September 25, 2022     Kunal La, DO  [address] [facility] [address]    Patient: Teresa Bird  YOB: 1935  Date of Visit: 9/20/2022      Dear Dr. La:    Thank you for referring Teresa Bird to me for evaluation. Below are my notes for this consultation.    If you have questions, please do not hesitate to call me. I look forward to following your patient along with you.         Sincerely,        ETHEL Cody        CC: MD Megan Gonsalez Maysoon M, PA C  9/25/2022  6:10 PM  Sign when Signing Visit     [facility]  Dr. Alf Bolton- System Chief Cardiothoracic Surgery  Carey Guzman PA-C- Aortic   [facility]  [phone]         Reason for visit:   Chief Complaint   Patient presents with    Annual Exam     1 yr f/u Pulmonary artery aneurysm with TTE & CTA      HPI    Teresa Bird is a 87 y.o. 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 Ms. Bird's PMH is significant for atrial fibrillation on xarelto, Uterine CA, Arthritis and GERD. She continues to perform normal daily activities without limitation and currently denies chest pain, shortness of breath, palpitations or lightheadedness.      Past Medical History:   Diagnosis Date    Arthritis     Cancer, uterine (CMS/HCC)     Closed right ankle fracture age 62    GERD (gastroesophageal reflux disease)     Lipid disorder     difficulty tolerating statins    Melanoma (CMS/HCC)     left shoulder    Osteoporosis     did not tolerate fosamax     Past Surgical History:   Procedure Laterality Date    CHOLECYSTECTOMY      HYSTERECTOMY  12/2013    [facility]       Penicillin, Pravastatin sodium, Rosuvastatin calcium, and Zetia [ezetimibe]  Current Outpatient Medications    Medication Sig Dispense Refill    alirocumab (PRALUENT PEN) 150 mg/mL pen injector Inject 1 ml (150 mg) under skin every 14 days 6 pen 6    cetirizine (ZyrTEC) 5 mg tablet Take 5 mg by mouth daily.      LORazepam (ATIVAN) 1 mg tablet Take 1 mg by mouth every 12 (twelve) hours as needed.      metoprolol succinate XL (TOPROL-XL) 25 mg 24 hr tablet Take 1 tablet (25 mg total) by mouth 2 (two) times a day. 180 tablet 3    pantoprazole (PROTONIX) 40 mg EC tablet Take 40 mg by mouth as needed.        rivaroxaban (XARELTO) 20 mg tablet TAKE 1 TABLET(20 MG) BY MOUTH DAILY WITH DINNER 90 tablet 6     No current facility-administered medications for this visit.     Social History     Socioeconomic History    Marital status:      Spouse name: None    Number of children: None    Years of education: None    Highest education level: None   Tobacco Use    Smoking status: Former Smoker     Quit date:      Years since quittin.7    Smokeless tobacco: Never Used    Tobacco comment: Quit    Vaping Use    Vaping Use: Never used   Substance and Sexual Activity    Alcohol use: Yes     Comment: social    Drug use: No    Sexual activity: Defer     Family History   Problem Relation Age of Onset    Heart attack Biological Mother     Diabetes Biological Mother     Lung cancer Biological Mother     COPD Biological Mother     Diabetes Biological Father     Prostate cancer Biological Brother         Current Outpatient Medications:     alirocumab (PRALUENT PEN) 150 mg/mL pen injector, Inject 1 ml (150 mg) under skin every 14 days, Disp: 6 pen, Rfl: 6    cetirizine (ZyrTEC) 5 mg tablet, Take 5 mg by mouth daily., Disp: , Rfl:     LORazepam (ATIVAN) 1 mg tablet, Take 1 mg by mouth every 12 (twelve) hours as needed., Disp: , Rfl:     metoprolol succinate XL (TOPROL-XL) 25 mg 24 hr tablet, Take 1 tablet (25 mg total) by mouth 2 (two) times a day., Disp: 180 tablet, Rfl: 3    pantoprazole (PROTONIX) 40  mg EC tablet, Take 40 mg by mouth as needed.  , Disp: , Rfl:     rivaroxaban (XARELTO) 20 mg tablet, TAKE 1 TABLET(20 MG) BY MOUTH DAILY WITH DINNER, Disp: 90 tablet, Rfl: 6    Review of Systems   Constitutional: Negative.   HENT: Negative.    Eyes: Negative.    Respiratory: Negative.    Endocrine: Negative.    Skin: Negative.    Musculoskeletal: Negative.    Gastrointestinal: Negative.    Genitourinary: Negative.    Neurological: Negative.    Psychiatric/Behavioral: Negative.    Allergic/Immunologic: Negative.       Objective    Vitals:    09/20/22 1433   BP: 110/74   Pulse: (!) 109   Resp: 16   SpO2: 95%      Physical Exam  Constitutional:       General: She is not in acute distress.     Appearance: She is well-developed. She is not diaphoretic.   HENT:      Head: Normocephalic and atraumatic.      Right Ear: External ear normal.      Left Ear: External ear normal.      Nose: Nose normal.   Eyes:      Conjunctiva/sclera: Conjunctivae normal.      Pupils: Pupils are equal, round, and reactive to light.   Cardiovascular:      Rate and Rhythm: Normal rate and regular rhythm.      Pulses: Intact distal pulses.      Heart sounds: No murmur heard.    No friction rub. No gallop.   Pulmonary:      Effort: Pulmonary effort is normal. No respiratory distress.      Breath sounds: No wheezing or rales.   Chest:      Chest wall: No tenderness.   Abdominal:      General: Bowel sounds are normal. There is no distension.      Palpations: Abdomen is soft. There is no mass.      Tenderness: There is no abdominal tenderness. There is no guarding or rebound.   Musculoskeletal:         General: No tenderness. Normal range of motion.      Cervical back: Normal range of motion and neck supple.   Skin:     General: Skin is warm and dry.      Coloration: Skin is not pale.      Findings: No erythema or rash.   Neurological:      Mental Status: She is alert and oriented to person, place, and time.      Deep Tendon Reflexes: Reflexes are  normal and symmetric.   Psychiatric:         Behavior: Behavior normal.         Thought Content: Thought content normal.         Judgment: Judgment normal.                 Imaging:    CTA Chest 9/20/22:    Technique: CT angiography of the chest was performed prior to and after the  administration of intravenous contrast.  Coronal and sagittal reformatted images  were obtained.     3D MIP and/or volume rendered image reconstruction performed and reviewed.     100 mL of Omnipaque 350 was administered.     CT DOSE:  One or more dose reduction techniques (e.g. automated exposure  control, adjustment of the mA and/or kV according to patient size, use of  iterative reconstruction technique) utilized for this examination. .     Comparison studies: 4/30/2019, 7/27/2020     CT Angiography:     Pulmonary artery: Aneurysmal dilatation of the main pulmonary artery and right  main artery is again noted.  In an index location, as noted on image #48, there  is stable interval appearance in size of the main pulmonary artery, which  measures up to 47 mm in diameter.  Additionally, as noted on image #50, there is  stable size and appearance of the ectatic right main pulmonary artery, which  measures up to 31 mm in diameter.  As noted on image #39, the left main  pulmonary artery also maintains stable size, measuring up to 24 mm.  There is no  mural thrombus or dissection.  There is no evidence of pulmonary emboli.        CHEST:     Lungs:  As noted on image #45, previously noted 6 mm nodular opacity within the  right upper lobe has resolved, suggesting that this is likely postinflammatory  in nature. There are mild dependent atelectatic changes.  Otherwise, no  suspicious airspace consolidation is noted.  As noted on image #18, there is a  stable 2 mm subpleural right upper lobe pulmonary nodule.  No new suspicious  pulmonary nodules or masses are identified.  The central airways are patent..  Mediastinum/Alba/Heart: There are no  pathologically enlarged mediastinal or  hilar lymph nodes. There is no significant pericardial effusion.  A  heterogeneous thyroid gland is again noted.  Chest wall/Pleura:  There is no evidence of pneumothorax or pleural effusion.  Axilla:  No pathologically enlarged lymph nodes are identified.     Bones:  No significant interval change. No new suspicious lytic or sclerotic  lesion..  Upper Abdomen: Limited evaluation.  No significant interval change.     --  IMPRESSION:     1.  Stable interval appearance of the pulmonary artery aneurysm involving the  main pulmonary artery, which measures up to 47 mm in diameter..         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       Findings    Left Ventricle Normal ventricle size. Mild concentric left ventricular hypertrophy. No gradient present. Preserved systolic function. Estimated EF 60- 65%. Normal septal motion. No regional wall motion abnormalities. Grade II diastolic dysfunction. Diastolic inflow pattern consistent with impaired relaxation.   Right Ventricle Mildly dilated ventricle size. Normal systolic function.   Left Atrium Moderately dilated atrium.  Mobile atrial septum present.   Right Atrium Mildly dilated atrium.   Aorta Aortic root normal. Sinuses of Valsalva normal-sized. Ascending aorta normal-sized. Aortic arch normal-sized. Descending aorta normal-sized.    Aortic Valve Normal structure.Tricuspid valve. Sclerotic leaflets.   No regurgitation. No stenosis. Mean gradient = mmHg. Peak velocity = 1.11 m/s. Calculated area = 2.69 cm2.   Mitral Valve Normal leaflet structure and normal leaflet motion. Mitral annular calcification. Mild to moderate regurgitation. No stenosis.   Tricuspid Valve Normal structure. Mild regurgitation. Estimated RVSP = 40 mmHg.  No significant stenosis.   Pulmonic Valve Mild to moderate regurgitation.Mildly dilated pulmonary artery. Dilated at 4.1cm   IVC/SVC IVC is <2.1cm. IVC demonstrates normal respiratory collapse.   Pericardium Normal structure. No evidence of pericardial effusion. No cardiac tamponade.                  Assessment/Plan    No problem-specific Assessment & Plan notes found for this encounter.      Ms. 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 I independently reviewed her latest CTA chest performed today, using the [employer] 3D program. The pulmonary artery measures 4.8 cm without interval change. The aortic root remains unchanged at 4.3 cm. There is no evidence of dissection. The patient remains asymptomatic. At this time I'd like to see Ms. Bird in one year with repeat CTA Chest. She was advised to continue following regularly with Dr. Beck for her cardioavascular care and to contact us with any questions or concerns.     The following recommendations were made to the patient: Do not lift in excess of 40 pounds.  Heavy lifting or straining causes brief spikes in blood pressure, which we would like to avoid.  The patient was also counseled on exercise parameters and was advised low impact cardiovascular exercise is ok and may do light weightlifting.  We have recommended that the patient monitor their heart rate during exercise.  The patient was also counseled on the importance of maintaining good blood pressure control and was  advised to follow regularly with their cardiologist/PCP. Given the FDA safety statement regarding the use of Fluoroquinolones (increased risk of aneurysmal growth or aortic dissection) in patients with aortic disease, they were advised to avoid this class of antibiotics when possible.  In the unlikely event of sudden onset of chest pain or pain that radiates to the upper back, they were advised to report to the closest ED and have our service notified.          Thank you for allowing me to participate in the care of this patient.  I hope this information is helpful.     ETHEL Cody 9/23/2022  4:33 PM

## 2022-09-23 ASSESSMENT — ENCOUNTER SYMPTOMS
MUSCULOSKELETAL NEGATIVE: 1
PSYCHIATRIC NEGATIVE: 1
EYES NEGATIVE: 1
ALLERGIC/IMMUNOLOGIC NEGATIVE: 1
NEUROLOGICAL NEGATIVE: 1
GASTROINTESTINAL NEGATIVE: 1
ENDOCRINE NEGATIVE: 1
RESPIRATORY NEGATIVE: 1
CONSTITUTIONAL NEGATIVE: 1

## 2022-09-23 NOTE — PROGRESS NOTES
[facility]  Dr. Alf Bolton- System Chief Cardiothoracic Surgery  Carey Guzman PA-C- Aortic   [facility]  [phone]         Reason for visit:   Chief Complaint   Patient presents with    Annual Exam     1 yr f/u Pulmonary artery aneurysm with TTE & CTA      HPI    Teresa Bird is a 87 y.o. 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 Ms. Bird's PMH is significant for atrial fibrillation on xarelto, Uterine CA, Arthritis and GERD. She continues to perform normal daily activities without limitation and currently denies chest pain, shortness of breath, palpitations or lightheadedness.      Past Medical History:   Diagnosis Date    Arthritis     Cancer, uterine (CMS/HCC)     Closed right ankle fracture age 62    GERD (gastroesophageal reflux disease)     Lipid disorder     difficulty tolerating statins    Melanoma (CMS/HCC)     left shoulder    Osteoporosis     did not tolerate fosamax     Past Surgical History:   Procedure Laterality Date    CHOLECYSTECTOMY      HYSTERECTOMY  12/2013    [facility]       Penicillin, Pravastatin sodium, Rosuvastatin calcium, and Zetia [ezetimibe]  Current Outpatient Medications   Medication Sig Dispense Refill    alirocumab (PRALUENT PEN) 150 mg/mL pen injector Inject 1 ml (150 mg) under skin every 14 days 6 pen 6    cetirizine (ZyrTEC) 5 mg tablet Take 5 mg by mouth daily.      LORazepam (ATIVAN) 1 mg tablet Take 1 mg by mouth every 12 (twelve) hours as needed.      metoprolol succinate XL (TOPROL-XL) 25 mg 24 hr tablet Take 1 tablet (25 mg total) by mouth 2 (two) times a day. 180 tablet 3    pantoprazole (PROTONIX) 40 mg EC tablet Take 40 mg by mouth as needed.        rivaroxaban (XARELTO) 20 mg tablet TAKE 1 TABLET(20 MG) BY  MOUTH DAILY WITH DINNER 90 tablet 6     No current facility-administered medications for this visit.     Social History     Socioeconomic History    Marital status:      Spouse name: None    Number of children: None    Years of education: None    Highest education level: None   Tobacco Use    Smoking status: Former Smoker     Quit date:      Years since quittin.7    Smokeless tobacco: Never Used    Tobacco comment: Quit 2018   Vaping Use    Vaping Use: Never used   Substance and Sexual Activity    Alcohol use: Yes     Comment: social    Drug use: No    Sexual activity: Defer     Family History   Problem Relation Age of Onset    Heart attack Biological Mother     Diabetes Biological Mother     Lung cancer Biological Mother     COPD Biological Mother     Diabetes Biological Father     Prostate cancer Biological Brother         Current Outpatient Medications:     alirocumab (PRALUENT PEN) 150 mg/mL pen injector, Inject 1 ml (150 mg) under skin every 14 days, Disp: 6 pen, Rfl: 6    cetirizine (ZyrTEC) 5 mg tablet, Take 5 mg by mouth daily., Disp: , Rfl:     LORazepam (ATIVAN) 1 mg tablet, Take 1 mg by mouth every 12 (twelve) hours as needed., Disp: , Rfl:     metoprolol succinate XL (TOPROL-XL) 25 mg 24 hr tablet, Take 1 tablet (25 mg total) by mouth 2 (two) times a day., Disp: 180 tablet, Rfl: 3    pantoprazole (PROTONIX) 40 mg EC tablet, Take 40 mg by mouth as needed.  , Disp: , Rfl:     rivaroxaban (XARELTO) 20 mg tablet, TAKE 1 TABLET(20 MG) BY MOUTH DAILY WITH DINNER, Disp: 90 tablet, Rfl: 6    Review of Systems   Constitutional: Negative.   HENT: Negative.    Eyes: Negative.    Respiratory: Negative.    Endocrine: Negative.    Skin: Negative.    Musculoskeletal: Negative.    Gastrointestinal: Negative.    Genitourinary: Negative.    Neurological: Negative.    Psychiatric/Behavioral: Negative.    Allergic/Immunologic: Negative.       Objective    Vitals:    22 1433   BP:  110/74   Pulse: (!) 109   Resp: 16   SpO2: 95%      Physical Exam  Constitutional:       General: She is not in acute distress.     Appearance: She is well-developed. She is not diaphoretic.   HENT:      Head: Normocephalic and atraumatic.      Right Ear: External ear normal.      Left Ear: External ear normal.      Nose: Nose normal.   Eyes:      Conjunctiva/sclera: Conjunctivae normal.      Pupils: Pupils are equal, round, and reactive to light.   Cardiovascular:      Rate and Rhythm: Normal rate and regular rhythm.      Pulses: Intact distal pulses.      Heart sounds: No murmur heard.    No friction rub. No gallop.   Pulmonary:      Effort: Pulmonary effort is normal. No respiratory distress.      Breath sounds: No wheezing or rales.   Chest:      Chest wall: No tenderness.   Abdominal:      General: Bowel sounds are normal. There is no distension.      Palpations: Abdomen is soft. There is no mass.      Tenderness: There is no abdominal tenderness. There is no guarding or rebound.   Musculoskeletal:         General: No tenderness. Normal range of motion.      Cervical back: Normal range of motion and neck supple.   Skin:     General: Skin is warm and dry.      Coloration: Skin is not pale.      Findings: No erythema or rash.   Neurological:      Mental Status: She is alert and oriented to person, place, and time.      Deep Tendon Reflexes: Reflexes are normal and symmetric.   Psychiatric:         Behavior: Behavior normal.         Thought Content: Thought content normal.         Judgment: Judgment normal.                 Imaging:    CTA Chest 9/20/22:    Technique: CT angiography of the chest was performed prior to and after the  administration of intravenous contrast.  Coronal and sagittal reformatted images  were obtained.     3D MIP and/or volume rendered image reconstruction performed and reviewed.     100 mL of Omnipaque 350 was administered.     CT DOSE:  One or more dose reduction techniques (e.g.  automated exposure  control, adjustment of the mA and/or kV according to patient size, use of  iterative reconstruction technique) utilized for this examination. .     Comparison studies: 4/30/2019, 7/27/2020     CT Angiography:     Pulmonary artery: Aneurysmal dilatation of the main pulmonary artery and right  main artery is again noted.  In an index location, as noted on image #48, there  is stable interval appearance in size of the main pulmonary artery, which  measures up to 47 mm in diameter.  Additionally, as noted on image #50, there is  stable size and appearance of the ectatic right main pulmonary artery, which  measures up to 31 mm in diameter.  As noted on image #39, the left main  pulmonary artery also maintains stable size, measuring up to 24 mm.  There is no  mural thrombus or dissection.  There is no evidence of pulmonary emboli.        CHEST:     Lungs:  As noted on image #45, previously noted 6 mm nodular opacity within the  right upper lobe has resolved, suggesting that this is likely postinflammatory  in nature. There are mild dependent atelectatic changes.  Otherwise, no  suspicious airspace consolidation is noted.  As noted on image #18, there is a  stable 2 mm subpleural right upper lobe pulmonary nodule.  No new suspicious  pulmonary nodules or masses are identified.  The central airways are patent..  Mediastinum/Alba/Heart: There are no pathologically enlarged mediastinal or  hilar lymph nodes. There is no significant pericardial effusion.  A  heterogeneous thyroid gland is again noted.  Chest wall/Pleura:  There is no evidence of pneumothorax or pleural effusion.  Axilla:  No pathologically enlarged lymph nodes are identified.     Bones:  No significant interval change. No new suspicious lytic or sclerotic  lesion..  Upper Abdomen: Limited evaluation.  No significant interval change.     --  IMPRESSION:     1.  Stable interval appearance of the pulmonary artery aneurysm involving the  main  pulmonary artery, which measures up to 47 mm in diameter..         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       Findings    Left Ventricle Normal ventricle size. Mild concentric left ventricular hypertrophy. No gradient present. Preserved systolic function. Estimated EF 60- 65%. Normal septal motion. No regional wall motion abnormalities. Grade II diastolic dysfunction. Diastolic inflow pattern consistent with impaired relaxation.   Right Ventricle Mildly dilated ventricle size. Normal systolic function.   Left Atrium Moderately dilated atrium.  Mobile atrial septum present.   Right Atrium Mildly dilated atrium.   Aorta Aortic root normal. Sinuses of Valsalva normal-sized. Ascending aorta normal-sized. Aortic arch normal-sized. Descending aorta normal-sized.   Aortic Valve Normal structure.Tricuspid valve. Sclerotic leaflets.   No regurgitation. No stenosis. Mean gradient = mmHg. Peak velocity = 1.11 m/s. Calculated area = 2.69 cm2.   Mitral Valve Normal leaflet structure and normal leaflet motion. Mitral annular calcification. Mild to moderate regurgitation. No stenosis.   Tricuspid Valve Normal structure. Mild regurgitation. Estimated RVSP = 40 mmHg.  No significant stenosis.   Pulmonic Valve Mild to moderate regurgitation.Mildly dilated pulmonary artery. Dilated at 4.1cm   IVC/SVC IVC is <2.1cm. IVC demonstrates  normal respiratory collapse.   Pericardium Normal structure. No evidence of pericardial effusion. No cardiac tamponade.                  Assessment/Plan    No problem-specific Assessment & Plan notes found for this encounter.      Ms. 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 I independently reviewed her latest CTA chest performed today, using the [employer] 3D program. The pulmonary artery measures 4.8 cm without interval change. The aortic root remains unchanged at 4.3 cm. There is no evidence of dissection. The patient remains asymptomatic. At this time I'd like to see Ms. Bird in one year with repeat CTA Chest. She was advised to continue following regularly with Dr. Beck for her cardioavascular care and to contact us with any questions or concerns.     The following recommendations were made to the patient: Do not lift in excess of 40 pounds.  Heavy lifting or straining causes brief spikes in blood pressure, which we would like to avoid.  The patient was also counseled on exercise parameters and was advised low impact cardiovascular exercise is ok and may do light weightlifting.  We have recommended that the patient monitor their heart rate during exercise.  The patient was also counseled on the importance of maintaining good blood pressure control and was advised to follow regularly with their cardiologist/PCP. Given the FDA safety statement regarding the use of Fluoroquinolones (increased risk of aneurysmal growth or aortic dissection) in patients with aortic disease, they were advised to avoid this class of antibiotics when possible.  In the unlikely event of sudden onset of chest pain or pain that radiates to the upper back, they were advised to report to the closest ED and have our service notified.          Thank you for allowing me to participate in the care of this patient.  I hope this information is  helpful.     ETHEL Cody 9/23/2022  4:33 PM

## 2022-11-15 ENCOUNTER — TELEPHONE (OUTPATIENT)
Dept: SCHEDULING | Facility: CLINIC | Age: 87
End: 2022-11-15
Payer: COMMERCIAL

## 2022-11-15 NOTE — TELEPHONE ENCOUNTER
Pt would like [employer] Patient Assistance Enrollment Form mailed to her home address.    Pt can be reached at [phone] with any questions.

## 2022-11-22 NOTE — TELEPHONE ENCOUNTER
Patients is requesting a call back to discuss picking up the form as she has not received it in the mail.    Patient can be reached at [phone]

## 2022-11-22 NOTE — TELEPHONE ENCOUNTER
Called and spoke to Alexandra.  Mailed the forms you should have them in a day or 2.  Mail has been extremely slow.  I do not have any more forms in office at this time.  If you do not get it by end of week call office back and will have forms by then.  She understands.

## 2023-03-20 LAB
BASOPHILS # BLD AUTO: 0 X10E3/UL (ref 0–0.2)
BASOPHILS NFR BLD AUTO: 1 %
EOSINOPHIL # BLD AUTO: 0.1 X10E3/UL (ref 0–0.4)
EOSINOPHIL NFR BLD AUTO: 2 %
ERYTHROCYTE [DISTWIDTH] IN BLOOD BY AUTOMATED COUNT: 12.6 % (ref 11.7–15.4)
HCT VFR BLD AUTO: 41.3 % (ref 34–46.6)
HGB BLD-MCNC: 14.2 G/DL (ref 11.1–15.9)
IMM GRANULOCYTES # BLD AUTO: 0 X10E3/UL (ref 0–0.1)
IMM GRANULOCYTES NFR BLD AUTO: 0 %
LYMPHOCYTES # BLD AUTO: 1.9 X10E3/UL (ref 0.7–3.1)
LYMPHOCYTES NFR BLD AUTO: 32 %
MCH RBC QN AUTO: 31.7 PG (ref 26.6–33)
MCHC RBC AUTO-ENTMCNC: 34.4 G/DL (ref 31.5–35.7)
MCV RBC AUTO: 92 FL (ref 79–97)
MONOCYTES # BLD AUTO: 0.3 X10E3/UL (ref 0.1–0.9)
MONOCYTES NFR BLD AUTO: 5 %
NEUTROPHILS # BLD AUTO: 3.7 X10E3/UL (ref 1.4–7)
NEUTROPHILS NFR BLD AUTO: 60 %
PLATELET # BLD AUTO: 258 X10E3/UL (ref 150–450)
RBC # BLD AUTO: 4.48 X10E6/UL (ref 3.77–5.28)
WBC # BLD AUTO: 6.1 X10E3/UL (ref 3.4–10.8)

## 2023-03-21 LAB
ALBUMIN SERPL-MCNC: 4.3 G/DL (ref 3.6–4.6)
ALBUMIN/GLOB SERPL: 2.2 {RATIO} (ref 1.2–2.2)
ALP SERPL-CCNC: 71 IU/L (ref 44–121)
ALT SERPL-CCNC: 13 IU/L (ref 0–32)
AST SERPL-CCNC: 19 IU/L (ref 0–40)
BILIRUB SERPL-MCNC: 0.5 MG/DL (ref 0–1.2)
BUN SERPL-MCNC: 16 MG/DL (ref 8–27)
BUN/CREAT SERPL: 22 (ref 12–28)
CALCIUM SERPL-MCNC: 9.9 MG/DL (ref 8.7–10.3)
CHLORIDE SERPL-SCNC: 106 MMOL/L (ref 96–106)
CHOLEST SERPL-MCNC: 141 MG/DL (ref 100–199)
CO2 SERPL-SCNC: 25 MMOL/L (ref 20–29)
CREAT SERPL-MCNC: 0.72 MG/DL (ref 0.57–1)
EGFRCR SERPLBLD CKD-EPI 2021: 82 ML/MIN/1.73
GLOBULIN SER CALC-MCNC: 2 G/DL (ref 1.5–4.5)
GLUCOSE SERPL-MCNC: 91 MG/DL (ref 70–99)
HDLC SERPL-MCNC: 76 MG/DL
LDLC SERPL CALC-MCNC: 51 MG/DL (ref 0–99)
POTASSIUM SERPL-SCNC: 5.4 MMOL/L (ref 3.5–5.2)
PROT SERPL-MCNC: 6.3 G/DL (ref 6–8.5)
SODIUM SERPL-SCNC: 144 MMOL/L (ref 134–144)
TRIGL SERPL-MCNC: 69 MG/DL (ref 0–149)
VLDLC SERPL CALC-MCNC: 14 MG/DL (ref 5–40)

## 2023-03-22 NOTE — PROGRESS NOTES
Cardiology Note    Sergei Cooley MD          Teresa Bird is a 87 y.o. female 1935     She returns for follow-up and echocardiogram.  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 she follows in aortic wellness clinic due for repeat imaging September 2023  y last imaged CTA September 2022, 4.7 cm, stable  Echocardiogram today March 2023  Stable findings   top normal aortic root size 4.1 cm aortic root 3.9 LVH preserved ejection fraction moderate mitral regurgitation mitral annular calcification  She has nonobstructive CAD based on coronary CT angiogram June 2021 with a calcium score 40  She has hyperlipidemia treated with PCSK9 inhibitor Praluent  2% burden of PAF was picked up on a monitor in 2018 she remains anticoagulated with Xarelto  History of multinodular goiter with negative biopsy in the past follows with endocrinology Dr Ha Wheatley        Lab work March 2023  CBC normal creatinine 0.72  Potassium 5.4  Cholesterol LDL 54                                  Patient Active Problem List    Diagnosis Date Noted   • SVT (supraventricular tachycardia) (CMS/[facility]) 07/20/2021   • Pulmonary nodule 11/04/2020   • Dilated aortic root (CMS/[facility]) 08/02/2019   • RBBB 08/02/2018   • PAF (paroxysmal atrial fibrillation) (CMS/Former[facility]) 04/24/2018   • Coronary artery disease involving native coronary artery of native heart without angina pectoris 04/08/2018   • Nicotine dependence in remission 04/08/2018   • Multinodular goiter 03/13/2018   • Mixed hyperlipidemia 03/13/2018   • 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[facility]) 04/04/2017   • Malignant neoplasm of uterus (CMS/[facility]) 03/22/2017       Allergy  Penicillin, Pravastatin sodium, Rosuvastatin calcium, and Zetia [ezetimibe]    MED LIST     Current Outpatient Medications   Medication Sig Dispense Refill   • alirocumab (PRALUENT PEN) 150 mg/mL pen injector Inject 1 ml (150 mg) under skin every 14 days 8 mL 3   • cetirizine (ZyrTEC) 5 mg tablet Take 5 mg by mouth  daily.     • LORazepam (ATIVAN) 1 mg tablet Take 1 mg by mouth every 12 (twelve) hours as needed.     • metoprolol succinate XL (TOPROL-XL) 25 mg 24 hr tablet Take 1 tablet (25 mg total) by mouth 2 (two) times a day. 180 tablet 3   • pantoprazole (PROTONIX) 40 mg EC tablet Take 40 mg by mouth as needed.       • rivaroxaban (XARELTO) 20 mg tablet TAKE 1 TABLET(20 MG) BY MOUTH DAILY WITH DINNER 90 tablet 6     No current facility-administered medications for this visit.        ROS    Labs   Lab Results   Component Value Date    WBC 6.1 03/20/2023    HGB 14.2 03/20/2023    HCT 41.3 03/20/2023     03/20/2023    CHOL 141 03/20/2023    TRIG 69 03/20/2023    HDL 76 03/20/2023    LDLCALC 51 03/20/2023    ALT 13 03/20/2023    AST 19 03/20/2023     03/20/2023    K 5.4 (H) 03/20/2023     03/20/2023    CREATININE 0.72 03/20/2023    BUN 16 03/20/2023    CO2 25 03/20/2023    TSH 2.210 08/04/2022   [id number]    Lab Results   Component Value Date    GLUCOSE 91 03/20/2023    CALCIUM 9.6 01/22/2020     03/20/2023    K 5.4 (H) 03/20/2023    CO2 25 03/20/2023     03/20/2023    BUN 16 03/20/2023    CREATININE 0.72 03/20/2023       Objective   Vitals:    03/23/23 1307   BP: 130/64   BP Location: Left upper arm   Patient Position: Sitting   Pulse: (!) 58   Resp: 18   SpO2: 99%   Weight: 68.9 kg (152 lb)   Height: 1.524 m (5')     Physical Exam  Constitutional:       General: She is not in acute distress.     Appearance: She is well-developed.   HENT:      Head: Normocephalic and atraumatic.      Nose: Nose normal.   Eyes:      General: No scleral icterus.     Conjunctiva/sclera: Conjunctivae normal.   Neck:      Vascular: No JVD.   Cardiovascular:      Rate and Rhythm: Normal rate and regular rhythm.      Pulses: Intact distal pulses.      Heart sounds: Normal heart sounds. No murmur heard.     No friction rub. No gallop.      Comments: Systolic click  1/6 systolic murmur second left intercostal  space  Pulmonary:      Effort: Pulmonary effort is normal. No respiratory distress.      Breath sounds: No stridor. No wheezing or rales.   Chest:      Chest wall: No tenderness.   Abdominal:      Tenderness: There is no abdominal tenderness.   Musculoskeletal:         General: No deformity.   Skin:     General: Skin is warm and dry.   Neurological:      Mental Status: She is alert and oriented to person, place, and time.         Cardiac Procedures      STRESS    Stress echocardiogram March 2017 moderate pulmonic insufficiency pulmonary artery for mild mitral regurgitation negative for ischemia     Stress echo 2/19   vpc in recovery neg ishcmia  VPCs during test apical septal abnormal wall motion related to that      ECHO    Echo march 2023  Mild biatrial dilatation  Mild left ventricular hypertrophy  Mildly dilated right ventricle preserved ejection fraction  No regional wall motion abnormalities preserved ejection fraction 60%  Grade 2 diastolic dysfunction  Aortic sclerosis  Top normal aortic root 3.7 cm  Mitral annular calcification mild to moderate mitral regurgitation  Mild tricuspid regurgitation estimated pulmonary systolic pressure 40 mmHg  Dilated pulmonary artery 4.1 cm moderate pulmonic insufficiency  Normal pericardium  No significant change from study of 2018            OTHER  Ultrasound thyroid July 2017 11 mm nodule       CAT SCAN   Chest CTA April 2018 no change from 2017 dilated pulmonary artery 4.7 thyroid nodules coronary artery calcifications renal cyst Lrhyr mass    cta 1/19 [facility] pul artery 4.2 mildy dilated aortic root      cta 5/19 pulmonary artery 47 mm ectatic right pulmonary artery 31 mm aortic root 4.3    CTA July 2020 aorta 4.7  MM 6 mm right upper lobe probably seen prior        Cor cta June 2021  non obst cad    :pulm artery 4.8 cm aorta 3.9 cm aortic root 4.3 cm  1.  Coronary Arteries: Mild nonobstructive coronary artery disease.  No evidence  of flow-limiting stenosis by FFR  CT.  2.  Cardiac Structures: Small left ventricular cavity with concentric left  ventricular hypertrophy.  Bi-atrial enlargement.  Focally calcified trileaflet  aortic valve.  3.  Mildly dilated aortic root at the sinuses of Valsalva.  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  4.  Normal left ventricular systolic function.  5.  Coronary calcium score 41.  This places the patient in the WAYNE 25% risk  percentile for age and gender matched individuals.  LAD:  Proximal: Patent.  Mid: Calcified plaque causing minimal (less than 30%) stenosis.  Misregistration  artifact.  Distal: Patent.  FFR CT in the distal vessel is 0.86 suggesting there is no flow  limiting stenosis.  DIAG 1: Small vessel.  Patent.  DIAG 2: Small vessel.  Patent.     LCX:  Proximal: Calcified plaque at the origin of the first obtuse marginal causing  mild (30-50%) stenosis.  Mid: Patent.  Distal: Patent.  FFR CT in the distal vessel is 0.94 suggesting no flow limiting  stenosis.  OM1:  Patent.  OM2:  Patent.     RCA:  Proximal: Patent.    LUNG BRUNER: Previously described right upper lobe nodule is somewhat less  prominent on today's examination measuring approximately 4 mm, previously  measuring 6 mm. Dependent hypoventilatory changes are noted.  LYMPH NODES: No pathologically enlarged lymph nodes visualized.  OSSEOUS STRUCTURES: No evidence of suspicious destructive bony lesions.     ELECTROPHYSIOLOGY    HOLTER July 2021  SHORT BURSTS NON SUST AVG UP TO 20 BEATS  The patient was monitored for 24 hours.   2. The predominant rhythm was sinus bradycardia. Ectopic atrial rhythm was noted.   3. The average heart rate was 59 bpm.   4. The minimum heart rate was 49 bpm.   5. The maximum heart rate was 111 bpm.   6. There were 35 supraventricular beats. There were 9 SVE tachycardias. The longest lasted 16 beatd 6.7 secs with an average  heart rate of 142 bpm at   4:22 pm on 7/20/2021. The fastest  lasted 3 beats 1.2 secs with an average heart rate of 148 bpm at  5:11 pm on 7/20/2021.   7. There were 156 premature ventricular beats. 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 Fajardo  Echo today all numbers mash pulmonary artery four-point 1 aortic root 3.7 by CTA aortic root was 4  Continue yearly imaging  Next year we will get coronary CT angiogram to image her coronary arteries also will be due May 2020    Coronary artery disease involving native coronary artery of native heart without angina pectoris  This was diagnosed by calcification seen on CAT scan stress echo was negative in February 2019 she does have right bundle branch block  Plan coronary CT angiogram with imaging of her pulmonary thoracic aneurysm in May 2020    PAF (paroxysmal atrial fibrillation) (CMS/HCC) (HCC)  Seen on a monitor in April 2018 2% of total heartbeats no clinical recurrence remains anticoagulated with Xarelto      Mixed hyperlipidemia  Phenomenal result to the response to the PCSK9 inhibitor presently LDL cholesterol is now 40 she is intolerant to statins and Zetia with GI upset    Multinodular goiter  Had negative biopsy 2017 follows with Dr. 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

## 2023-03-23 ENCOUNTER — HOSPITAL ENCOUNTER (OUTPATIENT)
Dept: CARDIOLOGY | Facility: CLINIC | Age: 88
Discharge: HOME | End: 2023-03-23
Attending: INTERNAL MEDICINE
Payer: COMMERCIAL

## 2023-03-23 ENCOUNTER — OFFICE VISIT (OUTPATIENT)
Dept: CARDIOLOGY | Facility: CLINIC | Age: 88
End: 2023-03-23
Payer: COMMERCIAL

## 2023-03-23 ENCOUNTER — TELEPHONE (OUTPATIENT)
Dept: CARDIOLOGY | Facility: CLINIC | Age: 88
End: 2023-03-23

## 2023-03-23 VITALS
RESPIRATION RATE: 18 BRPM | HEART RATE: 58 BPM | WEIGHT: 152 LBS | DIASTOLIC BLOOD PRESSURE: 64 MMHG | HEIGHT: 60 IN | OXYGEN SATURATION: 99 % | BODY MASS INDEX: 29.84 KG/M2 | SYSTOLIC BLOOD PRESSURE: 130 MMHG

## 2023-03-23 VITALS
SYSTOLIC BLOOD PRESSURE: 118 MMHG | WEIGHT: 152 LBS | DIASTOLIC BLOOD PRESSURE: 85 MMHG | BODY MASS INDEX: 28.7 KG/M2 | HEIGHT: 61 IN

## 2023-03-23 DIAGNOSIS — E78.2 MIXED HYPERLIPIDEMIA: ICD-10-CM

## 2023-03-23 DIAGNOSIS — I28.1 PULMONARY ARTERY ANEURYSM (CMS/HCC): Primary | ICD-10-CM

## 2023-03-23 DIAGNOSIS — I28.1 PULMONARY ARTERY ANEURYSM (CMS/HCC): ICD-10-CM

## 2023-03-23 LAB
AORTIC ROOT ANNULUS: 3.7 CM
ASCENDING AORTA: 3.5 CM
AV PEAK GRADIENT: 6 MMHG
AV PEAK VELOCITY-S: 1.24 M/S
BSA FOR ECHO PROCEDURE: 1.72 M2
E WAVE DECELERATION TIME: 246 MS
E/A RATIO: 0.8
E/E' RATIO: 14.6
E/LAT E' RATIO: 10.5
EDV (BP): 70.1 CM3
EF (A4C): 67.4 %
EF A2C: 65.1 %
EJECTION FRACTION: 67 %
EST RIGHT VENT SYSTOLIC PRESSURE BY TRICUSPID REGURGITATION JET: 41 MMHG
ESV (BP): 23.1 CM3
FRACTIONAL SHORTENING: 32.84 %
INTERVENTRICULAR SEPTUM: 1.42 CM
LA ESV (BP): 79.4 CM3
LA ESV INDEX (A2C): 41.22 CM3/M2
LA ESV INDEX (BP): 46.16 CM3/M2
LA/AORTA RATIO: 1.14
LAAS-AP2: 22.9 CM2
LAAS-AP4: 25.7 CM2
LAD 2D: 4.2 CM
LALD A4C: 5.78 CM
LALD A4C: 5.83 CM
LAV-S: 70.9 CM3
LEFT ATRIUM VOLUME INDEX: 52.38 CM3/M2
LEFT ATRIUM VOLUME: 90.1 CM3
LEFT INTERNAL DIMENSION IN SYSTOLE: 2.74 CM (ref 2.36–3.57)
LEFT VENTRICLE DIASTOLIC VOLUME INDEX: 34.94 CM3/M2
LEFT VENTRICLE DIASTOLIC VOLUME: 60.1 CM3
LEFT VENTRICLE SYSTOLIC VOLUME INDEX: 11.4 CM3/M2
LEFT VENTRICLE SYSTOLIC VOLUME: 19.6 CM3
LEFT VENTRICULAR INTERNAL DIMENSION IN DIASTOLE: 4.08 CM (ref 3.98–5.52)
LEFT VENTRICULAR POSTERIOR WALL IN END DIASTOLE: 1.22 CM (ref 0.52–0.96)
LV DIASTOLIC VOLUME: 77.8 CM3
LV ESV (APICAL 2 CHAMBER): 27.1 CM3
LVAD-AP2: 24.5 CM2
LVAD-AP4: 20.9 CM2
LVAS-AP2: 12.9 CM2
LVAS-AP4: 11.1 CM2
LVEDVI(A2C): 45.23 CM3/M2
LVEDVI(BP): 40.76 CM3/M2
LVESVI(A2C): 15.76 CM3/M2
LVESVI(BP): 13.43 CM3/M2
LVLD-AP2: 6.33 CM
LVLD-AP4: 6.01 CM
LVLS-AP2: 5.32 CM
LVLS-AP4: 5.22 CM
LVOT 2D: 2 CM
LVOT A: 3.14 CM2
MV E'TISSUE VEL-LAT: 0.08 M/S
MV E'TISSUE VEL-MED: 0.05 M/S
MV PEAK A VEL: 1 M/S
MV PEAK E VEL: 0.79 M/S
POSTERIOR WALL: 1.22 CM
PULMONARY REGURGITATION LATE DIASTOLIC VELOCITY: 1.41 M/S
PV PEAK GRADIENT: 3 MMHG
PV PV: 0.93 M/S
RAP: 5 MMHG
RVOT VMAX: 0.93 M/S
RVOT VTI: 16.5 CM
SEPTAL TISSUE DOPPLER FREE WALL LATE DIA VELOCITY (APICAL 4 CHAMBER VIEW): 0.12 M/S
TR MAX PG: 36 MMHG
TRICUSPID VALVE PEAK REGURGITATION VELOCITY: 3 M/S
Z-SCORE OF LEFT VENTRICULAR DIMENSION IN END DIASTOLE: -1.39
Z-SCORE OF LEFT VENTRICULAR DIMENSION IN END SYSTOLE: -0.46
Z-SCORE OF LEFT VENTRICULAR POSTERIOR WALL IN END DIASTOLE: 2.85

## 2023-03-23 PROCEDURE — 3008F BODY MASS INDEX DOCD: CPT | Performed by: INTERNAL MEDICINE

## 2023-03-23 PROCEDURE — 93000 ELECTROCARDIOGRAM COMPLETE: CPT | Performed by: INTERNAL MEDICINE

## 2023-03-23 PROCEDURE — 93306 TTE W/DOPPLER COMPLETE: CPT | Performed by: INTERNAL MEDICINE

## 2023-03-23 PROCEDURE — 99214 OFFICE O/P EST MOD 30 MIN: CPT | Performed by: INTERNAL MEDICINE

## 2023-03-23 RX ORDER — METOPROLOL SUCCINATE 25 MG/1
25 TABLET, EXTENDED RELEASE ORAL
Qty: 180 TABLET | Refills: 3 | Status: SHIPPED | OUTPATIENT
Start: 2023-03-23 | End: 2024-05-28

## 2023-03-23 RX ORDER — ALIROCUMAB 150 MG/ML
INJECTION, SOLUTION SUBCUTANEOUS
Qty: 8 ML | Refills: 3 | Status: ON HOLD | OUTPATIENT
Start: 2023-03-23 | End: 2023-10-12 | Stop reason: SDUPTHER

## 2023-03-23 NOTE — ASSESSMENT & PLAN NOTE
She follows in aortic wellness clinic due for CTA September 2023 it is 4.1 cm echocardiogram today March 2023 stable top normal aortic root size mild to moderate mitral regurgitation preserved ejection fraction 60% left ventricular hypertrophy

## 2023-03-23 NOTE — ASSESSMENT & PLAN NOTE
2% burden picked up on monitor in 2018 clinically no recurrence on beta-blocker anticoagulated with Xarelto

## 2023-03-23 NOTE — TELEPHONE ENCOUNTER
Received a fax from [employer] that Praluent has been approved through 3/23/2024.  Letter to be scanned into chart.

## 2023-04-11 ENCOUNTER — OFFICE VISIT (OUTPATIENT)
Dept: INTERNAL MEDICINE | Facility: CLINIC | Age: 88
End: 2023-04-11
Payer: COMMERCIAL

## 2023-04-11 VITALS
TEMPERATURE: 98.6 F | OXYGEN SATURATION: 99 % | SYSTOLIC BLOOD PRESSURE: 126 MMHG | RESPIRATION RATE: 16 BRPM | WEIGHT: 151.4 LBS | HEART RATE: 69 BPM | BODY MASS INDEX: 29.72 KG/M2 | HEIGHT: 60 IN | DIASTOLIC BLOOD PRESSURE: 80 MMHG

## 2023-04-11 DIAGNOSIS — E78.2 MIXED HYPERLIPIDEMIA: ICD-10-CM

## 2023-04-11 DIAGNOSIS — E04.2 MULTINODULAR GOITER: ICD-10-CM

## 2023-04-11 DIAGNOSIS — Z12.31 VISIT FOR SCREENING MAMMOGRAM: ICD-10-CM

## 2023-04-11 DIAGNOSIS — I48.0 PAF (PAROXYSMAL ATRIAL FIBRILLATION) (CMS/HCC): ICD-10-CM

## 2023-04-11 DIAGNOSIS — M81.0 AGE-RELATED OSTEOPOROSIS WITHOUT CURRENT PATHOLOGICAL FRACTURE: ICD-10-CM

## 2023-04-11 DIAGNOSIS — I28.1 PULMONARY ARTERY ANEURYSM (CMS/HCC): ICD-10-CM

## 2023-04-11 DIAGNOSIS — R19.5 POSITIVE FIT (FECAL IMMUNOCHEMICAL TEST): ICD-10-CM

## 2023-04-11 DIAGNOSIS — Z85.42 HISTORY OF UTERINE CANCER: Primary | ICD-10-CM

## 2023-04-11 DIAGNOSIS — I47.10 SVT (SUPRAVENTRICULAR TACHYCARDIA) (CMS/HCC): ICD-10-CM

## 2023-04-11 PROBLEM — Z87.81 HISTORY OF FRACTURE OF RIGHT ANKLE: Status: ACTIVE | Noted: 2023-04-11

## 2023-04-11 PROBLEM — Z87.81 HISTORY OF FRACTURE OF LEFT ANKLE: Status: ACTIVE | Noted: 2023-04-11

## 2023-04-11 PROBLEM — C55 MALIGNANT NEOPLASM OF UTERUS (CMS/HCC): Status: RESOLVED | Noted: 2017-03-22 | Resolved: 2023-04-11

## 2023-04-11 PROBLEM — I77.810 DILATED AORTIC ROOT (CMS/HCC): Status: RESOLVED | Noted: 2019-08-02 | Resolved: 2023-04-11

## 2023-04-11 PROBLEM — M17.12 PRIMARY OSTEOARTHRITIS OF LEFT KNEE: Status: ACTIVE | Noted: 2023-04-11

## 2023-04-11 PROCEDURE — 99205 OFFICE O/P NEW HI 60 MIN: CPT | Performed by: INTERNAL MEDICINE

## 2023-04-11 PROCEDURE — 3008F BODY MASS INDEX DOCD: CPT | Performed by: INTERNAL MEDICINE

## 2023-04-11 RX ORDER — LORAZEPAM 0.5 MG/1
1 TABLET ORAL DAILY PRN
Qty: 30 TABLET | Refills: 1
Start: 2023-04-11 | End: 2023-04-11 | Stop reason: SDUPTHER

## 2023-04-11 RX ORDER — LORAZEPAM 0.5 MG/1
1 TABLET ORAL DAILY PRN
Qty: 30 TABLET | Refills: 1 | Status: SHIPPED | OUTPATIENT
Start: 2023-04-11 | End: 2023-11-14 | Stop reason: SDUPTHER

## 2023-04-11 ASSESSMENT — ENCOUNTER SYMPTOMS
COUGH: 0
CONSTIPATION: 0
NAUSEA: 0
FEVER: 0
SORE THROAT: 0
ABDOMINAL PAIN: 0
CONFUSION: 0
FATIGUE: 0
FREQUENCY: 0
PALPITATIONS: 0
DIZZINESS: 0
CHILLS: 0
VOMITING: 0
BACK PAIN: 0
DYSURIA: 0
ARTHRALGIAS: 1
DIARRHEA: 0
HEADACHES: 0
SHORTNESS OF BREATH: 0

## 2023-04-11 ASSESSMENT — PATIENT HEALTH QUESTIONNAIRE - PHQ9: SUM OF ALL RESPONSES TO PHQ9 QUESTIONS 1 & 2: 0

## 2023-04-11 NOTE — PROGRESS NOTES
Subjective      Patient ID: Teresa Bird is a 87 y.o. female.    Patient seen for CAD, pulmonary artery aneurysm, PAF  She sees Dr. Thakkar at [facility] for her left knee.  She sees Cardiology at [facility].  She sees CT surgery at [facility].    She feels well.  She has no chest pain or SOB.  She has 11 grand-kids and 7 great grand-kids  She is very active.  She has no chest pain, palpitations, SOB.  She has no abdominal pain or N/V.    Current Outpatient Medications   Medication Sig Dispense Refill   • alirocumab (PRALUENT PEN) 150 mg/mL pen injector Inject 1 ml (150 mg) under skin every 14 days 8 mL 3   • cetirizine (ZyrTEC) 5 mg tablet Take 5 mg by mouth daily.     • LORazepam (ATIVAN) 0.5 mg tablet Take 2 tablets (1 mg total) by mouth daily as needed for anxiety. 30 tablet 1   • metoprolol succinate XL (TOPROL-XL) 25 mg 24 hr tablet Take 1 tablet (25 mg total) by mouth 2 (two) times a day. 180 tablet 3   • rivaroxaban (XARELTO) 20 mg tablet TAKE 1 TABLET(20 MG) BY MOUTH DAILY WITH DINNER 90 tablet 6     No current facility-administered medications for this visit.     Allergies   Allergen Reactions   • Penicillin Other (see comments)     Fainted and PCP said not to take   • Pravastatin Sodium GI intolerance   • Rosuvastatin Calcium GI intolerance     Other reaction(s): muscle cramps   • Zetia [Ezetimibe]      myalgias     Past Medical History:   Diagnosis Date   • Arthritis    • Cancer, uterine (CMS/HCC)    • Closed right ankle fracture age 62   • GERD (gastroesophageal reflux disease)    • Lipid disorder     difficulty tolerating statins   • Melanoma (CMS/HCC)     left shoulder   • Osteoporosis     did not tolerate fosamax     Past Surgical History:   Procedure Laterality Date   • CHOLECYSTECTOMY     • HYSTERECTOMY  12/2013   • MOHS SURGERY       Social History     Socioeconomic History   • Marital status:      Spouse name: Not on file   • Number of children: Not on file   • Years of education: Not on file   • Highest  education level: Not on file   Occupational History   • Not on file   Tobacco Use   • Smoking status: Former     Types: Cigarettes     Quit date: 2018     Years since quittin.2   • Smokeless tobacco: Never   • Tobacco comments:     Quit 2018   Vaping Use   • Vaping status: Never Used   Substance and Sexual Activity   • Alcohol use: Yes     Comment: social   • Drug use: No   • Sexual activity: Defer   Other Topics Concern   • Not on file   Social History Narrative   • Not on file     Social Determinants of Health     Financial Resource Strain: Not on file   Food Insecurity: Not on file   Transportation Needs: Not on file   Physical Activity: Not on file   Stress: Not on file   Social Connections: Not on file   Intimate Partner Violence: Not on file   Housing Stability: Not on file     Family History   Problem Relation Age of Onset   • Heart attack Biological Mother    • Diabetes Biological Mother    • Lung cancer Biological Mother    • COPD Biological Mother    • Diabetes Biological Father    • Prostate cancer Biological Brother          The following have been reviewed and updated as appropriate in this visit:   Problems       Review of Systems   Constitutional: Negative for chills, fatigue and fever.   HENT: Negative for congestion, postnasal drip and sore throat.    Eyes: Negative for visual disturbance.   Respiratory: Negative for cough and shortness of breath.    Cardiovascular: Negative for chest pain and palpitations.   Gastrointestinal: Negative for abdominal pain, constipation, diarrhea, nausea and vomiting.   Genitourinary: Negative for dysuria and frequency.   Musculoskeletal: Positive for arthralgias. Negative for back pain.   Skin: Negative for rash.   Neurological: Negative for dizziness and headaches.   Psychiatric/Behavioral: Negative for confusion.       Objective     Vitals:    23 1501   BP: 126/80   BP Location: Left upper arm   Patient Position: Sitting   Pulse: 69   Resp: 16   Temp: 37  °C (98.6 °F)   TempSrc: Oral   SpO2: 99%   Weight: 68.7 kg (151 lb 6.4 oz)   Height: 1.524 m (5')     Body mass index is 29.57 kg/m².    Physical Exam  Vitals reviewed.   Constitutional:       Appearance: Normal appearance.   HENT:      Head: Normocephalic and atraumatic.   Eyes:      General: No scleral icterus.     Conjunctiva/sclera: Conjunctivae normal.   Cardiovascular:      Rate and Rhythm: Normal rate and regular rhythm.      Heart sounds: No murmur heard.  Pulmonary:      Effort: No respiratory distress.      Breath sounds: Normal breath sounds. No wheezing.   Abdominal:      Palpations: Abdomen is soft.      Tenderness: There is no abdominal tenderness. There is no guarding or rebound.   Musculoskeletal:         General: Normal range of motion.      Cervical back: Normal range of motion.      Comments: Trace edema ankles   Skin:     General: Skin is warm.   Neurological:      Mental Status: She is alert and oriented to person, place, and time.         Orders Placed This Encounter   Procedures   • BI SCREENING MAMMOGRAM BILATERAL(TOMOSYNTHESIS)     Standing Status:   Future     Standing Expiration Date:   6/11/2024     Order Specific Question:   Release to patient     Answer:   Immediate   • DEXA BONE DENSITY     Standing Status:   Future     Standing Expiration Date:   4/11/2024     Order Specific Question:   Release to patient     Answer:   Immediate   • ULTRASOUND THYROID     Standing Status:   Future     Standing Expiration Date:   4/11/2024     Order Specific Question:   Release to patient     Answer:   Immediate   • Comprehensive metabolic panel     Standing Status:   Future     Number of Occurrences:   1     Standing Expiration Date:   10/11/2023     Order Specific Question:   Has the patient fasted?     Answer:   Yes     Order Specific Question:   Release to patient     Answer:   Immediate   • TSH 3rd Generation     Standing Status:   Future     Number of Occurrences:   1     Standing Expiration  Date:   10/11/2023     Order Specific Question:   Has the patient fasted?     Answer:   Yes     Order Specific Question:   Release to patient     Answer:   Immediate   • Vitamin D 25 hydroxy     Standing Status:   Future     Number of Occurrences:   1     Standing Expiration Date:   10/11/2023     Order Specific Question:   Release to patient     Answer:   Immediate       Assessment/Plan   Problem List Items Addressed This Visit        Circulatory    PAF (paroxysmal atrial fibrillation) (CMS/HCC)     Continue Metoprolol and Xarelto         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HCC)     Follow with surgeon         SVT (supraventricular tachycardia) (CMS/HCC)     Continue Metoprolol            Musculoskeletal    Age-related osteoporosis without current pathological fracture     She will think about getting a DEXA scan         Relevant Orders    DEXA BONE DENSITY    Vitamin D 25 hydroxy       Endocrine/Metabolic    Multinodular goiter     We will check an ultrasound         Relevant Orders    ULTRASOUND THYROID    TSH 3rd Generation       Other    History of uterine cancer - Primary    Mixed hyperlipidemia     Continue Praluent         Relevant Orders    Comprehensive metabolic panel    Positive FIT (fecal immunochemical test)     She had a positive one and colonoscopy was OK  She does not want another FIT test         Visit for screening mammogram     Check mammogram         Relevant Orders    BI SCREENING MAMMOGRAM BILATERAL(TOMOSYNTHESIS)     I spent 65 minutes on this date of service performing the following activities: obtaining history, performing examination, entering orders, documenting, preparing for visit, obtaining / reviewing records and providing counseling and education.    Sergei Cooley MD  4/11/2023

## 2023-04-11 NOTE — PATIENT INSTRUCTIONS
Problem List Items Addressed This Visit          Circulatory    PAF (paroxysmal atrial fibrillation) (CMS/HCC)     Continue Metoprolol and Xarelto         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HCC)     Follow with surgeon         SVT (supraventricular tachycardia) (CMS/HCC)     Continue Metoprolol            Musculoskeletal    Age-related osteoporosis without current pathological fracture     She will think about getting a DEXA scan         Relevant Orders    DEXA BONE DENSITY    Vitamin D 25 hydroxy       Endocrine/Metabolic    Multinodular goiter     We will check an ultrasound         Relevant Orders    ULTRASOUND THYROID    TSH 3rd Generation       Other    History of uterine cancer - Primary    Mixed hyperlipidemia     Continue Praluent         Relevant Orders    Comprehensive metabolic panel    Positive FIT (fecal immunochemical test)     She had a positive one and colonoscopy was OK  She does not want another FIT test         Visit for screening mammogram     Check mammogram         Relevant Orders    BI SCREENING MAMMOGRAM BILATERAL(TOMOSYNTHESIS)       Sergei Cooley MD  4/11/2023

## 2023-05-01 ENCOUNTER — HOSPITAL ENCOUNTER (OUTPATIENT)
Dept: RADIOLOGY | Age: 88
Discharge: HOME | End: 2023-05-01
Attending: INTERNAL MEDICINE
Payer: COMMERCIAL

## 2023-05-01 DIAGNOSIS — E04.2 MULTINODULAR GOITER: ICD-10-CM

## 2023-05-01 PROCEDURE — 76536 US EXAM OF HEAD AND NECK: CPT

## 2023-06-07 ENCOUNTER — HOSPITAL ENCOUNTER (OUTPATIENT)
Dept: RADIOLOGY | Age: 88
Discharge: HOME | End: 2023-06-07
Attending: INTERNAL MEDICINE
Payer: COMMERCIAL

## 2023-06-07 DIAGNOSIS — Z12.31 VISIT FOR SCREENING MAMMOGRAM: ICD-10-CM

## 2023-06-07 DIAGNOSIS — M81.0 AGE-RELATED OSTEOPOROSIS WITHOUT CURRENT PATHOLOGICAL FRACTURE: ICD-10-CM

## 2023-06-07 PROCEDURE — 77067 SCR MAMMO BI INCL CAD: CPT

## 2023-06-07 PROCEDURE — 77080 DXA BONE DENSITY AXIAL: CPT

## 2023-06-07 PROCEDURE — 77063 BREAST TOMOSYNTHESIS BI: CPT

## 2023-06-09 ENCOUNTER — TELEPHONE (OUTPATIENT)
Dept: INTERNAL MEDICINE | Facility: CLINIC | Age: 88
End: 2023-06-09
Payer: COMMERCIAL

## 2023-06-15 ENCOUNTER — APPOINTMENT (RX ONLY)
Dept: URBAN - METROPOLITAN AREA CLINIC 374 | Facility: CLINIC | Age: 88
Setting detail: DERMATOLOGY
End: 2023-06-15

## 2023-06-15 DIAGNOSIS — D22 MELANOCYTIC NEVI: ICD-10-CM

## 2023-06-15 DIAGNOSIS — L57.0 ACTINIC KERATOSIS: ICD-10-CM

## 2023-06-15 DIAGNOSIS — D18.0 HEMANGIOMA: ICD-10-CM

## 2023-06-15 DIAGNOSIS — L81.4 OTHER MELANIN HYPERPIGMENTATION: ICD-10-CM

## 2023-06-15 DIAGNOSIS — L82.1 OTHER SEBORRHEIC KERATOSIS: ICD-10-CM

## 2023-06-15 DIAGNOSIS — Z71.89 OTHER SPECIFIED COUNSELING: ICD-10-CM

## 2023-06-15 PROBLEM — D22.62 MELANOCYTIC NEVI OF LEFT UPPER LIMB, INCLUDING SHOULDER: Status: ACTIVE | Noted: 2023-06-15

## 2023-06-15 PROBLEM — D22.72 MELANOCYTIC NEVI OF LEFT LOWER LIMB, INCLUDING HIP: Status: ACTIVE | Noted: 2023-06-15

## 2023-06-15 PROBLEM — D22.61 MELANOCYTIC NEVI OF RIGHT UPPER LIMB, INCLUDING SHOULDER: Status: ACTIVE | Noted: 2023-06-15

## 2023-06-15 PROBLEM — D22.71 MELANOCYTIC NEVI OF RIGHT LOWER LIMB, INCLUDING HIP: Status: ACTIVE | Noted: 2023-06-15

## 2023-06-15 PROBLEM — D22.5 MELANOCYTIC NEVI OF TRUNK: Status: ACTIVE | Noted: 2023-06-15

## 2023-06-15 PROBLEM — D18.01 HEMANGIOMA OF SKIN AND SUBCUTANEOUS TISSUE: Status: ACTIVE | Noted: 2023-06-15

## 2023-06-15 PROCEDURE — 17000 DESTRUCT PREMALG LESION: CPT

## 2023-06-15 PROCEDURE — ? FULL BODY SKIN EXAM

## 2023-06-15 PROCEDURE — ? SUNSCREEN RECOMMENDATIONS

## 2023-06-15 PROCEDURE — 99213 OFFICE O/P EST LOW 20 MIN: CPT | Mod: 25

## 2023-06-15 PROCEDURE — ? COUNSELING

## 2023-06-15 PROCEDURE — ? PREMALIGNANT DESTRUCTION

## 2023-06-15 ASSESSMENT — LOCATION DETAILED DESCRIPTION DERM
LOCATION DETAILED: LEFT ANTERIOR PROXIMAL THIGH
LOCATION DETAILED: RIGHT ANTERIOR PROXIMAL UPPER ARM
LOCATION DETAILED: LEFT ANTERIOR DISTAL THIGH
LOCATION DETAILED: RIGHT MEDIAL SUPERIOR CHEST
LOCATION DETAILED: INFERIOR THORACIC SPINE
LOCATION DETAILED: RIGHT SUPERIOR MEDIAL UPPER BACK
LOCATION DETAILED: LEFT MEDIAL SUPERIOR CHEST
LOCATION DETAILED: RIGHT ANTERIOR DISTAL THIGH
LOCATION DETAILED: RIGHT ANTERIOR DISTAL UPPER ARM
LOCATION DETAILED: LEFT INFERIOR MEDIAL MIDBACK
LOCATION DETAILED: EPIGASTRIC SKIN
LOCATION DETAILED: SUPERIOR THORACIC SPINE
LOCATION DETAILED: LEFT ANTERIOR PROXIMAL UPPER ARM

## 2023-06-15 ASSESSMENT — LOCATION SIMPLE DESCRIPTION DERM
LOCATION SIMPLE: LEFT LOWER BACK
LOCATION SIMPLE: RIGHT UPPER BACK
LOCATION SIMPLE: UPPER BACK
LOCATION SIMPLE: RIGHT THIGH
LOCATION SIMPLE: RIGHT UPPER ARM
LOCATION SIMPLE: LEFT THIGH
LOCATION SIMPLE: CHEST
LOCATION SIMPLE: LEFT UPPER ARM
LOCATION SIMPLE: ABDOMEN

## 2023-06-15 ASSESSMENT — LOCATION ZONE DERM
LOCATION ZONE: ARM
LOCATION ZONE: TRUNK
LOCATION ZONE: LEG

## 2023-06-15 NOTE — PROCEDURE: PREMALIGNANT DESTRUCTION
Anesthesia Volume In Cc: 0
Render Note In Bullet Format When Appropriate: No
Post-Care Instructions: I reviewed with the patient in detail post-care instructions. Patient is to wear sunprotection, and avoid picking at any of the treated lesions. Pt may apply Vaseline to crusted or scabbing areas.
Method: liquid nitrogen
Consent: The patient's consent was obtained including but not limited to risks of crusting, scabbing, blistering, scarring, darker or lighter pigmentary change, recurrence, incomplete removal and infection.
Detail Level: Zone

## 2023-06-15 NOTE — PROCEDURE: FULL BODY SKIN EXAM
Price (Do Not Change): 0.00
Instructions: This plan will send the code FBSE to the PM system.  DO NOT or CHANGE the price.
Detail Level: Generalized
Body Of Note (Please Add Your Own Text Here): monitor biannually

## 2023-07-19 ENCOUNTER — TELEPHONE (OUTPATIENT)
Dept: INTERNAL MEDICINE | Facility: CLINIC | Age: 88
End: 2023-07-19
Payer: COMMERCIAL

## 2023-07-20 ENCOUNTER — CLINICAL SUPPORT (OUTPATIENT)
Dept: INTERNAL MEDICINE | Facility: CLINIC | Age: 88
End: 2023-07-20
Payer: COMMERCIAL

## 2023-07-20 DIAGNOSIS — M81.0 OSTEOPOROSIS, UNSPECIFIED OSTEOPOROSIS TYPE, UNSPECIFIED PATHOLOGICAL FRACTURE PRESENCE: Primary | ICD-10-CM

## 2023-07-20 PROCEDURE — 96372 THER/PROPH/DIAG INJ SC/IM: CPT | Performed by: INTERNAL MEDICINE

## 2023-08-10 ENCOUNTER — TELEPHONE (OUTPATIENT)
Dept: SCHEDULING | Facility: CLINIC | Age: 88
End: 2023-08-10
Payer: COMMERCIAL

## 2023-08-10 ENCOUNTER — TELEPHONE (OUTPATIENT)
Dept: CARDIOLOGY | Facility: CLINIC | Age: 88
End: 2023-08-10
Payer: COMMERCIAL

## 2023-08-10 ENCOUNTER — TRANSCRIBE ORDERS (OUTPATIENT)
Dept: SCHEDULING | Facility: REHABILITATION | Age: 88
End: 2023-08-10

## 2023-08-10 DIAGNOSIS — M17.0 OSTEOARTHRITIS OF BOTH KNEES: Primary | ICD-10-CM

## 2023-08-10 DIAGNOSIS — I28.1 PULMONARY ARTERY ANEURYSM (CMS/HCC): ICD-10-CM

## 2023-08-10 DIAGNOSIS — I28.1 PULMONARY ARTERY ANEURYSM (CMS/HCC): Primary | ICD-10-CM

## 2023-08-10 DIAGNOSIS — Q25.43 AORTIC ROOT ANEURYSM: Primary | ICD-10-CM

## 2023-08-10 NOTE — TELEPHONE ENCOUNTER
Do you want her to have ct chest or should she call ct surgery office? I do not see that you order nay test on her last visit. Please advise

## 2023-08-10 NOTE — TELEPHONE ENCOUNTER
Patient is asking for  CT Chest with contrast script to be regenerated and precerted for her so she can schedule at [place].    Please generate a [facility] order for bun/creat to be done 2 weeks before study      Please call her at [phone] when precert is completed so she can schedule. ty

## 2023-08-10 NOTE — TELEPHONE ENCOUNTER
Patient is following in aortic wellness clinic he should probably order the exact CAT scan at the if not I will be glad to put the order in

## 2023-08-10 NOTE — TELEPHONE ENCOUNTER
CT ANGIOGRAPHY CHEST WITH AND WITHOUT IV CONTRAST  CPT: 26089   (Order ID: [id number])     Diagnosis:  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HCC) (I28.1)       Location: [facility]    Time: 1st week of september

## 2023-08-15 NOTE — TELEPHONE ENCOUNTER
[place]/ CTA [id number]  Auth# [id number]  08/15/23-11/12/23    Please provide the pt the auth # and assist with scheduling testing

## 2023-08-22 ENCOUNTER — TELEPHONE (OUTPATIENT)
Dept: SCHEDULING | Facility: CLINIC | Age: 88
End: 2023-08-22
Payer: COMMERCIAL

## 2023-08-22 NOTE — TELEPHONE ENCOUNTER
Cardiac Clearance     Name of caller: Teresa Bird    Relationship to patient: self    Name of patient: Teresa Bird    Insurance Name: [id number]    Name of physician: Nick Beck MD    Date of Procedure/Surgery: 10/11/23    Type of Procedure/Surgery: left knee replacement    Name of surgeon: Dr. Tonie Bustillo    Office contact number: [phone]    Office fax number: [phone]    Addendums  Is patient able to be cleared based on last appt on 3/23/23  If unable to clear patient, please contact patient at [phone]    Additional notes: Pt requesting CC visit. I was unable to schedule an CCV prior to 10/11. Please call pt to schedule.

## 2023-08-23 NOTE — TELEPHONE ENCOUNTER
Last OV notes include clearance and EKG faxed to Dr. Dudley at [phone].  Received a confirmation fax that info delivered.

## 2023-09-06 ENCOUNTER — HOSPITAL ENCOUNTER (OUTPATIENT)
Dept: PHYSICAL THERAPY | Age: 88
Setting detail: THERAPIES SERIES
Discharge: HOME | End: 2023-09-06
Attending: ORTHOPAEDIC SURGERY
Payer: COMMERCIAL

## 2023-09-06 DIAGNOSIS — G89.29 CHRONIC PAIN OF LEFT KNEE: Primary | ICD-10-CM

## 2023-09-06 DIAGNOSIS — G89.29 CHRONIC PAIN OF RIGHT KNEE: ICD-10-CM

## 2023-09-06 DIAGNOSIS — M25.561 CHRONIC PAIN OF RIGHT KNEE: ICD-10-CM

## 2023-09-06 DIAGNOSIS — M25.562 CHRONIC PAIN OF LEFT KNEE: Primary | ICD-10-CM

## 2023-09-06 DIAGNOSIS — M17.0 OSTEOARTHRITIS OF BOTH KNEES, UNSPECIFIED OSTEOARTHRITIS TYPE: ICD-10-CM

## 2023-09-06 PROCEDURE — 97530 THERAPEUTIC ACTIVITIES: CPT | Mod: GP

## 2023-09-06 PROCEDURE — 97162 PT EVAL MOD COMPLEX 30 MIN: CPT | Mod: GP

## 2023-09-06 RX ORDER — DENOSUMAB 60 MG/ML
60 INJECTION SUBCUTANEOUS
Status: ON HOLD | COMMUNITY
Start: 2023-07-20 | End: 2023-10-12 | Stop reason: SDUPTHER

## 2023-09-06 NOTE — OP PT TREATMENT LOG
ORTHO PT FLOWSHEET    Diagnosis: Bilateral knee OA   Precautions: osteoporosis, h/o uterine cancer, melanoma, pulmonary artery aneurysm     Exercises Current Session Time Completed (Y/N/G)   MODALITIES- HEAT/ICE (44022) TOTAL TIME FOR SESSION Not performed    Heat     Ice     MODALITIES - E-STIM (00360) TOTAL TIME FOR SESSION Not performed    E-stim/H-Wave     THER ACT (60981) TOTAL TIME FOR SESSION 8-22 Minutes    Assessment  See note  Y   Vitals/pain See note Y   HEP 9/6/23: quad sets, heel slides, bridges Y   Education 9/6/23:  Discussed HEP and goals for treatment/expected outcomes. The patient verbalizes agreement and understanding.  Y   Body Mechanics/Work Training     GROUP (48441)  Not performed    THER EX (31800) TOTAL TIME FOR SESSION Not performed    CARDIOVASCULAR      Nu Step     Stationary Bike     Elliptical     Treadmill     STRENGTHENING     Quad sets     Heel slides/HS isos     LAQ/SAQ     Clamshells     Bridges     Squats     Resisted side stepping     FSU     LSU     HR/TR     Hip 3 way     Lunges     TKE               STRETCHING/ROM     Hamstring stretch     Hip flexor stretch     Standing calf stretch          NEURO RE-ED (13129) TOTAL TIME FOR SESSION Not performed    Static balance     Dynamic balance     Postural re-education     Taping     GAIT TRAINING (37485) TOTAL TIME FOR SESSION Not performed    Ambulation      Dynamic gait      MANUAL (33530) TOTAL TIME FOR SESSION Not performed    Manual stretching     Mobilization     Y = yes; N = no; G = group

## 2023-09-06 NOTE — LETTER
Dear DR. Dudley,    Thank you for this referral. Please review the attached notes and plan of care for your approval.  Please contact our department with any questions.     Sincerely,     Rui Del Cid, PT  [address] [facility] [address]    By co-signing this Plan of Care (POC) you agree to the following:  I have reviewed the the Plan of Care established by the therapist within this document and certify that the services are skilled and medically necessary. I have reviewed the plan and recommend that these services continue to meet the goals stated in this document.    PHYSICIAN SIGNATURE: __________________________________     DATE: ___________________  TIME: _____________           Physical Therapy Plan of Care 23   Effective from: 2023  Effective to: 10/11/2023    Active  Plan ID: [id number]           Participants as of 2023    Name Type Comments Contact Info    Tonie Dudley MD Surgeon  [phone]    Rui Del Cid, PT Physical Therapist        Last Progress Notes Note     Author: Rui Del Cid, PT Status: Addendum Last edited: 2023 11:00 AM         Physical Therapy Evaluation    [facility] Fax: [phone]    PT EVALUATION FOR OUTPATIENT THERAPY    Patient: Alexandra Bird    MRN: [id number]  : 1935 88 y.o.     Referring Physician: Tonie Dudley MD  Date of Visit: 2023      Certification Dates:  23 through 10/11/23         Recommended Frequency & Duration:  2 times/week for up to 5 weeks     Diagnosis:   1. Chronic pain of left knee    2. Osteoarthritis of both knees, unspecified osteoarthritis type    3. Chronic pain of right knee        Chief Complaints:   Chief Complaint   Patient presents with    Difficulty Walking    Pain    Balance Deficits    Dec Strength    Abnormality Of Gait    Decreased recreational/play activity       Precautions:    Precautions additional comments: osteoporosis    Past Medical History:   Past Medical  History:   Diagnosis Date    Arthritis     Cancer, uterine (CMS/HCC)     Closed right ankle fracture age 62    GERD (gastroesophageal reflux disease)     Lipid disorder     difficulty tolerating statins    Melanoma (CMS/HCC)     left shoulder    Osteoporosis     did not tolerate fosamax       Past Surgical History:   Past Surgical History:   Procedure Laterality Date    CHOLECYSTECTOMY      HYSTERECTOMY  12/2013    MOHS SURGERY           LEARNING ASSESSMENT    Assessment completed:  Yes    Learner name:  Alexandra    Learner: Patient    Learning Barriers:  Learning barriers: No Barriers    Preferred Language: English     Needed: No    Education Provided:   Method: Discussion  Readiness: acceptance  Response: Verbalizes understanding      CO-LEARNER ASSESSMENT:    Completed: No          Welcome letter discussed: Yes Patient provided with Welcome Letter, which includes attendance policy. Provided education regarding cancellation and no-show policy. Education regarding the importance of participation and regular attendance to maximize goal attainment.         OBJECTIVE MEASUREMENTS/DATA:    Time In Session:  Start Time: 1100  Stop Time: 1200  Time Calculation (min): 60 min   Assessment and Plan - 09/06/23 1059        Assessment    Plan of Care reviewed and patient/family in agreement Yes     System Pathology/Pathophysiology Noted musculoskeletal     Functional Limitations in Following Categories (PT Eval) home management;community/leisure     Rehab Potential/Prognosis good, to achieve stated therapy goals     Problem List decreased strength;decreased ROM;impaired balance;pain;impaired motor control     Clinical Assessment Alexandra is an 88 year old female referred to outpatient physical therapy for bilateral knee OA. She endorses 8-9 year history of knee pain with corticosteroid injections for management. PMH significant for osteoporosis, uterine cancer/melanoma, pulmonary aortic aneurysm. She reports  difficulty with prolonged walking, stair ambulation as well as well as decreased balance. She will undergo L knee TKA on 10/11/23. She demonstrates decreased L knee flexion ROM and strength vs R knee. Noted signficant static balance deficits with sharpened rhomberg position. In gait, she demonstrates increased genu valgus/knock knee presentation with L > R and L antalgia noted. Her deficits are limiting her activities of daily living and her recreational activities. She will benefit from skilled physical therapy to address her deficits.     Plan and Recommendations Initiate skilled PT 2x per week. Perform further outcome measures (30s STS, TUG)     Planned Services CPT 04304 Gait training;CPT 28125 Manual therapy;CPT 09973 Neuromuscular Reeducation;CPT 04476 Therapeutic activities;CPT 47086 Therapeutic exercises;CPT 10249 Electrical stimulation UNATTENDED;CPT 77555 Hot/Cold Packs therapy                General Information - 09/06/23 1059        Session Details    Document Type initial evaluation     Mode of Treatment individual therapy        General Information    Referring Physician Dr. Tonie Dudley     History of present illness/functional impairment Alexandra notes chronic pain in her knees. She initially saw [facility] 8-9 yrs ago, receiving injections for her knees that woul last her symptoms for 7-8 months per patient. She notes current L knee pain that is sore in nature. She notes during standing and pivotingshe may have buckling at her L knee. She notes her pain is not extreme, but there is weakness. She notes she does not walk right and that she is knock kneed. She reports that her knee symptoms have worsened over the past 5 years. She notes receiving injections; most recent was: 3 months ago.  She notes her pain is worse initially in the morning and stiff.  As the day progresses she notes some improvements in symptoms. She notes she takes tylenol for her symptoms. She attends the gym, has not been  there for 3 weeks. She would do the bicycle, and upper body machines, and stretching machines. She notes aggravating factors include going up and down stairs, and she is unable to walk for prolonged distances. She also endorses pain in her R hip and lateral thigh pain. Easing factors include seated rest. She notes her balance is not what it used to be. She notes having a fall 2 years ago with fractures in her wrist and ankle.     Patient/Family/Caregiver Comments/Observations PT goals to walk steadier, improve her balance. She has 3 weddings and 3 bridal showers.     Precautions comments osteoporosis                Pain/Vitals - 09/06/23 1059        Pain Assessment    Currently in pain No/Denies        Pre Activity Vital Signs    Pulse 75     /83                Falls/Food Screening - 09/06/23 1059        Initial Falls Assessment    One or more falls in the last year Yes     How many times 1     Was the patient injured in any fall Yes        Food Insecurity    Within the past 12 months, you worried that your food would run out before you got the money to buy more. Never true     Within the past 12 months, the food you bought just didn't last and you didn't have money to get more. Never true                PT - 09/06/23 1059        Physical Therapy    Physical Therapy [facility] PT        PT Plan    Frequency of treatment 2 times/week     PT Duration 5 weeks     PT Cert From 09/06/23     PT Cert To 10/11/23     Date PT POC was sent to provider 09/06/23     Signed PT Plan of Care received?  No                   Living Environment    Living Environment - 09/06/23 1059        Living Environment    Living Environment Comment L sided hand rail with stairs in home               PLOF:    Prior Level of Function - 09/06/23 1059        OTHER    Previous level of function Independent;  She attends the gym, has not been there for 3 weeks. She would do the bicycle, and upper body machines, and stretching  machines.               Sensory Tests    Sensory Testing - 09/06/23 1059        Sensory Assessment    Sensory Assessment sensation intact, lower extremities               ROM    Range of Motion - 09/06/23 1100        RIGHT: Lower Extremity PROM Assessment    Hip Flexion  110 degrees     Knee Flexion  129     Knee Extension  -4   lacking       LEFT: Lower Extremity PROM Assessment    Hip Flexion  114 degrees     Knee Flexion  121   painful into end range of motion    Knee Extension  -2        RIGHT: Lower Extremity AROM Assessment    Hip Flexion   95 degrees     Hip Internal Rotation   20 degrees     Hip External Rotation   35 degrees     Knee Flexion   125     Knee Extension   -4        LEFT: Lower Extremity AROM Assessment    Hip Flexion   104 degrees     Hip Internal Rotation   19 degrees     Hip External Rotation   22 degrees     Knee Flexion   111     Knee Extension   -4   lacking              MMT    Manual Muscle Tests - 09/06/23 1059        RIGHT: Lower Extremity Manual Muscle Test Assessment    Hip Flexion gross movement (5/5) normal     Hip Extension gross movement (3+/5) fair plus     Hip Abduction gross movement (3+/5) fair plus     Hip Internal Rotation gross movement (5/5) normal     Hip External Rotation gross movement (5/5) normal     Knee Flexion strength (4+/5) good plus     Knee Extension strength (4+/5) good plus     Ankle Dorsiflexion gross movement (5/5) normal     Ankle Plantarflexion gross movement (5/5) normal     Ankle Inversion gross movement (4+/5) good plus     Ankle Eversion gross movement (4+/5) good plus        LEFT: Lower Extremity Manual Muscle Test Assessment    Hip Flexion gross movement (4+/5) good plus     Hip Extension gross movement (3+/5) fair plus     Hip Abduction gross movement (4-/5) good minus     Hip Internal Rotation gross movement (5/5) normal     Hip External Rotation gross movement (5/5) normal     Knee Flexion strength (4/5) good     Knee Extension strength (4/5)  good     Ankle Dorsiflexion gross movement (4+/5) good plus     Ankle Plantarflexion gross movement (4+/5) good plus               Palpation    Palpation - 09/06/23 1059        Palpation    LE Palpation  No signficant tenderness to palpation throughout L knee; notes R anterior/lateral thigh and greater trochanteric tenderness.        Manual Interventions    Manual technique #1 Patellar mobilizations: L knee hypomobile medially/superiorly; R knee mobility within functional limits               Ortho Special Tests    Orthopedic Special Tests - 09/06/23 1100        Knee    Patellar Grind Right - Positive;Left - Negative     Thessaly/Disco Left - Negative;Right - Negative   pt noted occasional planting/rotation instability              Gait and Mobility    Gait and Mobility - 09/06/23 1059        Gait Training    Comment (Gait/Stairs) bilateral genu valgus with L greater than R; L toe out greater than R; L antalgic gait        Stairs Assessment    Comment Bilateral UE support step to pattern               Balance/Posture    Balance and Posture - 09/06/23 1059        Balance Assessment    Comment, Balance NBOS EC 30s; sharpened rhomberg L front 2s, R front 4s               Outcome Measures    PT Outcome Measures - 09/06/23 1059        Other Outcome Measures Used/Comments    Other outcome measure used: WOMAC: 39/96: 40.6%        NEW (2/6/23):  PSFS     PSFS ACTIVITY 1 dancing     PSFS ANSWER 1 0     PSFS ACTIVITY 2 shopping     PSFS ANSWER 2 5     PSFS ACTIVITY 3 stairs     PSFS ANSWER 3 8     PSFS ACTIVITY 4 prolonged walking     PSFS ANSWER 4 5     PSFS Sum of Activity Scores 18     PSFS Number of Activities 4     PSFS Percent Score 45 %                  Goals       Mutually agreed upon pain goal       Mutually agreed upon pain goal: Alexandra will be able to walk for 5 minutes with <3/10 pain        PT- Knee goals       Short Term Goals Time Frame Result Comment   Pt will demonstrate improved sharpened Rhomberg for  reducing falls risk 2-3 weeks     Pt will increase L knee AROM >/= 5 degrees into flexion to improve functional mobility 2-3 weeks     Assess TUG/set goal ASAP     Pt will improve PSFS score >/= 10%  2-3 weeks     Pt will be independent with HEP to increase carryover at home 2-3 weeks     Assess 30s STS and set goals ASAP          Long Term Goals Time Frame Result Comment   Pt will increase B/L LE  MMT into flexion, extension, hip abduction, extension >/= ½ grade 5 weeks     Pt will increase L knee ROM >/= 10 degrees into flexion to improve functional mobility 5 weeks     Increase LEFS >/= 9 points to increase function   5 weeks     Decrease WOMAC to 20 points to increase function  5 weeks     Pt will improve PSFS score >/= 20%  5 weeks     Pt will be able to walk for 6 min with <3/10 pain reproduction 5 weeks     Pt will be able to ascend/descend stairs with reciprocal pattern with L side hand railing.  5 weeks                PT-Alexandra goals (pt-stated)       1.) improve stair ambulation reciprocal pattern  2.) improve her balance and walking confidence  3.)improve shopping  4.) get back to dancing                TREATMENT PLAN:        ASSESSMENT:    This 88 y.o. year old female presents to PT with above stated diagnosis. Physical Therapy evaluation reveals decreased strength, decreased ROM, impaired balance, pain, impaired motor control resulting in home management, community/leisure limitations. Teresa Bird will benefit from skilled PT services to address limitation, work towards rehab and patient goals and maximize PLOF of chosen ADLs.     Planned Services: The patient's treatment will include CPT 74131 Gait training, CPT 30725 Manual therapy, CPT 69058 Neuromuscular Reeducation, CPT 08369 Therapeutic activities, CPT 29452 Therapeutic exercises, CPT 62644 Electrical stimulation UNATTENDED, CPT 68811 Hot/Cold Packs therapy, .     Rui Del Cid, PT

## 2023-09-06 NOTE — PROGRESS NOTES
Physical Therapy Evaluation    [facility] Fax: [phone]    PT EVALUATION FOR OUTPATIENT THERAPY    Patient: Alexandra Bird    MRN: [id number]  : 1935 88 y.o.     Referring Physician: Tonie Dudley MD  Date of Visit: 2023      Certification Dates:  23 through 10/11/23         Recommended Frequency & Duration:  2 times/week for up to 5 weeks     Diagnosis:   1. Chronic pain of left knee    2. Osteoarthritis of both knees, unspecified osteoarthritis type    3. Chronic pain of right knee        Chief Complaints:   Chief Complaint   Patient presents with    Difficulty Walking    Pain    Balance Deficits    Dec Strength    Abnormality Of Gait    Decreased recreational/play activity       Precautions:    Precautions additional comments: osteoporosis    Past Medical History:   Past Medical History:   Diagnosis Date    Arthritis     Cancer, uterine (CMS/HCC)     Closed right ankle fracture age 62    GERD (gastroesophageal reflux disease)     Lipid disorder     difficulty tolerating statins    Melanoma (CMS/HCC)     left shoulder    Osteoporosis     did not tolerate fosamax       Past Surgical History:   Past Surgical History:   Procedure Laterality Date    CHOLECYSTECTOMY      HYSTERECTOMY  2013    MOHS SURGERY           LEARNING ASSESSMENT    Assessment completed:  Yes    Learner name:  Alexandra    Learner: Patient    Learning Barriers:  Learning barriers: No Barriers    Preferred Language: English     Needed: No    Education Provided:   Method: Discussion  Readiness: acceptance  Response: Verbalizes understanding      CO-LEARNER ASSESSMENT:    Completed: No          Welcome letter discussed: Yes Patient provided with Welcome Letter, which includes attendance policy. Provided education regarding cancellation and no-show policy. Education regarding the importance of participation and regular attendance to maximize goal attainment.         OBJECTIVE  MEASUREMENTS/DATA:    Time In Session:  Start Time: 1100  Stop Time: 1200  Time Calculation (min): 60 min   Assessment and Plan - 09/06/23 1059        Assessment    Plan of Care reviewed and patient/family in agreement Yes     System Pathology/Pathophysiology Noted musculoskeletal     Functional Limitations in Following Categories (PT Eval) home management;community/leisure     Rehab Potential/Prognosis good, to achieve stated therapy goals     Problem List decreased strength;decreased ROM;impaired balance;pain;impaired motor control     Clinical Assessment Alexandra is an 88 year old female referred to outpatient physical therapy for bilateral knee OA. She endorses 8-9 year history of knee pain with corticosteroid injections for management. PMH significant for osteoporosis, uterine cancer/melanoma, pulmonary aortic aneurysm. She reports difficulty with prolonged walking, stair ambulation as well as well as decreased balance. She will undergo L knee TKA on 10/11/23. She demonstrates decreased L knee flexion ROM and strength vs R knee. Noted signficant static balance deficits with sharpened rhomberg position. In gait, she demonstrates increased genu valgus/knock knee presentation with L > R and L antalgia noted. Her deficits are limiting her activities of daily living and her recreational activities. She will benefit from skilled physical therapy to address her deficits.     Plan and Recommendations Initiate skilled PT 2x per week. Perform further outcome measures (30s STS, TUG)     Planned Services CPT 18873 Gait training;CPT 14706 Manual therapy;CPT 67867 Neuromuscular Reeducation;CPT 31848 Therapeutic activities;CPT 01669 Therapeutic exercises;CPT 53853 Electrical stimulation UNATTENDED;CPT 49281 Hot/Cold Packs therapy                General Information - 09/06/23 1059        Session Details    Document Type initial evaluation     Mode of Treatment individual therapy        General Information    Referring Physician  Dr. Tonie Dudley     History of present illness/functional impairment Alexandra notes chronic pain in her knees. She initially saw [facility] physicians 8-9 yrs ago, receiving injections for her knees that woul last her symptoms for 7-8 months per patient. She notes current L knee pain that is sore in nature. She notes during standing and pivotingshe may have buckling at her L knee. She notes her pain is not extreme, but there is weakness. She notes she does not walk right and that she is knock kneed. She reports that her knee symptoms have worsened over the past 5 years. She notes receiving injections; most recent was: 3 months ago.  She notes her pain is worse initially in the morning and stiff.  As the day progresses she notes some improvements in symptoms. She notes she takes tylenol for her symptoms. She attends the gym, has not been there for 3 weeks. She would do the bicycle, and upper body machines, and stretching machines. She notes aggravating factors include going up and down stairs, and she is unable to walk for prolonged distances. She also endorses pain in her R hip and lateral thigh pain. Easing factors include seated rest. She notes her balance is not what it used to be. She notes having a fall 2 years ago with fractures in her wrist and ankle.     Patient/Family/Caregiver Comments/Observations PT goals to walk steadier, improve her balance. She has 3 weddings and 3 bridal showers.     Precautions comments osteoporosis                Pain/Vitals - 09/06/23 1059        Pain Assessment    Currently in pain No/Denies        Pre Activity Vital Signs    Pulse 75     /83                Falls/Food Screening - 09/06/23 1059        Initial Falls Assessment    One or more falls in the last year Yes     How many times 1     Was the patient injured in any fall Yes        Food Insecurity    Within the past 12 months, you worried that your food would run out before you got the money to buy more. Never true      Within the past 12 months, the food you bought just didn't last and you didn't have money to get more. Never true                PT - 09/06/23 1059        Physical Therapy    Physical Therapy [facility] PT        PT Plan    Frequency of treatment 2 times/week     PT Duration 5 weeks     PT Cert From 09/06/23     PT Cert To 10/11/23     Date PT POC was sent to provider 09/06/23     Signed PT Plan of Care received?  No                   Living Environment    Living Environment - 09/06/23 1059        Living Environment    Living Environment Comment L sided hand rail with stairs in home               PLOF:    Prior Level of Function - 09/06/23 1059        OTHER    Previous level of function Independent;  She attends the gym, has not been there for 3 weeks. She would do the bicycle, and upper body machines, and stretching machines.               Sensory Tests    Sensory Testing - 09/06/23 1059        Sensory Assessment    Sensory Assessment sensation intact, lower extremities               ROM    Range of Motion - 09/06/23 1100        RIGHT: Lower Extremity PROM Assessment    Hip Flexion  110 degrees     Knee Flexion  129     Knee Extension  -4   lacking       LEFT: Lower Extremity PROM Assessment    Hip Flexion  114 degrees     Knee Flexion  121   painful into end range of motion    Knee Extension  -2        RIGHT: Lower Extremity AROM Assessment    Hip Flexion   95 degrees     Hip Internal Rotation   20 degrees     Hip External Rotation   35 degrees     Knee Flexion   125     Knee Extension   -4        LEFT: Lower Extremity AROM Assessment    Hip Flexion   104 degrees     Hip Internal Rotation   19 degrees     Hip External Rotation   22 degrees     Knee Flexion   111     Knee Extension   -4   lacking              MMT    Manual Muscle Tests - 09/06/23 1059        RIGHT: Lower Extremity Manual Muscle Test Assessment    Hip Flexion gross movement (5/5) normal     Hip Extension gross movement (3+/5) fair  plus     Hip Abduction gross movement (3+/5) fair plus     Hip Internal Rotation gross movement (5/5) normal     Hip External Rotation gross movement (5/5) normal     Knee Flexion strength (4+/5) good plus     Knee Extension strength (4+/5) good plus     Ankle Dorsiflexion gross movement (5/5) normal     Ankle Plantarflexion gross movement (5/5) normal     Ankle Inversion gross movement (4+/5) good plus     Ankle Eversion gross movement (4+/5) good plus        LEFT: Lower Extremity Manual Muscle Test Assessment    Hip Flexion gross movement (4+/5) good plus     Hip Extension gross movement (3+/5) fair plus     Hip Abduction gross movement (4-/5) good minus     Hip Internal Rotation gross movement (5/5) normal     Hip External Rotation gross movement (5/5) normal     Knee Flexion strength (4/5) good     Knee Extension strength (4/5) good     Ankle Dorsiflexion gross movement (4+/5) good plus     Ankle Plantarflexion gross movement (4+/5) good plus               Palpation    Palpation - 09/06/23 1059        Palpation    LE Palpation  No signficant tenderness to palpation throughout L knee; notes R anterior/lateral thigh and greater trochanteric tenderness.        Manual Interventions    Manual technique #1 Patellar mobilizations: L knee hypomobile medially/superiorly; R knee mobility within functional limits               Ortho Special Tests    Orthopedic Special Tests - 09/06/23 1100        Knee    Patellar Grind Right - Positive;Left - Negative     Thessaly/Disco Left - Negative;Right - Negative   pt noted occasional planting/rotation instability              Gait and Mobility    Gait and Mobility - 09/06/23 1059        Gait Training    Comment (Gait/Stairs) bilateral genu valgus with L greater than R; L toe out greater than R; L antalgic gait        Stairs Assessment    Comment Bilateral UE support step to pattern               Balance/Posture    Balance and Posture - 09/06/23 1059        Balance Assessment     Comment, Balance NBOS EC 30s; sharpened rhomberg L front 2s, R front 4s               Outcome Measures    PT Outcome Measures - 09/06/23 1059        Other Outcome Measures Used/Comments    Other outcome measure used: WOMAC: 39/96: 40.6%        NEW (2/6/23):  PSFS     PSFS ACTIVITY 1 dancing     PSFS ANSWER 1 0     PSFS ACTIVITY 2 shopping     PSFS ANSWER 2 5     PSFS ACTIVITY 3 stairs     PSFS ANSWER 3 8     PSFS ACTIVITY 4 prolonged walking     PSFS ANSWER 4 5     PSFS Sum of Activity Scores 18     PSFS Number of Activities 4     PSFS Percent Score 45 %                  Goals       Mutually agreed upon pain goal       Mutually agreed upon pain goal: Alexandra will be able to walk for 5 minutes with <3/10 pain        PT- Knee goals       Short Term Goals Time Frame Result Comment   Pt will demonstrate improved sharpened Rhomberg for reducing falls risk 2-3 weeks     Pt will increase L knee AROM >/= 5 degrees into flexion to improve functional mobility 2-3 weeks     Assess TUG/set goal ASAP     Pt will improve PSFS score >/= 10%  2-3 weeks     Pt will be independent with HEP to increase carryover at home 2-3 weeks     Assess 30s STS and set goals ASAP          Long Term Goals Time Frame Result Comment   Pt will increase B/L LE  MMT into flexion, extension, hip abduction, extension >/= ½ grade 5 weeks     Pt will increase L knee ROM >/= 10 degrees into flexion to improve functional mobility 5 weeks     Increase LEFS >/= 9 points to increase function   5 weeks     Decrease WOMAC to 20 points to increase function  5 weeks     Pt will improve PSFS score >/= 20%  5 weeks     Pt will be able to walk for 6 min with <3/10 pain reproduction 5 weeks     Pt will be able to ascend/descend stairs with reciprocal pattern with L side hand railing.  5 weeks                PT-Alexandra goals (pt-stated)       1.) improve stair ambulation reciprocal pattern  2.) improve her balance and walking confidence  3.)improve shopping  4.) get  back to dancing                TREATMENT PLAN:        ASSESSMENT:    This 88 y.o. year old female presents to PT with above stated diagnosis. Physical Therapy evaluation reveals decreased strength, decreased ROM, impaired balance, pain, impaired motor control resulting in home management, community/leisure limitations. Teresa Bird will benefit from skilled PT services to address limitation, work towards rehab and patient goals and maximize PLOF of chosen ADLs.     Planned Services: The patient's treatment will include CPT 90954 Gait training, CPT 23685 Manual therapy, CPT 46490 Neuromuscular Reeducation, CPT 99974 Therapeutic activities, CPT 07385 Therapeutic exercises, CPT 97920 Electrical stimulation UNATTENDED, CPT 13333 Hot/Cold Packs therapy, .     Rui Del Cid, PT

## 2023-09-07 ENCOUNTER — DOCUMENTATION (OUTPATIENT)
Dept: SOCIAL WORK | Facility: REHABILITATION | Age: 88
End: 2023-09-07
Payer: COMMERCIAL

## 2023-09-08 ENCOUNTER — HOSPITAL ENCOUNTER (OUTPATIENT)
Dept: PHYSICAL THERAPY | Age: 88
Setting detail: THERAPIES SERIES
Discharge: HOME | End: 2023-09-08
Attending: ORTHOPAEDIC SURGERY
Payer: COMMERCIAL

## 2023-09-08 DIAGNOSIS — M25.562 CHRONIC PAIN OF LEFT KNEE: ICD-10-CM

## 2023-09-08 DIAGNOSIS — M17.0 OSTEOARTHRITIS OF BOTH KNEES, UNSPECIFIED OSTEOARTHRITIS TYPE: Primary | ICD-10-CM

## 2023-09-08 DIAGNOSIS — M25.561 CHRONIC PAIN OF RIGHT KNEE: ICD-10-CM

## 2023-09-08 DIAGNOSIS — G89.29 CHRONIC PAIN OF RIGHT KNEE: ICD-10-CM

## 2023-09-08 DIAGNOSIS — G89.29 CHRONIC PAIN OF LEFT KNEE: ICD-10-CM

## 2023-09-08 PROCEDURE — 97110 THERAPEUTIC EXERCISES: CPT | Mod: GP

## 2023-09-08 ASSESSMENT — GAIT ASSESSMENTS
TUG TIME: 11.7
TUG TIME: 11.4
TUG TIME: 11.4
TUG TIME: 11.2

## 2023-09-08 NOTE — PROGRESS NOTES
Physical Therapy Visit    PT DAILY NOTE FOR OUTPATIENT THERAPY    Patient: Alexandra Bird MRN: [id number]  : 1935 88 y.o.  Referring Physician: Tonie Dudley MD  Date of Visit: 2023    Certification Dates: 23 through 10/11/23    Diagnosis:   1. Osteoarthritis of both knees, unspecified osteoarthritis type    2. Chronic pain of left knee    3. Chronic pain of right knee        Chief Complaints:       Precautions:   Precautions comments: osteoporosis      TODAY'S VISIT    Time In Session:  Start Time: 1300  Stop Time: 1400  Time Calculation (min): 60 min   History/Vitals/Pain/Encounter Info - 23 1258        Injury History/Precautions/Daily Required Info    Document Type initial evaluation     Primary Therapist Rui Del Cid,PT     Referring Physician Dr. Tonie Dudley     Precautions comments osteoporosis     History of present illness/functional impairment Alexandra notes chronic pain in her knees. She initially saw [facility] physicians 8-9 yrs ago, receiving injections for her knees that woul last her symptoms for 7-8 months per patient. She notes current L knee pain that is sore in nature. She notes during standing and pivotingshe may have buckling at her L knee. She notes her pain is not extreme, but there is weakness. She notes she does not walk right and that she is knock kneed. She reports that her knee symptoms have worsened over the past 5 years. She notes receiving injections; most recent was: 3 months ago.  She notes her pain is worse initially in the morning and stiff.  As the day progresses she notes some improvements in symptoms. She notes she takes tylenol for her symptoms. She attends the gym, has not been there for 3 weeks. She would do the bicycle, and upper body machines, and stretching machines. She notes aggravating factors include going up and down stairs, and she is unable to walk for prolonged distances. She also endorses pain in her R hip and lateral thigh pain. Easing factors  include seated rest. She notes her balance is not what it used to be. She notes having a fall 2 years ago with fractures in her wrist and ankle.     Patient/Family/Caregiver Comments/Observations Alexandra notes that the exercises have been helping her pain. She notes     Patient reported fall since last visit No        Pain Assessment    Currently in pain No/Denies        Pre Activity Vital Signs    Pulse 59     /76     BP Location Left upper arm     BP Method Automatic     Patient Position Sitting                Daily Treatment Assessment and Plan - 09/08/23 1258        Daily Treatment Assessment and Plan    Progress toward goals Progressing     Daily Outcome Summary Alexandra has responded positively to initial HEP. She reports reduced pain in her R thigh with interventions. She is challenged by standing interventions due to L knee pain, especially stair ambulation and lateral stepping. She remains with decreased eccentric control of knee extension that is limiting her gait and mobility. TUG scores are out of falls risk range and 30s STS is within norms for her age range. Potential for falls lies in dynamic gait and stair ambulation.     Plan and Recommendations Continue strengthening to bilateral knee. She responds well to stretching interventions.                     OBJECTIVE DATA TAKEN TODAY:    None taken    Today's Treatment:    ORTHO PT FLOWSHEET    Diagnosis: Bilateral knee OA   Precautions: osteoporosis, h/o uterine cancer, melanoma, pulmonary artery aneurysm     Exercises Current Session Time Completed (Y/N/G)   MODALITIES- HEAT/ICE (69365) TOTAL TIME FOR SESSION Not performed    Heat     Ice     MODALITIES - E-STIM (57532) TOTAL TIME FOR SESSION Not performed    E-stim/H-Wave     THER ACT (94680) TOTAL TIME FOR SESSION 0-7 Minutes    Assessment  See note     Vitals/pain See note Y   HEP 9/6/23: quad sets, heel slides, bridges    Education 9/6/23:  Discussed HEP and goals for treatment/expected  outcomes. The patient verbalizes agreement and understanding.     Body Mechanics/Work Training     GROUP (97997)  Not performed    THER EX (28512) TOTAL TIME FOR SESSION 53-67 Minutes    CARDIOVASCULAR      Nu Step     Stationary Bike 8 min seat 7 Y   Elliptical     Treadmill     STRENGTHENING     Quad sets     Heel slides/HS isos     LAQ  SAQ 2 x 10 2#  2 x 10 2# Y  Y   Clamshells 2 x 10 OTB Y   Bridges 2 x 10; OTB around knee Y   Squats     Resisted side stepping Orange TB: 1 lap with increased pain reported, held further completion Y   FSU 1 x 10 each; L LE with significant difficulty with eccentric control. UE support required with close supervision Y   LSU     HR/TR 2 x 10 Y   Hip 3 way 1 x 10 each direction in standing // Y   Lunges     TKE OTB behind knee standing; 2 x 10 Y             STRETCHING/ROM     Hamstring stretch 20s x 3 standing at step Y   Prone quad stretch 30s x 3 Y   Standing calf stretch 30s x 3 slantboard Y        NEURO RE-ED (63329) TOTAL TIME FOR SESSION Not performed    Static balance     Dynamic balance     Postural re-education     Taping     GAIT TRAINING (44123) TOTAL TIME FOR SESSION Not performed    Ambulation      Dynamic gait      MANUAL (93673) TOTAL TIME FOR SESSION Not performed    Manual stretching     Mobilization     Y = yes; N = no; G = group

## 2023-09-08 NOTE — PROGRESS NOTES
Physical Therapy Visit    PT DAILY NOTE FOR OUTPATIENT THERAPY    Patient: Alexandra Bird MRN: [id number]  : 1935 88 y.o.  Referring Physician: Tonie Dudley MD  Date of Visit: 2023    Certification Dates: 23 through 10/11/23    Diagnosis:   1. Osteoarthritis of both knees, unspecified osteoarthritis type    2. Chronic pain of left knee    3. Chronic pain of right knee        Chief Complaints:       Precautions:   Precautions comments: osteoporosis      TODAY'S VISIT    Time In Session:  Start Time: 1300  Stop Time: 1400  Time Calculation (min): 60 min   History/Vitals/Pain/Encounter Info - 23 1258        Injury History/Precautions/Daily Required Info    Document Type initial evaluation     Primary Therapist Rui Del Cid,PT     Referring Physician Dr. Tonie Dudley     Precautions comments osteoporosis     History of present illness/functional impairment Alexandra notes chronic pain in her knees. She initially saw [facility] physicians 8-9 yrs ago, receiving injections for her knees that woul last her symptoms for 7-8 months per patient. She notes current L knee pain that is sore in nature. She notes during standing and pivotingshe may have buckling at her L knee. She notes her pain is not extreme, but there is weakness. She notes she does not walk right and that she is knock kneed. She reports that her knee symptoms have worsened over the past 5 years. She notes receiving injections; most recent was: 3 months ago.  She notes her pain is worse initially in the morning and stiff.  As the day progresses she notes some improvements in symptoms. She notes she takes tylenol for her symptoms. She attends the gym, has not been there for 3 weeks. She would do the bicycle, and upper body machines, and stretching machines. She notes aggravating factors include going up and down stairs, and she is unable to walk for prolonged distances. She also endorses pain in her R hip and lateral thigh pain. Easing factors  include seated rest. She notes her balance is not what it used to be. She notes having a fall 2 years ago with fractures in her wrist and ankle.     Patient/Family/Caregiver Comments/Observations Alexandra notes that the exercises have been helping her pain. She notes     Patient reported fall since last visit No        Pain Assessment    Currently in pain No/Denies        Pre Activity Vital Signs    Pulse 59     /76     BP Location Left upper arm     BP Method Automatic     Patient Position Sitting                Daily Treatment Assessment and Plan - 09/08/23 1258        Daily Treatment Assessment and Plan    Progress toward goals Progressing     Daily Outcome Summary Alexandra has responded positively to initial HEP. She reports reduced pain in her R thigh with interventions. She is challenged by standing interventions due to L knee pain, especially stair ambulation and lateral stepping. She remains with decreased eccentric control of knee extension that is limiting her gait and mobility. TUG scores are out of falls risk range and 30s STS is within norms for her age range. Potential for falls lies in dynamic gait and stair ambulation.     Plan and Recommendations Continue strengthening to bilateral knee. She responds well to stretching interventions.                     OBJECTIVE DATA TAKEN TODAY:    Outcome Measures    PT Outcome Measures - 09/08/23 1315        Objective Outcome Measures    30 Second Sit to Stand Test 9 repetitions        Timed Up and Go Test    Trial One: Timed Up and Go Test 11.7     Trial Two: Timed Up and Go Test 11.4     Trial Three: Timed Up and Go Test 11.2     Mean of 3 Trials: Timed Up and Go Test 11.4        NEW (2/6/23):  PSFS     PSFS ACTIVITY 1 dancing     PSFS ANSWER 1 0     PSFS ACTIVITY 2 shopping     PSFS ANSWER 2 5     PSFS ACTIVITY 3 stairs     PSFS ANSWER 3 8     PSFS ACTIVITY 4 prolonged walking     PSFS ANSWER 4 5     PSFS Sum of Activity Scores 18     PSFS Number of  Activities 4     PSFS Percent Score 45 %                 Today's Treatment:    ORTHO PT FLOWSHEET    Diagnosis: Bilateral knee OA   Precautions: osteoporosis, h/o uterine cancer, melanoma, pulmonary artery aneurysm     Exercises Current Session Time Completed (Y/N/G)   MODALITIES- HEAT/ICE (45137) TOTAL TIME FOR SESSION Not performed    Heat     Ice     MODALITIES - E-STIM (62629) TOTAL TIME FOR SESSION Not performed    E-stim/H-Wave     THER ACT (16863) TOTAL TIME FOR SESSION 0-7 Minutes    Assessment  See note     Vitals/pain See note Y   HEP 9/6/23: quad sets, heel slides, bridges    Education 9/6/23:  Discussed HEP and goals for treatment/expected outcomes. The patient verbalizes agreement and understanding.     Body Mechanics/Work Training     GROUP (09706)  Not performed    THER EX (58339) TOTAL TIME FOR SESSION 53-67 Minutes    CARDIOVASCULAR      Nu Step     Stationary Bike 8 min seat 7 Y   Elliptical     Treadmill     STRENGTHENING     Quad sets     Heel slides/HS isos     LAQ  SAQ 2 x 10 2#  2 x 10 2# Y  Y   Clamshells 2 x 10 OTB Y   Bridges 2 x 10; OTB around knee Y   Squats     Resisted side stepping Orange TB: 1 lap with increased pain reported, held further completion Y   FSU 1 x 10 each; L LE with significant difficulty with eccentric control. UE support required with close supervision Y   LSU     HR/TR 2 x 10 Y   Hip 3 way 1 x 10 each direction in standing // Y   Lunges     TKE OTB behind knee standing; 2 x 10 Y             STRETCHING/ROM     Hamstring stretch 20s x 3 standing at step Y   Prone quad stretch 30s x 3 Y   Standing calf stretch 30s x 3 slantboard Y        NEURO RE-ED (59596) TOTAL TIME FOR SESSION Not performed    Static balance     Dynamic balance     Postural re-education     Taping     GAIT TRAINING (00498) TOTAL TIME FOR SESSION Not performed    Ambulation      Dynamic gait      MANUAL (20802) TOTAL TIME FOR SESSION Not performed    Manual stretching     Mobilization     Y = yes; N =  no; G = group

## 2023-09-08 NOTE — ADDENDUM NOTE
Encounter addended by: Rui Del Cid, PT on: 9/8/2023 3:26 PM   Actions taken: Delete clinical note, Clinical Note Signed

## 2023-09-08 NOTE — OP PT TREATMENT LOG
ORTHO PT FLOWSHEET    Diagnosis: Bilateral knee OA   Precautions: osteoporosis, h/o uterine cancer, melanoma, pulmonary artery aneurysm     Exercises Current Session Time Completed (Y/N/G)   MODALITIES- HEAT/ICE (18044) TOTAL TIME FOR SESSION Not performed    Heat     Ice     MODALITIES - E-STIM (60201) TOTAL TIME FOR SESSION Not performed    E-stim/H-Wave     THER ACT (25477) TOTAL TIME FOR SESSION 0-7 Minutes    Assessment  See note     Vitals/pain See note Y   HEP 9/6/23: quad sets, heel slides, bridges    Education 9/6/23:  Discussed HEP and goals for treatment/expected outcomes. The patient verbalizes agreement and understanding.     Body Mechanics/Work Training     GROUP (22597)  Not performed    THER EX (84285) TOTAL TIME FOR SESSION 53-67 Minutes    CARDIOVASCULAR      Nu Step     Stationary Bike 8 min seat 7 Y   Elliptical     Treadmill     STRENGTHENING     Quad sets     Heel slides/HS isos     LAQ  SAQ 2 x 10 2#  2 x 10 2# Y  Y   Clamshells 2 x 10 OTB Y   Bridges 2 x 10; OTB around knee Y   Squats     Resisted side stepping Orange TB: 1 lap with increased pain reported, held further completion Y   FSU 1 x 10 each; L LE with significant difficulty with eccentric control. UE support required with close supervision Y   LSU     HR/TR 2 x 10 Y   Hip 3 way 1 x 10 each direction in standing // Y   Lunges     TKE OTB behind knee standing; 2 x 10 Y             STRETCHING/ROM     Hamstring stretch 20s x 3 standing at step Y   Prone quad stretch 30s x 3 Y   Standing calf stretch 30s x 3 slantboard Y        NEURO RE-ED (71968) TOTAL TIME FOR SESSION Not performed    Static balance     Dynamic balance     Postural re-education     Taping     GAIT TRAINING (75875) TOTAL TIME FOR SESSION Not performed    Ambulation      Dynamic gait      MANUAL (58496) TOTAL TIME FOR SESSION Not performed    Manual stretching     Mobilization     Y = yes; N = no; G = group

## 2023-09-11 ENCOUNTER — TELEPHONE (OUTPATIENT)
Dept: CARDIOTHORACIC SURGERY | Facility: CLINIC | Age: 88
End: 2023-09-11
Payer: COMMERCIAL

## 2023-09-11 ENCOUNTER — HOSPITAL ENCOUNTER (OUTPATIENT)
Dept: PHYSICAL THERAPY | Age: 88
Setting detail: THERAPIES SERIES
Discharge: HOME | End: 2023-09-11
Attending: ORTHOPAEDIC SURGERY
Payer: COMMERCIAL

## 2023-09-11 DIAGNOSIS — I28.1 PULMONARY ARTERY ANEURYSM (CMS/HCC): Primary | ICD-10-CM

## 2023-09-11 DIAGNOSIS — M17.0 OSTEOARTHRITIS OF BOTH KNEES, UNSPECIFIED OSTEOARTHRITIS TYPE: Primary | ICD-10-CM

## 2023-09-11 PROCEDURE — 97110 THERAPEUTIC EXERCISES: CPT | Mod: GP

## 2023-09-11 PROCEDURE — 97140 MANUAL THERAPY 1/> REGIONS: CPT | Mod: GP

## 2023-09-11 NOTE — PROGRESS NOTES
Physical Therapy Visit    PT DAILY NOTE FOR OUTPATIENT THERAPY    Patient: Alexandra Bird MRN: [id number]  : 1935 88 y.o.  Referring Physician: Tonie Dudley MD  Date of Visit: 2023    Certification Dates: 23 through 10/11/23    Diagnosis:   1. Osteoarthritis of both knees, unspecified osteoarthritis type        Chief Complaints:       Precautions:   Precautions comments: osteoporosis      TODAY'S VISIT    Time In Session:      History/Vitals/Pain/Encounter Info - 23 1103        Injury History/Precautions/Daily Required Info    Document Type initial evaluation     Primary Therapist Riu Del Cid,PT     Referring Physician Dr. Tonie Dudley     Precautions comments osteoporosis     History of present illness/functional impairment Alexandra notes chronic pain in her knees. She initially saw [facility] physicians 8-9 yrs ago, receiving injections for her knees that woul last her symptoms for 7-8 months per patient. She notes current L knee pain that is sore in nature. She notes during standing and pivotingshe may have buckling at her L knee. She notes her pain is not extreme, but there is weakness. She notes she does not walk right and that she is knock kneed. She reports that her knee symptoms have worsened over the past 5 years. She notes receiving injections; most recent was: 3 months ago.  She notes her pain is worse initially in the morning and stiff.  As the day progresses she notes some improvements in symptoms. She notes she takes tylenol for her symptoms. She attends the gym, has not been there for 3 weeks. She would do the bicycle, and upper body machines, and stretching machines. She notes aggravating factors include going up and down stairs, and she is unable to walk for prolonged distances. She also endorses pain in her R hip and lateral thigh pain. Easing factors include seated rest. She notes her balance is not what it used to be. She notes having a fall 2 years ago with fractures in her  wrist and ankle.     Patient/Family/Caregiver Comments/Observations Alexandra reports need to abbreviate HEP due to having a busy weekend. reports continued relief of pain with HEP     Patient reported fall since last visit No        Pain Assessment    Currently in pain No/Denies        Pre Activity Vital Signs    Pulse 60     /70     BP Location Left upper arm     BP Method Automatic     Patient Position Sitting                Daily Treatment Assessment and Plan - 09/11/23 1103        Daily Treatment Assessment and Plan    Progress toward goals Progressing     Daily Outcome Summary pt reports that HEP has been helping relieve pain since initiation. continues to have L patellar tendon tenderness with quad TE's. unable to complete L LSU due to increased hip pain. added Forward step down to address LLE poor ecentric control. pt continues to show weak L abductors, extensors. responding well to manual stretching     Plan and Recommendations Continue strengthening to bilateral knee. She responds well to stretching interventions.                     OBJECTIVE DATA TAKEN TODAY:    None taken    Today's Treatment:    ORTHO PT FLOWSHEET    Diagnosis: Bilateral knee OA   Precautions: osteoporosis, h/o uterine cancer, melanoma, pulmonary artery aneurysm     Exercises Current Session Time Completed (Y/N/G)   MODALITIES- HEAT/ICE (03809) TOTAL TIME FOR SESSION Not performed    Heat     Ice     MODALITIES - E-STIM (09435) TOTAL TIME FOR SESSION Not performed    E-stim/H-Wave     THER ACT (88725) TOTAL TIME FOR SESSION 0-7 Minutes    Assessment  See note     Vitals/pain See note Y   HEP 9/6/23: quad sets, heel slides, bridges    Education 9/6/23:  Discussed HEP and goals for treatment/expected outcomes. The patient verbalizes agreement and understanding.     Body Mechanics/Work Training     GROUP (10332)  Not performed    THER EX (92536) TOTAL TIME FOR SESSION 38-52 Minutes    CARDIOVASCULAR      Nu Step     Stationary Bike 8  min seat 7 Y   Elliptical     Treadmill     STRENGTHENING     Quad sets     Heel slides/HS isos     LAQ  SAQ 2 x 15 2#  2 x 10 2# Y  Y   Clamshells 2 x 10 OTB Y   Bridges 2 x 10; OTB around knee Y   Squats 2x10 //bar Y   Resisted side stepping Orange TB: 1 lap with increased pain reported, held further completion    FSU 1 x 10 each; L LE with significant difficulty with eccentric control. UE support required with close supervision Y   FSD 2x10 from bottom step. B HR utilized due to poor eccentric control    LSU 2x10 unable to tolerate L side due to increased hip pain Y   HR/TR 2 x 10 from bottom step Y   Hip 3 way 1 x 10 each direction in standing //  Y   Lunges     TKE OTB behind knee standing; 2 x 10 Y             STRETCHING/ROM     Hamstring stretch 20s x 3 standing at step Y   Prone quad stretch 30s x 3    Standing calf stretch 30s x 3 slantboard Y        NEURO RE-ED (04206) TOTAL TIME FOR SESSION Not performed    Static balance     Dynamic balance     Postural re-education     Taping     GAIT TRAINING (39040) TOTAL TIME FOR SESSION Not performed    Ambulation      Dynamic gait      MANUAL (77375) TOTAL TIME FOR SESSION 8-22 Minutes    Manual stretching B hamstring, hip flexor, quad, hip IR's, hip adductors Y   Mobilization     Y = yes; N = no; G = group

## 2023-09-11 NOTE — OP PT TREATMENT LOG
ORTHO PT FLOWSHEET    Diagnosis: Bilateral knee OA   Precautions: osteoporosis, h/o uterine cancer, melanoma, pulmonary artery aneurysm     Exercises Current Session Time Completed (Y/N/G)   MODALITIES- HEAT/ICE (24289) TOTAL TIME FOR SESSION Not performed    Heat     Ice     MODALITIES - E-STIM (61229) TOTAL TIME FOR SESSION Not performed    E-stim/H-Wave     THER ACT (40637) TOTAL TIME FOR SESSION 0-7 Minutes    Assessment  See note     Vitals/pain See note Y   HEP 9/6/23: quad sets, heel slides, bridges    Education 9/6/23:  Discussed HEP and goals for treatment/expected outcomes. The patient verbalizes agreement and understanding.     Body Mechanics/Work Training     GROUP (31189)  Not performed    THER EX (54011) TOTAL TIME FOR SESSION 38-52 Minutes    CARDIOVASCULAR      Nu Step     Stationary Bike 8 min seat 7 Y   Elliptical     Treadmill     STRENGTHENING     Quad sets     Heel slides/HS isos     LAQ  SAQ 2 x 15 2#  2 x 10 2# Y  Y   Clamshells 2 x 10 OTB Y   Bridges 2 x 10; OTB around knee Y   Squats 2x10 //bar Y   Resisted side stepping Orange TB: 1 lap with increased pain reported, held further completion    FSU 1 x 10 each; L LE with significant difficulty with eccentric control. UE support required with close supervision Y   FSD 2x10 from bottom step. B HR utilized due to poor eccentric control    LSU 2x10 unable to tolerate L side due to increased hip pain Y   HR/TR 2 x 10 from bottom step Y   Hip 3 way 1 x 10 each direction in standing //  Y   Lunges     TKE OTB behind knee standing; 2 x 10 Y             STRETCHING/ROM     Hamstring stretch 20s x 3 standing at step Y   Prone quad stretch 30s x 3    Standing calf stretch 30s x 3 slantboard Y        NEURO RE-ED (91877) TOTAL TIME FOR SESSION Not performed    Static balance     Dynamic balance     Postural re-education     Taping     GAIT TRAINING (07084) TOTAL TIME FOR SESSION Not performed    Ambulation      Dynamic gait      MANUAL (43616) TOTAL TIME  FOR SESSION 8-22 Minutes    Manual stretching B hamstring, hip flexor, quad, hip IR's, hip adductors Y   Mobilization     Y = yes; N = no; G = group

## 2023-09-12 LAB
BUN SERPL-MCNC: 17 MG/DL (ref 8–27)
CREAT SERPL-MCNC: 0.7 MG/DL (ref 0.57–1)
EGFRCR SERPLBLD CKD-EPI 2021: 84 ML/MIN/1.73

## 2023-09-13 ENCOUNTER — HOSPITAL ENCOUNTER (OUTPATIENT)
Dept: PHYSICAL THERAPY | Age: 88
Setting detail: THERAPIES SERIES
Discharge: HOME | End: 2023-09-13
Attending: ORTHOPAEDIC SURGERY
Payer: COMMERCIAL

## 2023-09-13 DIAGNOSIS — M17.0 OSTEOARTHRITIS OF BOTH KNEES, UNSPECIFIED OSTEOARTHRITIS TYPE: Primary | ICD-10-CM

## 2023-09-13 PROCEDURE — 97150 GROUP THERAPEUTIC PROCEDURES: CPT | Mod: GP

## 2023-09-13 PROCEDURE — 97110 THERAPEUTIC EXERCISES: CPT | Mod: GP,59

## 2023-09-13 NOTE — PROGRESS NOTES
Physical Therapy Visit    PT DAILY NOTE FOR OUTPATIENT THERAPY    Patient: Alexandra Bird MRN: [id number]  : 1935 88 y.o.  Referring Physician: Tonie Dudley MD  Date of Visit: 2023    Certification Dates: 23 through 10/11/23    Diagnosis:   1. Osteoarthritis of both knees, unspecified osteoarthritis type        Chief Complaints:       Precautions:   Precautions comments: osteoporosis      TODAY'S VISIT    Time In Session:  Start Time: 1100  Stop Time: 1200  Time Calculation (min): 60 min   History/Vitals/Pain/Encounter Info - 23 1105        Injury History/Precautions/Daily Required Info    Document Type initial evaluation     Primary Therapist Rui Del Cid,PT     Referring Physician Dr. Tonie Dudley     Precautions comments osteoporosis     History of present illness/functional impairment Alexandra notes chronic pain in her knees. She initially saw [facility] physicians 8-9 yrs ago, receiving injections for her knees that woul last her symptoms for 7-8 months per patient. She notes current L knee pain that is sore in nature. She notes during standing and pivotingshe may have buckling at her L knee. She notes her pain is not extreme, but there is weakness. She notes she does not walk right and that she is knock kneed. She reports that her knee symptoms have worsened over the past 5 years. She notes receiving injections; most recent was: 3 months ago.  She notes her pain is worse initially in the morning and stiff.  As the day progresses she notes some improvements in symptoms. She notes she takes tylenol for her symptoms. She attends the gym, has not been there for 3 weeks. She would do the bicycle, and upper body machines, and stretching machines. She notes aggravating factors include going up and down stairs, and she is unable to walk for prolonged distances. She also endorses pain in her R hip and lateral thigh pain. Easing factors include seated rest. She notes her balance is not what it used  to be. She notes having a fall 2 years ago with fractures in her wrist and ankle.     Patient/Family/Caregiver Comments/Observations alexandra reports feeling great since starting prednison yesterday     Patient reported fall since last visit No        Pain Assessment    Currently in pain No/Denies        Pre Activity Vital Signs    Pulse 65     /91     BP Location Left upper arm     BP Method Automatic     Patient Position Sitting                Daily Treatment Assessment and Plan - 09/13/23 1105        Daily Treatment Assessment and Plan    Progress toward goals Progressing     Daily Outcome Summary Alexandra was able to tolerate most TE's without significant increase in pain. unable to perform FSU/LSU/FSD leading with 6 in step due to poor eccentric control, buckling. substituted with 4 in step.     Plan and Recommendations Continue strengthening to bilateral knee. stress eccentric control                     OBJECTIVE DATA TAKEN TODAY:    None taken    Today's Treatment:    ORTHO PT FLOWSHEET    Diagnosis: Bilateral knee OA   Precautions: osteoporosis, h/o uterine cancer, melanoma, pulmonary artery aneurysm     Exercises Current Session Time Completed (Y/N/G)   MODALITIES- HEAT/ICE (47430) TOTAL TIME FOR SESSION Not performed    Heat     Ice     MODALITIES - E-STIM (19840) TOTAL TIME FOR SESSION Not performed    E-stim/H-Wave     THER ACT (90429) TOTAL TIME FOR SESSION 0-7 Minutes    Assessment  See note     Vitals/pain See note Y   HEP 9/6/23: quad sets, heel slides, bridges    Education 9/6/23:  Discussed HEP and goals for treatment/expected outcomes. The patient verbalizes agreement and understanding.     Body Mechanics/Work Training     GROUP (99747)  8-22 Minutes    THER EX (00153) TOTAL TIME FOR SESSION 23-37 Minutes    CARDIOVASCULAR      Nu Step     Stationary Bike 8 min seat 7 upright bike due to availability Y   Elliptical     Treadmill     STRENGTHENING     Quad sets     Heel slides/HS isos      LAQ  SAQ 2 x 15 2#  2 x 10 2# G  G   Clamshells 2 x 10 OTB Y   Bridges 2 x 10; OTB around knee Y   Squats 2x10 //bar G   Resisted side stepping Orange TB: 1 lap with increased pain reported, held further completion Y   FSU 1 x 10 each; L LE with significant difficulty with eccentric control. UE support required with close supervision. LLE lead, 4in step, RLE lead 6 in step Y   FSD 2x10 from 4 in step single UE HR utilized due to poor eccentric control    LSU 2x10 LLE lead, 4in step, RLE lead 6 in step Y   HR/TR 2 x 10 at // bar G   Hip 3 way 1 x 10 each direction in standing //  G   Lunges     TKE OTB behind knee standing; 2 x 10 Y             STRETCHING/ROM     Hamstring stretch 20s x 3 standing at step Y   Prone quad stretch 30s x 3    Standing calf stretch 30s x 3 slantboard Y        NEURO RE-ED (11392) TOTAL TIME FOR SESSION Not performed    Static balance     Dynamic balance     Postural re-education     Taping     GAIT TRAINING (68747) TOTAL TIME FOR SESSION Not performed    Ambulation      Dynamic gait      MANUAL (85692) TOTAL TIME FOR SESSION Not performed    Manual stretching B hamstring, hip flexor, quad, hip IR's, hip adductors    Mobilization     Y = yes; N = no; G = group

## 2023-09-13 NOTE — OP PT TREATMENT LOG
ORTHO PT FLOWSHEET    Diagnosis: Bilateral knee OA   Precautions: osteoporosis, h/o uterine cancer, melanoma, pulmonary artery aneurysm     Exercises Current Session Time Completed (Y/N/G)   MODALITIES- HEAT/ICE (93053) TOTAL TIME FOR SESSION Not performed    Heat     Ice     MODALITIES - E-STIM (49834) TOTAL TIME FOR SESSION Not performed    E-stim/H-Wave     THER ACT (38781) TOTAL TIME FOR SESSION 0-7 Minutes    Assessment  See note     Vitals/pain See note Y   HEP 9/6/23: quad sets, heel slides, bridges    Education 9/6/23:  Discussed HEP and goals for treatment/expected outcomes. The patient verbalizes agreement and understanding.     Body Mechanics/Work Training     GROUP (31849)  8-22 Minutes    THER EX (69851) TOTAL TIME FOR SESSION 23-37 Minutes    CARDIOVASCULAR      Nu Step     Stationary Bike 8 min seat 7 upright bike due to availability Y   Elliptical     Treadmill     STRENGTHENING     Quad sets     Heel slides/HS isos     LAQ  SAQ 2 x 15 2#  2 x 10 2# G  G   Clamshells 2 x 10 OTB Y   Bridges 2 x 10; OTB around knee Y   Squats 2x10 //bar G   Resisted side stepping Orange TB: 1 lap with increased pain reported, held further completion Y   FSU 1 x 10 each; L LE with significant difficulty with eccentric control. UE support required with close supervision. LLE lead, 4in step, RLE lead 6 in step Y   FSD 2x10 from 4 in step single UE HR utilized due to poor eccentric control    LSU 2x10 LLE lead, 4in step, RLE lead 6 in step Y   HR/TR 2 x 10 at // bar G   Hip 3 way 1 x 10 each direction in standing //  G   Lunges     TKE OTB behind knee standing; 2 x 10 Y             STRETCHING/ROM     Hamstring stretch 20s x 3 standing at step Y   Prone quad stretch 30s x 3    Standing calf stretch 30s x 3 slantboard Y        NEURO RE-ED (91361) TOTAL TIME FOR SESSION Not performed    Static balance     Dynamic balance     Postural re-education     Taping     GAIT TRAINING (52550) TOTAL TIME FOR SESSION Not performed     Ambulation      Dynamic gait      MANUAL (40443) TOTAL TIME FOR SESSION Not performed    Manual stretching B hamstring, hip flexor, quad, hip IR's, hip adductors    Mobilization     Y = yes; N = no; G = group

## 2023-09-13 NOTE — ADDENDUM NOTE
Encounter addended by: Earle Moeller, PT on: 9/13/2023 1:42 PM   Actions taken: Clinical Note Signed

## 2023-09-19 ENCOUNTER — HOSPITAL ENCOUNTER (OUTPATIENT)
Dept: PHYSICAL THERAPY | Age: 88
Setting detail: THERAPIES SERIES
Discharge: HOME | End: 2023-09-19
Attending: ORTHOPAEDIC SURGERY
Payer: COMMERCIAL

## 2023-09-19 DIAGNOSIS — G89.29 CHRONIC PAIN OF LEFT KNEE: ICD-10-CM

## 2023-09-19 DIAGNOSIS — M25.562 CHRONIC PAIN OF LEFT KNEE: ICD-10-CM

## 2023-09-19 DIAGNOSIS — M25.561 CHRONIC PAIN OF RIGHT KNEE: ICD-10-CM

## 2023-09-19 DIAGNOSIS — G89.29 CHRONIC PAIN OF RIGHT KNEE: ICD-10-CM

## 2023-09-19 DIAGNOSIS — M17.0 OSTEOARTHRITIS OF BOTH KNEES, UNSPECIFIED OSTEOARTHRITIS TYPE: Primary | ICD-10-CM

## 2023-09-19 PROCEDURE — 97110 THERAPEUTIC EXERCISES: CPT | Mod: GP,CQ

## 2023-09-19 NOTE — OP PT TREATMENT LOG
ORTHO PT FLOWSHEET    Diagnosis: Bilateral knee OA   Precautions: osteoporosis, h/o uterine cancer, melanoma, pulmonary artery aneurysm     Exercises Current Session Time Completed (Y/N/G)   MODALITIES- HEAT/ICE (06495) TOTAL TIME FOR SESSION Not performed    Heat     Ice     MODALITIES - E-STIM (69412) TOTAL TIME FOR SESSION Not performed    E-stim/H-Wave     THER ACT (36375) TOTAL TIME FOR SESSION 0-7 Minutes    Assessment  See note     Vitals/pain See note Y   HEP 9/6/23: quad sets, heel slides, bridges    Education 9/6/23:  Discussed HEP and goals for treatment/expected outcomes. The patient verbalizes agreement and understanding.     Body Mechanics/Work Training     GROUP (28136)  Not performed    THER EX (98059) TOTAL TIME FOR SESSION 53-67 Minutes    CARDIOVASCULAR      Nu Step     Stationary Bike 8 min, seat 7 upright bike, level 1.0 Y   Elliptical     Treadmill     STRENGTHENING     Quad sets     Heel slides/HS isos     LAQ  SAQ 2 x 15 2.5# (trial 3# RLE NV)  2 x 10 2.5# Y  Y   Clamshells 2 x 10 pink TB Y   Bridges 2 x 10; pink TB around knee Y   Squats 2x10 //bar    Resisted side stepping Orange TB: 1 lap with increased pain reported, held further completion    FSU 2 x 10 each; L LE with significant difficulty with eccentric control. UE support required with close supervision. LLE lead, 4in step, RLE lead 6 in step Y   FSD 2x10 from 4 in step single UE HR utilized due to poor eccentric control    LSU 2x10 LLE lead, 4in step, RLE lead 6 in step Y   HR/TR 2 x 10 at // bar    Hip 3 way 2 x 10 each direction in standing //, pink TB around knees Y   Lunges     TKE OTB behind knee standing; 2 x 10              STRETCHING/ROM     Hamstring stretch 20s x 3 standing at step Y   Prone quad stretch 30s x 3    Standing calf stretch 30s x 3 slantboard Y        NEURO RE-ED (42843) TOTAL TIME FOR SESSION Not performed    Static balance     Dynamic balance     Postural re-education     Taping     GAIT TRAINING (41652)  TOTAL TIME FOR SESSION Not performed    Ambulation      Dynamic gait      MANUAL (86031) TOTAL TIME FOR SESSION Not performed    Manual stretching B hamstring, hip flexor, quad, hip IR's, hip adductors    Mobilization     Y = yes; N = no; G = group

## 2023-09-19 NOTE — PROGRESS NOTES
Physical Therapy Visit    PT DAILY NOTE FOR OUTPATIENT THERAPY    Patient: Alexandra Bird MRN: [id number]  : 1935 88 y.o.  Referring Physician: Tonie Dudley MD  Date of Visit: 2023    Certification Dates: 23 through 10/11/23    Diagnosis:   1. Osteoarthritis of both knees, unspecified osteoarthritis type    2. Chronic pain of left knee    3. Chronic pain of right knee        Chief Complaints:       Precautions:   Precautions comments: osteoporosis      TODAY'S VISIT    Time In Session:  Start Time: 1100  Stop Time: 1200  Time Calculation (min): 60 min   History/Vitals/Pain/Encounter Info - 23 1059        Injury History/Precautions/Daily Required Info    Document Type daily treatment     Primary Therapist Rui Del Cid,PT     Referring Physician Dr. Tonie Dudley     Precautions comments osteoporosis     History of present illness/functional impairment Alexandra notes chronic pain in her knees. She initially saw [facility] physicians 8-9 yrs ago, receiving injections for her knees that woul last her symptoms for 7-8 months per patient. She notes current L knee pain that is sore in nature. She notes during standing and pivotingshe may have buckling at her L knee. She notes her pain is not extreme, but there is weakness. She notes she does not walk right and that she is knock kneed. She reports that her knee symptoms have worsened over the past 5 years. She notes receiving injections; most recent was: 3 months ago.  She notes her pain is worse initially in the morning and stiff.  As the day progresses she notes some improvements in symptoms. She notes she takes tylenol for her symptoms. She attends the gym, has not been there for 3 weeks. She would do the bicycle, and upper body machines, and stretching machines. She notes aggravating factors include going up and down stairs, and she is unable to walk for prolonged distances. She also endorses pain in her R hip and lateral thigh pain. Easing factors  include seated rest. She notes her balance is not what it used to be. She notes having a fall 2 years ago with fractures in her wrist and ankle.     Patient/Family/Caregiver Comments/Observations Alexandra reports that the HEP is helping with R quad pain management. She states she was at a wedding out of town this past weekend and was dancing (holding onto people).     Patient reported fall since last visit No        Pain Assessment    Currently in pain No/Denies        Pre Activity Vital Signs    Pulse 67   irregular    SpO2 95 %     /64     BP Location Left upper arm     BP Method Manual     Patient Position Sitting                Daily Treatment Assessment and Plan - 09/19/23 1059        Daily Treatment Assessment and Plan    Progress toward goals Progressing     Daily Outcome Summary Continued functional activity training (step ups), glut. medius strengthening and proximal LE strengthening with resistance increased to pink TB and ankle weights to 2.5# today with good tolerance.     Plan and Recommendations Continue strengthening to bilateral knee. stress eccentric control                     OBJECTIVE DATA TAKEN TODAY:    None taken    Today's Treatment:    ORTHO PT FLOWSHEET    Diagnosis: Bilateral knee OA   Precautions: osteoporosis, h/o uterine cancer, melanoma, pulmonary artery aneurysm     Exercises Current Session Time Completed (Y/N/G)   MODALITIES- HEAT/ICE (51872) TOTAL TIME FOR SESSION Not performed    Heat     Ice     MODALITIES - E-STIM (87812) TOTAL TIME FOR SESSION Not performed    E-stim/H-Wave     THER ACT (88661) TOTAL TIME FOR SESSION 0-7 Minutes    Assessment  See note     Vitals/pain See note Y   HEP 9/6/23: quad sets, heel slides, bridges    Education 9/6/23:  Discussed HEP and goals for treatment/expected outcomes. The patient verbalizes agreement and understanding.     Body Mechanics/Work Training     GROUP (22859)  Not performed    THER EX (02993) TOTAL TIME FOR SESSION 53-67 Minutes     CARDIOVASCULAR      Nu Step     Stationary Bike 8 min, seat 7 upright bike, level 1.0 Y   Elliptical     Treadmill     STRENGTHENING     Quad sets     Heel slides/HS isos     LAQ  SAQ 2 x 15 2.5# (trial 3# RLE NV)  2 x 10 2.5# Y  Y   Clamshells 2 x 10 pink TB Y   Bridges 2 x 10; pink TB around knee Y   Squats 2x10 //bar    Resisted side stepping Orange TB: 1 lap with increased pain reported, held further completion    FSU 2 x 10 each; L LE with significant difficulty with eccentric control. UE support required with close supervision. LLE lead, 4in step, RLE lead 6 in step Y   FSD 2x10 from 4 in step single UE HR utilized due to poor eccentric control    LSU 2x10 LLE lead, 4in step, RLE lead 6 in step Y   HR/TR 2 x 10 at // bar    Hip 3 way 2 x 10 each direction in standing //, pink TB around knees Y   Lunges     TKE OTB behind knee standing; 2 x 10              STRETCHING/ROM     Hamstring stretch 20s x 3 standing at step Y   Prone quad stretch 30s x 3    Standing calf stretch 30s x 3 slantboard Y        NEURO RE-ED (78342) TOTAL TIME FOR SESSION Not performed    Static balance     Dynamic balance     Postural re-education     Taping     GAIT TRAINING (73286) TOTAL TIME FOR SESSION Not performed    Ambulation      Dynamic gait      MANUAL (23574) TOTAL TIME FOR SESSION Not performed    Manual stretching B hamstring, hip flexor, quad, hip IR's, hip adductors    Mobilization     Y = yes; N = no; G = group

## 2023-09-21 ENCOUNTER — HOSPITAL ENCOUNTER (OUTPATIENT)
Dept: PHYSICAL THERAPY | Age: 88
Setting detail: THERAPIES SERIES
Discharge: HOME | End: 2023-09-21
Attending: ORTHOPAEDIC SURGERY
Payer: COMMERCIAL

## 2023-09-21 DIAGNOSIS — M17.0 OSTEOARTHRITIS OF BOTH KNEES, UNSPECIFIED OSTEOARTHRITIS TYPE: Primary | ICD-10-CM

## 2023-09-21 PROCEDURE — 97110 THERAPEUTIC EXERCISES: CPT | Mod: GP

## 2023-09-21 NOTE — OP PT TREATMENT LOG
ORTHO PT FLOWSHEET    Diagnosis: Bilateral knee OA   Precautions: osteoporosis, h/o uterine cancer, melanoma, pulmonary artery aneurysm     Exercises Current Session Time Completed (Y/N/G)   MODALITIES- HEAT/ICE (05593) TOTAL TIME FOR SESSION Not performed    Heat     Ice     MODALITIES - E-STIM (14376) TOTAL TIME FOR SESSION Not performed    E-stim/H-Wave     THER ACT (53621) TOTAL TIME FOR SESSION 0-7 Minutes    Assessment  See note     Vitals/pain See note Y   HEP 9/6/23: quad sets, heel slides, bridges  9/21: hamstring stretch, calf stretch, weight shifted HR     Education 9/6/23:  Discussed HEP and goals for treatment/expected outcomes. The patient verbalizes agreement and understanding.   9/21: reviewed hamstring stretch that added (I) at home Y   Body Mechanics/Work Training     GROUP (32844)  Not performed    THER EX (96015) TOTAL TIME FOR SESSION 53-67 Minutes    CARDIOVASCULAR      Nu Step     Stationary Bike 8 min, seat 7 upright bike, level 1.0  Upright not available so 5 minutes on recumbent  Y   Elliptical     Treadmill     STRENGTHENING     Quad sets     Heel slides/HS isos     LAQ  SAQ 2 x 15 2.5# - to fatigue L: 2.5 #, R: 3#  2 x 10 2.5#to fatigue  L and 3# on R  Y  Y   Clamshells 2 x 10 pink TB    Bridges 2 x 10; pink TB around knee    Squats 2x10 //bar    Resisted side stepping Orange TB: 1 lap with increased pain reported, held further completion    FSU 2 x 10 each; L LE with significant difficulty with eccentric control. UE support required with close supervision. LLE lead, 4in step, RLE lead 6 in step Y   FSD 2x10 from 4 in step single UE HR utilized due to poor eccentric control Y   LSU 2x10 LLE lead, 4in step, RLE lead 6 in step Y   HR/TR 2 x  15 reps, shifted to L for increased WB Y   Hip 3 way 2 x 10 each direction in standing //, pink TB around knees Y   Lunges     TKE OTB behind knee standing; 2 x 10    Sit to Stand Weight shifted to L with two hands from chair with air ex for further  elevation x 7 reps  Y        STRETCHING/ROM     Hamstring stretch 20s x 3 standing at step, VC for foot placement  Y   Prone quad stretch 30s x 3    Standing calf stretch 30s x 3 slantboard Y        NEURO RE-ED (71586) TOTAL TIME FOR SESSION Not performed    Static balance     Dynamic balance     Postural re-education     Taping     GAIT TRAINING (74167) TOTAL TIME FOR SESSION Not performed    Ambulation      Dynamic gait      MANUAL (73606) TOTAL TIME FOR SESSION Not performed    Manual stretching B hamstring, hip flexor, quad, hip IR's, hip adductors    Mobilization     Y = yes; N = no; G = group

## 2023-09-21 NOTE — PROGRESS NOTES
Physical Therapy Visit    PT DAILY NOTE FOR OUTPATIENT THERAPY    Patient: Alexandra Bird MRN: [id number]  : 1935 88 y.o.  Referring Physician: Tonie Dudley MD  Date of Visit: 2023    Certification Dates: 23 through 10/11/23    Diagnosis:   1. Osteoarthritis of both knees, unspecified osteoarthritis type        Chief Complaints:       Precautions:   Precautions comments: osteoporosis      TODAY'S VISIT    Time In Session:  Start Time: 1100  Stop Time: 1200  Time Calculation (min): 60 min   History/Vitals/Pain/Encounter Info - 23 1100        Injury History/Precautions/Daily Required Info    Document Type daily treatment     Primary Therapist Rui Del Cid,PT     Referring Physician Dr. Tonie Dudley     Precautions comments osteoporosis     History of present illness/functional impairment Alexandra notes chronic pain in her knees. She initially saw [facility] physicians 8-9 yrs ago, receiving injections for her knees that woul last her symptoms for 7-8 months per patient. She notes current L knee pain that is sore in nature. She notes during standing and pivotingshe may have buckling at her L knee. She notes her pain is not extreme, but there is weakness. She notes she does not walk right and that she is knock kneed. She reports that her knee symptoms have worsened over the past 5 years. She notes receiving injections; most recent was: 3 months ago.  She notes her pain is worse initially in the morning and stiff.  As the day progresses she notes some improvements in symptoms. She notes she takes tylenol for her symptoms. She attends the gym, has not been there for 3 weeks. She would do the bicycle, and upper body machines, and stretching machines. She notes aggravating factors include going up and down stairs, and she is unable to walk for prolonged distances. She also endorses pain in her R hip and lateral thigh pain. Easing factors include seated rest. She notes her balance is not what it used to  be. She notes having a fall 2 years ago with fractures in her wrist and ankle.     Patient/Family/Caregiver Comments/Observations Alexandra reports that overall things are going well. Reports no increased soreness after last session. Added hamstring stretch at home on her own.     Patient reported fall since last visit No        Pain Assessment    Currently in pain Yes     Preferred Pain Scale number (Numeric Rating Pain Scale)     Pain Side/Orientation left     Pain: Body location Knee     Pain Rating (0-10): Pre Activity 3     Pain Rating (0-10): Activity 3     Pain Rating (0-10): Post Activity 3        Pain Intervention    Intervention  exercises and stretching     Post Intervention Comments monitored        Pre Activity Vital Signs    /74     BP Location Left upper arm     BP Method Manual     Patient Position Sitting                Daily Treatment Assessment and Plan - 09/21/23 1100        Daily Treatment Assessment and Plan    Progress toward goals Progressing     Daily Outcome Summary Focused on functional weight bearing and eccentric control today. Able to control with 4 inch step up and down but decreased with 6 inch. Added weight shifted STS to program to address as well as weight shifted HR to HEP in order to strengthen entire kinetic chain. She will continue to benefit from skilled PT services in order to decrease pain and strengthen in order to prepare for surgery.     Plan and Recommendations Continue strengthening to bilateral knee. stress eccentric control with weight bearing                     OBJECTIVE DATA TAKEN TODAY:    None taken    Today's Treatment:    ORTHO PT FLOWSHEET    Diagnosis: Bilateral knee OA   Precautions: osteoporosis, h/o uterine cancer, melanoma, pulmonary artery aneurysm     Exercises Current Session Time Completed (Y/N/G)   MODALITIES- HEAT/ICE (45279) TOTAL TIME FOR SESSION Not performed    Heat     Ice     MODALITIES - E-STIM (86026) TOTAL TIME FOR SESSION Not performed     E-stim/H-Wave     THER ACT (69225) TOTAL TIME FOR SESSION 0-7 Minutes    Assessment  See note     Vitals/pain See note Y   HEP 9/6/23: quad sets, heel slides, bridges  9/21: hamstring stretch, calf stretch, weight shifted HR     Education 9/6/23:  Discussed HEP and goals for treatment/expected outcomes. The patient verbalizes agreement and understanding.   9/21: reviewed hamstring stretch that added (I) at home Y   Body Mechanics/Work Training     GROUP (99491)  Not performed    THER EX (95824) TOTAL TIME FOR SESSION 53-67 Minutes    CARDIOVASCULAR      Nu Step     Stationary Bike 8 min, seat 7 upright bike, level 1.0  Upright not available so 5 minutes on recumbent  Y   Elliptical     Treadmill     STRENGTHENING     Quad sets     Heel slides/HS isos     LAQ  SAQ 2 x 15 2.5# - to fatigue L: 2.5 #, R: 3#  2 x 10 2.5#to fatigue  L and 3# on R  Y  Y   Clamshells 2 x 10 pink TB    Bridges 2 x 10; pink TB around knee    Squats 2x10 //bar    Resisted side stepping Orange TB: 1 lap with increased pain reported, held further completion    FSU 2 x 10 each; L LE with significant difficulty with eccentric control. UE support required with close supervision. LLE lead, 4in step, RLE lead 6 in step Y   FSD 2x10 from 4 in step single UE HR utilized due to poor eccentric control Y   LSU 2x10 LLE lead, 4in step, RLE lead 6 in step Y   HR/TR 2 x  15 reps, shifted to L for increased WB Y   Hip 3 way 2 x 10 each direction in standing //, pink TB around knees Y   Lunges     TKE OTB behind knee standing; 2 x 10    Sit to Stand Weight shifted to L with two hands from chair with air ex for further elevation x 7 reps  Y        STRETCHING/ROM     Hamstring stretch 20s x 3 standing at step, VC for foot placement  Y   Prone quad stretch 30s x 3    Standing calf stretch 30s x 3 slantboard Y        NEURO RE-ED (92111) TOTAL TIME FOR SESSION Not performed    Static balance     Dynamic balance     Postural re-education     Taping     GAIT  TRAINING (60474) TOTAL TIME FOR SESSION Not performed    Ambulation      Dynamic gait      MANUAL (28915) TOTAL TIME FOR SESSION Not performed    Manual stretching B hamstring, hip flexor, quad, hip IR's, hip adductors    Mobilization     Y = yes; N = no; G = group

## 2023-09-25 ENCOUNTER — HOSPITAL ENCOUNTER (OUTPATIENT)
Dept: RADIOLOGY | Facility: HOSPITAL | Age: 88
Discharge: HOME | End: 2023-09-25
Attending: NURSE PRACTITIONER
Payer: COMMERCIAL

## 2023-09-25 DIAGNOSIS — I28.1 PULMONARY ARTERY ANEURYSM (CMS/HCC): ICD-10-CM

## 2023-09-25 PROCEDURE — G1004 CDSM NDSC: HCPCS

## 2023-09-25 PROCEDURE — 63600105 HC IODINE BASED CONTRAST: Mod: JZ | Performed by: NURSE PRACTITIONER

## 2023-09-25 PROCEDURE — 71275 CT ANGIOGRAPHY CHEST: CPT | Mod: ME

## 2023-09-25 RX ADMIN — IOHEXOL 100 ML: 350 INJECTION, SOLUTION INTRAVENOUS at 11:03

## 2023-09-26 ENCOUNTER — HOSPITAL ENCOUNTER (OUTPATIENT)
Dept: PHYSICAL THERAPY | Age: 88
Setting detail: THERAPIES SERIES
Discharge: HOME | End: 2023-09-26
Attending: ORTHOPAEDIC SURGERY
Payer: COMMERCIAL

## 2023-09-26 DIAGNOSIS — G89.29 CHRONIC PAIN OF RIGHT KNEE: ICD-10-CM

## 2023-09-26 DIAGNOSIS — G89.29 CHRONIC PAIN OF LEFT KNEE: ICD-10-CM

## 2023-09-26 DIAGNOSIS — M25.562 CHRONIC PAIN OF LEFT KNEE: ICD-10-CM

## 2023-09-26 DIAGNOSIS — M17.0 OSTEOARTHRITIS OF BOTH KNEES, UNSPECIFIED OSTEOARTHRITIS TYPE: Primary | ICD-10-CM

## 2023-09-26 DIAGNOSIS — M25.561 CHRONIC PAIN OF RIGHT KNEE: ICD-10-CM

## 2023-09-26 PROCEDURE — 97110 THERAPEUTIC EXERCISES: CPT | Mod: GP

## 2023-09-26 NOTE — PROGRESS NOTES
Physical Therapy Visit    PT DAILY NOTE FOR OUTPATIENT THERAPY    Patient: Alexandra Bird MRN: [id number]  : 1935 88 y.o.  Referring Physician: Tonie Dudley MD  Date of Visit: 2023    Certification Dates: 23 through 10/11/23    Diagnosis:   1. Osteoarthritis of both knees, unspecified osteoarthritis type    2. Chronic pain of right knee    3. Chronic pain of left knee        Chief Complaints:  Bilateral knee pain    Precautions:   Precautions comments: osteoporosis      TODAY'S VISIT    Time In Session:  Start Time: 1300  Stop Time: 1400  Time Calculation (min): 60 min   History/Vitals/Pain/Encounter Info - 23 1256        Injury History/Precautions/Daily Required Info    Document Type daily treatment     Primary Therapist Rui Del Cid PT     Chief Complaint/Reason for Visit  Bilateral knee pain     Referring Physician Dr. Tonie Dudley     Precautions comments osteoporosis     History of present illness/functional impairment Alexandra notes chronic pain in her knees. She initially saw [facility] physicians 8-9 yrs ago, receiving injections for her knees that woul last her symptoms for 7-8 months per patient. She notes current L knee pain that is sore in nature. She notes during standing and pivotingshe may have buckling at her L knee. She notes her pain is not extreme, but there is weakness. She notes she does not walk right and that she is knock kneed. She reports that her knee symptoms have worsened over the past 5 years. She notes receiving injections; most recent was: 3 months ago.  She notes her pain is worse initially in the morning and stiff.  As the day progresses she notes some improvements in symptoms. She notes she takes tylenol for her symptoms. She attends the gym, has not been there for 3 weeks. She would do the bicycle, and upper body machines, and stretching machines. She notes aggravating factors include going up and down stairs, and she is unable to walk for prolonged distances.  She also endorses pain in her R hip and lateral thigh pain. Easing factors include seated rest. She notes her balance is not what it used to be. She notes having a fall 2 years ago with fractures in her wrist and ankle.     Patient/Family/Caregiver Comments/Observations Alexandra notes that her knees were feeling sore yesterday. She is unsure why     Patient reported fall since last visit No        Pain Assessment    Currently in pain No/Denies        Pre Activity Vital Signs    Pulse 57     /64     BP Location Left upper arm     BP Method Automatic     Patient Position Sitting                Daily Treatment Assessment and Plan - 09/26/23 1256        Daily Treatment Assessment and Plan    Progress toward goals Progressing     Daily Outcome Summary Continued challenge noted with eccentric control of step down and step up intervention. Included progressed quadriceps strengthening in varying ROM with TKE, LAQ/SAQ. Included resisted retro stepping to improve functional motor control and eccentric strength in knee; given continued challenge with eccentric control down step. Able to progress LAQ/SAQ with 4# ankle weight this session.     Plan and Recommendations Continue strengthening to bilateral knee. stress eccentric control with weight bearing                     OBJECTIVE DATA TAKEN TODAY:    None taken    Today's Treatment:    ORTHO PT FLOWSHEET    Diagnosis: Bilateral knee OA   Precautions: osteoporosis, h/o uterine cancer, melanoma, pulmonary artery aneurysm     Exercises Current Session Time Completed (Y/N/G)   MODALITIES- HEAT/ICE (12061) TOTAL TIME FOR SESSION Not performed    Heat     Ice     MODALITIES - E-STIM (06817) TOTAL TIME FOR SESSION Not performed    E-stim/H-Wave     THER ACT (58620) TOTAL TIME FOR SESSION 0-7 Minutes    Assessment  See note     Vitals/pain See note Y   HEP 9/6/23: quad sets, heel slides, bridges  9/21: hamstring stretch, calf stretch, weight shifted HR     Education 9/6/23:   Discussed HEP and goals for treatment/expected outcomes. The patient verbalizes agreement and understanding.   9/21: reviewed hamstring stretch that added (I) at home Y   Body Mechanics/Work Training     GROUP (97413)  Not performed    THER EX (18428) TOTAL TIME FOR SESSION 53-67 Minutes    CARDIOVASCULAR      Nu Step     Stationary Bike 8 min, seat 7 upright bike, level 1.0  Upright not available so 5 minutes on recumbent  Y   Elliptical     Treadmill     STRENGTHENING     Quad sets HEP    Heel slides/HS isos HEP    LAQ  SAQ 2 x 20 4.0# - to fatigue   2 x 10 4.0#   Y  Y   Clamshells 2 x 10 pink TB    Bridges 2 x 10; pink TB around knee    Minisquats 2 x 10; PTB at knees; cue for equal weight bearing Y   Resisted walking cable column 5 x retro walk: 20#  5 x laterally to each side; 15# Y  Y   FSU 2 x 10 each; L LE with significant difficulty with eccentric control. UE support required with close supervision. LLE lead, 4in step, RLE lead 6 in step Y   FSD 2x10 from 4 in step single UE HR utilized due to poor eccentric control    LSU 2x10 LLE lead, 4in step, RLE lead 6 in step    HR/TR 2 x  15 reps, shifted to L for increased WB    Hip 3 way 2 x 10 each direction in standing //, pink TB around knees Y   Lunges     TKE PTB behind knee standing; 2 x 10 Y   Sit to Stand Weight shifted to L with two hands from chair with air ex for further elevation x 7 reps          STRETCHING/ROM     Hamstring stretch 20s x 3 standing at step, VC for foot placement  Y   Supine hip flexor/rectus stretch 30s x 3 Y   Prone quad stretch 30s x 3    Standing calf stretch 30s x 3 slantboard Y        NEURO RE-ED (87684) TOTAL TIME FOR SESSION Not performed    Static balance     Dynamic balance     Postural re-education     Taping     GAIT TRAINING (91490) TOTAL TIME FOR SESSION Not performed    Ambulation      Dynamic gait      MANUAL (33094) TOTAL TIME FOR SESSION Not performed    Manual stretching B hamstring, hip flexor, quad, hip IR's, hip  adductors    Mobilization     Y = yes; N = no; G = group

## 2023-09-26 NOTE — OP PT TREATMENT LOG
ORTHO PT FLOWSHEET    Diagnosis: Bilateral knee OA   Precautions: osteoporosis, h/o uterine cancer, melanoma, pulmonary artery aneurysm     Exercises Current Session Time Completed (Y/N/G)   MODALITIES- HEAT/ICE (24817) TOTAL TIME FOR SESSION Not performed    Heat     Ice     MODALITIES - E-STIM (23363) TOTAL TIME FOR SESSION Not performed    E-stim/H-Wave     THER ACT (93608) TOTAL TIME FOR SESSION 0-7 Minutes    Assessment  See note     Vitals/pain See note Y   HEP 9/6/23: quad sets, heel slides, bridges  9/21: hamstring stretch, calf stretch, weight shifted HR     Education 9/6/23:  Discussed HEP and goals for treatment/expected outcomes. The patient verbalizes agreement and understanding.   9/21: reviewed hamstring stretch that added (I) at home Y   Body Mechanics/Work Training     GROUP (18919)  Not performed    THER EX (68926) TOTAL TIME FOR SESSION 53-67 Minutes    CARDIOVASCULAR      Nu Step     Stationary Bike 8 min, seat 7 upright bike, level 1.0  Upright not available so 5 minutes on recumbent  Y   Elliptical     Treadmill     STRENGTHENING     Quad sets HEP    Heel slides/HS isos HEP    LAQ  SAQ 2 x 20 4.0# - to fatigue   2 x 10 4.0#   Y  Y   Clamshells 2 x 10 pink TB    Bridges 2 x 10; pink TB around knee    Minisquats 2 x 10; PTB at knees; cue for equal weight bearing Y   Resisted walking cable column 5 x retro walk: 20#  5 x laterally to each side; 15# Y  Y   FSU 2 x 10 each; L LE with significant difficulty with eccentric control. UE support required with close supervision. LLE lead, 4in step, RLE lead 6 in step Y   FSD 2x10 from 4 in step single UE HR utilized due to poor eccentric control    LSU 2x10 LLE lead, 4in step, RLE lead 6 in step    HR/TR 2 x  15 reps, shifted to L for increased WB    Hip 3 way 2 x 10 each direction in standing //, pink TB around knees Y   Lunges     TKE PTB behind knee standing; 2 x 10 Y   Sit to Stand Weight shifted to L with two hands from chair with air ex for further  elevation x 7 reps          STRETCHING/ROM     Hamstring stretch 20s x 3 standing at step, VC for foot placement  Y   Supine hip flexor/rectus stretch 30s x 3 Y   Prone quad stretch 30s x 3    Standing calf stretch 30s x 3 slantboard Y        NEURO RE-ED (14698) TOTAL TIME FOR SESSION Not performed    Static balance     Dynamic balance     Postural re-education     Taping     GAIT TRAINING (11729) TOTAL TIME FOR SESSION Not performed    Ambulation      Dynamic gait      MANUAL (59041) TOTAL TIME FOR SESSION Not performed    Manual stretching B hamstring, hip flexor, quad, hip IR's, hip adductors    Mobilization     Y = yes; N = no; G = group

## 2023-09-27 ENCOUNTER — TRANSCRIBE ORDERS (OUTPATIENT)
Dept: PREADMISSION TESTING | Facility: HOSPITAL | Age: 88
End: 2023-09-27

## 2023-09-27 ENCOUNTER — HOSPITAL ENCOUNTER (OUTPATIENT)
Dept: CARDIOLOGY | Facility: HOSPITAL | Age: 88
Discharge: HOME | End: 2023-09-27
Attending: ORTHOPAEDIC SURGERY
Payer: COMMERCIAL

## 2023-09-27 ENCOUNTER — APPOINTMENT (OUTPATIENT)
Dept: PREADMISSION TESTING | Facility: HOSPITAL | Age: 88
End: 2023-09-27
Attending: ORTHOPAEDIC SURGERY
Payer: COMMERCIAL

## 2023-09-27 VITALS
OXYGEN SATURATION: 97 % | TEMPERATURE: 97.8 F | DIASTOLIC BLOOD PRESSURE: 62 MMHG | BODY MASS INDEX: 27.85 KG/M2 | HEIGHT: 61 IN | WEIGHT: 147.5 LBS | SYSTOLIC BLOOD PRESSURE: 122 MMHG | HEART RATE: 62 BPM | RESPIRATION RATE: 18 BRPM

## 2023-09-27 DIAGNOSIS — Z01.818 ENCOUNTER FOR OTHER PREPROCEDURAL EXAMINATION: ICD-10-CM

## 2023-09-27 DIAGNOSIS — I25.10 CORONARY ARTERY DISEASE INVOLVING NATIVE CORONARY ARTERY OF NATIVE HEART WITHOUT ANGINA PECTORIS: ICD-10-CM

## 2023-09-27 DIAGNOSIS — I48.0 PAF (PAROXYSMAL ATRIAL FIBRILLATION) (CMS/HCC): ICD-10-CM

## 2023-09-27 DIAGNOSIS — M17.12 UNILATERAL PRIMARY OSTEOARTHRITIS, LEFT KNEE: ICD-10-CM

## 2023-09-27 DIAGNOSIS — Z01.818 PREOPERATIVE EXAMINATION: ICD-10-CM

## 2023-09-27 DIAGNOSIS — M17.12 UNILATERAL PRIMARY OSTEOARTHRITIS, LEFT KNEE: Primary | ICD-10-CM

## 2023-09-27 DIAGNOSIS — I28.1 PULMONARY ARTERY ANEURYSM (CMS/HCC): ICD-10-CM

## 2023-09-27 DIAGNOSIS — E78.2 MIXED HYPERLIPIDEMIA: ICD-10-CM

## 2023-09-27 LAB
ALBUMIN SERPL-MCNC: 4 G/DL (ref 3.5–5.7)
ALP SERPL-CCNC: 48 IU/L (ref 34–125)
ALT SERPL-CCNC: 11 IU/L (ref 7–52)
ANION GAP SERPL CALC-SCNC: 4 MEQ/L (ref 3–15)
AST SERPL-CCNC: 16 IU/L (ref 13–39)
ATRIAL RATE: 60
BILIRUB SERPL-MCNC: 0.5 MG/DL (ref 0.3–1.2)
BUN SERPL-MCNC: 19 MG/DL (ref 7–25)
CALCIUM SERPL-MCNC: 9.3 MG/DL (ref 8.6–10.3)
CHLORIDE SERPL-SCNC: 106 MEQ/L (ref 98–107)
CO2 SERPL-SCNC: 31 MEQ/L (ref 21–31)
CREAT SERPL-MCNC: 0.7 MG/DL (ref 0.6–1.2)
ERYTHROCYTE [DISTWIDTH] IN BLOOD BY AUTOMATED COUNT: 13.1 % (ref 11.7–14.4)
GFR SERPL CREATININE-BSD FRML MDRD: >60 ML/MIN/1.73M*2
GLUCOSE SERPL-MCNC: 86 MG/DL (ref 70–99)
HCT VFR BLDCO AUTO: 45.2 % (ref 35–45)
HGB BLD-MCNC: 14.3 G/DL (ref 11.8–15.7)
MCH RBC QN AUTO: 31.6 PG (ref 28–33.2)
MCHC RBC AUTO-ENTMCNC: 31.6 G/DL (ref 32.2–35.5)
MCV RBC AUTO: 100 FL (ref 83–98)
P AXIS: 68
PDW BLD AUTO: 10.6 FL (ref 9.4–12.3)
PLATELET # BLD AUTO: 248 K/UL (ref 150–369)
POTASSIUM SERPL-SCNC: 4.3 MEQ/L (ref 3.5–5.1)
PR INTERVAL: 180
PROT SERPL-MCNC: 6.7 G/DL (ref 6–8.2)
QRS DURATION: 120
QT INTERVAL: 374
QTC CALCULATION(BAZETT): 374
R AXIS: 42
RBC # BLD AUTO: 4.52 M/UL (ref 3.93–5.22)
SODIUM SERPL-SCNC: 141 MEQ/L (ref 136–145)
T WAVE AXIS: -34
VENTRICULAR RATE: 60
WBC # BLD AUTO: 7.98 K/UL (ref 3.8–10.5)

## 2023-09-27 PROCEDURE — 99204 OFFICE O/P NEW MOD 45 MIN: CPT | Performed by: HOSPITALIST

## 2023-09-27 PROCEDURE — 36415 COLL VENOUS BLD VENIPUNCTURE: CPT

## 2023-09-27 PROCEDURE — 85027 COMPLETE CBC AUTOMATED: CPT

## 2023-09-27 PROCEDURE — 93005 ELECTROCARDIOGRAM TRACING: CPT

## 2023-09-27 PROCEDURE — 80053 COMPREHEN METABOLIC PANEL: CPT

## 2023-09-27 PROCEDURE — 93010 ELECTROCARDIOGRAM REPORT: CPT | Performed by: INTERNAL MEDICINE

## 2023-09-27 NOTE — ASSESSMENT & PLAN NOTE
She did not tolerate statins.  She takes Praluent and can follow-up as an outpatient with her usual physicians

## 2023-09-27 NOTE — ASSESSMENT & PLAN NOTE
The patient follows with cardiology, Dr. Nick Beck.  I reviewed previous notes including a note from March 2023.  There is an addendum at the bottom of that note dated 8/22/2023  Addendum August 22, 2023  Cardiovascular status to go total left knee replacement  No other cardiac studies recommended preoperatively  Would recommend holding Xarelto 48 hours prior to procedure restart again as soon as possible when surgically stable, postop

## 2023-09-27 NOTE — H&P (VIEW-ONLY)
[facility] Medicine Service -  Pre-Operative Consultation       Patient Name: Teresa Bird  Referring Surgeon: Dr. Tonie Dudley    Reason for Referral: Pre-Operative Evaluation  Surgical Procedure: Left total knee arthroplasty  Operative Date: 10/11/2023  Other Providers:      PCP: Sergei Cooley MD          HISTORY OF PRESENT ILLNESS      Teresa Bird is a 88 y.o. female presenting today to the [facility] at [facility] for pre-operative evaluation prior to planned surgery.    The patient has been experiencing left knee pain for some time.  It is limiting her ability to walk.  She did try some injections but still has pain and limited mobility.  She is now planning on knee replacement.    In regards to medical history:  + CAD- Dr. Nick Beck  + PAF-on metoprolol and Xarelto  + HLD  + Pulmonary artery aneurysm-follows with CT surgery    The patient denies any current or recent chest pain or pressure, dyspnea, cough, sputum, fevers, chills, abdominal pain, nausea, vomiting, diarrhea or other symptoms.     Functionally, the patient is able to ascend a flight or so of stairs with no dyspnea or chest pain.     The patient denies, on specific questioning, the following:  No history of MI or CHF.  +CAD  No history of TIP.  No history of DVT/PE.  No history of COPD.  No history of CVA.  No history of DM.   No history of CKD.     PAST MEDICAL AND SURGICAL HISTORY      Past Medical History:   Diagnosis Date    A-fib (CMS/[facility])     Arthritis     Closed right ankle fracture age 62    Endometrial cancer (CMS/[facility]) 12/06/2013    GERD (gastroesophageal reflux disease)     Lipid disorder     difficulty tolerating statins    Melanoma (CMS/[facility])     left shoulder    Osteoporosis     did not tolerate fosamax    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[facility])     SVT (supraventricular tachycardia) (CMS/[facility]) 08/2021    Thyroid goiter        Past Surgical History:   Procedure  Laterality Date    BUNIONECTOMY Bilateral     CATARACT EXTRACTION, BILATERAL  2006    CHOLECYSTECTOMY      HYSTERECTOMY  12/2013    MOHS SURGERY      SKIN CANCER EXCISION         MEDICATIONS        Current Outpatient Medications:     alirocumab (PRALUENT PEN) 150 mg/mL pen injector, Inject 1 ml (150 mg) under skin every 14 days, Disp: 8 mL, Rfl: 3    cetirizine (ZyrTEC) 5 mg tablet, Take 5 mg by mouth daily., Disp: , Rfl:     denosumab (PROLIA) 60 mg/mL syringe, , Disp: , Rfl:     LORazepam (ATIVAN) 0.5 mg tablet, Take 2 tablets (1 mg total) by mouth daily as needed for anxiety., Disp: 30 tablet, Rfl: 1    metoprolol succinate XL (TOPROL-XL) 25 mg 24 hr tablet, Take 1 tablet (25 mg total) by mouth 2 (two) times a day., Disp: 180 tablet, Rfl: 3    rivaroxaban (XARELTO) 20 mg tablet, TAKE 1 TABLET(20 MG) BY MOUTH DAILY WITH DINNER, Disp: 90 tablet, Rfl: 6    ALLERGIES      Penicillin, Pravastatin sodium, Rosuvastatin calcium, and Zetia [ezetimibe]    FAMILY HISTORY      family history includes COPD in her biological mother; Diabetes in her biological father and biological mother; Heart attack in her biological mother; Lung cancer in her biological mother; Prostate cancer in her biological brother.    Denies any prior known family history of DVTs/PEs/clotting disorder    SOCIAL HISTORY      Social History     Tobacco Use    Smoking status: Former     Packs/day: 0.50     Years: 25.00     Additional pack years: 0.00     Total pack years: 12.50     Types: Cigarettes     Quit date: 2008     Years since quitting: 15.7    Smokeless tobacco: Never   Vaping Use    Vaping Use: Never used   Substance Use Topics    Alcohol use: Yes     Alcohol/week: 2.0 standard drinks of alcohol     Types: 2 Standard drinks or equivalent per week     Comment: social    Drug use: No       REVIEW OF SYSTEMS      All other systems reviewed and negative except as noted in HPI    PHYSICAL EXAMINATION      Visit Vitals  /62 (BP  "Location: Right upper arm, Patient Position: Sitting)   Pulse 62   Temp 36.6 °C (97.8 °F) (Temporal)   Resp 18   Ht 1.549 m (5' 1\")   Wt 66.9 kg (147 lb 8 oz)   SpO2 97%   BMI 27.87 kg/m²     Body mass index is 27.87 kg/m².    Physical Exam   General Appearance:        Awake, Alert, Oriented x3   Head:    Normocephalic   Eyes:    PERRL, EOM's intact, No icterus   Throat:   Mucous membranes moist   Respiratory:     Clear to auscultation bilaterally   Cardiovascular:    Regular rate and rhythm   GI:     Soft, non-tender, bowel sounds active    Musculoskeletal   Symmetric strength bilateral upper and lower ext   Skin:   No diffuse rash on exposed areas of skin   Neurologic:  Psychiatric   CNII-XII grossly intact.     Appropriate, cooperative     LABS / EKG        Labs  Results from last 7 days   Lab Units 09/27/23  1036   WBC K/uL 7.98   HEMOGLOBIN g/dL 14.3   HEMATOCRIT % 45.2*   PLATELETS K/uL 248       Lab Results   Component Value Date     09/27/2023    K 4.3 09/27/2023     09/27/2023    BUN 19 09/27/2023    CREATININE 0.7 09/27/2023    WBC 7.98 09/27/2023    HGB 14.3 09/27/2023    HCT 45.2 (H) 09/27/2023     09/27/2023    ALT 11 09/27/2023    AST 16 09/27/2023         ECG/Telemetry  EKG from today was independent reviewed by me which shows sinus rhythm at 60 bpm with right bundle branch block and some T wave changes in leads V3 through V5    ASSESSMENT AND PLAN         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HCC)  The patient follows with CT surgery.  She had a recent CT scan and the report suggests stability.  She has a follow-up appointment next week with CT surgery and I requested that she obtain clearance from their perspective for her to have her knee surgery.    Coronary artery disease involving native coronary artery of native heart without angina pectoris  The patient follows with cardiology, Dr. Nick Beck.  I reviewed previous notes including a note from March 2023.  There is an addendum at " the bottom of that note dated 8/22/2023  Addendum August 22, 2023  Cardiovascular status to go total left knee replacement  No other cardiac studies recommended preoperatively  Would recommend holding Xarelto 48 hours prior to procedure restart again as soon as possible when surgically stable, postop    Preoperative examination  The patient has been evaluated by her cardiologist, Dr. Beck.  She is provided risk stratification in a previous note which I have reviewed and pasted below.  Of note however, the patient has expressed a desire to have spinal anesthesia.  I have discussed with anesthesia and they typically require holding anticoagulation for 3 days in order to be considered for spinal anesthesia.  Therefore, I have recommended that she hold it for 3 days and she can check with her cardiologist to see if this is acceptable.      Addendum August 22, 2023  Cardiovascular status to go total left knee replacement  No other cardiac studies recommended preoperatively  Would recommend holding Xarelto 48 hours prior to procedure restart again as soon as possible when surgically stable, postop    Mixed hyperlipidemia  She did not tolerate statins.  She takes Praluent and can follow-up as an outpatient with her usual physicians    PAF (paroxysmal atrial fibrillation) (CMS/[facility])  Patient has paroxysmal A-fib.  She is in a sinus rhythm today.  She takes metoprolol XL and should continue as tolerated.  She can hold her Xarelto for 2 to 3 days prior to surgery depending on if she wants to be considered for spinal anesthesia and her cardiologist input.  Resume Xarelto postoperatively when okay with Ortho  Monitor on telemetry after surgery       In regards to perioperative cardiac risk:  Regarding perioperative cardiovascular risk:  The patient has the following risk factors: history of ischemic heart disease.  This correlates to a rCRI score of 1 with perioperative cardiac event risk of 0.9%.      Further  comments:  Resume supplements when OK with surgical team.  I would encourage incentive spirometry to assist with minimizing mohit-operative pulmonary risk.  Resuming anticoagulation and timing of such per the discretion of the surgeon.     Please do not hesitate to [place] [facility] [place] upcoming hospitalization with any questions or concerns.     Karsten Reynoso, DO  9/27/2023

## 2023-09-27 NOTE — PRE-PROCEDURE INSTRUCTIONS
1. Admissions will call you with your arrival time on 10/10/23  (day prior to surgery) between 2pm-4pm.  For questions about your arrival time, please call [phone].     2. On the day of your procedure please report to the [facility] in the [place]. Please arrive through the [facility] Entrance([address], [place])  If you are parking in the [facility] Parking Garage, come to the ground floor of the garage and follow signs to the [facility].  Please report to the Surgery Registration Desk located in the [facility].     3. Please follow the following fasting guidelines: No solid food EIGHT HOURS prior to arrival time. Unlimited clear liquids, meaning water or PLAIN black coffee WITHOUT any milk, cream, sugar, or sweetener are permitted up to TWO HOURS prior to arrival at the hospital.    4. Please take ONLY the following medications with a sip of water on the morning of your procedure: metoprolol     Please stop taking:Xarelto 3 days prior to surgery.      5. Other Instructions: You may brush your teeth the morning of the procedure. Rinse and spit, do not swallow.  Bring a list of your medications with dosages.  Use surgical wash as directed.     6. If you develop a cold, cough, fever, rash, or other symptom prior to the day of the procedure, please report it to your physician immediately.    7. If you need to cancel the procedure for any reason, please contact your surgeon.    8. Make arrangements to have safe transportation home accompanied by a responsible adult. If you have not arranged safe transportation home, your surgery will be cancelled. Safe transportation may include private vehicle, ride-share service, taxi and public transportation when accompanied by a responsible adult who will assist you home. A responsible adult is someone known to you and does not include the taxi, ride-share or public transit drive transporting you.    9.  If it is medically necessary  for you to have a longer stay, you will be informed as soon as the decision is made.    10. Only bring essential items to the hospital.  Do not wear or bring anything of value to the hospital including jewelry of any kind, money, or wallet. Do not wear make-up or contact lenses.  DO NOT BRING MEDICATIONS FROM HOME unless instructed to do so. DO bring your hearing aids, glasses, and a case    11. No lotion, creams, powders, or oils on skin the morning of procedure     12. Dress in comfortable clothes.    13.  If instructed, please bring a copy of your Advanced Directive (Living Will/Durable Power of ) on the day of your procedure.     14. Ensuring your safety at all times is a very important part of our [facility] of Safety. After having surgery and sedation, you are at risk for falling and balance issues. Although you may feel awake, the effects of the medication can last up to 24 hours after anesthesia. If you need to use the bathroom during your recovery period, nursing staff will escort you there and stay with you to ensure your safety.    15. Refrain from drinking alcohol and smoking cigarettes for 24 hours prior to surgery.    16. Shower with antibacterial soap (Dial) the night before and morning of your procedure.  If your procedure indicates the need for CHG antiseptic wash (Bactoshield or Hibiclens), please use this instead and follow instructions as discussed at the time of your Pre-Admission Testing phone interview or visit.    Above instructions reviewed with patient and patient acknowledges understanding.      How to Use an Incentive Spirometer  An incentive spirometer is a tool that measures how well you are filling your lungs with each breath. Learning to take long, deep breaths using this tool can help you keep your lungs clear and active. This may help to reverse or lessen your chance of developing breathing (pulmonary) problems, especially infection. You may be asked to use a  spirometer:   After a surgery.   If you have a lung problem or a history of smoking.   After a long period of time when you have been unable to move or be active.  If the spirometer includes an indicator to show the highest number that you have reached, your health care provider or respiratory therapist will help youset a goal. Keep a log of your progress as told by your health care provider.  What are the risks?   Breathing too quickly may cause dizziness or cause you to pass out. Take your time so you do not get dizzy or light-headed.   If you are in pain, you may need to take pain medicine before doing incentive spirometry. It is harder to take a deep breath if you are having pain.  How to use your incentive spirometer    1. Sit up on the edge of your bed or on a chair.  2. Hold the incentive spirometer so that it is in an upright position.  3. Before you use the spirometer, breathe out normally.  4. Place the mouthpiece in your mouth. Make sure your lips are closed tightly around it.  5. Breathe in slowly and as deeply as you can through your mouth, causing the piston or the ball to rise toward the top of the chamber.  6. Hold your breath for 3-5 seconds, or for as long as possible.  ? If the spirometer includes a  indicator, use this to guide you in breathing. Slow down your breathing if the indicator goes above the marked areas.  7. Remove the mouthpiece from your mouth and breathe out normally. The piston or ball will return to the bottom of the chamber.  8. Rest for a few seconds, then repeat the steps 10 or more times.  ? Take your time and take a few normal breaths between deep breaths so that you do not get dizzy or light-headed.  ? Do this every 1-2 hours when you are awake.  9. If the spirometer includes a goal marker to show the highest number you have reached (best effort), use this as a goal to work toward during each repetition.  10. After each set of 10 deep breaths, cough a few times.  This will help to make sure that your lungs are clear.  ? If you have an incision on your chest or abdomen from surgery, place a pillow or a rolled-up towel firmly against the incision when you cough. This can help to reduce pain while taking deep breaths and coughing.  General tips  1. When you are able to get out of bed:  ? Walk around often.  ? Continue to take deep breaths and cough in order to clear your lungs.  2. Keep using the incentive spirometer until your health care provider says it is okay to stop using it. If you have been in the hospital, you may be told to keep using the spirometer at home.  Contact a health care provider if:   You are having difficulty using the spirometer.   You have trouble using the spirometer as often as instructed.   Your pain medicine is not giving enough relief for you to use the spirometer as told.   You have a fever.  Get help right away if:   You develop shortness of breath.   You develop a cough with bloody mucus from the lungs.   You have fluid or blood coming from an incision site after you cough.  Summary   An incentive spirometer is a tool that can help you learn to take long, deep breaths to keep your lungs clear and active.   You may be asked to use a spirometer after a surgery, if you have a lung problem or a history of smoking, or if you have been inactive for a long period of time.   Use your incentive spirometer as instructed every 1-2 hours while you are awake.   If you have an incision on your chest or abdomen, place a pillow or a rolled-up towel firmly against your incision when you cough. This will help to reduce pain.   Get help right away if you have shortness of breath, you cough up bloody mucus, or blood comes from your incision when you cough.  This information is not intended to replace advice given to you by your health care provider. Make sure you discuss any questions you have with your healthcare provider.  Document Revised: 03/08/2021  Document Reviewed: 03/08/2021  [employer] Patient Education © 2022 [employer]        Above instructions reviewed with patient and patient acknowledges understanding.    Form explained by: Micheline White RN

## 2023-09-27 NOTE — ASSESSMENT & PLAN NOTE
The patient has been evaluated by her cardiologist, Dr. Beck.  She is provided risk stratification in a previous note which I have reviewed and pasted below.  Of note however, the patient has expressed a desire to have spinal anesthesia.  I have discussed with anesthesia and they typically require holding anticoagulation for 3 days in order to be considered for spinal anesthesia.  Therefore, I have recommended that she hold it for 3 days and she can check with her cardiologist to see if this is acceptable.      Addendum August 22, 2023  Cardiovascular status to go total left knee replacement  No other cardiac studies recommended preoperatively  Would recommend holding Xarelto 48 hours prior to procedure restart again as soon as possible when surgically stable, postop

## 2023-09-27 NOTE — CONSULTS
[facility] Medicine Service -  Pre-Operative Consultation       Patient Name: Teresa Bird  Referring Surgeon: Dr. Tonie Dudley    Reason for Referral: Pre-Operative Evaluation  Surgical Procedure: Left total knee arthroplasty  Operative Date: 10/11/2023  Other Providers:      PCP: Sergei Cooley MD          HISTORY OF PRESENT ILLNESS      Teresa Bird is a 88 y.o. female presenting today to the [facility] at [facility] for pre-operative evaluation prior to planned surgery.    The patient has been experiencing left knee pain for some time.  It is limiting her ability to walk.  She did try some injections but still has pain and limited mobility.  She is now planning on knee replacement.    In regards to medical history:  + CAD- Dr. Nick Beck  + PAF-on metoprolol and Xarelto  + HLD  + Pulmonary artery aneurysm-follows with CT surgery    The patient denies any current or recent chest pain or pressure, dyspnea, cough, sputum, fevers, chills, abdominal pain, nausea, vomiting, diarrhea or other symptoms.     Functionally, the patient is able to ascend a flight or so of stairs with no dyspnea or chest pain.     The patient denies, on specific questioning, the following:  No history of MI or CHF.  +CAD  No history of TIP.  No history of DVT/PE.  No history of COPD.  No history of CVA.  No history of DM.   No history of CKD.     PAST MEDICAL AND SURGICAL HISTORY      Past Medical History:   Diagnosis Date    A-fib (CMS/[facility])     Arthritis     Closed right ankle fracture age 62    Endometrial cancer (CMS/[facility]) 12/06/2013    GERD (gastroesophageal reflux disease)     Lipid disorder     difficulty tolerating statins    Melanoma (CMS/[facility])     left shoulder    Osteoporosis     did not tolerate fosamax    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[facility])     SVT (supraventricular tachycardia) (CMS/[facility]) 08/2021    Thyroid goiter        Past Surgical History:   Procedure  Laterality Date    BUNIONECTOMY Bilateral     CATARACT EXTRACTION, BILATERAL  2006    CHOLECYSTECTOMY      HYSTERECTOMY  12/2013    MOHS SURGERY      SKIN CANCER EXCISION         MEDICATIONS        Current Outpatient Medications:     alirocumab (PRALUENT PEN) 150 mg/mL pen injector, Inject 1 ml (150 mg) under skin every 14 days, Disp: 8 mL, Rfl: 3    cetirizine (ZyrTEC) 5 mg tablet, Take 5 mg by mouth daily., Disp: , Rfl:     denosumab (PROLIA) 60 mg/mL syringe, , Disp: , Rfl:     LORazepam (ATIVAN) 0.5 mg tablet, Take 2 tablets (1 mg total) by mouth daily as needed for anxiety., Disp: 30 tablet, Rfl: 1    metoprolol succinate XL (TOPROL-XL) 25 mg 24 hr tablet, Take 1 tablet (25 mg total) by mouth 2 (two) times a day., Disp: 180 tablet, Rfl: 3    rivaroxaban (XARELTO) 20 mg tablet, TAKE 1 TABLET(20 MG) BY MOUTH DAILY WITH DINNER, Disp: 90 tablet, Rfl: 6    ALLERGIES      Penicillin, Pravastatin sodium, Rosuvastatin calcium, and Zetia [ezetimibe]    FAMILY HISTORY      family history includes COPD in her biological mother; Diabetes in her biological father and biological mother; Heart attack in her biological mother; Lung cancer in her biological mother; Prostate cancer in her biological brother.    Denies any prior known family history of DVTs/PEs/clotting disorder    SOCIAL HISTORY      Social History     Tobacco Use    Smoking status: Former     Packs/day: 0.50     Years: 25.00     Additional pack years: 0.00     Total pack years: 12.50     Types: Cigarettes     Quit date: 2008     Years since quitting: 15.7    Smokeless tobacco: Never   Vaping Use    Vaping Use: Never used   Substance Use Topics    Alcohol use: Yes     Alcohol/week: 2.0 standard drinks of alcohol     Types: 2 Standard drinks or equivalent per week     Comment: social    Drug use: No       REVIEW OF SYSTEMS      All other systems reviewed and negative except as noted in HPI    PHYSICAL EXAMINATION      Visit Vitals  /62 (BP  "Location: Right upper arm, Patient Position: Sitting)   Pulse 62   Temp 36.6 °C (97.8 °F) (Temporal)   Resp 18   Ht 1.549 m (5' 1\")   Wt 66.9 kg (147 lb 8 oz)   SpO2 97%   BMI 27.87 kg/m²     Body mass index is 27.87 kg/m².    Physical Exam   General Appearance:        Awake, Alert, Oriented x3   Head:    Normocephalic   Eyes:    PERRL, EOM's intact, No icterus   Throat:   Mucous membranes moist   Respiratory:     Clear to auscultation bilaterally   Cardiovascular:    Regular rate and rhythm   GI:     Soft, non-tender, bowel sounds active    Musculoskeletal   Symmetric strength bilateral upper and lower ext   Skin:   No diffuse rash on exposed areas of skin   Neurologic:  Psychiatric   CNII-XII grossly intact.     Appropriate, cooperative     LABS / EKG        Labs  Results from last 7 days   Lab Units 09/27/23  1036   WBC K/uL 7.98   HEMOGLOBIN g/dL 14.3   HEMATOCRIT % 45.2*   PLATELETS K/uL 248       Lab Results   Component Value Date     09/27/2023    K 4.3 09/27/2023     09/27/2023    BUN 19 09/27/2023    CREATININE 0.7 09/27/2023    WBC 7.98 09/27/2023    HGB 14.3 09/27/2023    HCT 45.2 (H) 09/27/2023     09/27/2023    ALT 11 09/27/2023    AST 16 09/27/2023         ECG/Telemetry  EKG from today was independent reviewed by me which shows sinus rhythm at 60 bpm with right bundle branch block and some T wave changes in leads V3 through V5    ASSESSMENT AND PLAN         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HCC)  The patient follows with CT surgery.  She had a recent CT scan and the report suggests stability.  She has a follow-up appointment next week with CT surgery and I requested that she obtain clearance from their perspective for her to have her knee surgery.    Coronary artery disease involving native coronary artery of native heart without angina pectoris  The patient follows with cardiology, Dr. Nick Beck.  I reviewed previous notes including a note from March 2023.  There is an addendum at " the bottom of that note dated 8/22/2023  Addendum August 22, 2023  Cardiovascular status to go total left knee replacement  No other cardiac studies recommended preoperatively  Would recommend holding Xarelto 48 hours prior to procedure restart again as soon as possible when surgically stable, postop    Preoperative examination  The patient has been evaluated by her cardiologist, Dr. Beck.  She is provided risk stratification in a previous note which I have reviewed and pasted below.  Of note however, the patient has expressed a desire to have spinal anesthesia.  I have discussed with anesthesia and they typically require holding anticoagulation for 3 days in order to be considered for spinal anesthesia.  Therefore, I have recommended that she hold it for 3 days and she can check with her cardiologist to see if this is acceptable.      Addendum August 22, 2023  Cardiovascular status to go total left knee replacement  No other cardiac studies recommended preoperatively  Would recommend holding Xarelto 48 hours prior to procedure restart again as soon as possible when surgically stable, postop    Mixed hyperlipidemia  She did not tolerate statins.  She takes Praluent and can follow-up as an outpatient with her usual physicians    PAF (paroxysmal atrial fibrillation) (CMS/[facility])  Patient has paroxysmal A-fib.  She is in a sinus rhythm today.  She takes metoprolol XL and should continue as tolerated.  She can hold her Xarelto for 2 to 3 days prior to surgery depending on if she wants to be considered for spinal anesthesia and her cardiologist input.  Resume Xarelto postoperatively when okay with Ortho  Monitor on telemetry after surgery       In regards to perioperative cardiac risk:  Regarding perioperative cardiovascular risk:  The patient has the following risk factors: history of ischemic heart disease.  This correlates to a rCRI score of 1 with perioperative cardiac event risk of 0.9%.      Further  comments:  Resume supplements when OK with surgical team.  I would encourage incentive spirometry to assist with minimizing mohit-operative pulmonary risk.  Resuming anticoagulation and timing of such per the discretion of the surgeon.     Please do not hesitate to contact [facility] during the upcoming hospitalization with any questions or concerns.     Karsten Reynoso, DO  9/27/2023

## 2023-09-27 NOTE — ASSESSMENT & PLAN NOTE
The patient follows with CT surgery.  She had a recent CT scan and the report suggests stability.  She has a follow-up appointment next week with CT surgery and I requested that she obtain clearance from their perspective for her to have her knee surgery.

## 2023-09-27 NOTE — ASSESSMENT & PLAN NOTE
Patient has paroxysmal A-fib.  She is in a sinus rhythm today.  She takes metoprolol XL and should continue as tolerated.  She can hold her Xarelto for 2 to 3 days prior to surgery depending on if she wants to be considered for spinal anesthesia and her cardiologist input.  Resume Xarelto postoperatively when okay with Ortho  Monitor on telemetry after surgery

## 2023-09-28 ENCOUNTER — TRANSCRIBE ORDERS (OUTPATIENT)
Dept: SCHEDULING | Facility: REHABILITATION | Age: 88
End: 2023-09-28

## 2023-09-28 ENCOUNTER — TRANSCRIBE ORDERS (OUTPATIENT)
Dept: REGISTRATION | Facility: REHABILITATION | Age: 88
End: 2023-09-28

## 2023-09-28 DIAGNOSIS — Z96.652 PRESENCE OF LEFT ARTIFICIAL KNEE JOINT: Primary | ICD-10-CM

## 2023-09-28 ASSESSMENT — ENCOUNTER SYMPTOMS
CONSTITUTIONAL NEGATIVE: 1
ALLERGIC/IMMUNOLOGIC NEGATIVE: 1
EYES NEGATIVE: 1
ENDOCRINE NEGATIVE: 1
RESPIRATORY NEGATIVE: 1
GASTROINTESTINAL NEGATIVE: 1
NEUROLOGICAL NEGATIVE: 1
MUSCULOSKELETAL NEGATIVE: 1
PSYCHIATRIC NEGATIVE: 1

## 2023-09-28 NOTE — PROGRESS NOTES
[facility]  Dr. Alf Bolton- System Chief Cardiothoracic Surgery  ED[facility] Mcclendon - Aortic   [facility]  [phone]         Reason for visit:   Chief Complaint   Patient presents with    Follow-up      HPI    Teresa Bird is a 88 y.o. 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 She established care with the office in 2018 and her pulmonary artery dimensions have been stable since that time.    Ms. Bird's PMH is significant for atrial fibrillation on xarelto, Uterine CA, Arthritis and GERD.     She presents today with no new medical issues or interval hospitalizations.  She continues to perform normal daily activities without limitation and currently denies chest pain, shortness of breath, palpitations or lightheadedness. She is scheduled for a left knee replacement with Dr. Dudley at [facility] on October 11th.     Past Medical History:   Diagnosis Date    A-fib (CMS/HCC)     Arthritis     Closed right ankle fracture age 62    Endometrial cancer (CMS/HCC) 12/06/2013    GERD (gastroesophageal reflux disease)     Lipid disorder     difficulty tolerating statins    Melanoma (CMS/HCC)     left shoulder    Osteoporosis     did not tolerate fosamax    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HCC)     SVT (supraventricular tachycardia) 08/2021    Thyroid goiter      Past Surgical History:   Procedure Laterality Date    BUNIONECTOMY Bilateral     CATARACT EXTRACTION, BILATERAL  2006    CHOLECYSTECTOMY      HYSTERECTOMY  12/2013    MOHS SURGERY      SKIN CANCER EXCISION       Penicillin, Pravastatin sodium, Rosuvastatin calcium, and Zetia [ezetimibe]  Current Outpatient Medications   Medication Sig Dispense Refill    alirocumab (PRALUENT PEN) 150 mg/mL  pen injector Inject 1 ml (150 mg) under skin every 14 days 8 mL 3    cetirizine (ZyrTEC) 5 mg tablet Take 5 mg by mouth daily.      denosumab (PROLIA) 60 mg/mL syringe Inject 60 mg under the skin every 6 (six) months.      LORazepam (ATIVAN) 0.5 mg tablet Take 2 tablets (1 mg total) by mouth daily as needed for anxiety. 30 tablet 1    metoprolol succinate XL (TOPROL-XL) 25 mg 24 hr tablet Take 1 tablet (25 mg total) by mouth 2 (two) times a day. 180 tablet 3    rivaroxaban (XARELTO) 20 mg tablet TAKE 1 TABLET(20 MG) BY MOUTH DAILY WITH DINNER 90 tablet 6     No current facility-administered medications for this visit.     Social History     Socioeconomic History    Marital status:      Spouse name: None    Number of children: None    Years of education: None    Highest education level: None   Tobacco Use    Smoking status: Former     Packs/day: 0.50     Years: 25.00     Additional pack years: 0.00     Total pack years: 12.50     Types: Cigarettes     Quit date: 2008     Years since quitting: 15.7    Smokeless tobacco: Never   Vaping Use    Vaping Use: Never used   Substance and Sexual Activity    Alcohol use: Yes     Alcohol/week: 2.0 standard drinks of alcohol     Types: 2 Standard drinks or equivalent per week     Comment: social    Drug use: No    Sexual activity: Defer     Social Determinants of Health     Food Insecurity: No Food Insecurity (9/6/2023)    Hunger Vital Sign     Worried About Running Out of Food in the Last Year: Never true     Ran Out of Food in the Last Year: Never true     Family History   Problem Relation Age of Onset    Heart attack Biological Mother     Diabetes Biological Mother     Lung cancer Biological Mother     COPD Biological Mother     Diabetes Biological Father     Prostate cancer Biological Brother         Current Outpatient Medications:     alirocumab (PRALUENT PEN) 150 mg/mL pen injector, Inject 1 ml (150 mg) under skin every 14 days, Disp: 8 mL,  Rfl: 3    cetirizine (ZyrTEC) 5 mg tablet, Take 5 mg by mouth daily., Disp: , Rfl:     denosumab (PROLIA) 60 mg/mL syringe, Inject 60 mg under the skin every 6 (six) months., Disp: , Rfl:     LORazepam (ATIVAN) 0.5 mg tablet, Take 2 tablets (1 mg total) by mouth daily as needed for anxiety., Disp: 30 tablet, Rfl: 1    metoprolol succinate XL (TOPROL-XL) 25 mg 24 hr tablet, Take 1 tablet (25 mg total) by mouth 2 (two) times a day., Disp: 180 tablet, Rfl: 3    rivaroxaban (XARELTO) 20 mg tablet, TAKE 1 TABLET(20 MG) BY MOUTH DAILY WITH DINNER, Disp: 90 tablet, Rfl: 6    Review of Systems   Constitutional: Negative.   HENT: Negative.    Eyes: Negative.    Cardiovascular: Positive for irregular heartbeat (afib). Negative for chest pain, dyspnea on exertion, leg swelling, near-syncope, palpitations and syncope.   Respiratory: Negative.  Negative for shortness of breath.    Endocrine: Negative.    Skin: Negative.    Musculoskeletal: Negative.  Negative for back pain.   Gastrointestinal: Negative.    Genitourinary: Negative.    Neurological: Negative.    Psychiatric/Behavioral: Negative.    Allergic/Immunologic: Negative.       Objective    Vitals:    10/03/23 1331   BP: 120/70   Pulse: (!) 56   Resp: 16   SpO2: 96%      Physical Exam  Constitutional:       General: She is not in acute distress.     Appearance: She is well-developed. She is not diaphoretic.   HENT:      Head: Normocephalic and atraumatic.      Right Ear: External ear normal.      Left Ear: External ear normal.      Nose: Nose normal.   Eyes:      Conjunctiva/sclera: Conjunctivae normal.      Pupils: Pupils are equal, round, and reactive to light.   Cardiovascular:      Rate and Rhythm: Normal rate and regular rhythm.      Pulses: Intact distal pulses.      Heart sounds: No murmur heard.     No friction rub. No gallop.   Pulmonary:      Effort: Pulmonary effort is normal. No respiratory distress.      Breath sounds: No wheezing or rales.   Chest:       Chest wall: No tenderness.   Abdominal:      General: Bowel sounds are normal. There is no distension.      Palpations: Abdomen is soft. There is no mass.      Tenderness: There is no abdominal tenderness. There is no guarding or rebound.   Musculoskeletal:         General: No tenderness. Normal range of motion.      Cervical back: Normal range of motion and neck supple.   Skin:     General: Skin is warm and dry.      Coloration: Skin is not pale.      Findings: No erythema or rash.   Neurological:      Mental Status: She is alert and oriented to person, place, and time.      Deep Tendon Reflexes: Reflexes are normal and symmetric.   Psychiatric:         Behavior: Behavior normal.         Thought Content: Thought content normal.         Judgment: Judgment normal.           Imaging:    I have reviewed the CTA chest scan and have measured her pulmonary artery to be stable at 47mm maximum diameter.  With regards to the aortic root, I reviewed last year's CTA scan and this years and measured the aortic root to be stable at 3.9cm.  This years scan is not as well gated for aortic dimensions but there does not appear to be any significant growth compared to last year's imaging.  CTA Chest 9/25/23:  CT Angiography:     Pulmonary artery: Aneurysmal dilatation of the main pulmonary artery and right  main artery is again noted.  In an index location, as noted on image #48, there  is stable interval appearance in size of the main pulmonary artery, which  measures up to 47 mm in diameter.  Additionally, as noted on image #50, there is  stable size and appearance of the ectatic right main pulmonary artery, which  measures up to 31 mm in diameter.  As noted on image #39, the left main  pulmonary artery also maintains stable size, measuring up to 24 mm.  There is no  mural thrombus or dissection.  There is no evidence of pulmonary emboli.     CHEST:   Lungs:  There are mild dependent atelectatic changes.  No new  suspicious  pulmonary nodules or masses are identified.  The central airways are patent..  Mediastinum/Alba/Heart: There are no pathologically enlarged mediastinal or  hilar lymph nodes. There is no significant pericardial effusion.  A  heterogeneous thyroid gland is again noted.  Chest wall/Pleura:  There is no evidence of pneumothorax or pleural effusion.  Axilla:  No pathologically enlarged lymph nodes are identified.     Bones:  No significant interval change. No new suspicious lytic or sclerotic  lesion..  Upper Abdomen: Limited evaluation.  No significant interval change.  --  IMPRESSION:  1.  Stable interval appearance of the pulmonary artery aneurysm involving the  main pulmonary artery, which measures up to 47 mm in diameter..         TTE 3/23/23:    Left Ventricle: Normal ventricle size. Moderate concentric left ventricular hypertrophy. No outflow tract obstruction present. Estimated EF 65-70%. No regional wall motion abnormalities. Grade I diastolic dysfunction.    Right Ventricle: Mildly dilated ventricle size. Normal systolic function.    Left Atrium: Severely dilated atrium.    Right Atrium: Mildly dilated atrium.    Aortic Valve: Tricuspid valve.  Sclerotic leaflets. No regurgitation. No stenosis.    Mitral Valve: Normal leaflet structure. Mitral annular calcification. Mild to moderate regurgitation.    Tricuspid Valve: Normal structure. Mild to moderate regurgitation. Estimated RVSP = 41 mmHg.    Pulmonic Valve: Normal structure. Mild to moderate regurgitation. Mildly dilated pulmonary artery.    Aorta: Aortic root normal. Sinuses of Valsalva normal-sized. Ascending aorta normal-sized. Mild dilatation of the descending aorta.3.7 CM    IVC/SVC: Inferior vena cava is a small caliber vessel (<1.7cm). Inferior vena cava demonstrates normal respiratory collapse.    Pericardium: Normal structure.    NO CHANGE FROM 2020     Assessment/Plan    Ms. 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 I independently reviewed her latest CTA chest performed today, using the PACs viewer.  The pulmonary artery measures 4.7cm without interval change. The aortic root remains unchanged at 3.9cm. The aortic root was previously measured to be 4.3cm however on direct review of that scan I also measured 3.9cm.  There is no evidence of dissection. The patient remains asymptomatic.  She continues to follow with Dr. Beck and had an echocardiogram done in March with stable findings.    Ms Bird reports to us that she is scheduled for a left knee replacement in the coming weeks.  She has received formal cardiac clearance from Dr. Beck.  From our perspective she is cleared from pursuing knee replacement surgery.  Recommendations perioperatively would be blood pressure control <130/80.  She continues to deny chest pain, chest pain that radiates to the back, shortness of breath, BRANHAM or leg swelling.    At this time I'd like to see Ms. Bird in two years with repeat CTA Chest.  She has remained incredibly stable for the past five years with stable dimensions measuring 47mm.  She was advised to continue following regularly with Dr. Beck for her cardiovascular care and to contact us with any questions or concerns.     The following recommendations were made to the patient: Do not lift in excess of 40 pounds.  Heavy lifting or straining causes brief spikes in blood pressure, which we would like to avoid.  The patient was also counseled on exercise parameters and was advised low impact cardiovascular exercise is ok and may do light weightlifting.  We have recommended that the patient monitor their heart rate during exercise.  The patient was also counseled on the importance of maintaining good blood pressure control and was advised to follow regularly with their cardiologist/PCP. Given the FDA safety statement regarding the use of  Fluoroquinolones (increased risk of aneurysmal growth or aortic dissection) in patients with aortic disease, they were advised to avoid this class of antibiotics when possible.  In the unlikely event of sudden onset of chest pain or pain that radiates to the upper back, they were advised to report to the closest ED and have our service notified.          Thank you for allowing me to participate in the care of this patient.  I hope this information is helpful.     EDY Messina 10/3/2023  2:22 PM

## 2023-09-29 ENCOUNTER — HOSPITAL ENCOUNTER (OUTPATIENT)
Dept: PHYSICAL THERAPY | Age: 88
Setting detail: THERAPIES SERIES
Discharge: HOME | End: 2023-09-29
Attending: ORTHOPAEDIC SURGERY
Payer: COMMERCIAL

## 2023-09-29 DIAGNOSIS — M25.561 CHRONIC PAIN OF RIGHT KNEE: ICD-10-CM

## 2023-09-29 DIAGNOSIS — G89.29 CHRONIC PAIN OF LEFT KNEE: ICD-10-CM

## 2023-09-29 DIAGNOSIS — G89.29 CHRONIC PAIN OF RIGHT KNEE: ICD-10-CM

## 2023-09-29 DIAGNOSIS — M25.562 CHRONIC PAIN OF LEFT KNEE: ICD-10-CM

## 2023-09-29 DIAGNOSIS — M17.0 OSTEOARTHRITIS OF BOTH KNEES, UNSPECIFIED OSTEOARTHRITIS TYPE: Primary | ICD-10-CM

## 2023-09-29 PROCEDURE — 97110 THERAPEUTIC EXERCISES: CPT | Mod: GP

## 2023-09-29 PROCEDURE — 97530 THERAPEUTIC ACTIVITIES: CPT | Mod: GP

## 2023-09-29 NOTE — PROGRESS NOTES
Physical Therapy Visit    PT DAILY NOTE FOR OUTPATIENT THERAPY    Patient: Alexandra Bird MRN: [id number]  : 1935 88 y.o.  Referring Physician: Tonie Dudley MD  Date of Visit: 2023    Certification Dates: 23 through 10/11/23    Diagnosis:   1. Osteoarthritis of both knees, unspecified osteoarthritis type    2. Chronic pain of right knee    3. Chronic pain of left knee        Chief Complaints:  Bilateral knee pain    Precautions:   Precautions comments: osteoporosis      TODAY'S VISIT    Time In Session:  Start Time: 1102  Stop Time: 1202  Time Calculation (min): 60 min   History/Vitals/Pain/Encounter Info - 23 1100        Injury History/Precautions/Daily Required Info    Document Type daily treatment     Primary Therapist Rui Del Cid,MULU     Chief Complaint/Reason for Visit  Bilateral knee pain     Referring Physician Dr. Tonie Dudley     Precautions comments osteoporosis     History of present illness/functional impairment Alexandra notes chronic pain in her knees. She initially saw [facility] physicians 8-9 yrs ago, receiving injections for her knees that woul last her symptoms for 7-8 months per patient. She notes current L knee pain that is sore in nature. She notes during standing and pivotingshe may have buckling at her L knee. She notes her pain is not extreme, but there is weakness. She notes she does not walk right and that she is knock kneed. She reports that her knee symptoms have worsened over the past 5 years. She notes receiving injections; most recent was: 3 months ago.  She notes her pain is worse initially in the morning and stiff.  As the day progresses she notes some improvements in symptoms. She notes she takes tylenol for her symptoms. She attends the gym, has not been there for 3 weeks. She would do the bicycle, and upper body machines, and stretching machines. She notes aggravating factors include going up and down stairs, and she is unable to walk for prolonged distances.  She also endorses pain in her R hip and lateral thigh pain. Easing factors include seated rest. She notes her balance is not what it used to be. She notes having a fall 2 years ago with fractures in her wrist and ankle.     Patient/Family/Caregiver Comments/Observations Alexandra notes that she has been doing pretty well with her exercises. Notes having pre-operative appointments.     Patient reported fall since last visit No        Pain Assessment    Currently in pain No/Denies                Daily Treatment Assessment and Plan - 09/29/23 1218        Daily Treatment Assessment and Plan    Progress toward goals Progressing     Daily Outcome Summary Alexandra progresses well with functional strengthening interventions for R LE. Her L LE remains limited with forward and lateral step ups, with reduced quadriceps activation through ROM. Utiilized a theraband around her knees for sit to stand transfers to improve equal weightbearing and glute activation for squat pattern to reduce discomfort through knee. Included LAQ with iso hold for home exercise program.     Plan and Recommendations Continue functional strengthening/ROM interventions and continue to educate on post operative expectations.                     OBJECTIVE DATA TAKEN TODAY:    None taken    Today's Treatment:    ORTHO PT FLOWSHEET    Diagnosis: Bilateral knee OA   Precautions: osteoporosis, h/o uterine cancer, melanoma, pulmonary artery aneurysm     Exercises Current Session Time Completed (Y/N/G)   MODALITIES- HEAT/ICE (34455) TOTAL TIME FOR SESSION Not performed    Heat     Ice     MODALITIES - E-STIM (77882) TOTAL TIME FOR SESSION Not performed    E-stim/H-Wave     THER ACT (96831) TOTAL TIME FOR SESSION 8-22 Minutes    Assessment  See note     Vitals/pain See note Y   HEP 9/6/23: quad sets, heel slides, bridges  9/21: hamstring stretch, calf stretch, weight shifted HR   9/29/23: LAQ iso with hold   Y   Education 9/6/23:  Discussed HEP and goals for  treatment/expected outcomes. The patient verbalizes agreement and understanding.   9/21: reviewed hamstring stretch that added (I) at home  9/29/23: Reviewed HEP and new intervention. Discussed goal for post op, and importance of continued strengthening and maintenance of mobility and flexibility for improved post operative course. The patient verbalizes agreement and understanding.  Y   Body Mechanics/Work Training     GROUP (27272)  Not performed    THER EX (22891) TOTAL TIME FOR SESSION 38-52 Minutes    CARDIOVASCULAR      Nu Step     Stationary Bike Recumbent seat 7; 8 min Y   Elliptical     Treadmill     STRENGTHENING     Quad sets HEP    Heel slides/HS isos HEP    LAQ  SAQ 3 x 10 4.0#  3 x 10 4.0#   Y  Y   SLR 2 x 10 Y   Clamshells 2 x 10 pink TB    Bridges 3 x 10 Y   Minisquats 2 x 10; PTB at knees; cue for equal weight bearing    Resisted walking cable column 5 x retro walk: 20#  5 x laterally to each side; 15#    FSU 2 x 10 each; L LE with significant difficulty with eccentric control. UE support required with close supervision. LLE lead, 4in step, RLE lead 6 in step Y   FSD 2x10 from 4 in step single UE HR utilized due to poor eccentric control    LSU 2x10 LLE lead, 4in step, RLE lead 6 in step Y   HR/TR 2 x  15 reps, shifted to L for increased WB    Hip 3 way 2 x 10 each direction in standing //, pink TB around knees Y   Lunges     TKE PTB behind knee standing; 2 x 10    Sit to Stand Lowest setting big mat; 2 x 10 PTB around knees for increased stability/glute recruitment.  Y        STRETCHING/ROM     Hamstring stretch 20s x 3 standing at step, VC for foot placement  Y   Supine hip flexor/rectus stretch 30s x 3    Prone quad stretch 30s x 3 Y   Standing calf stretch 30s x 3 slantboard Y        NEURO RE-ED (12059) TOTAL TIME FOR SESSION Not performed    Static balance     Dynamic balance     Postural re-education     Taping     GAIT TRAINING (71920) TOTAL TIME FOR SESSION Not performed    Ambulation       Dynamic gait      MANUAL (52313) TOTAL TIME FOR SESSION Not performed    Manual stretching B hamstring, hip flexor, quad, hip IR's, hip adductors    Mobilization     Y = yes; N = no; G = group

## 2023-09-29 NOTE — OP PT TREATMENT LOG
ORTHO PT FLOWSHEET    Diagnosis: Bilateral knee OA   Precautions: osteoporosis, h/o uterine cancer, melanoma, pulmonary artery aneurysm     Exercises Current Session Time Completed (Y/N/G)   MODALITIES- HEAT/ICE (83333) TOTAL TIME FOR SESSION Not performed    Heat     Ice     MODALITIES - E-STIM (37825) TOTAL TIME FOR SESSION Not performed    E-stim/H-Wave     THER ACT (59459) TOTAL TIME FOR SESSION 8-22 Minutes    Assessment  See note     Vitals/pain See note Y   HEP 9/6/23: quad sets, heel slides, bridges  9/21: hamstring stretch, calf stretch, weight shifted HR   9/29/23: LAQ iso with hold   Y   Education 9/6/23:  Discussed HEP and goals for treatment/expected outcomes. The patient verbalizes agreement and understanding.   9/21: reviewed hamstring stretch that added (I) at home  9/29/23: Reviewed HEP and new intervention. Discussed goal for post op, and importance of continued strengthening and maintenance of mobility and flexibility for improved post operative course. The patient verbalizes agreement and understanding.  Y   Body Mechanics/Work Training     GROUP (85632)  Not performed    THER EX (70527) TOTAL TIME FOR SESSION 38-52 Minutes    CARDIOVASCULAR      Nu Step     Stationary Bike Recumbent seat 7; 8 min Y   Elliptical     Treadmill     STRENGTHENING     Quad sets HEP    Heel slides/HS isos HEP    LAQ  SAQ 3 x 10 4.0#  3 x 10 4.0#   Y  Y   SLR 2 x 10 Y   Clamshells 2 x 10 pink TB    Bridges 3 x 10 Y   Minisquats 2 x 10; PTB at knees; cue for equal weight bearing    Resisted walking cable column 5 x retro walk: 20#  5 x laterally to each side; 15#    FSU 2 x 10 each; L LE with significant difficulty with eccentric control. UE support required with close supervision. LLE lead, 4in step, RLE lead 6 in step Y   FSD 2x10 from 4 in step single UE HR utilized due to poor eccentric control    LSU 2x10 LLE lead, 4in step, RLE lead 6 in step Y   HR/TR 2 x  15 reps, shifted to L for increased WB    Hip 3 way 2 x  10 each direction in standing //, pink TB around knees Y   Lunges     TKE PTB behind knee standing; 2 x 10    Sit to Stand Lowest setting big mat; 2 x 10 PTB around knees for increased stability/glute recruitment.  Y        STRETCHING/ROM     Hamstring stretch 20s x 3 standing at step, VC for foot placement  Y   Supine hip flexor/rectus stretch 30s x 3    Prone quad stretch 30s x 3 Y   Standing calf stretch 30s x 3 slantboard Y        NEURO RE-ED (11735) TOTAL TIME FOR SESSION Not performed    Static balance     Dynamic balance     Postural re-education     Taping     GAIT TRAINING (97954) TOTAL TIME FOR SESSION Not performed    Ambulation      Dynamic gait      MANUAL (96870) TOTAL TIME FOR SESSION Not performed    Manual stretching B hamstring, hip flexor, quad, hip IR's, hip adductors    Mobilization     Y = yes; N = no; G = group

## 2023-10-02 ENCOUNTER — HOSPITAL ENCOUNTER (OUTPATIENT)
Dept: PHYSICAL THERAPY | Age: 88
Setting detail: THERAPIES SERIES
Discharge: HOME | End: 2023-10-02
Attending: ORTHOPAEDIC SURGERY
Payer: COMMERCIAL

## 2023-10-02 DIAGNOSIS — G89.29 CHRONIC PAIN OF RIGHT KNEE: ICD-10-CM

## 2023-10-02 DIAGNOSIS — M25.561 CHRONIC PAIN OF RIGHT KNEE: ICD-10-CM

## 2023-10-02 DIAGNOSIS — M25.562 CHRONIC PAIN OF LEFT KNEE: ICD-10-CM

## 2023-10-02 DIAGNOSIS — M17.0 OSTEOARTHRITIS OF BOTH KNEES, UNSPECIFIED OSTEOARTHRITIS TYPE: Primary | ICD-10-CM

## 2023-10-02 DIAGNOSIS — G89.29 CHRONIC PAIN OF LEFT KNEE: ICD-10-CM

## 2023-10-02 PROCEDURE — 97530 THERAPEUTIC ACTIVITIES: CPT | Mod: GP

## 2023-10-02 PROCEDURE — 97110 THERAPEUTIC EXERCISES: CPT | Mod: GP

## 2023-10-02 PROCEDURE — 97112 NEUROMUSCULAR REEDUCATION: CPT | Mod: GP

## 2023-10-02 NOTE — PROGRESS NOTES
Physical Therapy Progress Note    PT PROGRESS NOTE FOR OUTPATIENT THERAPY    Patient: Alexandra Bird   MRN: [id number]  : 1935 88 y.o.    Referring Physician: Tonie Dudley MD  Date of Visit: 10/2/2023    Certification Dates: 23 through 10/11/23    Recommended Frequency & Duration:  2 times/week for up to 5 weeks        Diagnosis:   1. Osteoarthritis of both knees, unspecified osteoarthritis type    2. Chronic pain of right knee    3. Chronic pain of left knee        Chief Complaints:  No chief complaint on file.      Precautions:   Precautions comments: osteoporosis    TODAY'S VISIT:    Time In Session:  Start Time: 1100  Stop Time: 1200  Time Calculation (min): 60 min   General Information - 10/02/23 1101        Session Details    Document Type daily treatment        General Information    Referring Physician Dr. Tonie Dudley     History of present illness/functional impairment Alexandra notes chronic pain in her knees. She initially saw [facility] physicians 8-9 yrs ago, receiving injections for her knees that woul last her symptoms for 7-8 months per patient. She notes current L knee pain that is sore in nature. She notes during standing and pivotingshe may have buckling at her L knee. She notes her pain is not extreme, but there is weakness. She notes she does not walk right and that she is knock kneed. She reports that her knee symptoms have worsened over the past 5 years. She notes receiving injections; most recent was: 3 months ago.  She notes her pain is worse initially in the morning and stiff.  As the day progresses she notes some improvements in symptoms. She notes she takes tylenol for her symptoms. She attends the gym, has not been there for 3 weeks. She would do the bicycle, and upper body machines, and stretching machines. She notes aggravating factors include going up and down stairs, and she is unable to walk for prolonged distances. She also endorses pain in her R hip and lateral thigh  pain. Easing factors include seated rest. She notes her balance is not what it used to be. She notes having a fall 2 years ago with fractures in her wrist and ankle.     Patient/Family/Caregiver Comments/Observations Alexandra notes having no pain today. She notes she has not done much yet.     Precautions comments osteoporosis                  Pain/Vitals - 10/02/23 1101        Pain Assessment    Currently in pain No/Denies        Pre Activity Vital Signs    Pulse 56     /67     BP Location Left upper arm     BP Method Automatic     Patient Position Sitting                PT - 10/02/23 1101        Physical Therapy    Physical Therapy Special[facility] PT        PT Plan    Frequency of treatment 2 times/week     PT Duration 5 weeks     PT Cert From 09/06/23     PT Cert To 10/11/23     Date PT POC was sent to provider 09/06/23     Signed PT Plan of Care received?  Yes                Assessment and Plan - 10/02/23 1109        Assessment    Plan of Care reviewed and patient/family in agreement Yes     System Pathology/Pathophysiology Noted musculoskeletal;neuromuscular     Functional Limitations in Following Categories (PT Eval) home management;community/leisure     Rehab Potential/Prognosis good, to achieve stated therapy goals     Problem List decreased strength;decreased ROM;impaired balance;pain;impaired motor control     Clinical Assessment Alexandra has attended 9 visits of skilled physical therapy targeting her bilateral knee OA. She is on pace for discharge next week for L TKA. She demonstrates improvements in L knee motion and self reported function, with reductions in pain since starting PT. Noted improved WOMAC scores, as well as improved hip MMT. She remains with signficant balance deficits in tandem and single leg stances. She has pain reproduction at L lateral knee with squatting and STS transfers. Noted continued challenge with stair ambulation, requiring step to pattern and UE support. Plan to  continue education on expectations post operatively and strengthening/mobility interventions until D/C next week.     Plan and Recommendations Continue PT; DC next week. Continue functional strengthening/ROM interventions and continue to educate on post operative expectations.     [employer] CPT 26308 Gait training;CPT 23066 Manual therapy;CPT 36179 Neuromuscular Reeducation;CPT 77916 Therapeutic activities;CPT 42499 Therapeutic exercises;CPT 47940 Electrical stimulation UNATTENDED;CPT 21528 Hot/Cold Packs therapy                 OBJECTIVE MEASUREMENTS/DATA:    ROM    Range of Motion - 10/02/23 1200        RIGHT: Lower Extremity AROM Assessment    Knee Flexion   135     Knee Extension   0        LEFT: Lower Extremity AROM Assessment    Knee Flexion   130     Knee Extension   -1               MMT    Manual Muscle Tests - 10/02/23 1109        RIGHT: Lower Extremity Manual Muscle Test Assessment    Hip Flexion gross movement (5/5) normal     Hip Extension gross movement (4/5) good     Hip Abduction gross movement (4/5) good     Hip Internal Rotation gross movement (5/5) normal     Hip External Rotation gross movement (5/5) normal     Knee Flexion strength (4+/5) good plus     Knee Extension strength (4+/5) good plus     Ankle Dorsiflexion gross movement (5/5) normal     Ankle Plantarflexion gross movement (5/5) normal     Ankle Inversion gross movement (4+/5) good plus     Ankle Eversion gross movement (4+/5) good plus        LEFT: Lower Extremity Manual Muscle Test Assessment    Hip Flexion gross movement (4+/5) good plus     Hip Extension gross movement (4/5) good     Hip Abduction gross movement (4/5) good     Hip Internal Rotation gross movement (5/5) normal     Hip External Rotation gross movement (5/5) normal     Knee Flexion strength (4/5) good   uncomfortable    Knee Extension strength (4+/5) good plus     Ankle Dorsiflexion gross movement (4+/5) good plus     Ankle Plantarflexion gross movement (4+/5)  good plus     Ankle Inversion gross movement (4+/5) good plus     Ankle Eversion gross movement (4+/5) good plus               Palpation    Palpation - 10/02/23 1109        Palpation    LE Palpation  No tenderness global knee        Manual Interventions    Manual technique #1 Patellar mobilizations: L knee hypomobile superiorly; R knee mobility within functional limits               Balance/Posture    Balance and Posture - 10/02/23 1109        Balance Assessment    Comment, Balance NBOS EC 30s; L front tandem: 4s, R front tandem 10s; SLS unable to attain longer than 1s               Gait and Mobility    Gait and Mobility - 10/02/23 1109        Gait Training    Comment (Gait/Stairs) remains with bilateral knee valgus during gait        Stairs Assessment    Comment step to pattern, continued decreased eccentric control               Outcome Measures    PT Outcome Measures - 10/02/23 1109        Objective Outcome Measures    30 Second Sit to Stand Test 5 repetitions   pain limiting this session       Other Outcome Measures Used/Comments    Other outcome measure used: WOMAC: 15/96 = 15.6%        NEW (2/6/23):  PSFS     PSFS ACTIVITY 1 Dancing     PSFS ANSWER 1 4     PSFS ACTIVITY 2 shopping     PSFS ANSWER 2 4     PSFS ACTIVITY 3 stairs     PSFS ANSWER 3 3     PSFS ACTIVITY 4 prolonged walking     PSFS ANSWER 4 5     PSFS Sum of Activity Scores 16     PSFS Number of Activities 4     PSFS Percent Score 40 %                 ROM and MMT        9/6/2023 10/2/2023   PT LE ROM Measurements   PROM: Right Hip Flexion 110 degrees    AROM: Right Hip Flexion 95 degrees    PROM: Left Hip Flexion 114 degrees    AROM: Left Hip Flexion 104 degrees    AROM: Right Hip IR 20 degrees    AROM: Left Hip IR 19 degrees    AROM: Right Hip ER 35 degrees    AROM: Left Hip ER 22 degrees    PROM: Right Knee Flexion 129    AROM: Right Knee Flexion 125 135   PROM: Left Knee Flexion 121       painful into end range of motion    AROM: Left Knee  Flexion 111 130   PROM: Right Knee Extension -4       lacking    AROM: Right Knee Extension -4 0   PROM: Left Knee Extension -2    AROM: Left Knee Extension -4       lacking -1   PT LE MMT   Right Hip Flexion (5/5) normal (5/5) normal   Left Hip Flexion (4+/5) good plus (4+/5) good plus   Right Hip Extension (3+/5) fair plus (4/5) good   Left Hip Extension (3+/5) fair plus (4/5) good   Right Hip ABD (3+/5) fair plus (4/5) good   Left Hip ABD (4-/5) good minus (4/5) good   Right Hip IR (5/5) normal (5/5) normal   Left Hip IR (5/5) normal (5/5) normal   Right Hip ER (5/5) normal (5/5) normal   Left Hip ER (5/5) normal (5/5) normal   Right Knee Flexion (4+/5) good plus (4+/5) good plus   Left Knee Flexion (4/5) good (4/5) good       uncomfortable   Right Knee Extension (4+/5) good plus (4+/5) good plus   Left Knee Extension (4/5) good (4+/5) good plus   Right Ankle DF (5/5) normal (5/5) normal   Left Ankle DF (4+/5) good plus (4+/5) good plus   Right Ankle PF (5/5) normal (5/5) normal   Left Ankle PF (4+/5) good plus (4+/5) good plus   Right Ankle Inversion (4+/5) good plus (4+/5) good plus   Left Ankle Inversion  (4+/5) good plus   Right Ankle Eversion (4+/5) good plus (4+/5) good plus   Left Ankle Eversion  (4+/5) good plus     Outcome Measures        9/6/2023    10:59 9/8/2023    13:15 10/2/2023    11:09   PT OBJECTIVE Outcome Measures   30 Second Sit to Stand  9 repetitions 5 repetitions       pain limiting this session   TUG (Mean)  11.4    PT SUBJECTIVE Outcome Measures   PSFS % Score 45 % 45 % 40 %   Other WOMAC: 39/96: 40.6%  WOMAC: 15/96 = 15.6%       Today's Treatment::    Education provided:  Yes: See treatment log for details of education provided  Methods: Discussion  Readiness: acceptance  Response: Verbalizes understanding    ORTHO PT FLOWSHEET    Diagnosis: Bilateral knee OA   Precautions: osteoporosis, h/o uterine cancer, melanoma, pulmonary artery aneurysm     Exercises Current Session Time Completed  (Y/N/G)   MODALITIES- HEAT/ICE (01108) TOTAL TIME FOR SESSION Not performed    Heat     Ice     MODALITIES - E-STIM (25407) TOTAL TIME FOR SESSION Not performed    E-stim/H-Wave     THER ACT (86007) TOTAL TIME FOR SESSION 8-22 Minutes    Assessment  See note  Y   Vitals/pain See note Y   HEP 9/6/23: quad sets, heel slides, bridges  9/21: hamstring stretch, calf stretch, weight shifted HR   9/29/23: LAQ iso with hold  10/2/23: SLR   Y   Education 9/6/23:  Discussed HEP and goals for treatment/expected outcomes. The patient verbalizes agreement and understanding.   9/21: reviewed hamstring stretch that added (I) at home  9/29/23: Reviewed HEP and new intervention. Discussed goal for post op, and importance of continued strengthening and maintenance of mobility and flexibility for improved post operative course. The patient verbalizes agreement and understanding.   10/2/23: Discussed HEP progressions. Reviewed mobility and strengthening program.  Y   Body Mechanics/Work Training     GROUP (77955)  Not performed    THER EX (67771) TOTAL TIME FOR SESSION 23-37 Minutes    CARDIOVASCULAR      Nu Step     Stationary Bike Recumbent seat 7; 8 min Y   Elliptical     Treadmill     STRENGTHENING     Quad sets HEP    Heel slides/HS isos HEP    LAQ  SAQ 3 x 10 4.0#  3 x 10 4.0#   Y   SLR 1 x 10 Y   Clamshells 2 x 10 pink TB    Bridges 3 x 10    Minisquats 1 x 15 with PB behind back Y   Resisted walking cable column 5 x retro walk: 20#  5 x laterally to each side; 15#    FSU 1 x 120 each; L LE with significant difficulty with eccentric control. UE support required with close supervision. LLE lead, 4in step Y   FSD 2x10 from 4 in step single UE HR utilized due to poor eccentric control    LSU 1 x 20 LLE lead, 4in step; cue for form Y   HR/TR 2 x  15 reps, shifted to L for increased WB Y   Hip 3 way 2 x 10 each direction in standing //, pink TB around knees    Lunges     Sit to Stand Lowest setting big mat; 2 x 10 PTB around knees  for increased stability/glute recruitment.          STRETCHING/ROM     Hamstring stretch 20s x 3 standing at step, VC for foot placement     Supine hip flexor/rectus stretch 30s x 3    Prone quad stretch 30s x 3    Standing calf stretch 30s x 3 slantboard         NEURO RE-ED (79691) TOTAL TIME FOR SESSION 8-22 Minutes    Static balance NBOS EC foam: 30s x 3  Modified tandem on foam: 20s x 2 each direction Y  Y   TKE 2 x 10; BTB working to cue increased quad contraction Y   Postural re-education     Taping     GAIT TRAINING (20761) TOTAL TIME FOR SESSION Not performed    Ambulation      Dynamic gait      MANUAL (04299) TOTAL TIME FOR SESSION Not performed    Manual stretching B hamstring, hip flexor, quad, hip IR's, hip adductors    Mobilization     Y = yes; N = no; G = group       Goals Addressed                    This Visit's Progress      Mutually agreed upon pain goal         Mutually agreed upon pain goal: Alexandra will be able to walk for 5 minutes with <3/10 pain    Met        PT- Knee goals         Short Term Goals Time Frame Result Comment   Pt will demonstrate improved sharpened [place] for reducing falls risk 2-3 weeks improved Remains limited, especially with L front   Pt will increase L knee AROM >/= 5 degrees into flexion to improve functional mobility 2-3 weeks Met    Assess TUG/set goal ASAP Met Not falls risk per TUG   Pt will improve PSFS score >/= 10%  2-3 weeks Not met    Pt will be independent with HEP to increase carryover at home 2-3 weeks Met    Assess 30s STS and set goals ASAP Met         Long Term Goals Time Frame Result Comment   Pt will increase B/L LE  MMT into flexion, extension, hip abduction, extension >/= ½ grade 5 weeks Met    Pt will increase L knee ROM >/= 10 degrees into flexion to improve functional mobility 5 weeks Met    Decrease WOMAC to 20 points to increase function  5 weeks Met    Pt will improve PSFS score >/= 20%  5 weeks Ongoing    Pt will be able to walk for 6 min  with <3/10 pain reproduction 5 weeks Ongoing    30s STS to improve to 10x  5 weeks Ongoing    Pt will be able to ascend/descend stairs with reciprocal pattern with L side hand railing.  5 weeks Ongoing               PT-Alexandra goals (pt-stated)         1.) improve stair ambulation reciprocal pattern  2.) improve her balance and walking confidence  3.)improve shopping  4.) get back to dancing    Improving, save dancing              Rui Del Cid, PT

## 2023-10-02 NOTE — OP PT TREATMENT LOG
ORTHO PT FLOWSHEET    Diagnosis: Bilateral knee OA   Precautions: osteoporosis, h/o uterine cancer, melanoma, pulmonary artery aneurysm     Exercises Current Session Time Completed (Y/N/G)   MODALITIES- HEAT/ICE (29394) TOTAL TIME FOR SESSION Not performed    Heat     Ice     MODALITIES - E-STIM (15297) TOTAL TIME FOR SESSION Not performed    E-stim/H-Wave     THER ACT (86450) TOTAL TIME FOR SESSION 8-22 Minutes    Assessment  See note  Y   Vitals/pain See note Y   HEP 9/6/23: quad sets, heel slides, bridges  9/21: hamstring stretch, calf stretch, weight shifted HR   9/29/23: LAQ iso with hold  10/2/23: SLR   Y   Education 9/6/23:  Discussed HEP and goals for treatment/expected outcomes. The patient verbalizes agreement and understanding.   9/21: reviewed hamstring stretch that added (I) at home  9/29/23: Reviewed HEP and new intervention. Discussed goal for post op, and importance of continued strengthening and maintenance of mobility and flexibility for improved post operative course. The patient verbalizes agreement and understanding.   10/2/23: Discussed HEP progressions. Reviewed mobility and strengthening program.  Y   Body Mechanics/Work Training     GROUP (17259)  Not performed    THER EX (00302) TOTAL TIME FOR SESSION 23-37 Minutes    CARDIOVASCULAR      Nu Step     Stationary Bike Recumbent seat 7; 8 min Y   Elliptical     Treadmill     STRENGTHENING     Quad sets HEP    Heel slides/HS isos HEP    LAQ  SAQ 3 x 10 4.0#  3 x 10 4.0#   Y   SLR 1 x 10 Y   Clamshells 2 x 10 pink TB    Bridges 3 x 10    Minisquats 1 x 15 with PB behind back Y   Resisted walking cable column 5 x retro walk: 20#  5 x laterally to each side; 15#    FSU 1 x 120 each; L LE with significant difficulty with eccentric control. UE support required with close supervision. LLE lead, 4in step Y   FSD 2x10 from 4 in step single UE HR utilized due to poor eccentric control    LSU 1 x 20 LLE lead, 4in step; cue for form Y   HR/TR 2 x  15 reps,  shifted to L for increased WB Y   Hip 3 way 2 x 10 each direction in standing //, pink TB around knees    Lunges     Sit to Stand Lowest setting big mat; 2 x 10 PTB around knees for increased stability/glute recruitment.          STRETCHING/ROM     Hamstring stretch 20s x 3 standing at step, VC for foot placement     Supine hip flexor/rectus stretch 30s x 3    Prone quad stretch 30s x 3    Standing calf stretch 30s x 3 slantboard         NEURO RE-ED (39181) TOTAL TIME FOR SESSION 8-22 Minutes    Static balance NBOS EC foam: 30s x 3  Modified tandem on foam: 20s x 2 each direction Y  Y   TKE 2 x 10; BTB working to cue increased quad contraction Y   Postural re-education     Taping     GAIT TRAINING (02009) TOTAL TIME FOR SESSION Not performed    Ambulation      Dynamic gait      MANUAL (99709) TOTAL TIME FOR SESSION Not performed    Manual stretching B hamstring, hip flexor, quad, hip IR's, hip adductors    Mobilization     Y = yes; N = no; G = group

## 2023-10-03 ENCOUNTER — OFFICE VISIT (OUTPATIENT)
Dept: CARDIOLOGY | Facility: CLINIC | Age: 88
End: 2023-10-03
Payer: COMMERCIAL

## 2023-10-03 VITALS
HEART RATE: 56 BPM | HEIGHT: 61 IN | OXYGEN SATURATION: 96 % | WEIGHT: 147 LBS | SYSTOLIC BLOOD PRESSURE: 120 MMHG | RESPIRATION RATE: 16 BRPM | DIASTOLIC BLOOD PRESSURE: 70 MMHG | BODY MASS INDEX: 27.75 KG/M2

## 2023-10-03 DIAGNOSIS — I28.1 PULMONARY ARTERY ANEURYSM (CMS/HCC): Primary | ICD-10-CM

## 2023-10-03 DIAGNOSIS — I77.810 DILATED AORTIC ROOT (CMS/HCC): ICD-10-CM

## 2023-10-03 PROCEDURE — 99214 OFFICE O/P EST MOD 30 MIN: CPT | Performed by: NURSE PRACTITIONER

## 2023-10-03 PROCEDURE — 3008F BODY MASS INDEX DOCD: CPT | Performed by: NURSE PRACTITIONER

## 2023-10-03 ASSESSMENT — ENCOUNTER SYMPTOMS
DYSPNEA ON EXERTION: 0
PALPITATIONS: 0
SHORTNESS OF BREATH: 0
BACK PAIN: 0
SYNCOPE: 0
NEAR-SYNCOPE: 0
IRREGULAR HEARTBEAT: 1

## 2023-10-06 ENCOUNTER — HOSPITAL ENCOUNTER (OUTPATIENT)
Dept: PHYSICAL THERAPY | Age: 88
Setting detail: THERAPIES SERIES
Discharge: HOME | End: 2023-10-06
Attending: ORTHOPAEDIC SURGERY
Payer: COMMERCIAL

## 2023-10-06 DIAGNOSIS — G89.29 CHRONIC PAIN OF LEFT KNEE: ICD-10-CM

## 2023-10-06 DIAGNOSIS — G89.29 CHRONIC PAIN OF RIGHT KNEE: ICD-10-CM

## 2023-10-06 DIAGNOSIS — M25.561 CHRONIC PAIN OF RIGHT KNEE: ICD-10-CM

## 2023-10-06 DIAGNOSIS — M17.0 OSTEOARTHRITIS OF BOTH KNEES, UNSPECIFIED OSTEOARTHRITIS TYPE: Primary | ICD-10-CM

## 2023-10-06 DIAGNOSIS — M25.562 CHRONIC PAIN OF LEFT KNEE: ICD-10-CM

## 2023-10-06 PROCEDURE — 97150 GROUP THERAPEUTIC PROCEDURES: CPT | Mod: GP,CQ

## 2023-10-06 PROCEDURE — 97110 THERAPEUTIC EXERCISES: CPT | Mod: GP,CQ,59

## 2023-10-06 NOTE — DISCHARGE INSTRUCTIONS
Your prescriptions were electronically prescribed by Dr. Dudley.       DVT Prophylaxis:  Eliquis 2.5 mg twice a day x 7 days. Last dose should be on 10/18/2023.   Starting on 10/19/2023 you may resume your home dose of Xarelto 20 mg daily.       Constipation/ Pain Med:   - Take your pain medication with food to prevent nausea  - Do not drive while taking pain medications.   - Pain medications may cause constipation.   - Drink plenty of fluids and eat fresh fruits and vegetables.  - Please take Senna-Colace as prescribed. Hold for loose stool. If you are having difficulties having a bowel movement or haven't had bowel movement in 2 days, please add over the counter miralax and take as directed on package.   - If you have not had a bowel movement in three days please call the office.    Incision Care:   - On October 16, you may take off your dressing and leave your incision open to air.   - However, if there is any drainage please keep covered with gauze and tape.  - Please keep your incision(s) clean and dry  - Make sure your incision(s) are checked at least twice daily for signs and symptoms of infection.   If any of the below should occur, please call the office:  - Drainage from incision site  - Opening of incisions  - Fevers greater than 101  - Flu-like symptoms  - Increased redness and/or tenderness  Please call office or go to emergency department if :  - Thigh/calf pain or swelling in legs  - Chest pain  - Chest congestion  - Problems with breathing.

## 2023-10-06 NOTE — PROGRESS NOTES
Physical Therapy Visit    PT DAILY NOTE FOR OUTPATIENT THERAPY    Patient: Alexandra Bird MRN: [id number]  : 1935 88 y.o.  Referring Physician: Tonie Dudley MD  Date of Visit: 10/6/2023    Certification Dates: 23 through 10/11/23    Diagnosis:   1. Osteoarthritis of both knees, unspecified osteoarthritis type    2. Chronic pain of right knee    3. Chronic pain of left knee        Chief Complaints:  Bilateral knee pain    Precautions:   Precautions comments: osteoporosis      TODAY'S VISIT    Time In Session:  Start Time: 1104  Stop Time: 1158  Time Calculation (min): 54 min   History/Vitals/Pain/Encounter Info - 10/06/23 1102        Injury History/Precautions/Daily Required Info    Document Type daily treatment     Primary Therapist Rui Del Cid,[facility]     Chief Complaint/Reason for Visit  Bilateral knee pain     Referring Physician Dr. Tonie Dudely     Precautions comments osteoporosis     History of present illness/functional impairment Alexandra notes chronic pain in her knees. She initially saw [facility] physicians 8-9 yrs ago, receiving injections for her knees that woul last her symptoms for 7-8 months per patient. She notes current L knee pain that is sore in nature. She notes during standing and pivotingshe may have buckling at her L knee. She notes her pain is not extreme, but there is weakness. She notes she does not walk right and that she is knock kneed. She reports that her knee symptoms have worsened over the past 5 years. She notes receiving injections; most recent was: 3 months ago.  She notes her pain is worse initially in the morning and stiff.  As the day progresses she notes some improvements in symptoms. She notes she takes tylenol for her symptoms. She attends the gym, has not been there for 3 weeks. She would do the bicycle, and upper body machines, and stretching machines. She notes aggravating factors include going up and down stairs, and she is unable to walk for prolonged distances.  She also endorses pain in her R hip and lateral thigh pain. Easing factors include seated rest. She notes her balance is not what it used to be. She notes having a fall 2 years ago with fractures in her wrist and ankle.     Patient/Family/Caregiver Comments/Observations Alexandra denies pain today, reports feeling tired due to getting up early this morning     Patient reported fall since last visit No        Pain Assessment    Currently in pain No/Denies        Pre Activity Vital Signs    /64     BP Location Right upper arm     BP Method Manual     Patient Position Sitting                Daily Treatment Assessment and Plan - 10/06/23 1102        Daily Treatment Assessment and Plan    Progress toward goals Progressing     Daily Outcome Summary Completes current plan without significant increase in pain, most challenged with mini squats. Continues to require weight shifting onto R side during HR and squats due to pain.     Plan and Recommendations Discharge NV                     OBJECTIVE DATA TAKEN TODAY:    None taken    Today's Treatment:    ORTHO PT FLOWSHEET    Diagnosis: Bilateral knee OA   Precautions: osteoporosis, h/o uterine cancer, melanoma, pulmonary artery aneurysm     Exercises Current Session Time Completed (Y/N/G)   MODALITIES- HEAT/ICE (73044) TOTAL TIME FOR SESSION Not performed    Heat     Ice     MODALITIES - E-STIM (72859) TOTAL TIME FOR SESSION Not performed    E-stim/H-Wave     THER ACT (69210) TOTAL TIME FOR SESSION 8-22 Minutes    Assessment  See note  Y   Vitals/pain See note Y   HEP 9/6/23: quad sets, heel slides, bridges  9/21: hamstring stretch, calf stretch, weight shifted HR   9/29/23: LAQ iso with hold  10/2/23: SLR   Y   Education 9/6/23:  Discussed HEP and goals for treatment/expected outcomes. The patient verbalizes agreement and understanding.   9/21: reviewed hamstring stretch that added (I) at home  9/29/23: Reviewed HEP and new intervention. Discussed goal for post op, and  importance of continued strengthening and maintenance of mobility and flexibility for improved post operative course. The patient verbalizes agreement and understanding.   10/2/23: Discussed HEP progressions. Reviewed mobility and strengthening program.  Y   Body Mechanics/Work Training     GROUP (13577)  23-37 Minutes    THER EX (72128) TOTAL TIME FOR SESSION 23-37 Minutes    CARDIOVASCULAR      Nu Step     Stationary Bike Recumbent seat 7; 8 min G   Elliptical     Treadmill     STRENGTHENING     Quad sets HEP    Heel slides/HS isos HEP    LAQ  SAQ 3 x 10 4.0#  3 x 10 4.0#   Y   SLR 1 x 10 Y   Clamshells 2 x 10 pink TB    Bridges 3 x 10    Minisquats 1 x 15 with PB behind back Y   Resisted walking cable column 5 x retro walk: 20#  5 x laterally to each side; 15#    FSU 1 x 20 each; L LE with significant difficulty with eccentric control. UE support required with close supervision. LLE lead, 4in step Y   FSD 2x10 from 4 in step single UE HR utilized due to poor eccentric control    LSU 1 x 20 LLE lead, 4in step; cue for form Y   HR/TR 2 x  20 reps, shifted to L for increased WB Y   Hip 3 way 2 x 10 each direction in standing //, pink TB around knees    Lunges     Sit to Stand Lowest setting big mat; 2 x 10 PTB around knees for increased stability/glute recruitment.          STRETCHING/ROM     Hamstring stretch 20s x 3 standing at step, VC for foot placement  G   Supine hip flexor/rectus stretch 30s x 3    Prone quad stretch 30s x 3    Standing calf stretch 30s x 3 slantboard G        NEURO RE-ED (08487) TOTAL TIME FOR SESSION 0-7 Minutes    Static balance NBOS EC foam: 30s x 3  Modified tandem on foam: 20s x 2 each direction Y  Y   TKE 2 x 10; BTB working to cue increased quad contraction Y   Postural re-education     Taping     GAIT TRAINING (81144) TOTAL TIME FOR SESSION Not performed    Ambulation      Dynamic gait      MANUAL (99952) TOTAL TIME FOR SESSION Not performed    Manual stretching B hamstring, hip  flexor, quad, hip IR's, hip adductors    Mobilization     Y = yes; N = no; G = group

## 2023-10-06 NOTE — OP PT TREATMENT LOG
ORTHO PT FLOWSHEET    Diagnosis: Bilateral knee OA   Precautions: osteoporosis, h/o uterine cancer, melanoma, pulmonary artery aneurysm     Exercises Current Session Time Completed (Y/N/G)   MODALITIES- HEAT/ICE (88517) TOTAL TIME FOR SESSION Not performed    Heat     Ice     MODALITIES - E-STIM (18130) TOTAL TIME FOR SESSION Not performed    E-stim/H-Wave     THER ACT (18392) TOTAL TIME FOR SESSION 0-7 Minutes    Assessment  See note     Vitals/pain See note Y   HEP 9/6/23: quad sets, heel slides, bridges  9/21: hamstring stretch, calf stretch, weight shifted HR   9/29/23: LAQ iso with hold  10/2/23: SLR      Education 9/6/23:  Discussed HEP and goals for treatment/expected outcomes. The patient verbalizes agreement and understanding.   9/21: reviewed hamstring stretch that added (I) at home  9/29/23: Reviewed HEP and new intervention. Discussed goal for post op, and importance of continued strengthening and maintenance of mobility and flexibility for improved post operative course. The patient verbalizes agreement and understanding.   10/2/23: Discussed HEP progressions. Reviewed mobility and strengthening program.     Body Mechanics/Work Training     GROUP (74366)  23-37 Minutes    THER EX (27903) TOTAL TIME FOR SESSION 23-37 Minutes    CARDIOVASCULAR      Nu Step     Stationary Bike Recumbent seat 7; 8 min G   Elliptical     Treadmill     STRENGTHENING     Quad sets HEP    Heel slides/HS isos HEP    LAQ  SAQ 3 x 10 4.0#  3 x 10 4.0#   Y   SLR 1 x 10 Y   Clamshells 2 x 10 pink TB    Bridges 3 x 10    Minisquats 1 x 15 with PB behind back Y   Resisted walking cable column 5 x retro walk: 20#  5 x laterally to each side; 15#    FSU 1 x 20 each; L LE with significant difficulty with eccentric control. UE support required with close supervision. LLE lead, 4in step Y   FSD 2x10 from 4 in step single UE HR utilized due to poor eccentric control    LSU 1 x 20 LLE lead, 4in step; cue for form Y   HR/TR 2 x  20 reps,  shifted to L for increased WB Y   Hip 3 way 2 x 10 each direction in standing //, pink TB around knees    Lunges     Sit to Stand Lowest setting big mat; 2 x 10 PTB around knees for increased stability/glute recruitment.          STRETCHING/ROM     Hamstring stretch 20s x 3 standing at step, VC for foot placement  G   Supine hip flexor/rectus stretch 30s x 3    Prone quad stretch 30s x 3    Standing calf stretch 30s x 3 slantboard G        NEURO RE-ED (08100) TOTAL TIME FOR SESSION 0-7 Minutes    Static balance NBOS EC foam: 30s x 3  Modified tandem on foam: 20s x 2 each direction Y  Y   TKE 2 x 10; BTB working to cue increased quad contraction Y   Postural re-education     Taping     GAIT TRAINING (54979) TOTAL TIME FOR SESSION Not performed    Ambulation      Dynamic gait      MANUAL (37484) TOTAL TIME FOR SESSION Not performed    Manual stretching B hamstring, hip flexor, quad, hip IR's, hip adductors    Mobilization     Y = yes; N = no; G = group

## 2023-10-09 ENCOUNTER — HOSPITAL ENCOUNTER (OUTPATIENT)
Dept: PHYSICAL THERAPY | Age: 88
Setting detail: THERAPIES SERIES
Discharge: HOME | End: 2023-10-09
Attending: ORTHOPAEDIC SURGERY
Payer: COMMERCIAL

## 2023-10-09 DIAGNOSIS — M17.0 OSTEOARTHRITIS OF BOTH KNEES, UNSPECIFIED OSTEOARTHRITIS TYPE: Primary | ICD-10-CM

## 2023-10-09 DIAGNOSIS — M25.562 CHRONIC PAIN OF LEFT KNEE: ICD-10-CM

## 2023-10-09 DIAGNOSIS — G89.29 CHRONIC PAIN OF RIGHT KNEE: ICD-10-CM

## 2023-10-09 DIAGNOSIS — G89.29 CHRONIC PAIN OF LEFT KNEE: ICD-10-CM

## 2023-10-09 DIAGNOSIS — M25.561 CHRONIC PAIN OF RIGHT KNEE: ICD-10-CM

## 2023-10-09 PROCEDURE — 97530 THERAPEUTIC ACTIVITIES: CPT | Mod: GP

## 2023-10-09 PROCEDURE — 97110 THERAPEUTIC EXERCISES: CPT | Mod: GP

## 2023-10-09 NOTE — PROGRESS NOTES
Physical Therapy Discharge      PT DISCHARGE NOTE FOR OUTPATIENT THERAPY    Patient: Alexandra Bird MRN: [id number]  : 1935 88 y.o.  Referring Physician: Tonie Dudley MD  Date of Visit: 10/9/2023      Certification Dates: 23 through 10/11/23    Total Visit Count: 11    Chief Complaints:  No chief complaint on file.      Precautions:  Precautions comments: osteoporosis      TODAY'S VISIT:    Time In Session:  Start Time: 1101  Stop Time: 1201  Time Calculation (min): 60 min   General Information - 10/09/23 1101        Session Details    Document Type discharge evaluation        General Information    Referring Physician Dr. Tonie Dudley     History of present illness/functional impairment Alexandra notes chronic pain in her knees. She initially saw [facility] physicians 8-9 yrs ago, receiving injections for her knees that woul last her symptoms for 7-8 months per patient. She notes current L knee pain that is sore in nature. She notes during standing and pivotingshe may have buckling at her L knee. She notes her pain is not extreme, but there is weakness. She notes she does not walk right and that she is knock kneed. She reports that her knee symptoms have worsened over the past 5 years. She notes receiving injections; most recent was: 3 months ago.  She notes her pain is worse initially in the morning and stiff.  As the day progresses she notes some improvements in symptoms. She notes she takes tylenol for her symptoms. She attends the gym, has not been there for 3 weeks. She would do the bicycle, and upper body machines, and stretching machines. She notes aggravating factors include going up and down stairs, and she is unable to walk for prolonged distances. She also endorses pain in her R hip and lateral thigh pain. Easing factors include seated rest. She notes her balance is not what it used to be. She notes having a fall 2 years ago with fractures in her wrist and ankle.     Patient/Family/Caregiver  Comments/Observations Alexandra notes feeling good today, no new complaints. Alexandra notes that she has been feeling stronger since starting physical therapy. She notes reduced stiffness in the mornings. She notes her L leg is still weak.     Precautions comments osteoporosis                Daily Falls Screen - 10/09/23 1201        Daily Falls Assessment    Patient reported fall since last visit No                Pain/Vitals - 10/09/23 1101        Pain Assessment    Currently in pain No/Denies        Pre Activity Vital Signs    Pulse 59     /64     BP Location Left upper arm     BP Method Automatic     Patient Position Sitting                PT - 10/09/23 1201        Physical Therapy    Physical Therapy [facility] PT        PT Plan    Frequency of treatment 2 times/week     PT Duration 5 weeks     PT Cert From 09/06/23     PT Cert To 10/11/23     Date PT POC was sent to provider 09/06/23     Signed PT Plan of Care received?  Yes                Assessment and Plan - 10/09/23 1101        Assessment    Clinical Assessment Alexandra has attended 11 visits of skilled PT targeting her bilateral knee pain. She will be undergoing L TKA 10/11/23; and will return to OPPT 10/30/23. She demonstrates improvements in knee active and passive range of motion bilaterally since initial evaluation. Noted improvements in static balance, however L LE remains signficantly unstable in single leg stance. Her stair ambulation remains limited due to pain and weakness at her L knee, requiring step to pattern. Educated patient on expectations for attaining ROM, and normalizing gait post TKA. Reviewed HEP. She is appropriate for discharge at this time for surgical management of L knee.     Plan and Recommendations Discharge PT services.                 OBJECTIVE MEASUREMENTS/DATA:    ROM    Range of Motion - 10/09/23 1100        RIGHT: Lower Extremity PROM Assessment    Knee Flexion  140     Knee Extension  0        LEFT: Lower  Extremity PROM Assessment    Knee Flexion  139     Knee Extension  0        RIGHT: Lower Extremity AROM Assessment    Knee Flexion   135     Knee Extension   0        LEFT: Lower Extremity AROM Assessment    Knee Flexion   132     Knee Extension   0               MMT    Manual Muscle Tests - 10/09/23 1101        RIGHT: Lower Extremity Manual Muscle Test Assessment    Hip Flexion gross movement (5/5) normal     Hip Extension gross movement (4/5) good     Hip Abduction gross movement (4/5) good     Hip Internal Rotation gross movement (5/5) normal     Hip External Rotation gross movement (5/5) normal     Knee Flexion strength (4+/5) good plus     Knee Extension strength (4+/5) good plus     Ankle Dorsiflexion gross movement (5/5) normal     Ankle Plantarflexion gross movement (5/5) normal     Ankle Inversion gross movement (4+/5) good plus     Ankle Eversion gross movement (4+/5) good plus        LEFT: Lower Extremity Manual Muscle Test Assessment    Hip Flexion gross movement (4+/5) good plus     Hip Extension gross movement (4/5) good     Hip Abduction gross movement (4-/5) good minus     Hip Internal Rotation gross movement (5/5) normal     Hip External Rotation gross movement (5/5) normal     Knee Flexion strength (4/5) good   uncomfortable    Knee Extension strength (4+/5) good plus     Ankle Dorsiflexion gross movement (4+/5) good plus     Ankle Plantarflexion gross movement (4+/5) good plus     Ankle Inversion gross movement (4+/5) good plus     Ankle Eversion gross movement (4+/5) good plus               Palpation    Palpation - 10/09/23 1101        Palpation    LE Palpation  No tenderness global knee        Manual Interventions    Manual technique #1 Patellar mobilizations: L knee hypomobile superiorly, laterally; R knee mobility within functional limits               Balance/Posture    Balance and Posture - 10/09/23 1101        Balance Assessment    Comment, Balance Tandem: R front 30s, L front: 30s;  R leg  SLS: 8s, L leg unable to hold >1s               Outcome Measures    PT Outcome Measures - 10/09/23 1101        Objective Outcome Measures    30 Second Sit to Stand Test 8 repetitions        Other Outcome Measures Used/Comments    Other outcome measure used: WOMAC: 17/96 = 18%        NEW (2/6/23):  PSFS     PSFS ACTIVITY 1 dancing     PSFS ANSWER 1 3     PSFS ACTIVITY 2 shopping     PSFS ANSWER 2 5     PSFS ACTIVITY 3 stairs     PSFS ANSWER 3 1     PSFS ACTIVITY 4 prolonged walking     PSFS ANSWER 4 4     PSFS Sum of Activity Scores 13     PSFS Number of Activities 4     PSFS Percent Score 32.5 %                 ROM and MMT        9/6/2023 10/2/2023 10/9/2023   PT LE ROM Measurements   PROM: Right Hip Flexion 110 degrees     AROM: Right Hip Flexion 95 degrees     PROM: Left Hip Flexion 114 degrees     AROM: Left Hip Flexion 104 degrees     AROM: Right Hip IR 20 degrees     AROM: Left Hip IR 19 degrees     AROM: Right Hip ER 35 degrees     AROM: Left Hip ER 22 degrees     PROM: Right Knee Flexion 129  140   AROM: Right Knee Flexion 125 135 135   PROM: Left Knee Flexion 121       painful into end range of motion  139   AROM: Left Knee Flexion 111 130 132   PROM: Right Knee Extension -4       lacking  0   AROM: Right Knee Extension -4 0 0   PROM: Left Knee Extension -2  0   AROM: Left Knee Extension -4       lacking -1 0   PT LE MMT   Right Hip Flexion (5/5) normal (5/5) normal (5/5) normal   Left Hip Flexion (4+/5) good plus (4+/5) good plus (4+/5) good plus   Right Hip Extension (3+/5) fair plus (4/5) good (4/5) good   Left Hip Extension (3+/5) fair plus (4/5) good (4/5) good   Right Hip ABD (3+/5) fair plus (4/5) good (4/5) good   Left Hip ABD (4-/5) good minus (4/5) good (4-/5) good minus   Right Hip IR (5/5) normal (5/5) normal (5/5) normal   Left Hip IR (5/5) normal (5/5) normal (5/5) normal   Right Hip ER (5/5) normal (5/5) normal (5/5) normal   Left Hip ER (5/5) normal (5/5) normal (5/5) normal   Right Knee  Flexion (4+/5) good plus (4+/5) good plus (4+/5) good plus   Left Knee Flexion (4/5) good (4/5) good       uncomfortable (4/5) good       uncomfortable   Right Knee Extension (4+/5) good plus (4+/5) good plus (4+/5) good plus   Left Knee Extension (4/5) good (4+/5) good plus (4+/5) good plus   Right Ankle DF (5/5) normal (5/5) normal (5/5) normal   Left Ankle DF (4+/5) good plus (4+/5) good plus (4+/5) good plus   Right Ankle PF (5/5) normal (5/5) normal (5/5) normal   Left Ankle PF (4+/5) good plus (4+/5) good plus (4+/5) good plus   Right Ankle Inversion (4+/5) good plus (4+/5) good plus (4+/5) good plus   Left Ankle Inversion  (4+/5) good plus (4+/5) good plus   Right Ankle Eversion (4+/5) good plus (4+/5) good plus (4+/5) good plus   Left Ankle Eversion  (4+/5) good plus (4+/5) good plus     Outcome Measures        9/6/2023    10:59 9/8/2023    13:15 10/2/2023    11:09 10/9/2023    11:01   PT OBJECTIVE Outcome Measures   30 Second Sit to Stand  9 repetitions 5 repetitions       pain limiting this session 8 repetitions   TUG (Mean)  11.4     PT SUBJECTIVE Outcome Measures   PSFS % Score 45 % 45 % 40 % 32.5 %   Other WOMAC: 39/96: 40.6%  WOMAC: 15/96 = 15.6% WOMAC: 17/96 = 18%       Today's Treatment:    Education provided:  Yes: See treatment log for details of education provided  Methods: Discussion  Readiness: acceptance  Response: Verbalizes understanding    ORTHO PT FLOWSHEET    Diagnosis: Bilateral knee OA   Precautions: osteoporosis, h/o uterine cancer, melanoma, pulmonary artery aneurysm     Exercises Current Session Time Completed (Y/N/G)   MODALITIES- HEAT/ICE (77924) TOTAL TIME FOR SESSION Not performed    Heat     Ice     MODALITIES - E-STIM (95562) TOTAL TIME FOR SESSION Not performed    E-stim/H-Wave     THER ACT (90691) TOTAL TIME FOR SESSION 23-37 Minutes    Assessment  See note  Y   Vitals/pain See note Y   HEP 9/6/23: quad sets, heel slides, bridges  9/21: hamstring stretch, calf stretch, weight  shifted HR   9/29/23: LAQ iso with hold  10/2/23: SLR  10/9/23: Consolidated HEP targeting all above save LAQ, bridge, HR Y   Education 9/6/23:  Discussed HEP and goals for treatment/expected outcomes. The patient verbalizes agreement and understanding.   9/21: reviewed hamstring stretch that added (I) at home  9/29/23: Reviewed HEP and new intervention. Discussed goal for post op, and importance of continued strengthening and maintenance of mobility and flexibility for improved post operative course. The patient verbalizes agreement and understanding.   10/2/23: Discussed HEP progressions. Reviewed mobility and strengthening program.   10/9/23: Reviewed expectations for TKA. Discussed goal for regaining motion and gait quality initially and importance of compliance with surgical/PT advice. The patient verbalizes agreement and understanding.  Y   Body Mechanics/Work Training     GROUP (98945)  Not performed    THER EX (59339) TOTAL TIME FOR SESSION 23-37 Minutes    CARDIOVASCULAR      Nu Step     Stationary Bike Recumbent seat 7; 8 min Y   Elliptical     Treadmill     STRENGTHENING     Quad sets 10 x 10s Y   Heel slides/HS isos 10 x 10s Y   LAQ  SAQ 3 x 10 4.0#  3 x 10 4.0#      SLR 1 x 10 Y   Clamshells 2 x 10 pink TB    Bridges 2 x 10 Y   Minisquats 1 x 15 with PB behind back    Resisted walking cable column 5 x retro walk: 20#  5 x laterally to each side; 15#    FSU 1 x 20 each; L LE with significant difficulty with eccentric control. UE support required with close supervision. LLE lead, 4in step    FSD 2x10 from 4 in step single UE HR utilized due to poor eccentric control    LSU 1 x 20 LLE lead, 4in step; cue for form    HR/TR 2 x  20 reps, shifted to L for increased WB    Hip 3 way 2 x 10 each direction in standing //, pink TB around knees    Lunges     Sit to Stand Lowest setting big mat; 2 x 10 PTB around knees for increased stability/glute recruitment.          STRETCHING/ROM     Hamstring stretch 30s x 3  seated Y   Supine hip flexor/rectus stretch 30s x 3    Prone quad stretch 30s x 3    Seated calf stretch 30s x 3  Y        NEURO RE-ED (32156) TOTAL TIME FOR SESSION Not performed    Static balance NBOS EC foam: 30s x 3  Modified tandem on foam: 20s x 2 each direction    TKE 2 x 10; BTB working to cue increased quad contraction    Postural re-education     Taping     GAIT TRAINING (89633) TOTAL TIME FOR SESSION Not performed    Ambulation      Dynamic gait      MANUAL (22298) TOTAL TIME FOR SESSION Not performed    Manual stretching B hamstring, hip flexor, quad, hip IR's, hip adductors    Mobilization     Y = yes; N = no; G = group       Goals Addressed                    This Visit's Progress      COMPLETED: PT- Knee goals         Short Term Goals Time Frame Result Comment   Pt will demonstrate improved sharpened [place] for reducing falls risk 2-3 weeks Met 30s b/l   Pt will increase L knee AROM >/= 5 degrees into flexion to improve functional mobility 2-3 weeks Met    Assess TUG/set goal ASAP Met Not falls risk per TUG   Pt will improve PSFS score >/= 10%  2-3 weeks Not met    Pt will be independent with HEP to increase carryover at home 2-3 weeks Met    Assess 30s STS and set goals ASAP Met         Long Term Goals Time Frame Result Comment   Pt will increase B/L LE  MMT into flexion, extension, hip abduction, extension >/= ½ grade 5 weeks Met    Pt will increase L knee ROM >/= 10 degrees into flexion to improve functional mobility 5 weeks Met    Decrease WOMAC to 20 points to increase function  5 weeks Met    Pt will improve PSFS score >/= 20%  5 weeks Not met    Pt will be able to walk for 6 min with <3/10 pain reproduction 5 weeks N/A    30s STS to improve to 10x  5 weeks Not met    Pt will be able to ascend/descend stairs with reciprocal pattern with L side hand railing.  5 weeks Not met               COMPLETED: PT-Alexandra goals (pt-stated)         1.) improve stair ambulation reciprocal pattern  2.)  improve her balance and walking confidence  3.)improve shopping  4.) get back to dancing    Balance and shopping have improved; stairs and dancing remain limited              Discharge information for [facility]:    Reason for D/C: Maximum potential met  Was care interrupted for medical reason?: Yes (TKA surgery 10/11/23)  Care was interrupted due to hospitalization?: No  Did the patient demonstrate increased independence: Yes  Demonstration of independece:: [place] in HEP, Increased functional activity  Has the patient returned to productive activity?: Yes  Increased production assessment:: Return to household activities  Skin integrity: Intact

## 2023-10-09 NOTE — OP PT TREATMENT LOG
ORTHO PT FLOWSHEET    Diagnosis: Bilateral knee OA   Precautions: osteoporosis, h/o uterine cancer, melanoma, pulmonary artery aneurysm     Exercises Current Session Time Completed (Y/N/G)   MODALITIES- HEAT/ICE (79915) TOTAL TIME FOR SESSION Not performed    Heat     Ice     MODALITIES - E-STIM (49638) TOTAL TIME FOR SESSION Not performed    E-stim/H-Wave     THER ACT (61777) TOTAL TIME FOR SESSION 23-37 Minutes    Assessment  See note  Y   Vitals/pain See note Y   HEP 9/6/23: quad sets, heel slides, bridges  9/21: hamstring stretch, calf stretch, weight shifted HR   9/29/23: LAQ iso with hold  10/2/23: SLR  10/9/23: Consolidated HEP targeting all above save LAQ, bridge, HR Y   Education 9/6/23:  Discussed HEP and goals for treatment/expected outcomes. The patient verbalizes agreement and understanding.   9/21: reviewed hamstring stretch that added (I) at home  9/29/23: Reviewed HEP and new intervention. Discussed goal for post op, and importance of continued strengthening and maintenance of mobility and flexibility for improved post operative course. The patient verbalizes agreement and understanding.   10/2/23: Discussed HEP progressions. Reviewed mobility and strengthening program.   10/9/23: Reviewed expectations for TKA. Discussed goal for regaining motion and gait quality initially and importance of compliance with surgical/PT advice. The patient verbalizes agreement and understanding.  Y   Body Mechanics/Work Training     GROUP (18125)  Not performed    THER EX (86195) TOTAL TIME FOR SESSION 23-37 Minutes    CARDIOVASCULAR      Nu Step     Stationary Bike Recumbent seat 7; 8 min Y   Elliptical     Treadmill     STRENGTHENING     Quad sets 10 x 10s Y   Heel slides/HS isos 10 x 10s Y   LAQ  SAQ 3 x 10 4.0#  3 x 10 4.0#      SLR 1 x 10 Y   Clamshells 2 x 10 pink TB    Bridges 2 x 10 Y   Minisquats 1 x 15 with PB behind back    Resisted walking cable column 5 x retro walk: 20#  5 x laterally to each side; 15#     FSU 1 x 20 each; L LE with significant difficulty with eccentric control. UE support required with close supervision. LLE lead, 4in step    FSD 2x10 from 4 in step single UE HR utilized due to poor eccentric control    LSU 1 x 20 LLE lead, 4in step; cue for form    HR/TR 2 x  20 reps, shifted to L for increased WB    Hip 3 way 2 x 10 each direction in standing //, pink TB around knees    Lunges     Sit to Stand Lowest setting big mat; 2 x 10 PTB around knees for increased stability/glute recruitment.          STRETCHING/ROM     Hamstring stretch 30s x 3 seated Y   Supine hip flexor/rectus stretch 30s x 3    Prone quad stretch 30s x 3    Seated calf stretch 30s x 3  Y        NEURO RE-ED (15962) TOTAL TIME FOR SESSION Not performed    Static balance NBOS EC foam: 30s x 3  Modified tandem on foam: 20s x 2 each direction    TKE 2 x 10; BTB working to cue increased quad contraction    Postural re-education     Taping     GAIT TRAINING (75065) TOTAL TIME FOR SESSION Not performed    Ambulation      Dynamic gait      MANUAL (32884) TOTAL TIME FOR SESSION Not performed    Manual stretching B hamstring, hip flexor, quad, hip IR's, hip adductors    Mobilization     Y = yes; N = no; G = group

## 2023-10-10 ENCOUNTER — ANESTHESIA EVENT (OUTPATIENT)
Dept: OPERATING ROOM | Facility: HOSPITAL | Age: 88
Setting detail: HOSPITAL OUTPATIENT SURGERY
End: 2023-10-10
Payer: COMMERCIAL

## 2023-10-11 ENCOUNTER — HOSPITAL ENCOUNTER (OUTPATIENT)
Facility: HOSPITAL | Age: 88
Discharge: HOME | End: 2023-10-13
Attending: ORTHOPAEDIC SURGERY | Admitting: ORTHOPAEDIC SURGERY
Payer: COMMERCIAL

## 2023-10-11 ENCOUNTER — APPOINTMENT (OUTPATIENT)
Dept: RADIOLOGY | Facility: HOSPITAL | Age: 88
End: 2023-10-11
Attending: NURSE PRACTITIONER
Payer: COMMERCIAL

## 2023-10-11 ENCOUNTER — ANESTHESIA (OUTPATIENT)
Dept: OPERATING ROOM | Facility: HOSPITAL | Age: 88
Setting detail: HOSPITAL OUTPATIENT SURGERY
End: 2023-10-11
Payer: COMMERCIAL

## 2023-10-11 DIAGNOSIS — Z96.652 S/P TOTAL KNEE ARTHROPLASTY, LEFT: ICD-10-CM

## 2023-10-11 DIAGNOSIS — E78.2 MIXED HYPERLIPIDEMIA: ICD-10-CM

## 2023-10-11 DIAGNOSIS — I49.9 CARDIAC ARRHYTHMIA, UNSPECIFIED CARDIAC ARRHYTHMIA TYPE: Primary | ICD-10-CM

## 2023-10-11 DIAGNOSIS — M17.12 OSTEOARTHRITIS OF LEFT KNEE, UNSPECIFIED OSTEOARTHRITIS TYPE: ICD-10-CM

## 2023-10-11 PROCEDURE — 97162 PT EVAL MOD COMPLEX 30 MIN: CPT | Mod: GP

## 2023-10-11 PROCEDURE — 71000012 HC PACU PHASE 2 EA ADDL MIN: Performed by: ORTHOPAEDIC SURGERY

## 2023-10-11 PROCEDURE — 63600000 HC DRUGS/DETAIL CODE: Mod: JZ | Performed by: ORTHOPAEDIC SURGERY

## 2023-10-11 PROCEDURE — 0SRD0JZ REPLACEMENT OF LEFT KNEE JOINT WITH SYNTHETIC SUBSTITUTE, OPEN APPROACH: ICD-10-PCS | Performed by: ORTHOPAEDIC SURGERY

## 2023-10-11 PROCEDURE — 25000000 HC PHARMACY GENERAL: Performed by: ORTHOPAEDIC SURGERY

## 2023-10-11 PROCEDURE — 71000003 HC PACU EXT INITIAL 30 MIN: Performed by: ORTHOPAEDIC SURGERY

## 2023-10-11 PROCEDURE — 71000002 HC PACU PHASE 2 INITIAL 30MIN: Performed by: ORTHOPAEDIC SURGERY

## 2023-10-11 PROCEDURE — 63600000 HC DRUGS/DETAIL CODE: Performed by: NURSE PRACTITIONER

## 2023-10-11 PROCEDURE — 37000010 HC ANESTHESIA SPINAL: Performed by: ORTHOPAEDIC SURGERY

## 2023-10-11 PROCEDURE — 63700000 HC SELF-ADMINISTRABLE DRUG: Performed by: NURSE PRACTITIONER

## 2023-10-11 PROCEDURE — 71000013 HC PACU EXT EA ADDL MIN: Performed by: ORTHOPAEDIC SURGERY

## 2023-10-11 PROCEDURE — 63600000 HC DRUGS/DETAIL CODE: Mod: JZ

## 2023-10-11 PROCEDURE — 63700000 HC SELF-ADMINISTRABLE DRUG

## 2023-10-11 PROCEDURE — 63600000 HC DRUGS/DETAIL CODE: Mod: JZ | Performed by: STUDENT IN AN ORGANIZED HEALTH CARE EDUCATION/TRAINING PROGRAM

## 2023-10-11 PROCEDURE — 36000016 HC OR LEVEL 6 EA ADDL MIN: Performed by: ORTHOPAEDIC SURGERY

## 2023-10-11 PROCEDURE — 71000001 HC PACU PHASE 1 INITIAL 30MIN: Performed by: ORTHOPAEDIC SURGERY

## 2023-10-11 PROCEDURE — 63600000 HC DRUGS/DETAIL CODE: Mod: JZ | Performed by: NURSE PRACTITIONER

## 2023-10-11 PROCEDURE — 88305 TISSUE EXAM BY PATHOLOGIST: CPT | Performed by: ORTHOPAEDIC SURGERY

## 2023-10-11 PROCEDURE — C1776 JOINT DEVICE (IMPLANTABLE): HCPCS | Performed by: ORTHOPAEDIC SURGERY

## 2023-10-11 PROCEDURE — 25800000 HC PHARMACY IV SOLUTIONS: Performed by: NURSE PRACTITIONER

## 2023-10-11 PROCEDURE — 73560 X-RAY EXAM OF KNEE 1 OR 2: CPT | Mod: LT

## 2023-10-11 PROCEDURE — 99232 SBSQ HOSP IP/OBS MODERATE 35: CPT | Performed by: HOSPITALIST

## 2023-10-11 PROCEDURE — 27200000 HC STERILE SUPPLY: Performed by: ORTHOPAEDIC SURGERY

## 2023-10-11 PROCEDURE — 71000011 HC PACU PHASE 1 EA ADDL MIN: Performed by: ORTHOPAEDIC SURGERY

## 2023-10-11 PROCEDURE — C1713 ANCHOR/SCREW BN/BN,TIS/BN: HCPCS | Performed by: ORTHOPAEDIC SURGERY

## 2023-10-11 PROCEDURE — 36000006 HC OR LEVEL 6 INITIAL 30MIN: Performed by: ORTHOPAEDIC SURGERY

## 2023-10-11 PROCEDURE — 97530 THERAPEUTIC ACTIVITIES: CPT | Mod: GP

## 2023-10-11 PROCEDURE — 25800000 HC PHARMACY IV SOLUTIONS

## 2023-10-11 PROCEDURE — 63600000 HC DRUGS/DETAIL CODE: Performed by: ORTHOPAEDIC SURGERY

## 2023-10-11 PROCEDURE — 25800000 HC PHARMACY IV SOLUTIONS: Performed by: STUDENT IN AN ORGANIZED HEALTH CARE EDUCATION/TRAINING PROGRAM

## 2023-10-11 DEVICE — PIN TROCAR DRILL HEADLESS 3.2X75 MM: Type: IMPLANTABLE DEVICE | Site: KNEE | Status: FUNCTIONAL

## 2023-10-11 DEVICE — TOTAL KNEE PERSONA FEMALE HEX SCREW 2.5MM: Type: IMPLANTABLE DEVICE | Site: KNEE | Status: FUNCTIONAL

## 2023-10-11 DEVICE — IMPLANTABLE DEVICE
Type: IMPLANTABLE DEVICE | Site: KNEE | Status: FUNCTIONAL
Brand: PERSONA® VIVACIT-E®

## 2023-10-11 DEVICE — IMPLANTABLE DEVICE: Type: IMPLANTABLE DEVICE | Site: KNEE | Status: FUNCTIONAL

## 2023-10-11 DEVICE — PSN FEM CR PPS COCR NRW 7 L: Type: IMPLANTABLE DEVICE | Site: KNEE | Status: FUNCTIONAL

## 2023-10-11 RX ORDER — DEXAMETHASONE SODIUM PHOSPHATE 4 MG/ML
8 INJECTION, SOLUTION INTRA-ARTICULAR; INTRALESIONAL; INTRAMUSCULAR; INTRAVENOUS; SOFT TISSUE ONCE
Status: COMPLETED | OUTPATIENT
Start: 2023-10-12 | End: 2023-10-12

## 2023-10-11 RX ORDER — HYDROMORPHONE HYDROCHLORIDE 1 MG/ML
INJECTION, SOLUTION INTRAMUSCULAR; INTRAVENOUS; SUBCUTANEOUS AS NEEDED
Status: DISCONTINUED | OUTPATIENT
Start: 2023-10-11 | End: 2023-10-11 | Stop reason: SURG

## 2023-10-11 RX ORDER — ACETAMINOPHEN 325 MG/1
TABLET ORAL
Status: COMPLETED
Start: 2023-10-11 | End: 2023-10-11

## 2023-10-11 RX ORDER — CETIRIZINE HYDROCHLORIDE 5 MG/1
5 TABLET ORAL DAILY
Status: DISCONTINUED | OUTPATIENT
Start: 2023-10-11 | End: 2023-10-13 | Stop reason: HOSPADM

## 2023-10-11 RX ORDER — AMOXICILLIN 250 MG
1 CAPSULE ORAL 2 TIMES DAILY
Status: DISCONTINUED | OUTPATIENT
Start: 2023-10-11 | End: 2023-10-13 | Stop reason: HOSPADM

## 2023-10-11 RX ORDER — DEXTROSE 50 % IN WATER (D50W) INTRAVENOUS SYRINGE
25 AS NEEDED
Status: DISCONTINUED | OUTPATIENT
Start: 2023-10-11 | End: 2023-10-13 | Stop reason: HOSPADM

## 2023-10-11 RX ORDER — DIPHENHYDRAMINE HCL 25 MG
25 CAPSULE ORAL EVERY 6 HOURS PRN
Status: DISCONTINUED | OUTPATIENT
Start: 2023-10-11 | End: 2023-10-13 | Stop reason: HOSPADM

## 2023-10-11 RX ORDER — ONDANSETRON HYDROCHLORIDE 2 MG/ML
INJECTION, SOLUTION INTRAVENOUS AS NEEDED
Status: DISCONTINUED | OUTPATIENT
Start: 2023-10-11 | End: 2023-10-11 | Stop reason: SURG

## 2023-10-11 RX ORDER — HYDROMORPHONE HYDROCHLORIDE 1 MG/ML
0.5 INJECTION, SOLUTION INTRAMUSCULAR; INTRAVENOUS; SUBCUTANEOUS
Status: DISCONTINUED | OUTPATIENT
Start: 2023-10-11 | End: 2023-10-11 | Stop reason: HOSPADM

## 2023-10-11 RX ORDER — IBUPROFEN 200 MG
16-32 TABLET ORAL AS NEEDED
Status: DISCONTINUED | OUTPATIENT
Start: 2023-10-11 | End: 2023-10-13 | Stop reason: HOSPADM

## 2023-10-11 RX ORDER — FENTANYL CITRATE 50 UG/ML
INJECTION, SOLUTION INTRAMUSCULAR; INTRAVENOUS AS NEEDED
Status: DISCONTINUED | OUTPATIENT
Start: 2023-10-11 | End: 2023-10-11 | Stop reason: SURG

## 2023-10-11 RX ORDER — KETOROLAC TROMETHAMINE 15 MG/ML
15 INJECTION, SOLUTION INTRAMUSCULAR; INTRAVENOUS
Status: COMPLETED | OUTPATIENT
Start: 2023-10-11 | End: 2023-10-11

## 2023-10-11 RX ORDER — ROPIVACAINE HYDROCHLORIDE 5 MG/ML
INJECTION, SOLUTION EPIDURAL; INFILTRATION; PERINEURAL
Status: DISCONTINUED | OUTPATIENT
Start: 2023-10-11 | End: 2023-10-11 | Stop reason: HOSPADM

## 2023-10-11 RX ORDER — POLYETHYLENE GLYCOL 3350 17 G/17G
17 POWDER, FOR SOLUTION ORAL DAILY
Status: DISCONTINUED | OUTPATIENT
Start: 2023-10-11 | End: 2023-10-13 | Stop reason: HOSPADM

## 2023-10-11 RX ORDER — METOPROLOL SUCCINATE 25 MG/1
25 TABLET, EXTENDED RELEASE ORAL
Status: DISCONTINUED | OUTPATIENT
Start: 2023-10-11 | End: 2023-10-13 | Stop reason: HOSPADM

## 2023-10-11 RX ORDER — DEXAMETHASONE SODIUM PHOSPHATE 4 MG/ML
INJECTION, SOLUTION INTRA-ARTICULAR; INTRALESIONAL; INTRAMUSCULAR; INTRAVENOUS; SOFT TISSUE AS NEEDED
Status: DISCONTINUED | OUTPATIENT
Start: 2023-10-11 | End: 2023-10-11 | Stop reason: SURG

## 2023-10-11 RX ORDER — PROPOFOL 10 MG/ML
INJECTION, EMULSION INTRAVENOUS CONTINUOUS PRN
Status: DISCONTINUED | OUTPATIENT
Start: 2023-10-11 | End: 2023-10-11 | Stop reason: SURG

## 2023-10-11 RX ORDER — SODIUM CHLORIDE 9 MG/ML
INJECTION, SOLUTION INTRAVENOUS CONTINUOUS
Status: ACTIVE | OUTPATIENT
Start: 2023-10-11 | End: 2023-10-12

## 2023-10-11 RX ORDER — FENTANYL CITRATE 50 UG/ML
50 INJECTION, SOLUTION INTRAMUSCULAR; INTRAVENOUS EVERY 5 MIN PRN
Status: COMPLETED | OUTPATIENT
Start: 2023-10-11 | End: 2023-10-11

## 2023-10-11 RX ORDER — DIPHENHYDRAMINE HCL 50 MG/ML
25 VIAL (ML) INJECTION EVERY 6 HOURS PRN
Status: DISCONTINUED | OUTPATIENT
Start: 2023-10-11 | End: 2023-10-13 | Stop reason: HOSPADM

## 2023-10-11 RX ORDER — ONDANSETRON 4 MG/1
4 TABLET, ORALLY DISINTEGRATING ORAL EVERY 8 HOURS PRN
Status: DISCONTINUED | OUTPATIENT
Start: 2023-10-11 | End: 2023-10-13 | Stop reason: HOSPADM

## 2023-10-11 RX ORDER — TRANEXAMIC ACID 10 MG/ML
1000 INJECTION, SOLUTION INTRAVENOUS ONCE
Status: COMPLETED | OUTPATIENT
Start: 2023-10-11 | End: 2023-10-11

## 2023-10-11 RX ORDER — KETOROLAC TROMETHAMINE 15 MG/ML
INJECTION, SOLUTION INTRAMUSCULAR; INTRAVENOUS
Status: DISPENSED
Start: 2023-10-11 | End: 2023-10-12

## 2023-10-11 RX ORDER — ACETAMINOPHEN 325 MG/1
650 TABLET ORAL
Qty: 14 TABLET | Refills: 0 | Status: DISPENSED | OUTPATIENT
Start: 2023-10-11 | End: 2023-10-13

## 2023-10-11 RX ORDER — LORAZEPAM 1 MG/1
1 TABLET ORAL DAILY PRN
Status: DISCONTINUED | OUTPATIENT
Start: 2023-10-11 | End: 2023-10-13 | Stop reason: HOSPADM

## 2023-10-11 RX ORDER — OXYCODONE HYDROCHLORIDE 5 MG/1
5-10 TABLET ORAL EVERY 4 HOURS PRN
Status: DISCONTINUED | OUTPATIENT
Start: 2023-10-11 | End: 2023-10-13 | Stop reason: HOSPADM

## 2023-10-11 RX ORDER — GABAPENTIN 300 MG/1
CAPSULE ORAL
Status: COMPLETED
Start: 2023-10-11 | End: 2023-10-11

## 2023-10-11 RX ORDER — GABAPENTIN 300 MG/1
300 CAPSULE ORAL
Status: COMPLETED | OUTPATIENT
Start: 2023-10-11 | End: 2023-10-11

## 2023-10-11 RX ORDER — BUPIVACAINE HYDROCHLORIDE 7.5 MG/ML
INJECTION, SOLUTION INTRASPINAL
Status: COMPLETED | OUTPATIENT
Start: 2023-10-11 | End: 2023-10-11

## 2023-10-11 RX ORDER — BISACODYL 10 MG/1
10 SUPPOSITORY RECTAL DAILY PRN
Status: DISCONTINUED | OUTPATIENT
Start: 2023-10-11 | End: 2023-10-13 | Stop reason: HOSPADM

## 2023-10-11 RX ORDER — DEXTROSE 40 %
15-30 GEL (GRAM) ORAL AS NEEDED
Status: DISCONTINUED | OUTPATIENT
Start: 2023-10-11 | End: 2023-10-13 | Stop reason: HOSPADM

## 2023-10-11 RX ORDER — MIDAZOLAM HYDROCHLORIDE 2 MG/2ML
INJECTION, SOLUTION INTRAMUSCULAR; INTRAVENOUS AS NEEDED
Status: DISCONTINUED | OUTPATIENT
Start: 2023-10-11 | End: 2023-10-11 | Stop reason: SURG

## 2023-10-11 RX ORDER — ONDANSETRON HYDROCHLORIDE 2 MG/ML
4 INJECTION, SOLUTION INTRAVENOUS EVERY 8 HOURS PRN
Status: DISCONTINUED | OUTPATIENT
Start: 2023-10-11 | End: 2023-10-13 | Stop reason: HOSPADM

## 2023-10-11 RX ORDER — DEXTROSE 50 % IN WATER (D50W) INTRAVENOUS SYRINGE
25 AS NEEDED
Status: DISCONTINUED | OUTPATIENT
Start: 2023-10-11 | End: 2023-10-11 | Stop reason: HOSPADM

## 2023-10-11 RX ORDER — ALUMINUM HYDROXIDE, MAGNESIUM HYDROXIDE, AND SIMETHICONE 1200; 120; 1200 MG/30ML; MG/30ML; MG/30ML
30 SUSPENSION ORAL EVERY 4 HOURS PRN
Status: DISCONTINUED | OUTPATIENT
Start: 2023-10-11 | End: 2023-10-13 | Stop reason: HOSPADM

## 2023-10-11 RX ORDER — ONDANSETRON HYDROCHLORIDE 2 MG/ML
4 INJECTION, SOLUTION INTRAVENOUS
Status: DISCONTINUED | OUTPATIENT
Start: 2023-10-11 | End: 2023-10-11 | Stop reason: HOSPADM

## 2023-10-11 RX ORDER — IBUPROFEN 200 MG
16-32 TABLET ORAL AS NEEDED
Status: DISCONTINUED | OUTPATIENT
Start: 2023-10-11 | End: 2023-10-11 | Stop reason: HOSPADM

## 2023-10-11 RX ORDER — ACETAMINOPHEN 325 MG/1
975 TABLET ORAL
Status: COMPLETED | OUTPATIENT
Start: 2023-10-11 | End: 2023-10-11

## 2023-10-11 RX ORDER — DEXTROSE 40 %
15-30 GEL (GRAM) ORAL AS NEEDED
Status: DISCONTINUED | OUTPATIENT
Start: 2023-10-11 | End: 2023-10-11 | Stop reason: HOSPADM

## 2023-10-11 RX ADMIN — KETOROLAC TROMETHAMINE 15 MG: 15 INJECTION, SOLUTION INTRAMUSCULAR; INTRAVENOUS at 19:00

## 2023-10-11 RX ADMIN — SENNOSIDES AND DOCUSATE SODIUM 1 TABLET: 50; 8.6 TABLET ORAL at 21:35

## 2023-10-11 RX ADMIN — GABAPENTIN 300 MG: 300 CAPSULE ORAL at 07:42

## 2023-10-11 RX ADMIN — FENTANYL CITRATE 50 MCG: 50 INJECTION INTRAMUSCULAR; INTRAVENOUS at 09:52

## 2023-10-11 RX ADMIN — VANCOMYCIN HYDROCHLORIDE 1000 MG: 1 INJECTION, POWDER, LYOPHILIZED, FOR SOLUTION INTRAVENOUS at 07:59

## 2023-10-11 RX ADMIN — ONDANSETRON 4 MG: 2 INJECTION INTRAMUSCULAR; INTRAVENOUS at 09:17

## 2023-10-11 RX ADMIN — MIDAZOLAM HYDROCHLORIDE 2 MG: 1 INJECTION, SOLUTION INTRAMUSCULAR; INTRAVENOUS at 08:40

## 2023-10-11 RX ADMIN — HYDROMORPHONE HYDROCHLORIDE 0.5 MG: 1 INJECTION, SOLUTION INTRAMUSCULAR; INTRAVENOUS; SUBCUTANEOUS at 09:36

## 2023-10-11 RX ADMIN — CEFAZOLIN 2 G: 2 INJECTION, POWDER, FOR SOLUTION INTRAMUSCULAR; INTRAVENOUS at 08:55

## 2023-10-11 RX ADMIN — FENTANYL CITRATE 50 MCG: 50 INJECTION, SOLUTION INTRAMUSCULAR; INTRAVENOUS at 11:41

## 2023-10-11 RX ADMIN — TRANEXAMIC ACID 1000 MG: 10 INJECTION, SOLUTION INTRAVENOUS at 08:55

## 2023-10-11 RX ADMIN — ACETAMINOPHEN 650 MG: 325 TABLET ORAL at 21:35

## 2023-10-11 RX ADMIN — SODIUM CHLORIDE: 9 INJECTION, SOLUTION INTRAVENOUS at 14:54

## 2023-10-11 RX ADMIN — SODIUM CHLORIDE: 9 INJECTION, SOLUTION INTRAVENOUS at 08:12

## 2023-10-11 RX ADMIN — BUPIVACAINE HYDROCHLORIDE IN DEXTROSE 1.6 ML: 7.5 INJECTION, SOLUTION SUBARACHNOID at 08:43

## 2023-10-11 RX ADMIN — FENTANYL CITRATE 25 MCG: 50 INJECTION, SOLUTION INTRAMUSCULAR; INTRAVENOUS at 10:54

## 2023-10-11 RX ADMIN — OXYCODONE HYDROCHLORIDE 5 MG: 5 TABLET ORAL at 23:27

## 2023-10-11 RX ADMIN — FENTANYL CITRATE 50 MCG: 50 INJECTION INTRAMUSCULAR; INTRAVENOUS at 09:14

## 2023-10-11 RX ADMIN — ONDANSETRON HYDROCHLORIDE 4 MG: 2 INJECTION, SOLUTION INTRAVENOUS at 15:19

## 2023-10-11 RX ADMIN — PROPOFOL 25 MG: 10 INJECTION, EMULSION INTRAVENOUS at 08:53

## 2023-10-11 RX ADMIN — CEFAZOLIN 2 G: 2 INJECTION, POWDER, FOR SOLUTION INTRAMUSCULAR; INTRAVENOUS at 16:17

## 2023-10-11 RX ADMIN — DEXAMETHASONE SODIUM PHOSPHATE 4 MG: 4 INJECTION, SOLUTION INTRA-ARTICULAR; INTRALESIONAL; INTRAMUSCULAR; INTRAVENOUS; SOFT TISSUE at 09:17

## 2023-10-11 RX ADMIN — PROPOFOL 150 MG: 10 INJECTION, EMULSION INTRAVENOUS at 09:12

## 2023-10-11 RX ADMIN — ACETAMINOPHEN 975 MG: 325 TABLET ORAL at 07:42

## 2023-10-11 RX ADMIN — KETOROLAC TROMETHAMINE 15 MG: 15 INJECTION, SOLUTION INTRAMUSCULAR; INTRAVENOUS at 13:48

## 2023-10-11 RX ADMIN — ACETAMINOPHEN 650 MG: 325 TABLET ORAL at 14:56

## 2023-10-11 RX ADMIN — PROPOFOL 75 MCG/KG/MIN: 10 INJECTION, EMULSION INTRAVENOUS at 08:49

## 2023-10-11 ASSESSMENT — COGNITIVE AND FUNCTIONAL STATUS - GENERAL
CLIMB 3 TO 5 STEPS WITH RAILING: 3 - A LITTLE
WALKING IN HOSPITAL ROOM: 3 - A LITTLE
STANDING UP FROM CHAIR USING ARMS: 3 - A LITTLE
MOVING TO AND FROM BED TO CHAIR: 3 - A LITTLE
CLIMB 3 TO 5 STEPS WITH RAILING: 3 - A LITTLE
WALKING IN HOSPITAL ROOM: 3 - A LITTLE
AFFECT: FLAT/BLUNTED AFFECT
STANDING UP FROM CHAIR USING ARMS: 3 - A LITTLE
MOVING TO AND FROM BED TO CHAIR: 3 - A LITTLE

## 2023-10-11 ASSESSMENT — ENCOUNTER SYMPTOMS: DYSRHYTHMIAS: 1

## 2023-10-11 ASSESSMENT — PAIN SCALES - GENERAL: PAIN_LEVEL: 2

## 2023-10-11 NOTE — HOSPITAL COURSE
Alexandra is a 88 y.o. female admitted on 10/11/2023 with Osteoarthritis of left knee, unspecified osteoarthritis type [M17.12]  S/P total knee replacement, left [Z96.652]. Principal problem is No Principal Problem: There is no principal problem currently on the Problem List. Please update the Problem List and refresh..    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    History of Present Illness   Pt is an 88 year old female s/p left TKA on 10/11/23 ([facility]).

## 2023-10-11 NOTE — ASSESSMENT & PLAN NOTE
s/p LTKA on 10/11/23  - Management as per Ortho  - Pain control per Ortho  - DVT ppx per Ortho  - PT/OT  - Encourage IS  - Bowel regimen

## 2023-10-11 NOTE — OR SURGEON
Pre-Procedure patient identification:  I am the primary operating surgeon/proceduralist and I have reviewed the applicable pathology reports and radiology studies for this procedure. I have identified the patient and confirmed laterality is left on 10/11/23 at 7:36 AM Tonie Dudley MD  Phone Number: [phone]

## 2023-10-11 NOTE — PROGRESS NOTES
Physical Therapy -  Initial Evaluation     Patient: Teresa Bird  Location: [facility] 5PAV 5414  MRN: [id number]  Today's date: 10/11/2023    HISTORY OF PRESENT ILLNESS     Alexandra is a 88 y.o. female admitted on 10/11/2023 with Osteoarthritis of left knee, unspecified osteoarthritis type [M17.12]  S/P total knee replacement, left [Z96.652]. Principal problem is No Principal Problem: There is no principal problem currently on the Problem List. Please update the Problem List and refresh..    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    History of Present Illness   Pt is an 88 year old female s/p left TKA on 10/11/23 ([place]).    PRIOR LEVEL OF FUNCTION AND LIVING ENVIRONMENT     Prior Level of Function    Flowsheet Row Most Recent Value   Dominant Hand left   Ambulation independent   Transferring independent   Toileting independent   Bathing independent   Dressing independent   Eating independent   IADLs independent   Driving/Transportation    Communication understands/communicates without difficulty   Prior Level of Function Comment reports being Independent w/ all fxn'l/ADL tasks w/o AD - PTA   Assistive Device Currently Used at Home none        Prior Living Environment    Flowsheet Row Most Recent Value   People in Home alone   Current Living Arrangements home   Living Environment Comment Lives alone in 1SH - 2STE+1 via garage - FFSU w/ stall shower & grab bars & shower chair        VITALS AND PAIN        Objective   OBJECTIVE     Start time:  1801  End time:   1825  Session Length:  24  Mode of Treatment: physical therapy, individual therapy    General  Observations  Patient received chair position, reclined, leg(s) elevated. She was agreeable to therapy, no issues or concerns identified by nurse prior to session.      Precautions: fall, weight bearing  Extremity Weight-Bearing Status: left lower extremity  Left LE Weight-Bearing Status: weight-bearing as tolerated (WBAT)           PT Eval and Treat - 10/11/23 1801        Cognition    Orientation Status oriented x 4     Affect/Mental Status flat/blunted affect     Follows Commands WFL     Comment, Cognition overall pleasant & cooperative        Vision Assessment/Intervention    Vision Assessment WFL        Hearing Assessment    Hearing Status hearing aid, bilateral        Sensory Assessment    Sensory Assessment sensation intact, lower extremities        Edema Symptoms/Interventions    Location lower extremity, left     Description diffuse     Treatment Interventions positioning        Lower Extremity Assessment    LE Assessment ROM and Strength WFL except        Lower Extremity Range of Motion    Hip, Left (ROM) decr'd AROM     Knee, Left (ROM) -8>44        Lower Extremity Strength    Hip, Left (Strength) 2/5     Knee, Left (Strength) 2/5        Motor Skills    Coordination --   alternating toe-taps - WFL       Bed Mobility    Bed Mobility Activities --   already OOB in chair w/ nsg       Mobility Belt    Mobility Belt Used for All Out of Bed Activity yes        Sit/Stand Transfer    Surface edge of bed;chair with arm rests     [place] close supervision     Safety/Cues increased time to complete;verbal cues;tactile cues;initiation;preparatory posture;technique;hand placement;proper use of assistive device     Transfer Comments slow to rise from chair req'd cues for proper push off techniques onto RW; eager to sit w/ reports of feeling nauseous req'd cues for safe reach back, demo'd decr'd eccentric control        Gait Training    [place], Gait close supervision     Safety/Cues verbal cues;tactile cues      Assistive Device walker, front-wheeled     Distance in Feet 80 feet     Pattern step-to;step-through   progressed from step-to>step-thru pattern    Deviations/Abnormal Patterns gait speed decreased;step length decreased;stride length decreased;antalgic     Comment (Gait/Stairs) very slow,  mildly unsteady step-to pattern in an exaggerated start/stop sequence w/ slightly flexed trunk posture req'd intermittent cues to recipricol gait pattern able to perform in response to cues however still in a start/stop sequence        Balance    Static Sitting Balance WFL;supported;sitting in chair     Static Standing Balance WFL;supported   using RW - no LOB       Impairments/Safety Issues    Impairments Affecting Function balance;endurance/activity tolerance;strength;pain     Functional Endurance Fair +                               Education Documentation  Transfers, taught by Sintia Escamilla PT at 10/11/2023  6:56 PM.  Learner: Patient  Readiness: Eager  Method: Explanation, Demonstration  Response: Verbalizes Understanding, Demonstrated Understanding, Needs Reinforcement  Comment: Pt educated on the role of PT, proper transfer techniques & safe gait sequencing using RW    Stairs Negotiation, taught by Sintia Escamilla PT at 10/11/2023  6:56 PM.  Learner: Patient  Readiness: Eager  Method: Explanation, Demonstration  Response: Verbalizes Understanding, Demonstrated Understanding, Needs Reinforcement  Comment: Pt educated on the role of PT, proper transfer techniques & safe gait sequencing using RW    Gait Training, taught by Sintia Escamilla PT at 10/11/2023  6:56 PM.  Learner: Patient  Readiness: Eager  Method: Explanation, Demonstration  Response: Verbalizes Understanding, Demonstrated Understanding, Needs Reinforcement  Comment: Pt educated on the role of PT, proper transfer techniques & safe gait sequencing using RW    Bed Mobility, taught by Sintia Escamilla PT at 10/11/2023  6:56 PM.  Learner:  Patient  Readiness: Eager  Method: Explanation, Demonstration  Response: Verbalizes Understanding, Demonstrated Understanding, Needs Reinforcement  Comment: Pt educated on the role of PT, proper transfer techniques & safe gait sequencing using RW        Session Outcome  Patient chair position, reclined, leg(s) elevated at end of session, chair alarm on, all needs met, call light in reach, personal items in reach. Nursing notified about patient's response to therapy/activity, patient's performance, and patient's position.    AM-PAC - Mobility (Current Function)     Turning form your back to your side while in flat bed without using bedrails 3 - A Little   Moving from lying on your back to sitting on the side of a flat bed without using bedrails 3 - A Little   Moving to and from a bed to a chair 3 - A Little   Standing up from a chair using your arms 3 - A Little   To walk in a hospital room 3 - A Little   Climbing 3-5 steps with a railing 3 - A Little   AM-PAC Mobility Score 18      ASSESSMENT AND PLAN     Progress Summary  Pt is pleasant & cooperative - presents w/ mild physical deficits limiting her safety w/ mobility - currently at an overall cl S level for xfers & amb using RW, rec Home w/ Home PT to address deficits as pt lives alone.    Patient/Family Therapy Goals Statement: wants to return directly home    PT Plan    Flowsheet Row Most Recent Value   Rehab Potential good, to achieve stated therapy goals at 10/11/2023 1801   Therapy Frequency daily at 10/11/2023 1801   Planned Therapy Interventions balance training, bed mobility training, gait training, strengthening, transfer training, stair training at 10/11/2023 1801          PT Discharge Recommendations    Flowsheet Row Most Recent Value   PT Recommended Discharge Disposition home with home health at 10/11/2023 1801   Anticipated Equipment Needs at Discharge (PT) walker, front-wheeled at 10/11/2023 1801               PT Goals    Flowsheet Row Most Recent  Value   Transfer Goal 1    Activity/Assistive Device all transfers at 10/11/2023 1801   Coleman modified independence at 10/11/2023 1801   Time Frame by discharge at 10/11/2023 1801   Progress/Outcome goal ongoing at 10/11/2023 1801   Gait Training Goal 1    Activity/Assistive Device gait (walking locomotion), assistive device use at 10/11/2023 1801   Coleman modified independence at 10/11/2023 1801   Distance 150 at 10/11/2023 1801   Time Frame by discharge at 10/11/2023 1801   Progress/Outcome goal ongoing at 10/11/2023 1801   ROM Goal 1    ROM Goal 1 L knee (AROM): 0-90* at 10/11/2023 1801   Time Frame by discharge at 10/11/2023 1801   Progress/Outcome goal ongoing at 10/11/2023 1801   Stairs Goal 1    Activity/Assistive Device stairs, all skills at 10/11/2023 1801   Coleman modified independence at 10/11/2023 1801   Number of Stairs 5  [including curb negotiation] at 10/11/2023 1801   Time Frame by discharge at 10/11/2023 1801   Progress/Outcome goal ongoing at 10/11/2023 1801

## 2023-10-11 NOTE — OP NOTE
Patient Name:  Teresa Bird  MR:  [id number]    ATTENDING SURGEON:  Tonie Dudley MD    ASSISTANT: ROSMERY Lezama    PREOPERATIVE DIAGNOSIS:   Primary degenerative arthritis left knee    POSTOPERATIVE DIAGNOSIS: Primary degenerative arthritis left knee    PROCEDURE: Left total knee arthroplasty    Components: [employer]  Tibia: E  Poly: 10 mm MC  Femur: 6 Narrow  Patella: NA    ANESTHESIA: spinal/general/local    EBL: 10 cc       PROCEDURE:  After the patient was examined in the preoperative holding area, the examination was confirmed to be identical to that noted in the office and the knee was initialed.  The patient was brought to the operating room and anesthetized.  A dose of intravenous antibiotics was administered and the lower extremity was prepped and draped in a sterile fashion.  A mid line skin incision was made and a medial peripatellar arthrotomy was performed.  There was advanced arthritis within the joint.  The menisci and the anterior cruciate ligament were excised.  The proximal tibia was resected at a right angle to the long axis of the tibial shaft.  The distal femur was cut parallel to the tibia, both in extension and flexion, with the anterior, posterior, and chamfer cuts made in external rotation creating a rectangular flexion gap. The soft tissues were balanced and osteophytes removed.  The posterior cruciate ligament was balanced.  With a trial of components in place, the knee was stable.  The patella was not resurfaced, based on whether it showed signs of wear. The patella tracked well without tilt or subluxation.  The bony surfaces were irrigated with pulsatile lavage and dried.  The components were impacted into place.  At the termination of the case, there was full extension and 125 degrees of flexion, excellent stability, and the patella tracked well.  The knee was irrigated, sutured in layers, dry sterile dressing was applied.  The patient was transferred to the  recovery room in stable condition having tolerated the procedure well.  There were no intraoperative complications.    As attending surgeon, I personally performed or supervised the critical components of the procedure.      Tonie Dudley MD

## 2023-10-11 NOTE — ANESTHESIA PROCEDURE NOTES
Spinal Block    Patient location during procedure: pre-op  Start time: 10/11/2023 8:40 AM  End time: 10/11/2023 8:43 AM  Staffing  Performed: anesthesiologist   Anesthesiologist: Arnulfo Kimball DO  Performed by: Arnulfo Kimball DO  Authorized by: Arnulfo Kimball DO    Reason for block: at surgeon's request  Preanesthetic Checklist  Completed: patient identified, surgical consent, pre-op evaluation, timeout performed, IV checked, risks and benefits discussed, monitors and equipment checked and sterile field maintained during procedure  Spinal Block  Patient position: sitting  Prep: ChloraPrep and site prepped and draped  Patient monitoring: heart rate, cardiac monitor, continuous pulse ox and blood pressure  Approach: midline  Location: L3-4  Injection technique: single-shot  Needle  Needle type: Sprotte   Needle gauge: 24 G  Needle length: 3.5 in  Assessment  Sensory level: T10  Events: cerebrospinal fluid and failed spinal  Additional Notes  Procedure well tolerated. Vital signs stable.    Medications Administered -   bupivacaine PF (MARCAINE SPINAL) 0.75% intrathecal injection - intrathecal   1.6 mL - 10/11/2023 8:43:00 AM

## 2023-10-11 NOTE — PROGRESS NOTES
Orthopedic Post Surgical Note    88 year old female s/p left TKA. Denies nausea, vomiting, chest pain, SOB.     Physical Exam:  - dressing clean, dry, intact with ace wrap in place   - sensation intact to light touch  - palpable  Left DP, doppler left PT pulses  - active dorsiflexion, plantarflexion, extensor hallucis longus decreased due to history of bunion surgery.     A/P:   - pain control  - physical therapy/occupational therapy  - DVT prophylaxis> Eliquis 2.5 mg BID x 7 days> home Xarelto 20 mg daily.  - [facility] consult for medical management

## 2023-10-11 NOTE — ANESTHESIA PREPROCEDURE EVALUATION
Relevant Problems   CARDIOVASCULAR   (+) Coronary artery disease involving native coronary artery of native heart without angina pectoris   (+) PAF (paroxysmal atrial fibrillation) (CMS/HCC)   (+) RBBB   (+) SVT (supraventricular tachycardia)      MUSCULOSKELETAL   (+) Primary osteoarthritis of left knee       Anesthesia ROS/MED HX    Anesthesia History    Previous anesthetics  No history of anesthetic complications  Cardiovascular   dyslipidemia  Dysrhythmias and atrial fibrillation   Cardiac clearance reviewed, echocardiogram reviewed and ECG reviewed  Hematological    no thrombocytopenia   anticoagulantsno anemia  GI/Hepatic   GERD  Musculoskeletal   Osteoarthritis  Renal Diseaseno electrolyte problem  Endo/Other  Body Habitus: Normal       Past Surgical History:   Procedure Laterality Date    BUNIONECTOMY Bilateral     CATARACT EXTRACTION, BILATERAL  2006    CHOLECYSTECTOMY      HYSTERECTOMY  12/2013    MOHS SURGERY      SKIN CANCER EXCISION         Physical Exam    Airway   Mallampati: I   TM distance: >3 FB   Neck ROM: full  Cardiovascular - normal   Rhythm: regular   Rate: normalPulmonary - normal   clear to auscultation  Dental    Teeth Problems: upper dentures and partials        Anesthesia Plan    Plan: spinal    Technique: spinal and total IV anesthesia     Lines and Monitors: PIV     Airway: natural airway / supplemental oxygen   3 ASA  Blood Products:   Use of Blood Products Discussed: No   Anesthetic plan and risks discussed with: patient  Induction:    intravenous   Postop Plan:   Pain Management: IV analgesics

## 2023-10-11 NOTE — ASSESSMENT & PLAN NOTE
Currently SR; Heart rate 40s to 50s on telemetry in PACU  Had some runs of pSVT, asx overnight  - Continue metoprolol with hold parameters  - Resume Xarelto ASAP when cleared by Ortho   - Recommend outpatient Cardiology follow-up

## 2023-10-11 NOTE — ASSESSMENT & PLAN NOTE
The patient follows with CT surgery.  She had a recent CT scan and the report suggests stability  - Outpatient follow-up

## 2023-10-11 NOTE — PLAN OF CARE
Problem: Knee Arthroplasty  Goal: Optimal Functional Ability  Outcome: Progressing     Problem: Adult Inpatient Plan of Care  Goal: Plan of Care Review  Outcome: Progressing  Flowsheets (Taken 10/11/2023 7946)  Outcome Evaluation: PT eval completed.  Plan of Care Reviewed With: patient

## 2023-10-11 NOTE — ANESTHESIOLOGIST PRE-PROCEDURE ATTESTATION
Pre-Procedure Patient Identification:  I am the Primary Anesthesiologist and have identified the patient on 10/11/23 at 8:27 AM.   I have confirmed the procedure(s) will be performed by the following surgeon/proceduralist Tonie Dudley MD.

## 2023-10-11 NOTE — CONSULTS
Hospital Medicine Service -  Daily Progress Note       SUBJECTIVE   Interval History: Pt seen and examined in PACU.  Sleepy but easily arousable and answered questions appropriately.  Denied any chest pain or shortness of breath, no palpitations, no dizziness or lightheadedness, no nausea/vomiting.     OBJECTIVE      Vital signs in last 24 hours:  Temp:  [35.8 °C (96.5 °F)-36.2 °C (97.2 °F)] 36.2 °C (97.2 °F)  Heart Rate:  [41-63] 43  Resp:  [0-21] 0  BP: (109-148)/(55-62) 109/55    Intake/Output Summary (Last 24 hours) at 10/11/2023 1143  Last data filed at 10/11/2023 1001  Gross per 24 hour   Intake 600 ml   Output --   Net 600 ml       PHYSICAL EXAMINATION      Physical Exam  Constitutional:       General: She is not in acute distress.     Appearance: She is not toxic-appearing.   HENT:      Head: Normocephalic and atraumatic.   Eyes:      Conjunctiva/sclera: Conjunctivae normal.   Cardiovascular:      Rate and Rhythm: Regular rhythm. Bradycardia present.      Heart sounds: Normal heart sounds.   Pulmonary:      Effort: Pulmonary effort is normal.      Breath sounds: Normal breath sounds.   Abdominal:      General: Bowel sounds are normal. There is no distension.      Palpations: Abdomen is soft.      Tenderness: There is no abdominal tenderness.   Musculoskeletal:      Cervical back: Neck supple.   Skin:     General: Skin is warm and dry.   Neurological:      Mental Status: She is alert and oriented to person, place, and time.   Psychiatric:         Mood and Affect: Mood normal.         Behavior: Behavior normal.            LINES, CATHETERS, DRAINS, AIRWAYS, AND WOUNDS   Lines, Drains, and Airways:  Wounds (agree with documentation and present on admission):  Peripheral IV (Adult) 10/11/23 Posterior;Right Hand (Active)   Number of days: 0       Surgical Incision Knee Left (Active)   Number of days: 0         Comments:      LABS / IMAGING / TELE      Labs  preop labs reviewed      ECG/Telemetry  Sinus  bradycardia on telemetry in PACU    ASSESSMENT AND PLAN      S/P total knee arthroplasty, left  Assessment & Plan  Care, PT/OT per ortho recs  Prn pain control, bowel regimen  DVT prophylaxis  Encourage incentive spirometry  Monitor post op labs    PAF (paroxysmal atrial fibrillation) (CMS/[facility])  Assessment & Plan  Currently and sinus rhythm/sinus bradycardia.  Heart rate 40s to 50s on telemetry in PACU.  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  She had a recent CT scan and the report suggests stability.  .    Mixed hyperlipidemia  Assessment & Plan  On Praluent injections       VTE Assessment: Padua    VTE Prophylaxis:  Current anticoagulants:    None      Code Status: No Order      Estimated Discharge Date: 10/12/2023     Disposition Planning: per orthopedics     Jamaal Gregory MD  10/11/2023

## 2023-10-11 NOTE — ANESTHESIA POSTPROCEDURE EVALUATION
Patient: Teresa Bird    Procedure Summary     Date: 10/11/23 Room / Location: [facility] P[facility] OR 01 / [facility] OR PAV    Anesthesia Start: 0846 Anesthesia Stop: 1028    Procedure: Left Total Knee Arthroplasty (Left: Knee) Diagnosis:       Osteoarthritis of left knee, unspecified osteoarthritis type      (Osteoarthritis of left knee, unspecified osteoarthritis type [M17.12])    Surgeons: Tonie Dudley MD Responsible Provider: Arnulfo Kimball DO    Anesthesia Type: spinal ASA Status: 3          Anesthesia Type: spinal  PACU Vitals  10/11/2023 1025 - 10/11/2023 1125      10/11/2023  1030 10/11/2023  1032 10/11/2023  1035 10/11/2023  1045    BP: 122/58 -- 122/58 112/55    Temp: 35.8 °C (96.5 °F) 35.9 °C (96.6 °F) -- --    Pulse: 45 47 42 52    Resp: 16 16 11 21    SpO2: 94 % -- -- 96 %              10/11/2023  1050 10/11/2023  1054 10/11/2023  1100 10/11/2023  1115    BP: 111/57 111/57 109/55 127/58    Temp: -- -- 36.2 °C (97.2 °F) --    Pulse: 42 41 43 47    Resp: 14 10 0 12    SpO2: -- -- 98 % 99 %            Anesthesia Post Evaluation    Pain score: 2  Pain management: adequate  Mode of pain management: IV medication  Patient location during evaluation: PACU  Patient participation: complete - patient participated  Level of consciousness: awake and alert  Cardiovascular status: acceptable  Airway Patency: adequate  Respiratory status: acceptable  Hydration status: acceptable  Anesthetic complications: no

## 2023-10-12 LAB
ALBUMIN SERPL-MCNC: 3.2 G/DL (ref 3.5–5.7)
ALP SERPL-CCNC: 42 IU/L (ref 34–125)
ALT SERPL-CCNC: 16 IU/L (ref 7–52)
ANION GAP SERPL CALC-SCNC: 7 MEQ/L (ref 3–15)
ANION GAP SERPL CALC-SCNC: 7 MEQ/L (ref 3–15)
AST SERPL-CCNC: 19 IU/L (ref 13–39)
BASOPHILS # BLD: 0.02 K/UL (ref 0.01–0.1)
BASOPHILS NFR BLD: 0.2 %
BILIRUB DIRECT SERPL-MCNC: 0.1 MG/DL
BILIRUB SERPL-MCNC: 0.3 MG/DL (ref 0.3–1.2)
BUN SERPL-MCNC: 16 MG/DL (ref 7–25)
BUN SERPL-MCNC: 17 MG/DL (ref 7–25)
CALCIUM SERPL-MCNC: 7.6 MG/DL (ref 8.6–10.3)
CALCIUM SERPL-MCNC: 7.7 MG/DL (ref 8.6–10.3)
CASE RPRT: NORMAL
CHLORIDE SERPL-SCNC: 107 MEQ/L (ref 98–107)
CHLORIDE SERPL-SCNC: 108 MEQ/L (ref 98–107)
CLINICAL INFO: NORMAL
CO2 SERPL-SCNC: 22 MEQ/L (ref 21–31)
CO2 SERPL-SCNC: 23 MEQ/L (ref 21–31)
CREAT SERPL-MCNC: 0.6 MG/DL (ref 0.6–1.2)
CREAT SERPL-MCNC: 0.6 MG/DL (ref 0.6–1.2)
DIFFERENTIAL METHOD BLD: ABNORMAL
EOSINOPHIL # BLD: 0 K/UL (ref 0.04–0.36)
EOSINOPHIL NFR BLD: 0 %
ERYTHROCYTE [DISTWIDTH] IN BLOOD BY AUTOMATED COUNT: 13.1 % (ref 11.7–14.4)
ERYTHROCYTE [DISTWIDTH] IN BLOOD BY AUTOMATED COUNT: 13.2 % (ref 11.7–14.4)
GFR SERPL CREATININE-BSD FRML MDRD: >60 ML/MIN/1.73M*2
GFR SERPL CREATININE-BSD FRML MDRD: >60 ML/MIN/1.73M*2
GLUCOSE SERPL-MCNC: 136 MG/DL (ref 70–99)
GLUCOSE SERPL-MCNC: 155 MG/DL (ref 70–99)
HCT VFR BLDCO AUTO: 34.6 % (ref 35–45)
HCT VFR BLDCO AUTO: 36.1 % (ref 35–45)
HGB BLD-MCNC: 11.1 G/DL (ref 11.8–15.7)
HGB BLD-MCNC: 11.6 G/DL (ref 11.8–15.7)
IMM GRANULOCYTES # BLD AUTO: 0.03 K/UL (ref 0–0.08)
IMM GRANULOCYTES NFR BLD AUTO: 0.3 %
LYMPHOCYTES # BLD: 0.94 K/UL (ref 1.2–3.5)
LYMPHOCYTES NFR BLD: 9.1 %
MAGNESIUM SERPL-MCNC: 1.7 MG/DL (ref 1.8–2.5)
MAGNESIUM SERPL-MCNC: 2.1 MG/DL (ref 1.8–2.5)
MCH RBC QN AUTO: 31.8 PG (ref 28–33.2)
MCH RBC QN AUTO: 32 PG (ref 28–33.2)
MCHC RBC AUTO-ENTMCNC: 32.1 G/DL (ref 32.2–35.5)
MCHC RBC AUTO-ENTMCNC: 32.1 G/DL (ref 32.2–35.5)
MCV RBC AUTO: 98.9 FL (ref 83–98)
MCV RBC AUTO: 99.7 FL (ref 83–98)
MONOCYTES # BLD: 0.85 K/UL (ref 0.28–0.8)
MONOCYTES NFR BLD: 8.3 %
NEUTROPHILS # BLD: 8.44 K/UL (ref 1.7–7)
NEUTS SEG NFR BLD: 82.1 %
NRBC BLD-RTO: 0 %
PATH REPORT.FINAL DX SPEC: NORMAL
PATH REPORT.GROSS SPEC: NORMAL
PDW BLD AUTO: 10.5 FL (ref 9.4–12.3)
PDW BLD AUTO: 9.9 FL (ref 9.4–12.3)
PLATELET # BLD AUTO: 245 K/UL (ref 150–369)
PLATELET # BLD AUTO: 254 K/UL (ref 150–369)
POTASSIUM SERPL-SCNC: 3.6 MEQ/L (ref 3.5–5.1)
POTASSIUM SERPL-SCNC: 3.9 MEQ/L (ref 3.5–5.1)
PROT SERPL-MCNC: 5.3 G/DL (ref 6–8.2)
RBC # BLD AUTO: 3.47 M/UL (ref 3.93–5.22)
RBC # BLD AUTO: 3.65 M/UL (ref 3.93–5.22)
SODIUM SERPL-SCNC: 136 MEQ/L (ref 136–145)
SODIUM SERPL-SCNC: 138 MEQ/L (ref 136–145)
WBC # BLD AUTO: 10.28 K/UL (ref 3.8–10.5)
WBC # BLD AUTO: 8.93 K/UL (ref 3.8–10.5)

## 2023-10-12 PROCEDURE — 63700000 HC SELF-ADMINISTRABLE DRUG: Performed by: NURSE PRACTITIONER

## 2023-10-12 PROCEDURE — 97535 SELF CARE MNGMENT TRAINING: CPT | Mod: GO,59

## 2023-10-12 PROCEDURE — 85027 COMPLETE CBC AUTOMATED: CPT | Mod: 59 | Performed by: NURSE PRACTITIONER

## 2023-10-12 PROCEDURE — 83735 ASSAY OF MAGNESIUM: CPT | Performed by: HOSPITALIST

## 2023-10-12 PROCEDURE — 25800000 HC PHARMACY IV SOLUTIONS: Performed by: NURSE PRACTITIONER

## 2023-10-12 PROCEDURE — 97166 OT EVAL MOD COMPLEX 45 MIN: CPT | Mod: GO

## 2023-10-12 PROCEDURE — 80053 COMPREHEN METABOLIC PANEL: CPT | Performed by: HOSPITALIST

## 2023-10-12 PROCEDURE — 97150 GROUP THERAPEUTIC PROCEDURES: CPT | Mod: GO

## 2023-10-12 PROCEDURE — 36415 COLL VENOUS BLD VENIPUNCTURE: CPT | Performed by: HOSPITALIST

## 2023-10-12 PROCEDURE — 63600000 HC DRUGS/DETAIL CODE: Mod: JZ | Performed by: NURSE PRACTITIONER

## 2023-10-12 PROCEDURE — 97150 GROUP THERAPEUTIC PROCEDURES: CPT | Mod: GP,CQ

## 2023-10-12 PROCEDURE — 63600000 HC DRUGS/DETAIL CODE: Mod: JZ | Performed by: HOSPITALIST

## 2023-10-12 PROCEDURE — 63700000 HC SELF-ADMINISTRABLE DRUG: Performed by: HOSPITALIST

## 2023-10-12 PROCEDURE — 97116 GAIT TRAINING THERAPY: CPT | Mod: GP,CQ,59

## 2023-10-12 PROCEDURE — 85025 COMPLETE CBC W/AUTO DIFF WBC: CPT | Performed by: HOSPITALIST

## 2023-10-12 PROCEDURE — 99232 SBSQ HOSP IP/OBS MODERATE 35: CPT | Performed by: HOSPITALIST

## 2023-10-12 PROCEDURE — 83735 ASSAY OF MAGNESIUM: CPT | Performed by: NURSE PRACTITIONER

## 2023-10-12 PROCEDURE — 82248 BILIRUBIN DIRECT: CPT | Performed by: HOSPITALIST

## 2023-10-12 RX ORDER — OXYCODONE HYDROCHLORIDE 5 MG/1
5-10 TABLET ORAL EVERY 4 HOURS PRN
Start: 2023-10-12 | End: 2023-11-14 | Stop reason: ALTCHOICE

## 2023-10-12 RX ORDER — DOCUSATE SODIUM 100 MG/1
100 CAPSULE, LIQUID FILLED ORAL 2 TIMES DAILY PRN
Start: 2023-10-12 | End: 2023-11-14 | Stop reason: ALTCHOICE

## 2023-10-12 RX ORDER — ACETAMINOPHEN 500 MG
1000 TABLET ORAL EVERY 8 HOURS PRN
Start: 2023-10-12 | End: 2023-11-11

## 2023-10-12 RX ORDER — NALOXONE HYDROCHLORIDE 4 MG/.1ML
1 SPRAY NASAL ONCE AS NEEDED
Qty: 1 EACH | Refills: 0
Start: 2023-10-12 | End: 2023-10-13 | Stop reason: SDUPTHER

## 2023-10-12 RX ORDER — ALIROCUMAB 150 MG/ML
INJECTION, SOLUTION SUBCUTANEOUS
Start: 2023-10-26 | End: 2024-04-02 | Stop reason: SDUPTHER

## 2023-10-12 RX ORDER — POTASSIUM CHLORIDE 750 MG/1
40 TABLET, EXTENDED RELEASE ORAL ONCE
Status: COMPLETED | OUTPATIENT
Start: 2023-10-12 | End: 2023-10-12

## 2023-10-12 RX ORDER — TRAMADOL HYDROCHLORIDE 50 MG/1
50 TABLET ORAL SEE ADMIN INSTRUCTIONS
Start: 2023-10-12 | End: 2023-11-14 | Stop reason: ALTCHOICE

## 2023-10-12 RX ORDER — DENOSUMAB 60 MG/ML
INJECTION SUBCUTANEOUS
Start: 2023-10-26

## 2023-10-12 RX ADMIN — OXYCODONE HYDROCHLORIDE 5 MG: 5 TABLET ORAL at 22:07

## 2023-10-12 RX ADMIN — DEXAMETHASONE SODIUM PHOSPHATE 8 MG: 4 INJECTION, SOLUTION INTRA-ARTICULAR; INTRALESIONAL; INTRAMUSCULAR; INTRAVENOUS; SOFT TISSUE at 05:26

## 2023-10-12 RX ADMIN — METOPROLOL SUCCINATE 25 MG: 25 TABLET, FILM COATED, EXTENDED RELEASE ORAL at 08:15

## 2023-10-12 RX ADMIN — POTASSIUM CHLORIDE 40 MEQ: 750 TABLET, EXTENDED RELEASE ORAL at 02:48

## 2023-10-12 RX ADMIN — SENNOSIDES AND DOCUSATE SODIUM 1 TABLET: 50; 8.6 TABLET ORAL at 08:14

## 2023-10-12 RX ADMIN — ACETAMINOPHEN 650 MG: 325 TABLET ORAL at 19:26

## 2023-10-12 RX ADMIN — APIXABAN 2.5 MG: 2.5 TABLET, FILM COATED ORAL at 19:26

## 2023-10-12 RX ADMIN — SODIUM CHLORIDE: 9 INJECTION, SOLUTION INTRAVENOUS at 06:29

## 2023-10-12 RX ADMIN — CEFAZOLIN 2 G: 2 INJECTION, POWDER, FOR SOLUTION INTRAMUSCULAR; INTRAVENOUS at 01:46

## 2023-10-12 RX ADMIN — ACETAMINOPHEN 650 MG: 325 TABLET ORAL at 14:35

## 2023-10-12 RX ADMIN — APIXABAN 2.5 MG: 2.5 TABLET, FILM COATED ORAL at 08:14

## 2023-10-12 RX ADMIN — ACETAMINOPHEN 650 MG: 325 TABLET ORAL at 02:48

## 2023-10-12 RX ADMIN — MAGNESIUM SULFATE IN DEXTROSE 1 G: 10 INJECTION, SOLUTION INTRAVENOUS at 03:17

## 2023-10-12 RX ADMIN — OXYCODONE HYDROCHLORIDE 5 MG: 5 TABLET ORAL at 17:49

## 2023-10-12 RX ADMIN — SENNOSIDES AND DOCUSATE SODIUM 1 TABLET: 50; 8.6 TABLET ORAL at 19:26

## 2023-10-12 RX ADMIN — ACETAMINOPHEN 650 MG: 325 TABLET ORAL at 08:14

## 2023-10-12 RX ADMIN — OXYCODONE HYDROCHLORIDE 5 MG: 5 TABLET ORAL at 11:38

## 2023-10-12 RX ADMIN — METOPROLOL SUCCINATE 25 MG: 25 TABLET, FILM COATED, EXTENDED RELEASE ORAL at 17:49

## 2023-10-12 RX ADMIN — OXYCODONE HYDROCHLORIDE 5 MG: 5 TABLET ORAL at 05:26

## 2023-10-12 ASSESSMENT — COGNITIVE AND FUNCTIONAL STATUS - GENERAL
CLIMB 3 TO 5 STEPS WITH RAILING: 4 - NONE
HELP NEEDED FOR PERSONAL GROOMING: 3 - A LITTLE
DRESSING REGULAR LOWER BODY CLOTHING: 4 - NONE
WALKING IN HOSPITAL ROOM: 3 - A LITTLE
HELP NEEDED FOR PERSONAL GROOMING: 4 - NONE
STANDING UP FROM CHAIR USING ARMS: 3 - A LITTLE
TOILETING: 3 - A LITTLE
MOVING TO AND FROM BED TO CHAIR: 4 - NONE
DRESSING REGULAR UPPER BODY CLOTHING: 4 - NONE
EATING MEALS: 4 - NONE
WALKING IN HOSPITAL ROOM: 4 - NONE
EATING MEALS: 4 - NONE
WALKING IN HOSPITAL ROOM: 4 - NONE
AFFECT: WFL
CLIMB 3 TO 5 STEPS WITH RAILING: 4 - NONE
STANDING UP FROM CHAIR USING ARMS: 4 - NONE
HELP NEEDED FOR BATHING: 3 - A LITTLE
MOVING TO AND FROM BED TO CHAIR: 4 - NONE
DRESSING REGULAR UPPER BODY CLOTHING: 3 - A LITTLE
AFFECT: WFL
STANDING UP FROM CHAIR USING ARMS: 4 - NONE
HELP NEEDED FOR BATHING: 3 - A LITTLE
MOVING TO AND FROM BED TO CHAIR: 3 - A LITTLE
CLIMB 3 TO 5 STEPS WITH RAILING: 3 - A LITTLE
AFFECT: WFL
TOILETING: 4 - NONE
DRESSING REGULAR LOWER BODY CLOTHING: 3 - A LITTLE

## 2023-10-12 NOTE — PROGRESS NOTES
Orthopaedic Surgery Progress Note    Interval History  No acute distress. Pain well tolerated.     Nursing notified [facility] overnight of patient having frequent runs of accelerated junctional rhythm 15-20 beats. BMP obtained. Potassium and Magnesium repleted.    Vital Signs:   Vitals:    10/12/23 0251   BP: (!) 95/45   Pulse: 66   Resp: 16   Temp: 36.3 °C (97.4 °F)   SpO2: 97%       CBC Results       10/12/23 09/27/23 03/20/23     0050 1036 0913    WBC 10.28 7.98 6.1    RBC 3.65 4.52 4.48    HGB 11.6 14.3 14.2    HCT 36.1 45.2 41.3    MCV 98.9 100.0 92    MCH 31.8 31.6 31.7    MCHC 32.1 31.6 34.4     248 258          BMP Results       10/12/23 09/27/23 09/12/23     0050 1036 0816     141 --    K 3.6 4.3 --    Cl 108 106 --    CO2 23 31 --    Glucose 155 86 --    BUN 17 19 17    Creatinine 0.6 0.7 0.70    Calcium 7.7 9.3 --    Anion Gap 7 4 --    EGFR >60.0 >60.0 84          Physical Exam:  General:   No acute distress   Symmetric chest rise bilaterally with normal effort     Musculoskeletal:   Left Lower Extremity  Inspection: Surgical dressing in place, clean, dry and intact. Ace wrap  Motor: Flexes and extends ankle and toes  Sensory: Intact to light touch in SPN/DPN/tibial/saphenous/sural distributions   Vascular: Palpable DP, toes perfused    Assessment and Plan  88 y.o. female status post left total knee arthroplasty, doing well    -Follow up repeat BMP  -POD: 1 Day Post-Op   -DVT prophylaxis: eliquis 2.5 mg BID x 7 days  -WBS: Weight bearing as tolerated  -Analgesia  -Physical therapy: rec home pending additional sessions  -[facility] recs appreciated    Momo Landis MD  Orthopaedic Surgery PGY-2

## 2023-10-12 NOTE — PROGRESS NOTES
Occupational Therapy -  Initial Evaluation     Patient: Teresa Bird  Location: [facility] 5PAV 5414  MRN: [id number]  Today's date: 10/12/2023    HISTORY OF PRESENT ILLNESS     Alexandra is a 88 y.o. female admitted on 10/11/2023 with Osteoarthritis of left knee, unspecified osteoarthritis type [M17.12]  S/P total knee replacement, left [Z96.652]. Principal problem is No Principal Problem: There is no principal problem currently on the Problem List. Please update the Problem List and refresh..    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    History of Present Illness   Pt is an 88 year old female s/p left TKA on 10/11/23 ([place]).    PRIOR LEVEL OF FUNCTION AND LIVING ENVIRONMENT     Prior Level of Function    Flowsheet Row Most Recent Value   Dominant Hand left   Ambulation independent   Transferring independent   Toileting independent   Bathing independent   Dressing independent   Eating independent   IADLs independent   Driving/Transportation    Communication understands/communicates without difficulty   Prior Level of Function Comment reports being Independent w/ all fxn'l/ADL tasks w/o AD - PTA   Assistive Device Currently Used at Home none        Prior Living Environment    Flowsheet Row Most Recent Value   People in Home alone   Current Living Arrangements home   Living Environment Comment Lives alone in 1SH - 2STE+1 via garage - FFSU w/ stall shower & grab bars & shower chair        Occupational Profile    Flowsheet Row Most Recent Value   Environmental Supports and Barriers Lives alone, plans to obtain attachable toilet rails        VITALS AND PAIN     OT Vitals     Date/Time Pulse SpO2 Pt Activity O2 Therapy BP BP Location BP Method Pt Position Observations [facility]   10/12/23 0831 100 89 % At rest None (Room air) 108/54 Left upper arm Automatic Lying -- VV   10/12/23 0832 -- 92 % At rest None (Room air) -- -- -- -- with cues for PLB VV   10/12/23 0845 118 88 % Walking None (Room air) -- -- -- -- RN made aware of desat in SPO2 on RA while ambulating VV   10/12/23 0853 106 94 % At rest None (Room air) 127/61 Left upper arm Automatic Sitting Cues for PLB techs VV      OT Pain    Date/Time Pain Type Side/Orientation Location Rating: Rest Rating: Activity Description Interventions [facility]   10/12/23 0831 Pain Assessment;Pain Reassessment left knee 0 - no pain 2 - mild pain aching position adjusted VV           Objective   OBJECTIVE     Start time:  0831  End time:  0854  Session Length: 23 min  Mode of Treatment: occupational therapy, individual therapy    General Observations  Patient received supine, reclined, in bed. She was agreeable to therapy, no issues or concerns identified by nurse prior to session.      Precautions: fall, weight bearing  Extremity Weight-Bearing Status: left lower extremity  Left LE Weight-Bearing Status: weight-bearing as tolerated (WBAT)    Limitations/Impairments: hearing      OT Eval and Treat - 10/12/23 0831        Cognition    Orientation Status oriented x 4     Affect/Mental Status WFL     Follows Commands WFL     Cognitive Function WFL     Comment, Cognition Very pleasant and cooperative. receptive to all provided edu and recs        Vision Assessment/Intervention    Vision Assessment WFL        Hearing Assessment    Hearing Status hearing aid, bilateral     Impact of Hearing Impairment on Functional Status WFL with aides in place        Sensory Assessment    Sensory Assessment sensation intact, upper extremities        Upper Extremity Assessment    UE Assessment ROM and Strength WFL     General Observations grossly assessed with func activity, AROM  "WFL, grossly 3+/5 BUE        Bed Mobility    Bed Mobility Activities supine to sit     Floyd supervision     Safety/Cues increased time to complete     Assistive Device head of bed elevated;bed rails     Comment OOB to L. Use of UE to (A) with advancement of LLE at this time 2* pain and feeling \"heavy\". Otherwise SUP with incr time , denied LH/dizziness upon sitting EOB        Mobility Belt    Mobility Belt Used for All Out of Bed Activity yes        Sit/Stand Transfer    Surface edge of bed;chair with arm rests     [place]oyd supervision     Safety/Cues verbal cues;hand placement;technique     Assistive Device walker, front-wheeled     Transfer Comments From EOB to recliner. Min cues for safe hand placement and kickout method. Good eccentric control        Functional Mobility    Distance hallway     Functional Mobility Floyd supervision     Safety/Cues increased time to complete     Assistive Device walker, front-wheeled     Functional Mobility Comments SUP, no LOB, incr time 2* pain, denied LH/dizziness        Grooming    Tasks washes, rinses and dries hands;brushes/lambert hair;oral care (brushing teeth, cleaning dentures)     Floyd set up;supervision     Position sink side;supported standing     Comment good standing tolerance and balance t/o tasks        Balance    Static Sitting Balance WFL;sitting, edge of bed     Dynamic Sitting Balance WFL;sitting, edge of bed     Sit to Stand Dynamic Balance mild impairment;supported     Static Standing Balance mild impairment;supported     Dynamic Standing Balance mild impairment;supported     Comment, Balance SUP with RW. no overt LOB noted. Limited by LLE pain/ROM/strength        Impairments/Safety Issues    Impairments Affecting Function balance;endurance/activity tolerance;pain;range of motion (ROM);strength                               Education Documentation  Activity, taught by Barbara Willis OT at 10/12/2023  9:10 AM.  Learner: " Patient  Readiness: Acceptance  Method: Explanation  Response: Verbalizes Understanding  Comment: Educated on OT POC, safety and technique during func transfers and mobility using RW, safety with ADLs, WB status, ortho class, and d/c planning        Session Outcome  Patient reclined, in chair, leg(s) elevated at end of session, chair alarm on, all needs met, personal items in reach, call light in reach. Nursing notified about patient's performance, patient's position, patient's response to therapy/activity, and change in vital signs. (RN notified that pt desatted to 88% on RA)    AM-PAC - ADL (Current Function)     Putting on/taking off regular lower body clothing 3 - A Little   Bathing 3 - A Little   Toileting 3 - A Little   Putting on/taking off regular upper body clothing 3 - A Little   Help for taking care of personal grooming 3 - A Little   Eating meals 4 - None   AM-PAC ADL Score 19      ASSESSMENT AND PLAN     Progress Summary  OT eval completed. Pt is a 88 y.o. female s/p L TKA. SUP overall for bed mobility, STS, func mobility w/ RW and standing grooming tasks. Noted to desat to 88% on RA with mobility and tachy to 118bpm, RN Made aware and monitoring/managing. Limited by pain, dcr activity tolerance, balance, LLE ROM/strength. OT to cont w/ POC in acute setting. Rec d/c home w/ (A) pending ortho class progress    Patient/Family Therapy Goal Statement: To return home at d/c    OT Plan    Flowsheet Row Most Recent Value   Rehab Potential good, to achieve stated therapy goals at 10/12/2023 0831   Therapy Frequency evaluation only at 10/12/2023 0831   Planned Therapy Interventions activity tolerance training, adaptive equipment training, BADL retraining, functional balance retraining, occupation/activity based interventions, patient/caregiver education/training, transfer/mobility retraining at 10/12/2023 0831          OT Discharge Recommendations    Flowsheet Row Most Recent Value   OT Recommended Discharge  Disposition home with assistance at 10/12/2023 0831   Anticipated Equipment Needs if Discharged Home (OT) other (see comments)  [plans to obtain attachable toilet rails] at 10/12/2023 0831               OT Goals    Flowsheet Row Most Recent Value   Bed Mobility Goal 1    Activity/Assistive Device bed mobility activities, all at 10/12/2023 0831   [place] modified independence at 10/12/2023 0831   Time Frame 3-5 days at 10/12/2023 0831   Progress/Outcome goal ongoing at 10/12/2023 0831   Transfer Goal 1    Activity/Assistive Device sit-to-stand/stand-to-sit, toilet at 10/12/2023 0831   [place] modified independence at 10/12/2023 0831   Time Frame 3-5 days at 10/12/2023 0831   Progress/Outcome goal ongoing at 10/12/2023 0831   Transfer Goal 2    Activity/Assistive Device shower at 10/12/2023 0831   [place] supervision required at 10/12/2023 0831   Time Frame 3-5 days at 10/12/2023 0831   Progress/Outcome goal ongoing at 10/12/2023 0831   Dressing Goal 1    Activity/Adaptive Equipment lower body dressing at 10/12/2023 0831   [place] modified independence at 10/12/2023 0831   Time Frame 3-5 days at 10/12/2023 0831   Strategies/Barriers LH AE edu at 10/12/2023 0831   Progress/Outcome goal ongoing at 10/12/2023 0831

## 2023-10-12 NOTE — PROGRESS NOTES
Physical Therapy -  Daily Treatment/Progress Note     Patient: Teresa Bird  Location: [address] 5414  MRN: [id number]  Today's date: 10/12/2023    HISTORY OF PRESENT ILLNESS     Alexandra is a 88 y.o. female admitted on 10/11/2023 with Osteoarthritis of left knee, unspecified osteoarthritis type [M17.12]  S/P total knee replacement, left [Z96.652]. Principal problem is No Principal Problem: There is no principal problem currently on the Problem List. Please update the Problem List and refresh..    Past Medical History  Alexandra has a past medical history of A-fib (CMS/[facility]), Arthritis, Closed right ankle fracture (age 62), Endometrial cancer (CMS/HCC) (12/06/2013), GERD (gastroesophageal reflux disease), Lipid disorder, Melanoma (CMS/HCC), Osteoporosis, 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HCC), SVT (supraventricular tachycardia) (08/2021), and Thyroid goiter.    History of Present Illness   Pt is an 88 year old female s/p left TKA on 10/11/23 ([place]).    PRIOR LEVEL OF FUNCTION AND LIVING ENVIRONMENT     Prior Level of Function    Flowsheet Row Most Recent Value   Dominant Hand left   Ambulation independent   Transferring independent   Toileting independent   Bathing independent   Dressing independent   Eating independent   IADLs independent   Driving/Transportation    Communication understands/communicates without difficulty   Prior Level of Function Comment reports being Independent w/ all fxn'l/ADL tasks w/o AD - PTA   Assistive Device Currently Used at Home shower chair, grab bar, other (see comments)  [Recliner chair]        Prior Living Environment    Flowsheet Row Most Recent Value   People in Home alone   Current Living Arrangements home   Home Accessibility stairs to enter home (Group)   Living Environment Comment Lives alone in 1SH - 2STE+1 via garage - FFSU w/ stall shower & grab bars & shower chair        VITALS AND PAIN     PT Vitals    Date/Time Pulse HR Source SpO2 Pt  Activity O2 Therapy BP [facility]   10/12/23 1016 67 Monitor 96 % At rest None (Room air) 111/54 MTC      PT Pain    Date/Time Side/Orientation Location Rating: Rest Rating: Activity Interventions [facility]   10/12/23 1016 left knee 2 5 position adjusted [facility]           Objective   OBJECTIVE     Start time:  1006  End time:  1057  Session Length: 51 min  Mode of Treatment: group therapy, physical therapy    General Observations  Patient received in therapy gym. She was agreeable to therapy, no issues or concerns identified by nurse prior to session. in gym    Precautions: fall, weight bearing  Extremity Weight-Bearing Status: left lower extremity  Left LE Weight-Bearing Status: weight-bearing as tolerated (WBAT)           PT Eval and Treat - 10/12/23 1006        Cognition    Orientation Status oriented x 4     Affect/Mental Status WFL     Follows Commands WFL     Cognitive Function WFL        Lower Extremity Range of Motion    Knee, Left (ROM) AROM 6-81        Mobility Belt    Mobility Belt Used for All Out of Bed Activity no     Reason Mobility Belt Not Used other (see comments)     Reason Mobility Belt Not Used arnel        Sit/Stand Transfer    [place] modified independence     Assistive Device walker, front-wheeled        Car Transfer    Transfer Technique sit-stand     [place] modified independence     Assistive Device walker, front-wheeled        Gait Training    [place], Gait modified independence     Assistive Device walker, front-wheeled     Distance in Feet 125 feet     Pattern step-through     Deviations/Abnormal Patterns antalgic;gait speed decreased;step length decreased;stride length decreased;weight shifting decreased     Advanced Gait Activity curb negotiation        Curb Negotiation    [place] distant supervision     Assistive Device walker, front-wheeled     Comment Vc's needed for proper sequencing with progression of RW, body positioning within walker and step to technique. Verbalized  understanding of techniques educated.        Stairs Training    [place], Stairs distant supervision     Safety/Cues technique;sequencing     Assistive Device cane, straight;railing     Handrail Location (Stairs) right side (ascending)     Number of Stairs 4     Ascending Stairs Technique step-to-step     Descending Stairs Technique step-to-step     Comment Pt req'd cues for management of assistive device and cues for sequencing for BLE's. Recommending Supervision on stairs once d/c'd. Pt's family not present for treatment. Pt educated on how family can provide assistance / supervision for safety. Verbalized understanding of techniques educated.   Pt's family not present for treatment. Pt educated on how family can provide assistance / supervision for safety.        Balance    Static Sitting Balance WFL     Dynamic Sitting Balance WFL     Sit to Stand Dynamic Balance WFL     Static Standing Balance WFL     Dynamic Standing Balance WFL     Comment, Balance No overt LOB's        Lower Extremity (Therapeutic Exercise)    Exercise Position/Type seated     General Exercise bilateral;ankle pumps;quad sets;LAQ (long arc quad);heel slides;gluteal sets     Reps and Sets x10     Comment Pt educated on TE's to perform to increase functional ROM and strength of BLE's.                       10 Meter Walk Test (Self-Selected Velocity)  Trial One: Ten Meter Walk Test (sec): 20 seconds  Trial Two: Ten Meter Walk Test (sec): 19 seconds  Trial One: Gait Speed (m/s): 0.3 m/s  Trial Two: Gait Speed (m/s): 0.32 m/s  Average Gait Speed (m/s): Two Trials: 0.31 m/s          Session Outcome  Patient in therapy gym at end of session, returned to room by therapy aide. Nursing notified about patient's performance.    AM-PAC - Mobility (Current Function)     Turning form your back to your side while in flat bed without using bedrails 4 - None   Moving from lying on your back to sitting on the side of a flat bed without using bedrails 4 -  None   Moving to and from a bed to a chair 4 - None   Standing up from a chair using your arms 4 - None   To walk in a hospital room 4 - None   Climbing 3-5 steps with a railing 4 - None   AM-PAC Mobility Score 24      ASSESSMENT AND PLAN     Progress Summary  Training conducted with negotiation of safe functional mobility techniques which included gait w/ RW and transfer techniques. Pt. demo good carryover with instructions. Also educated in application of surgical precautions with daily activities/mobility. Educated on TE's to assist with recovery following surgery to promote healing and strengthening of BLE's. Pt's family not present for treatment. Pt educated on how family can provide assistance / supervision for safety. Pt cleared PT gym.    Patient/Family Therapy Goals Statement: wants to return directly home    PT Plan    Flowsheet Row Most Recent Value   Rehab Potential good, to achieve stated therapy goals at 10/11/2023 1801   Therapy Frequency daily at 10/11/2023 1801   Planned Therapy Interventions balance training, bed mobility training, gait training, strengthening, transfer training, stair training at 10/11/2023 1801          PT Discharge Recommendations    Flowsheet Row Most Recent Value   PT Recommended Discharge Disposition home with home health at 10/11/2023 1801   Anticipated Equipment Needs if Discharged Home (PT) walker, front-wheeled  [RW issued-verbalizes understanding of proper care/maintenance and use of RW after demonstration/instruction.] at 10/12/2023 1006          Therapy Durable Medical Equipment  Vendor: [employer]  Equipment Provided: Walker, with Wheels, Adult     PT Goals    Flowsheet Row Most Recent Value   Transfer Goal 1    Activity/Assistive Device all transfers at 10/11/2023 1801   [place] modified independence at 10/11/2023 1801   Time Frame by discharge at 10/11/2023 1801   Progress/Outcome goal ongoing at 10/11/2023 1801   Gait Training Goal  1    Activity/Assistive Device gait (walking locomotion), assistive device use at 10/11/2023 1801   [place] modified independence at 10/11/2023 1801   Distance 150 at 10/11/2023 1801   Time Frame by discharge at 10/11/2023 1801   Progress/Outcome goal ongoing at 10/11/2023 1801   ROM Goal 1    ROM Goal 1 L knee (AROM): 0-90* at 10/11/2023 1801   Time Frame by discharge at 10/11/2023 1801   Progress/Outcome goal ongoing at 10/11/2023 1801   Stairs Goal 1    Activity/Assistive Device stairs, all skills at 10/11/2023 1801   [place] modified independence at 10/11/2023 1801   Number of Stairs 5  [including curb negotiation] at 10/11/2023 1801   Time Frame by discharge at 10/11/2023 1801   Progress/Outcome goal ongoing at 10/11/2023 1801

## 2023-10-12 NOTE — PLAN OF CARE
Plan of Care Review  Plan of Care Reviewed With: patient  Progress: improving  Outcome Evaluation: Pt OOB x 1 with RW to bathroom.  Voiding urine without issue.  Pain controlled with ATC tylenol and PRN 5mg oxycodone and ice.  Magnesium and postassium repleted.  SR with occasional runs of AJR on tele.  For PT gym and pt hopeful for dc home today if cleared.

## 2023-10-12 NOTE — PROGRESS NOTES
Hospital Medicine Service -  Daily Progress Note       SUBJECTIVE   Interval History:   Patient reports that her pulse ox was low earlier and she is anxious about that. She denies dyspnea or feeling symptomatic. Encouraged her to do IS- states that she did it once at 630am. She did IS and was able to get to ~750, almost 1000.   She denies HA, dizziness, CP, abdominal pain, N/V.     Update: patient weaned off of supplemental O2. Encouraged IS and OOB/early ambulation. Cautious use of narcotics.      OBJECTIVE      Vital signs in last 24 hours:  Temp:  [36.1 °C (96.9 °F)-36.8 °C (98.3 °F)] 36.8 °C (98.3 °F)  Heart Rate:  [] 67  Resp:  [0-21] 16  BP: ()/(45-75) 111/54    Intake/Output Summary (Last 24 hours) at 10/12/2023 1041  Last data filed at 10/12/2023 0541  Gross per 24 hour   Intake 200 ml   Output 950 ml   Net -750 ml       PHYSICAL EXAMINATION      Physical Exam  Vitals and nursing note reviewed.   Constitutional:       General: She is not in acute distress.     Appearance: Normal appearance. She is not toxic-appearing.   HENT:      Head: Normocephalic and atraumatic.   Eyes:      Conjunctiva/sclera: Conjunctivae normal.   Cardiovascular:      Rate and Rhythm: Normal rate and regular rhythm.      Heart sounds: Normal heart sounds.   Pulmonary:      Effort: Pulmonary effort is normal.      Breath sounds: Normal breath sounds.      Comments: On 2L  Abdominal:      General: Bowel sounds are normal. There is no distension.      Palpations: Abdomen is soft.      Tenderness: There is no abdominal tenderness.   Musculoskeletal:      Cervical back: Neck supple.   Skin:     General: Skin is warm and dry.   Neurological:      General: No focal deficit present.      Mental Status: She is oriented to person, place, and time.   Psychiatric:         Mood and Affect: Mood normal.        LINES, CATHETERS, DRAINS, AIRWAYS, AND WOUNDS   Lines, Drains, and Airways:  Wounds (agree with documentation and present on  admission):  Peripheral IV (Adult) 10/11/23 Posterior;Right Hand (Active)   Number of days: 1       Surgical Incision Knee Left (Active)   Number of days: 1         Comments:      LABS / IMAGING / TELE      Labs  Results from last 7 days   Lab Units 10/12/23  0609 10/12/23  0050   SODIUM mEQ/L 136 138   POTASSIUM mEQ/L 3.9 3.6   CHLORIDE mEQ/L 107 108*   CO2 mEQ/L 22 23   BUN mg/dL 16 17   CREATININE mg/dL 0.6 0.6   CALCIUM mg/dL 7.6* 7.7*   ALBUMIN g/dL  --  3.2*   BILIRUBIN TOTAL mg/dL  --  0.3   ALK PHOS IU/L  --  42   ALT IU/L  --  16   AST IU/L  --  19   GLUCOSE mg/dL 136* 155*     Results from last 7 days   Lab Units 10/12/23  0609 10/12/23  0050   WBC K/uL 8.93 10.28   HEMOGLOBIN g/dL 11.1* 11.6*   HEMATOCRIT % 34.6* 36.1   PLATELETS K/uL 245 254     Microbiology Results     ** No results found for the last 720 hours. **        Above labs have been personally reviewed.     ECG/Telemetry  SR, accelerated junctional rhythm noted    ASSESSMENT AND PLAN      * S/P total knee arthroplasty, left  Assessment & Plan  s/p LTKA on 10/11/23  - Management as per Ortho  - Pain control per Ortho  - DVT ppx per Ortho  - PT/OT  - Encourage IS  - Bowel regimen    PAF (paroxysmal atrial fibrillation) (CMS/[facility])  Assessment & Plan  Currently SR; Heart rate 40s to 50s on telemetry in PACU.  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  She had a recent CT scan and the report suggests stability  - Outpatient follow-up    Mixed hyperlipidemia  Assessment & Plan  - Continue Praluent injections when cleared by Ortho       VTE Assessment: [place]    VTE Prophylaxis:  Current  anticoagulants:  apixaban (ELIQUIS) tablet 2.5 mg, oral, BID  [START ON 10/19/2023] rivaroxaban (XARELTO) tablet 20 mg, oral, Daily with dinner      Code Status: Full Code      Estimated Discharge Date: 10/12/2023       Disposition Planning: as per ETHEL Harvey  10/12/2023

## 2023-10-12 NOTE — PLAN OF CARE
Care Coordination Admission Assessment Note    General Information:  Readmission Within the last 30 days: no previous admission in last 30 days  Does patient have a : No  Patient-Specific Goals (include timeframe): Discharge home    Living Arrangements:  Arrived From: home  Current Living Arrangements: home  People in Home: alone  Home Accessibility: stairs to enter home (Group)  Living Arrangement Comments: Pt lives a house, 1SH, around 3STE to enter. FFSU w/ WIS w/ grab bar and shower chair    Housing Stability and Financial Resources (SDOH):  In the last 12 months, was there a time when you were not able to pay the mortgage or rent on time?: No  In the last 12 months, how many places have you lived?: 1  In the last 12 months, was there a time when you did not have a steady place to sleep or slept in a shelter (including now)?: No  How hard is it for you to pay for the very basics like food, housing, medical care, and heating?: Not hard at all    Functional Status Prior to Admission:   Assistive Device/Animal Currently Used at Home: shower chair, grab bar, other (see comments) (Recliner chair)  Functional Status Comments:    IADL Comments: Pt was independent with I-ADL PTA     Supports and Services:  Current Outpatient/Agency/Support Group: none  Type of Current Home Care Services: Other (Comment) (none)  History of home care episode or rehab stay:      Discharge Needs Assessment:   Concerns to be Addressed: no discharge needs identified  Current Discharge Risk: other (see comments) (none)  Anticipated Changes Related to Illness: none    Patient/Family Anticipated Discharge Plan:  Patient/Family Anticipates Transition To: home  Patient/Family Anticipated Services at Transition: none    Connection to Community  Not applicable      Patient Choice:   Offered/Gave Vendor List: no  Patient's Choice of Community Agency(s):         Anticipated Discharge Plan:  Met with patient. Provided education and  contact information for Care Coordination services.: yes  Anticipated Discharge Disposition: home with assistance     Transportation Needs (SDOH):  Transportation Concerns: none  Transportation Anticipated: family or friend will provide  Is Out of Hospital DNR needed at discharge?: no    In the past 12 months, has lack of transportation kept you from medical appointments or from getting medications?: No  In the past 12 months, has lack of transportation kept you from meetings, work, or from getting things needed for daily living?: No    Concerns - comments:       BROCK met with pt at bedside this morning. SW confirmed pt address, emergency contact, PCP, pharmacy, and insurance. Pt has a AD/LW and does not use O 2. Pt's son will transport her home.     Discharge Plan  Home   Son will transport

## 2023-10-12 NOTE — PROGRESS NOTES
Occupational Therapy -  Daily Treatment/Progress Note     Patient: Teresa Bird  Location: [facility] 5P 5414  MRN: [id number]  Today's date: 10/12/2023    HISTORY OF PRESENT ILLNESS     Alexandra is a 88 y.o. female admitted on 10/11/2023 with Osteoarthritis of left knee, unspecified osteoarthritis type [M17.12]  S/P total knee replacement, left [Z96.652]. Principal problem is No Principal Problem: There is no principal problem currently on the Problem List. Please update the Problem List and refresh..    Past Medical History  Alexandra has a past medical history of A-fib (CMS/HCC), Arthritis, Closed right ankle fracture (age 62), Endometrial cancer (CMS/HCC) (12/06/2013), GERD (gastroesophageal reflux disease), Lipid disorder, Melanoma (CMS/HCC), Osteoporosis, 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HCC), SVT (supraventricular tachycardia) (08/2021), and Thyroid goiter.    History of Present Illness   Pt is an 88 year old female s/p left TKA on 10/11/23 ([place]).    PRIOR LEVEL OF FUNCTION AND LIVING ENVIRONMENT     Prior Level of Function    Flowsheet Row Most Recent Value   Dominant Hand left   Ambulation independent   Transferring independent   Toileting independent   Bathing independent   Dressing independent   Eating independent   IADLs independent   Driving/Transportation    Communication understands/communicates without difficulty   Prior Level of Function Comment reports being Independent w/ all fxn'l/ADL tasks w/o AD - PTA   Assistive Device Currently Used at Home shower chair, grab bar, other (see comments)  [Recliner chair]        Prior Living Environment    Flowsheet Row Most Recent Value   People in Home alone   Current Living Arrangements home   Home Accessibility stairs to enter home (Group)   Living Environment Comment Lives alone in 1SH - 2STE+1 via garage - FFSU w/ stall shower & grab bars & shower chair        Occupational Profile    Flowsheet Row Most Recent Value    Environmental Supports and Barriers Lives alone, plans to obtain attachable toilet rails        VITALS AND PAIN     OT Vitals    Date/Time Pulse HR Source SpO2 Pt Activity O2 Therapy BP [facility]   10/12/23 1016 67 Monitor 96 % At rest None (Room air) 111/54 MTC      OT Pain    Date/Time Side/Orientation Location Rating: Rest Rating: Activity Interventions [facility]   10/12/23 1016 left knee 2 5 position adjusted MTC           Objective   OBJECTIVE     Start time:  1015  End time:  1043  Session Length: 28 min  Mode of Treatment: group therapy, occupational therapy    General Observations  Patient received in therapy gym. She was agreeable to therapy, no issues or concerns identified by nurse prior to session.      Precautions: fall, weight bearing  Extremity Weight-Bearing Status: left lower extremity  Left LE Weight-Bearing Status: weight-bearing as tolerated (WBAT)    Limitations/Impairments: hearing      OT Eval and Treat - 10/12/23 1015        Cognition    Orientation Status oriented x 4     Affect/Mental Status WFL     Follows Commands WFL     Cognitive Function WFL        Bed Mobility    Bed Mobility Activities supine to sit;sit to supine     [place] modified independence     Assistive Device none     Comment pt completed simulated home height bed mobility with flat bed        Mobility Belt    Mobility Belt Used for All Out of Bed Activity no     Reason Mobility Belt Not Used other (see comments)     Reason Mobility Belt Not Used arnel        Sit/Stand Transfer    Surface chair with arm rests     [place] modified independence     Assistive Device walker, front-wheeled        Toilet Transfer    Transfer Technique sit/stand     [place] modified independence     Assistive Device commode, 3-in-1;walker, front-wheeled     Comment Pt provided with demonstration and verbal education on performance of safe toilet transfers, provided with recommendation to use commode over toilet at home to raise seat height and  promote safety. During this education and demonstration, pt instructed to push from window, cabinet sink or grab bar if present at home        Shower/Tub Transfer    Comment Pt provided with verbal instructions and visual demonstration of performance of shower transfer with tub bench and shower chair. Pt recommended to acquire shower chair, and to perform initial transfers with family member or assistance present to ensure safety        Bathing    Comment Pt provided with education on adaptive strategies for bathing like using a shower chair, tub bench and long handle sponge to promote safety and independence. pt verbalized understanding of this education        Lower Body Dressing    Comment Pt provided with demonstration on how to use and acquire LB dressing adaptive equipment to promote safety and independence with ADL tasks. further education was provided including energy conservation and adaptive strategies like dressing affected LE first. pt verbalized understanding of this education.        Toileting    Comment Pt provided with education on adaptive equipment to promote safety with toileting including use of commode to promote safety and independence. pt verbalized understanding of this education        Balance    Static Sitting Balance WFL     Dynamic Sitting Balance WFL     Sit to Stand Dynamic Balance WFL     Static Standing Balance WFL     Dynamic Standing Balance WFL                      Functional Reach Test  Trial One: Functional Reach Test (in): 10 inches  Trial Two: Functional Reach Test (in): 10 inches  Average (in): 10 inches           Session Outcome  Patient in therapy gym at end of session, returned to room by therapy aide. Nursing notified about patient's performance.    AM-PAC - ADL (Current Function)     Putting on/taking off regular lower body clothing 4 - None   Bathing 3 - A Little   Toileting 4 - None   Putting on/taking off regular upper body clothing 4 - None   Help for taking care of  personal grooming 4 - None   Eating meals 4 - None   AM-PAC ADL Score 23      ASSESSMENT AND PLAN     Progress Summary  Pt completed OT group session, demonstrated good safety awareness for functional transfers to toilet, shower and bed. Educated on adaptive ADL strategies and appropriate DME. clear for home from OT services at this time    Patient/Family Therapy Goal Statement: To return home at d/c    OT Plan    Flowsheet Row Most Recent Value   Rehab Potential good, to achieve stated therapy goals at 10/12/2023 0831   Therapy Frequency evaluation only at 10/12/2023 0831   Planned Therapy Interventions activity tolerance training, adaptive equipment training, BADL retraining, functional balance retraining, occupation/activity based interventions, patient/caregiver education/training, transfer/mobility retraining at 10/12/2023 0831          OT Discharge Recommendations    Flowsheet Row Most Recent Value   OT Recommended Discharge Disposition home with assistance at 10/12/2023 0831   Anticipated Equipment Needs if Discharged Home (OT) commode, 3-in-1, walker, front-wheeled at 10/12/2023 1015               OT Goals    Flowsheet Row Most Recent Value   Bed Mobility Goal 1    Activity/Assistive Device bed mobility activities, all at 10/12/2023 0831   [place] modified independence at 10/12/2023 0831   Time Frame 3-5 days at 10/12/2023 0831   Progress/Outcome goal met at 10/12/2023 1015   Transfer Goal 1    Activity/Assistive Device sit-to-stand/stand-to-sit, toilet at 10/12/2023 0831   [place] modified independence at 10/12/2023 0831   Time Frame 3-5 days at 10/12/2023 0831   Progress/Outcome goal met at 10/12/2023 1015   Transfer Goal 2    Activity/Assistive Device shower at 10/12/2023 0831   [place] supervision required at 10/12/2023 0831   Time Frame 3-5 days at 10/12/2023 0831   Progress/Outcome goal met at 10/12/2023 1015   Dressing Goal 1    Activity/Adaptive Equipment lower body dressing at  10/12/2023 0831   [place] modified independence at 10/12/2023 0831   Time Frame 3-5 days at 10/12/2023 0831   Strategies/Barriers LH AE edu at 10/12/2023 0831   Progress/Outcome goal met at 10/12/2023 1015

## 2023-10-12 NOTE — NURSING NOTE
Tele called patient having frequent runs of accelerated junctional rhythm 15-20 beats.  Pt VSS denies CP, palpitations SOB.  Dr. Louis paged awaiting callback.    00:35 Dr. Louis aware, stat bmp with mg, and cbc ordered.  As needed ECG ordered if sustaining.

## 2023-10-12 NOTE — PLAN OF CARE
Plan of Care Review  Plan of Care Reviewed With: patient  Progress: improving  Outcome Evaluation: Pt aaox4. OOb with assistx1 with RW. pain controlled with oxycodone and tylenol. Earlier, pt desat to 88% on ra when ambulating. Pt encouraged to use IS. pt currently 94% on ra. Plan for d/c to home tomorrow.

## 2023-10-12 NOTE — PLAN OF CARE
Problem: Knee Arthroplasty  Goal: Optimal Functional Ability  Outcome: Progressing     Problem: Adult Inpatient Plan of Care  Goal: Plan of Care Review  Outcome: Progressing  Flowsheets (Taken 10/12/2023 0910)  Progress: improving  Outcome Evaluation: OT figueroa completed. Pt is a 88 y.o. female s/p L TKA. SUP overall for bed mobility, STS, func mobility w/ RW and standing grooming tasks. Noted to desat to 88% on RA with mobility and tachy to 118bpm, RN Made aware and monitoring/managing. Limited by pain, dcr activity tolerance, balance, LLE ROM/strength. OT to cont w/ POC in acute setting. Rec d/c home w/ (A) pending ortho class progress  Plan of Care Reviewed With: patient

## 2023-10-13 VITALS
SYSTOLIC BLOOD PRESSURE: 130 MMHG | RESPIRATION RATE: 18 BRPM | HEART RATE: 67 BPM | OXYGEN SATURATION: 92 % | DIASTOLIC BLOOD PRESSURE: 60 MMHG | TEMPERATURE: 97.2 F

## 2023-10-13 LAB
ANION GAP SERPL CALC-SCNC: 8 MEQ/L (ref 3–15)
BUN SERPL-MCNC: 14 MG/DL (ref 7–25)
CALCIUM SERPL-MCNC: 8.1 MG/DL (ref 8.6–10.3)
CHLORIDE SERPL-SCNC: 109 MEQ/L (ref 98–107)
CO2 SERPL-SCNC: 24 MEQ/L (ref 21–31)
CREAT SERPL-MCNC: 0.6 MG/DL (ref 0.6–1.2)
GFR SERPL CREATININE-BSD FRML MDRD: >60 ML/MIN/1.73M*2
GLUCOSE SERPL-MCNC: 87 MG/DL (ref 70–99)
MAGNESIUM SERPL-MCNC: 2.1 MG/DL (ref 1.8–2.5)
POTASSIUM SERPL-SCNC: 3.9 MEQ/L (ref 3.5–5.1)
SODIUM SERPL-SCNC: 141 MEQ/L (ref 136–145)

## 2023-10-13 PROCEDURE — 36415 COLL VENOUS BLD VENIPUNCTURE: CPT | Performed by: FAMILY MEDICINE

## 2023-10-13 PROCEDURE — 83735 ASSAY OF MAGNESIUM: CPT | Performed by: FAMILY MEDICINE

## 2023-10-13 PROCEDURE — 63700000 HC SELF-ADMINISTRABLE DRUG: Performed by: NURSE PRACTITIONER

## 2023-10-13 PROCEDURE — 99232 SBSQ HOSP IP/OBS MODERATE 35: CPT | Performed by: HOSPITALIST

## 2023-10-13 PROCEDURE — 80048 BASIC METABOLIC PNL TOTAL CA: CPT | Performed by: FAMILY MEDICINE

## 2023-10-13 RX ORDER — ACETAMINOPHEN 325 MG/1
650 TABLET ORAL EVERY 4 HOURS PRN
Status: DISCONTINUED | OUTPATIENT
Start: 2023-10-13 | End: 2023-10-13

## 2023-10-13 RX ORDER — NALOXONE HYDROCHLORIDE 4 MG/.1ML
1 SPRAY NASAL ONCE AS NEEDED
Qty: 1 EACH | Refills: 0 | Status: SHIPPED | OUTPATIENT
Start: 2023-10-13 | End: 2023-11-14 | Stop reason: ALTCHOICE

## 2023-10-13 RX ORDER — ACETAMINOPHEN 325 MG/1
650 TABLET ORAL EVERY 4 HOURS PRN
Status: DISCONTINUED | OUTPATIENT
Start: 2023-10-13 | End: 2023-10-13 | Stop reason: HOSPADM

## 2023-10-13 RX ADMIN — METOPROLOL SUCCINATE 25 MG: 25 TABLET, FILM COATED, EXTENDED RELEASE ORAL at 05:41

## 2023-10-13 RX ADMIN — OXYCODONE HYDROCHLORIDE 5 MG: 5 TABLET ORAL at 05:41

## 2023-10-13 RX ADMIN — SENNOSIDES AND DOCUSATE SODIUM 1 TABLET: 50; 8.6 TABLET ORAL at 08:15

## 2023-10-13 RX ADMIN — ACETAMINOPHEN 650 MG: 325 TABLET ORAL at 08:15

## 2023-10-13 RX ADMIN — OXYCODONE HYDROCHLORIDE 5 MG: 5 TABLET ORAL at 11:14

## 2023-10-13 RX ADMIN — POLYETHYLENE GLYCOL 3350 17 G: 17 POWDER, FOR SOLUTION ORAL at 08:15

## 2023-10-13 RX ADMIN — APIXABAN 2.5 MG: 2.5 TABLET, FILM COATED ORAL at 08:15

## 2023-10-13 ASSESSMENT — COGNITIVE AND FUNCTIONAL STATUS - GENERAL
CLIMB 3 TO 5 STEPS WITH RAILING: 3 - A LITTLE
STANDING UP FROM CHAIR USING ARMS: 4 - NONE
WALKING IN HOSPITAL ROOM: 3 - A LITTLE
MOVING TO AND FROM BED TO CHAIR: 4 - NONE

## 2023-10-13 NOTE — ASSESSMENT & PLAN NOTE
Known h/o this; runs of pSVT on tele, asx  Follows w/ Cardiology, Dr. Beck  - Continue BB  - Outpatient follow-up

## 2023-10-13 NOTE — PROGRESS NOTES
Orthopaedic Surgery Progress Note    Interval History  No acute distress. Pain well tolerated. No chest pain or shortness of breath.    Vital Signs:   Vitals:    10/13/23 0541   BP: 113/71   Pulse: 67   Resp:    Temp:    SpO2:        CBC Results       10/12/23 09/27/23 03/20/23     0050 1036 0913    WBC 10.28 7.98 6.1    RBC 3.65 4.52 4.48    HGB 11.6 14.3 14.2    HCT 36.1 45.2 41.3    MCV 98.9 100.0 92    MCH 31.8 31.6 31.7    MCHC 32.1 31.6 34.4     248 258          BMP Results       10/12/23 09/27/23 09/12/23     0050 1036 0816     141 --    K 3.6 4.3 --    Cl 108 106 --    CO2 23 31 --    Glucose 155 86 --    BUN 17 19 17    Creatinine 0.6 0.7 0.70    Calcium 7.7 9.3 --    Anion Gap 7 4 --    EGFR >60.0 >60.0 84          Physical Exam:  General:   No acute distress     Musculoskeletal:   Left Lower Extremity  Inspection: Surgical dressing in place, clean, dry and intact. Ace wrap  Motor: Flexes and extends ankle and toes, limited great toe ROM  Sensory: Intact to light touch in SPN/DPN/tibial/saphenous/sural distributions   Vascular: Palpable DP, toes perfused    Assessment and Plan  88 y.o. female status post left total knee arthroplasty, doing well    -Follow up repeat BMP  -POD: 2 Days Post-Op   -DVT prophylaxis: eliquis 2.5 mg BID x 7 days  -WBS: Weight bearing as tolerated  -Analgesia  -Physical therapy: rec home pending additional sessions  -[facility] recs appreciated    Faustino Servin Jr, MD

## 2023-10-13 NOTE — PLAN OF CARE
Plan of Care Review  Plan of Care Reviewed With: patient  Progress: improving  Outcome Evaluation: pt OOB assist x1 with walker, pain controlled with PRN oxycodone & tylenol, voiding w/o difficulty. Plan to D/C home today.

## 2023-10-13 NOTE — PROGRESS NOTES
Hospital Medicine Service -  Daily Progress Note       SUBJECTIVE   Interval History:   Patient was weaned off of supplemental O2 overnight.   Patient had runs of pSVT on tele- asx. She reports a previous h/o this, her Cardiologist is aware. Recommend outpatient follow-up.   Patient denies HA, dizziness, CP, dyspnea, abdominal pain, N/V.      OBJECTIVE      Vital signs in last 24 hours:  Temp:  [36.2 °C (97.2 °F)-36.7 °C (98 °F)] 36.2 °C (97.2 °F)  Heart Rate:  [63-99] 67  Resp:  [16-18] 18  BP: (100-130)/(53-71) 130/60    Intake/Output Summary (Last 24 hours) at 10/13/2023 1010  Last data filed at 10/12/2023 1224  Gross per 24 hour   Intake --   Output 200 ml   Net -200 ml       PHYSICAL EXAMINATION      Physical Exam  Vitals and nursing note reviewed.   Constitutional:       General: She is not in acute distress.     Appearance: Normal appearance. She is not toxic-appearing.   HENT:      Head: Normocephalic and atraumatic.   Eyes:      Conjunctiva/sclera: Conjunctivae normal.   Cardiovascular:      Rate and Rhythm: Normal rate and regular rhythm.      Heart sounds: Normal heart sounds.   Pulmonary:      Effort: Pulmonary effort is normal. No respiratory distress.      Breath sounds: Normal breath sounds.      Comments: On RA  Abdominal:      General: Bowel sounds are normal. There is no distension.      Palpations: Abdomen is soft.      Tenderness: There is no abdominal tenderness.   Musculoskeletal:      Cervical back: Neck supple.   Skin:     General: Skin is warm and dry.   Neurological:      Mental Status: She is oriented to person, place, and time. Mental status is at baseline.   Psychiatric:         Mood and Affect: Mood normal.        LINES, CATHETERS, DRAINS, AIRWAYS, AND WOUNDS   Lines, Drains, and Airways:  Wounds (agree with documentation and present on admission):  Peripheral IV (Adult) 10/11/23 Posterior;Right Hand (Active)   Number of days: 2       Surgical Incision Knee Left (Active)   Number of  days: 2         Comments:      LABS / IMAGING / TELE      Labs  Results from last 7 days   Lab Units 10/13/23  0604 10/12/23  0609 10/12/23  0050   SODIUM mEQ/L 141 136 138   POTASSIUM mEQ/L 3.9 3.9 3.6   CHLORIDE mEQ/L 109* 107 108*   CO2 mEQ/L 24 22 23   BUN mg/dL 14 16 17   CREATININE mg/dL 0.6 0.6 0.6   CALCIUM mg/dL 8.1* 7.6* 7.7*   ALBUMIN g/dL  --   --  3.2*   BILIRUBIN TOTAL mg/dL  --   --  0.3   ALK PHOS IU/L  --   --  42   ALT IU/L  --   --  16   AST IU/L  --   --  19   GLUCOSE mg/dL 87 136* 155*     Results from last 7 days   Lab Units 10/12/23  0609 10/12/23  0050   WBC K/uL 8.93 10.28   HEMOGLOBIN g/dL 11.1* 11.6*   HEMATOCRIT % 34.6* 36.1   PLATELETS K/uL 245 254     Microbiology Results     ** No results found for the last 720 hours. **        Above labs have been personally reviewed.     ECG/Telemetry  SR, pSVT    ASSESSMENT AND PLAN      * S/P total knee arthroplasty, left  Assessment & Plan  s/p LTKA on 10/11/23  - Management as per Ortho  - Pain control per Ortho  - DVT ppx per Ortho  - PT/OT  - Encourage IS  - Bowel regimen    SVT (supraventricular tachycardia)  Assessment & Plan  Known h/o this; runs of pSVT on tele, asx  Follows w/ Cardiology, Dr. 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  She had a recent CT scan and the report suggests stability  - Outpatient follow-up    Mixed  hyperlipidemia  Assessment & Plan  - Continue Praluent injections when cleared by Ortho         VTE Assessment: [place]    VTE Prophylaxis:  Current anticoagulants:  apixaban (ELIQUIS) tablet 2.5 mg, oral, BID  [START ON 10/19/2023] rivaroxaban (XARELTO) tablet 20 mg, oral, Daily with dinner      Code Status: Full Code      Estimated Discharge Date: 10/13/2023       Disposition Planning: as per Ortho     ETHEL Waite  10/13/2023

## 2023-10-13 NOTE — PLAN OF CARE
Care Coordination Discharge Plan Summary    Admission Assessment Summary    General Information  Readmission Within the last 30 days: no previous admission in last 30 days  Does patient have a : No  Patient-Specific Goals (include timeframe): Discharge home    Living Arrangements  Arrived From: home  Current Living Arrangements: home  People in Home: alone  Home Accessibility: stairs to enter home (Group)  Living Arrangement Comments: Pt lives a house, 1SH, around 3STE to enter. FFSU w/ WIS w/ grab bar and shower chair    Social Determinants of Health - Screenings  Housing Stability  In the last 12 months, was there a time when you were not able to pay the mortgage or rent on time?: No  In the last 12 months, how many places have you lived?: 1  In the last 12 months, was there a time when you did not have a steady place to sleep or slept in a shelter (including now)?: No  Financial Resource Strain  How hard is it for you to pay for the very basics like food, housing, medical care, and heating?: Not hard at all  Transportation Needs  In the past 12 months, has lack of transportation kept you from medical appointments or from getting medications?: No  In the past 12 months, has lack of transportation kept you from meetings, work, or from getting things needed for daily living?: No    Functional Status Prior to Admission  Assistive Device/Animal Currently Used at Home: shower chair, grab bar, other (see comments) (Recliner chair)  Functional Status Comments:    IADL Comments: Pt was independent with I-ADL PTA    Discharge Needs Assessment    Concerns to be Addressed: no discharge needs identified  Current Discharge Risk: other (see comments) (none)  Anticipated Changes Related to Illness: none    Discharge Plan Summary    Patient Choice  Offered/Gave Vendor List: no       Concerns / Comments:      Discharge Plan:  Disposition/Destination: Home  HOme         Connection to Community  Not  applicable    Community Resources:      Discharge Transportation:  Is Out of Hospital DNR needed at Discharge: no  Does patient need discharge transport?   no    Discharge to home today. No dc needs.

## 2023-10-13 NOTE — PLAN OF CARE
Plan of Care Review  Plan of Care Reviewed With: patient  Progress: improving  Outcome Evaluation: AAOx4. OOBx1 w RW. Pain controlled with PRN oxy and ATC tylenol. Voiding in the bathroom. Comfortable overnight. Plan for d/c today.

## 2023-10-30 ENCOUNTER — HOSPITAL ENCOUNTER (OUTPATIENT)
Dept: PHYSICAL THERAPY | Age: 88
Setting detail: THERAPIES SERIES
Discharge: HOME | End: 2023-10-30
Attending: ORTHOPAEDIC SURGERY
Payer: COMMERCIAL

## 2023-10-30 DIAGNOSIS — Z96.652 PRESENCE OF LEFT ARTIFICIAL KNEE JOINT: ICD-10-CM

## 2023-10-30 DIAGNOSIS — M25.562 LEFT KNEE PAIN, UNSPECIFIED CHRONICITY: Primary | ICD-10-CM

## 2023-10-30 PROCEDURE — 97110 THERAPEUTIC EXERCISES: CPT | Mod: GP

## 2023-10-30 PROCEDURE — 97116 GAIT TRAINING THERAPY: CPT | Mod: GP

## 2023-10-30 PROCEDURE — 97161 PT EVAL LOW COMPLEX 20 MIN: CPT | Mod: GP

## 2023-10-30 ASSESSMENT — GAIT ASSESSMENTS
TUG TIME: 20

## 2023-10-30 NOTE — LETTER
Dear DR. Dudley,    Thank you for this referral. Please review the attached notes and plan of care for your approval.  Please contact our department with any questions.     Sincerely,     Rui Del Cid, PT  [address]  [address]  [place] [address]    By co-signing this Plan of Care (POC) you agree to the following:  I have reviewed the the Plan of Care established by the therapist within this document and certify that the services are skilled and medically necessary. I have reviewed the plan and recommend that these services continue to meet the goals stated in this document.    PHYSICIAN SIGNATURE: __________________________________     DATE: ___________________  TIME: _____________           Physical Therapy Plan of Care 10/30/23   Effective from: 10/30/2023  Effective to: 2023    Plan ID: [id number]            Participants as of Finalize on 10/30/2023    Name Type Comments Contact Info    Tonie Dudley MD Surgeon  [phone]    Rui Del Cid, PT Physical Therapist         Last Progress Notes Note     Author: Rui Del Cid, PT Status: Signed Last edited: 10/30/2023 12:00 PM         Physical Therapy Evaluation    [facility] Fax: [phone]    PT EVALUATION FOR OUTPATIENT THERAPY    Patient: Alexandra Bird    MRN: [id number]  : 1935 88 y.o.     Referring Physician: Tonie Dudley MD  Date of Visit: 10/30/2023      Certification Dates:  10/30/23 through 23         Recommended Frequency & Duration:  2 times/week for up to 6 weeks     Diagnosis:   1. Left knee pain, unspecified chronicity    2. Presence of left artificial knee joint        Chief Complaints:   Chief Complaint   Patient presents with    Difficulty Walking    Dec ROM    Pain    Decreased recreational/play activity    Dec Strength       Precautions: no known precautions/restrictions  Precautions additional comments:      Past Medical History:   Past Medical History:   Diagnosis Date    A-fib  (CMS/HCC)     Arthritis     Closed right ankle fracture age 62    Endometrial cancer (CMS/HCC) 12/06/2013    GERD (gastroesophageal reflux disease)     Lipid disorder     difficulty tolerating statins    Melanoma (CMS/HCC)     left shoulder    Osteoporosis     did not tolerate fosamax    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HCC)     gets follow up CT scans regularly    SVT (supraventricular tachycardia) 08/2021    Thyroid goiter        Past Surgical History:   Past Surgical History:   Procedure Laterality Date    BUNIONECTOMY Bilateral     CATARACT EXTRACTION, BILATERAL  2006    CHOLECYSTECTOMY      HYSTERECTOMY  12/2013    MOHS SURGERY      SKIN CANCER EXCISION           LEARNING ASSESSMENT    Assessment completed:  Yes    Learner name:  Alexandra    Learner: Patient    Learning Barriers:  Learning barriers: No Barriers    Preferred Language: English     Needed: No    Education Provided:   Method: Discussion  Readiness: acceptance  Response: Verbalizes understanding      CO-LEARNER ASSESSMENT:    Completed: No          Welcome letter discussed: Yes Patient provided with Welcome Letter, which includes attendance policy. Provided education regarding cancellation and no-show policy. Education regarding the importance of participation and regular attendance to maximize goal attainment.         OBJECTIVE MEASUREMENTS/DATA:    Time In Session:  Start Time: 1200  Stop Time: 1300  Time Calculation (min): 60 min   Assessment and Plan - 10/30/23 1203        Assessment    Plan of Care reviewed and patient/family in agreement Yes     System Pathology/Pathophysiology Noted musculoskeletal;neuromuscular     Functional Limitations in Following Categories (PT Eval) home management;community/leisure     Rehab Potential/Prognosis good, to achieve stated therapy goals     Problem List decreased strength;decreased ROM;impaired balance;pain;impaired motor control     Clinical Assessment Alexandra is an 88 year old female  referred to outpatient physical therapy s/p L TKA 10/11/23. During stay; paroxysmal SVT noted on telemetry and she was instructed to follow with her cardiologist. She presents WBAT with SPC with L antalgic gait. Noted mild-moderate self reported disability per KOS and PSFS scoring. ROM and strength limitations remain as expected post operatively. Noted clean and healing incision. Stair ambulation is limited to step to pattern at present due to mobility/weakness. Her deficits are limiting her ability to perform activities of daily living and recreational activities. She will benefit from skilled physical therapy to address her deficits.     Plan and Recommendations Initiate PT 2x per week. Continue gait and ROM progressions.     Planned Services CPT 46704 Gait training;CPT 06410 Manual therapy;CPT 94386 Neuromuscular Reeducation;CPT 50285 Therapeutic activities;CPT 38658 Therapeutic exercises;CPT 91141 Hot/Cold Packs therapy;CPT 39358 Electrical stimulation UNATTENDED                General Information - 10/30/23 1203        Session Details    Document Type initial evaluation     Mode of Treatment individual therapy        General Information    Onset of Illness/Injury or Date of Surgery 10/11/23     Referring Physician Dr. Dudley     History of present illness/functional impairment Alexandra is an 87 y/o female with PMH of SVT, PAF, CAD, RUTHIE, HLD, chronic hip/knee pain, osteoporosis presenting outpatient PT for TKA 10/11/23. She initially saw [facility] physicians 8-9 yrs ago, receiving injections for her knees that would last her symptoms for 7-8 months per patient. She trialed course of outpatient pre-hab and finally underwent TKA 10/11/23. Alexandra notes returning home 2 days post op. She notes that she has been only having to take tylenol and has been doing well. She endorses continued difficulty with walking. She is able to sleep on her side. She notes stiffness in the morning. Endorses some continued ankle swelling.  Notes doing overall well, although some difficulty noted with SPC use due to coordination.     Existing Precautions/Restrictions no known precautions/restrictions                Pain/Vitals - 10/30/23 1203        Pain Assessment    Currently in pain Yes     Preferred Pain Scale number (Numeric Rating Pain Scale)     Pain Side/Orientation left     Pain Rating (0-10): Pre Activity 2        Pre Activity Vital Signs    Pulse 86     /69     BP Location Left upper arm     BP Method Automatic     Patient Position Sitting        Pain Intervention    Intervention  evaluation/ TE     Post Intervention Comments No increased pain reported end of session                Falls/Food Screening - 10/30/23 1203        Initial Falls Assessment    One or more falls in the last year Yes     How many times 1     Was the patient injured in any fall No        Food Insecurity    Within the past 12 months, you worried that your food would run out before you got the money to buy more. Never true     Within the past 12 months, the food you bought just didn't last and you didn't have money to get more. Never true                PT - 10/30/23 1203        Physical Therapy    Physical Therapy [facility] PT        PT Plan    Frequency of treatment 2 times/week     PT Duration 6 weeks     PT Cert From 10/30/23     PT Cert To 12/11/23     Date PT POC was sent to provider 10/30/23     Signed PT Plan of Care received?  No                   Living Environment    Living Environment - 10/30/23 1203        Living Environment    People in Home alone     Living Environment Comment Lives alone in 1SH - 2STE+1 via garage - FFSU w/ stall shower & grab bars & shower chair        Home Use of Assistive/Adaptive Equipment    Assistive Device/Animal Currently Used at Home shower chair;grab bar;other (see comments)   Recliner chair              PLOF:    Prior Level of Function - 10/30/23 1203        OTHER    Previous level of function  Independent; She attended the gym regularly. She would do the bicycle, and upper body machines, and stretching machines.               Sensory Tests    Sensory Testing - 10/30/23 1549        Sensory Assessment    Sensory Assessment other (see comments)   decreased sensation globally around incision site              Skin    Skin Assessment - 10/30/23 1203        LE Skin    Skin Condition Intact   incision clean and healing well L knee, no redness, or signs of infection              ROM    Range of Motion - 10/30/23 1200        LEFT: Lower Extremity PROM Assessment    Knee Flexion  95     Knee Extension  0        RIGHT: Lower Extremity AROM Assessment    Knee Flexion   135     Knee Extension   2        LEFT: Lower Extremity AROM Assessment    Knee Flexion   87     Knee Extension   -4               MMT    Manual Muscle Tests - 10/30/23 1203        RIGHT: Lower Extremity Manual Muscle Test Assessment    Hip Flexion gross movement (5/5) normal     Hip Extension gross movement (4/5) good     Hip Abduction gross movement (4/5) good     Hip Internal Rotation gross movement (4+/5) good plus     Hip External Rotation gross movement (4+/5) good plus     Knee Flexion strength (5/5) normal     Knee Extension strength (5/5) normal     Ankle Dorsiflexion gross movement (5/5) normal     Ankle Plantarflexion gross movement (5/5) normal     Ankle Inversion gross movement (5/5) normal     Ankle Eversion gross movement (5/5) normal        LEFT: Lower Extremity Manual Muscle Test Assessment    Hip Flexion gross movement (4+/5) good plus     Hip Extension gross movement (4/5) good     Hip Abduction gross movement (4-/5) good minus     Hip Internal Rotation gross movement (5/5) normal     Hip External Rotation gross movement (5/5) normal     Knee Flexion strength (4/5) good   uncomfortable    Knee Extension strength (4-/5) good minus   held full pressure    Ankle Dorsiflexion gross movement (4+/5) good plus     Ankle Plantarflexion gross  movement (4+/5) good plus     Ankle Inversion gross movement (4+/5) good plus     Ankle Eversion gross movement (4+/5) good plus               Palpation    Palpation - 10/30/23 1203        Palpation    LE Palpation  R knee non tender; L knee non tender, decreased sensation global knee near incision        Manual Interventions    Manual technique #1 Patellar mobilizations: L knee hypomobile superiorly, laterally; R knee mobility within functional limits               Transfers     Gait and Mobility    Gait and Mobility - 10/30/23 1203        Gait Training    Gait surface comments L antalgic gait with SPC               Balance/Posture    Balance and Posture - 10/30/23 1203        Balance Assessment    Comment, Balance NBOS EO 30s, tandem L front: 30s, tandem R front: 8s; SLS unable to attain at this time on L leg               Outcome Measures    PT Outcome Measures - 10/30/23 1203        Subjective Outcome Measures    KOOS KOS ADL: 43/80= 54%        Timed Up and Go Test    Trial One: Timed Up and Go Test 20     Trial Two: Timed Up and Go Test 20     Trial Three: Timed Up and Go Test 20     Mean of 3 Trials: Timed Up and Go Test 20     Results, Timed Up and Go Test (Balance) Utilized SPC, L UE for pushup to standing        NEW (2/6/23):  PSFS     PSFS ACTIVITY 1 Dancing     PSFS ANSWER 1 1     PSFS ACTIVITY 2 shopping     PSFS ANSWER 2 1     PSFS ACTIVITY 3 stairs     PSFS ANSWER 3 1     PSFS ACTIVITY 4 prolonged walking     PSFS ANSWER 4 3     PSFS Sum of Activity Scores 6     PSFS Number of Activities 4     PSFS Percent Score 15 %                  Goals       Mutually agreed upon pain goal       Mutually agreed upon pain goal: Alexandra will have no pain with stair ambulation in 6 weeks        PT- Knee Alexandra goals (pt-stated)       Alexandra's goals are to:    Improve tolerance to shopping  Improve stair ambulation  Improve prolonged walking ability        PT- L TKA       Short Term Goals Time Frame Result Comment    Pt will increase L knee ROM >/= 10 degrees into flexion, extension to improve functional mobility 3 weeks     Increase KOS ADL >/=  10% to increase function   3 weeks     Decrease TUG </= 13.5 sec to decrease fall risk 4 weeks     Assess gait speed Asap     Pt will improve PSFS score >/= 20%  3 weeks     Pt will be independent with HEP to increase carryover at home 3 weeks     Pt will be able to walk >/= 1/4 mile without gait deviation  3 weeks       Long Term Goals Time Frame Result Comment   Pt will increase L LE  MMT into flexion, extension, abduction >/= ½ grade 6 weeks     Pt will increase L knee ROM >/= 20 degrees into flexion, extension to improve functional mobility 6 weeks     Increase KOS ADL >/=  20% to increase function   6 weeks     Pt to ambulate stairs step over step with unilateral support and no pain. 6 weeks     Assess gait speed 6 weeks     Pt will improve PSFS score >/= 40%  6 weeks     Pt will be independent with HEP to increase carryover at home 6 weeks     Pt will be able to walk >/= 1/2 mile without gait deviation  6 weeks                      TREATMENT PLAN:    ORTHO PT FLOWSHEET    Diagnosis: L TKA 10/11/23   Precautions: osteoporosis, heart disease:SVT, AFIB, h/o melanoma,      Exercises Current Session Time Completed (Y/N/G)   MODALITIES- HEAT/ICE (13713) TOTAL TIME FOR SESSION Not performed    Heat     Ice     MODALITIES - E-STIM (92875) TOTAL TIME FOR SESSION Not performed    E-stim/H-Wave     THER ACT (74640) TOTAL TIME FOR SESSION Not performed    Assessment  See note     Vitals/pain See note    Education Educated patient on knee anatomy and findings of examination. Discussed HEP and goals for treatment/expected outcomes. The patient verbalizes agreement and understanding.     Body Mechanics/Work Training     GROUP (93592)  Not performed    THER EX (56869) TOTAL TIME FOR SESSION 8-22 Minutes    HEP 10/30/23: quad sets, knee extension mobilization, heel slides, hamstring stretch,  seated heel slides Y   CARDIOVASCULAR      Nu Step     Stationary Bike     Elliptical     Treadmill     STRENGTHENING     Quad sets     Heel slides/HS isos     LAQ/SAQ     Clamshells     Bridges     Squats     Resisted side stepping     FSU     LSU     HR/TR     Hip 3 way     Lunges     TKE               STRETCHING/ROM     Hamstring stretch     Hip flexor stretch     Standing calf stretch          NEURO RE-ED (51287) TOTAL TIME FOR SESSION Not performed    Tandem balance     Anterior weight shifting     Cone taps     Cone step overs     Postural re-education     Taping     GAIT TRAINING (40535) TOTAL TIME FOR SESSION 8-22 Minutes    Stair Reviewed stair training involving step to pattern until symptoms and strength improve. Reviewed goals for progression Y   Gait Reviewed SPC use. Discussed use of walker if feelings of instability/weakness/pain limit gait with cane. The patient verbalizes agreement and understanding.  Y   MANUAL (04210) TOTAL TIME FOR SESSION Not performed    Manual stretching     Mobilization     Y = yes; N = no; G = group      ASSESSMENT:    This 88 y.o. year old female presents to PT with above stated diagnosis. Physical Therapy evaluation reveals decreased strength, decreased ROM, impaired balance, pain, impaired motor control resulting in home management, community/leisure limitations. Teresa Bird will benefit from skilled PT services to address limitation, work towards rehab and patient goals and maximize PLOF of chosen ADLs.     Planned Services: The patient's treatment will include CPT 72767 Gait training, CPT 43906 Manual therapy, CPT 79847 Neuromuscular Reeducation, CPT 77615 Therapeutic activities, CPT 80393 Therapeutic exercises, CPT 85034 Hot/Cold Packs therapy, CPT 08133 Electrical stimulation UNATTENDED, .     Rui Del Cid, PT                         Current Participants as of 10/30/2023    Name Type Comments Contact Info    Tonie Dudley MD Surgeon  [phone]     Signature pending    Rui Del Cid, PT Physical Therapist      Signature pending

## 2023-10-30 NOTE — PROGRESS NOTES
Physical Therapy Evaluation    [facility] Fax: [phone]    PT EVALUATION FOR OUTPATIENT THERAPY    Patient: Alexandra Bird    MRN: [id number]  : 1935 88 y.o.     Referring Physician: Tonie Dudley MD  Date of Visit: 10/30/2023      Certification Dates:  10/30/23 through 23         Recommended Frequency & Duration:  2 times/week for up to 6 weeks     Diagnosis:   1. Left knee pain, unspecified chronicity    2. Presence of left artificial knee joint        Chief Complaints:   Chief Complaint   Patient presents with    Difficulty Walking    Dec ROM    Pain    Decreased recreational/play activity    Dec Strength       Precautions: no known precautions/restrictions  Precautions additional comments:      Past Medical History:   Past Medical History:   Diagnosis Date    A-fib (CMS/HCC)     Arthritis     Closed right ankle fracture age 62    Endometrial cancer (CMS/HCC) 2013    GERD (gastroesophageal reflux disease)     Lipid disorder     difficulty tolerating statins    Melanoma (CMS/HCC)     left shoulder    Osteoporosis     did not tolerate fosamax    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HCC)     gets follow up CT scans regularly    SVT (supraventricular tachycardia) 2021    Thyroid goiter        Past Surgical History:   Past Surgical History:   Procedure Laterality Date    BUNIONECTOMY Bilateral     CATARACT EXTRACTION, BILATERAL  2006    CHOLECYSTECTOMY      HYSTERECTOMY  2013    MOHS SURGERY      SKIN CANCER EXCISION           LEARNING ASSESSMENT    Assessment completed:  Yes    Learner name:  Alexandra    Learner: Patient    Learning Barriers:  Learning barriers: No Barriers    Preferred Language: English     Needed: No    Education Provided:   Method: Discussion  Readiness: acceptance  Response: Verbalizes understanding      CO-LEARNER ASSESSMENT:    Completed: No          Welcome letter discussed: Yes Patient provided with Welcome Letter, which includes  attendance policy. Provided education regarding cancellation and no-show policy. Education regarding the importance of participation and regular attendance to maximize goal attainment.         OBJECTIVE MEASUREMENTS/DATA:    Time In Session:  Start Time: 1200  Stop Time: 1300  Time Calculation (min): 60 min   Assessment and Plan - 10/30/23 1203        Assessment    Plan of Care reviewed and patient/family in agreement Yes     System Pathology/Pathophysiology Noted musculoskeletal;neuromuscular     Functional Limitations in Following Categories (PT Eval) home management;community/leisure     Rehab Potential/Prognosis good, to achieve stated therapy goals     Problem List decreased strength;decreased ROM;impaired balance;pain;impaired motor control     Clinical Assessment Alexandra is an 88 year old female referred to outpatient physical therapy s/p L TKA 10/11/23. During stay; paroxysmal SVT noted on telemetry and she was instructed to follow with her cardiologist. She presents WBAT with SPC with L antalgic gait. Noted mild-moderate self reported disability per KOS and PSFS scoring. ROM and strength limitations remain as expected post operatively. Noted clean and healing incision. Stair ambulation is limited to step to pattern at present due to mobility/weakness. Her deficits are limiting her ability to perform activities of daily living and recreational activities. She will benefit from skilled physical therapy to address her deficits.     Plan and Recommendations Initiate PT 2x per week. Continue gait and ROM progressions.     Planned Services CPT 15125 Gait training;CPT 59305 Manual therapy;CPT 10500 Neuromuscular Reeducation;CPT 71248 Therapeutic activities;CPT 23162 Therapeutic exercises;CPT 55271 Hot/Cold Packs therapy;CPT 58495 Electrical stimulation UNATTENDED                General Information - 10/30/23 1203        Session Details    Document Type initial evaluation     Mode of Treatment individual therapy         General Information    Onset of Illness/Injury or Date of Surgery 10/11/23     Referring Physician Dr. Dudley     History of present illness/functional impairment Alexandra is an 87 y/o female with PMH of SVT, PAF, CAD, RUTHIE, HLD, chronic hip/knee pain, osteoporosis presenting outpatient PT for TKA 10/11/23. She initially saw [facility] physicians 8-9 yrs ago, receiving injections for her knees that would last her symptoms for 7-8 months per patient. She trialed course of outpatient pre-hab and finally underwent TKA 10/11/23. Alexandra notes returning home 2 days post op. She notes that she has been only having to take tylenol and has been doing well. She endorses continued difficulty with walking. She is able to sleep on her side. She notes stiffness in the morning. Endorses some continued ankle swelling. Notes doing overall well, although some difficulty noted with SPC use due to coordination.     Existing Precautions/Restrictions no known precautions/restrictions                Pain/Vitals - 10/30/23 1203        Pain Assessment    Currently in pain Yes     Preferred Pain Scale number (Numeric Rating Pain Scale)     Pain Side/Orientation left     Pain Rating (0-10): Pre Activity 2        Pre Activity Vital Signs    Pulse 86     /69     BP Location Left upper arm     BP Method Automatic     Patient Position Sitting        Pain Intervention    Intervention  evaluation/ TE     Post Intervention Comments No increased pain reported end of session                Falls/Food Screening - 10/30/23 1203        Initial Falls Assessment    One or more falls in the last year Yes     How many times 1     Was the patient injured in any fall No        Food Insecurity    Within the past 12 months, you worried that your food would run out before you got the money to buy more. Never true     Within the past 12 months, the food you bought just didn't last and you didn't have money to get more. Never true                PT - 10/30/23  1203        Physical Therapy    Physical Therapy [facility] PT        PT Plan    Frequency of treatment 2 times/week     PT Duration 6 weeks     PT Cert From 10/30/23     PT Cert To 12/11/23     Date PT POC was sent to provider 10/30/23     Signed PT Plan of Care received?  No                   Living Environment    Living Environment - 10/30/23 1203        Living Environment    People in Home alone     Living Environment Comment Lives alone in 1SH - 2STE+1 via garage - FFSU w/ stall shower & grab bars & shower chair        Home Use of Assistive/Adaptive Equipment    Assistive Device/Animal Currently Used at Home shower chair;grab bar;other (see comments)   Recliner chair              PLOF:    Prior Level of Function - 10/30/23 1203        OTHER    Previous level of function Independent; She attended the gym regularly. She would do the bicycle, and upper body machines, and stretching machines.               Sensory Tests    Sensory Testing - 10/30/23 1549        Sensory Assessment    Sensory Assessment other (see comments)   decreased sensation globally around incision site              Skin    Skin Assessment - 10/30/23 1203        LE Skin    Skin Condition Intact   incision clean and healing well L knee, no redness, or signs of infection              ROM    Range of Motion - 10/30/23 1200        LEFT: Lower Extremity PROM Assessment    Knee Flexion  95     Knee Extension  0        RIGHT: Lower Extremity AROM Assessment    Knee Flexion   135     Knee Extension   2        LEFT: Lower Extremity AROM Assessment    Knee Flexion   87     Knee Extension   -4               MMT    Manual Muscle Tests - 10/30/23 1203        RIGHT: Lower Extremity Manual Muscle Test Assessment    Hip Flexion gross movement (5/5) normal     Hip Extension gross movement (4/5) good     Hip Abduction gross movement (4/5) good     Hip Internal Rotation gross movement (4+/5) good plus     Hip External Rotation gross movement (4+/5)  good plus     Knee Flexion strength (5/5) normal     Knee Extension strength (5/5) normal     Ankle Dorsiflexion gross movement (5/5) normal     Ankle Plantarflexion gross movement (5/5) normal     Ankle Inversion gross movement (5/5) normal     Ankle Eversion gross movement (5/5) normal        LEFT: Lower Extremity Manual Muscle Test Assessment    Hip Flexion gross movement (4+/5) good plus     Hip Extension gross movement (4/5) good     Hip Abduction gross movement (4-/5) good minus     Hip Internal Rotation gross movement (5/5) normal     Hip External Rotation gross movement (5/5) normal     Knee Flexion strength (4/5) good   uncomfortable    Knee Extension strength (4-/5) good minus   held full pressure    Ankle Dorsiflexion gross movement (4+/5) good plus     Ankle Plantarflexion gross movement (4+/5) good plus     Ankle Inversion gross movement (4+/5) good plus     Ankle Eversion gross movement (4+/5) good plus               Palpation    Palpation - 10/30/23 1203        Palpation    LE Palpation  R knee non tender; L knee non tender, decreased sensation global knee near incision        Manual Interventions    Manual technique #1 Patellar mobilizations: L knee hypomobile superiorly, laterally; R knee mobility within functional limits               Transfers     Gait and Mobility    Gait and Mobility - 10/30/23 1203        Gait Training    Gait surface comments L antalgic gait with SPC               Balance/Posture    Balance and Posture - 10/30/23 1203        Balance Assessment    Comment, Balance NBOS EO 30s, tandem L front: 30s, tandem R front: 8s; SLS unable to attain at this time on L leg               Outcome Measures    PT Outcome Measures - 10/30/23 1203        Subjective Outcome Measures    KOOS KOS ADL: 43/80= 54%        Timed Up and Go Test    Trial One: Timed Up and Go Test 20     Trial Two: Timed Up and Go Test 20     Trial Three: Timed Up and Go Test 20     Mean of 3 Trials: Timed Up and Go Test 20      Results, Timed Up and Go Test (Balance) Utilized SPC, L UE for pushup to standing        NEW (2/6/23):  PSFS     PSFS ACTIVITY 1 Dancing     PSFS ANSWER 1 1     PSFS ACTIVITY 2 shopping     PSFS ANSWER 2 1     PSFS ACTIVITY 3 stairs     PSFS ANSWER 3 1     PSFS ACTIVITY 4 prolonged walking     PSFS ANSWER 4 3     PSFS Sum of Activity Scores 6     PSFS Number of Activities 4     PSFS Percent Score 15 %                  Goals       Mutually agreed upon pain goal       Mutually agreed upon pain goal: Alexandra will have no pain with stair ambulation in 6 weeks        PT- Knee Alexandra goals (pt-stated)       Alexandra's goals are to:    Improve tolerance to shopping  Improve stair ambulation  Improve prolonged walking ability        PT- L TKA       Short Term Goals Time Frame Result Comment   Pt will increase L knee ROM >/= 10 degrees into flexion, extension to improve functional mobility 3 weeks     Increase KOS ADL >/=  10% to increase function   3 weeks     Decrease TUG </= 13.5 sec to decrease fall risk 4 weeks     Assess gait speed Asap     Pt will improve PSFS score >/= 20%  3 weeks     Pt will be independent with HEP to increase carryover at home 3 weeks     Pt will be able to walk >/= 1/4 mile without gait deviation  3 weeks       Long Term Goals Time Frame Result Comment   Pt will increase L LE  MMT into flexion, extension, abduction >/= ½ grade 6 weeks     Pt will increase L knee ROM >/= 20 degrees into flexion, extension to improve functional mobility 6 weeks     Increase KOS ADL >/=  20% to increase function   6 weeks     Pt to ambulate stairs step over step with unilateral support and no pain. 6 weeks     Assess gait speed 6 weeks     Pt will improve PSFS score >/= 40%  6 weeks     Pt will be independent with HEP to increase carryover at home 6 weeks     Pt will be able to walk >/= 1/2 mile without gait deviation  6 weeks                      TREATMENT PLAN:    ORTHO PT FLOWSHEET    Diagnosis: L TKA  10/11/23   Precautions: osteoporosis, heart disease:SVT, AFIB, h/o melanoma,      Exercises Current Session Time Completed (Y/N/G)   MODALITIES- HEAT/ICE (50199) TOTAL TIME FOR SESSION Not performed    Heat     Ice     MODALITIES - E-STIM (79037) TOTAL TIME FOR SESSION Not performed    E-stim/H-Wave     THER ACT (66264) TOTAL TIME FOR SESSION Not performed    Assessment  See note     Vitals/pain See note    Education Educated patient on knee anatomy and findings of examination. Discussed HEP and goals for treatment/expected outcomes. The patient verbalizes agreement and understanding.     Body Mechanics/Work Training     GROUP (68361)  Not performed    THER EX (84522) TOTAL TIME FOR SESSION 8-22 Minutes    HEP 10/30/23: quad sets, knee extension mobilization, heel slides, hamstring stretch, seated heel slides Y   CARDIOVASCULAR      Nu Step     Stationary Bike     Elliptical     Treadmill     STRENGTHENING     Quad sets     Heel slides/HS isos     LAQ/SAQ     Clamshells     Bridges     Squats     Resisted side stepping     FSU     LSU     HR/TR     Hip 3 way     Lunges     TKE               STRETCHING/ROM     Hamstring stretch     Hip flexor stretch     Standing calf stretch          NEURO RE-ED (78440) TOTAL TIME FOR SESSION Not performed    Tandem balance     Anterior weight shifting     Cone taps     Cone step overs     Postural re-education     Taping     GAIT TRAINING (60067) TOTAL TIME FOR SESSION 8-22 Minutes    Stair Reviewed stair training involving step to pattern until symptoms and strength improve. Reviewed goals for progression Y   Gait Reviewed SPC use. Discussed use of walker if feelings of instability/weakness/pain limit gait with cane. The patient verbalizes agreement and understanding.  Y   MANUAL (84488) TOTAL TIME FOR SESSION Not performed    Manual stretching     Mobilization     Y = yes; N = no; G = group      ASSESSMENT:    This 88 y.o. year old female presents to PT with above stated  diagnosis. Physical Therapy evaluation reveals decreased strength, decreased ROM, impaired balance, pain, impaired motor control resulting in home management, community/leisure limitations. Teersa Bird will benefit from skilled PT services to address limitation, work towards rehab and patient goals and maximize PLOF of chosen ADLs.     Planned Services: The patient's treatment will include CPT 37952 Gait training, CPT 95945 Manual therapy, CPT 74524 Neuromuscular Reeducation, CPT 43052 Therapeutic activities, CPT 16290 Therapeutic exercises, CPT 56274 Hot/Cold Packs therapy, CPT 21618 Electrical stimulation UNATTENDED, .     Rui Del Cid, PT

## 2023-10-30 NOTE — PATIENT INSTRUCTIONS
QUAD SET WITH TOWEL UNDER HEEL    While lying or sitting with a small towel roll under your ankle, tighten your top thigh muscle to press the back of your knee downward towards the ground.    Video # [id number] Repeat 10 Times   Hold 10 Seconds   Complete 1 Set   Perform 3 Times a Day          APT self end range knee extension mobilization    Sit in a chair with your leg straight out in front of you and your heel resting on the floor. Place your hand above your knee and push down towards the ground.    Video # [id number] Repeat 50 Times   Complete 4 Sets   Perform 1 Times a Day          Hamstring Stretch    Sitting up tall in chair, prop leg up on stool with leg straight and toes up toward ceiling. Hinge forward at waist keeping chest up tall until stretch is felt in back of leg (hamstring). Repeat 4 Times   Hold 30 Seconds   Complete 1 Set   Perform 3 Times a Day          SUPINE HEEL SLIDES - STRETCH    While lying on your back place a belt, towel, strap or bed sheet around your foot and start by pulling with your arms to bend your knee into a bent position until a gentle stretch is felt and hold this position.    Then allow your knee to straighten back out to starting position and repeat.    Video # [id number] Repeat 10 Times   Hold 10 Seconds   Complete 1 Set   Perform 3 Times a Day          SEATED HEEL SLIDES    While in a seated position and foot forward and rested on the floor, slowly slide your foot closer towards you.    Hold a gentle stretch and then return foot forward to original position.    Video # [id number] Repeat 10 Times   Hold 5 Seconds   Complete 1 Set   Perform 3 Times a Day

## 2023-10-30 NOTE — OP PT TREATMENT LOG
ORTHO PT FLOWSHEET    Diagnosis: L TKA 10/11/23   Precautions: osteoporosis, heart disease:SVT, AFIB, h/o melanoma,      Exercises Current Session Time Completed (Y/N/G)   MODALITIES- HEAT/ICE (82801) TOTAL TIME FOR SESSION Not performed    Heat     Ice     MODALITIES - E-STIM (52805) TOTAL TIME FOR SESSION Not performed    E-stim/H-Wave     THER ACT (39544) TOTAL TIME FOR SESSION Not performed    Assessment  See note     Vitals/pain See note    Education Educated patient on knee anatomy and findings of examination. Discussed HEP and goals for treatment/expected outcomes. The patient verbalizes agreement and understanding.     Body Mechanics/Work Training     GROUP (21940)  Not performed    THER EX (04188) TOTAL TIME FOR SESSION 8-22 Minutes    HEP 10/30/23: quad sets, knee extension mobilization, heel slides, hamstring stretch, seated heel slides Y   CARDIOVASCULAR      Nu Step     Stationary Bike     Elliptical     Treadmill     STRENGTHENING     Quad sets     Heel slides/HS isos     LAQ/SAQ     Clamshells     Bridges     Squats     Resisted side stepping     FSU     LSU     HR/TR     Hip 3 way     Lunges     TKE               STRETCHING/ROM     Hamstring stretch     Hip flexor stretch     Standing calf stretch          NEURO RE-ED (83428) TOTAL TIME FOR SESSION Not performed    Tandem balance     Anterior weight shifting     Cone taps     Cone step overs     Postural re-education     Taping     GAIT TRAINING (01305) TOTAL TIME FOR SESSION 8-22 Minutes    Stair Reviewed stair training involving step to pattern until symptoms and strength improve. Reviewed goals for progression Y   Gait Reviewed SPC use. Discussed use of walker if feelings of instability/weakness/pain limit gait with cane. The patient verbalizes agreement and understanding.  Y   MANUAL (06663) TOTAL TIME FOR SESSION Not performed    Manual stretching     Mobilization     Y = yes; N = no; G = group

## 2023-11-06 ENCOUNTER — HOSPITAL ENCOUNTER (OUTPATIENT)
Dept: PHYSICAL THERAPY | Age: 88
Setting detail: THERAPIES SERIES
Discharge: HOME | End: 2023-11-06
Attending: ORTHOPAEDIC SURGERY
Payer: COMMERCIAL

## 2023-11-06 DIAGNOSIS — M25.562 LEFT KNEE PAIN, UNSPECIFIED CHRONICITY: ICD-10-CM

## 2023-11-06 DIAGNOSIS — Z96.652 PRESENCE OF LEFT ARTIFICIAL KNEE JOINT: Primary | ICD-10-CM

## 2023-11-06 PROCEDURE — 97140 MANUAL THERAPY 1/> REGIONS: CPT | Mod: GP

## 2023-11-06 PROCEDURE — 97112 NEUROMUSCULAR REEDUCATION: CPT | Mod: GP

## 2023-11-06 PROCEDURE — 97110 THERAPEUTIC EXERCISES: CPT | Mod: GP

## 2023-11-06 NOTE — PROGRESS NOTES
Physical Therapy Visit    PT DAILY NOTE FOR OUTPATIENT THERAPY    Patient: Alexandra Bird MRN: [id number]  : 1935 88 y.o.  Referring Physician: Tonie Dudley MD  Date of Visit: 2023    Certification Dates: 10/30/23 through 23    Diagnosis:   1. Presence of left artificial knee joint    2. Left knee pain, unspecified chronicity        Chief Complaints:  pain, decreased ROM and strength    Precautions:   Existing Precautions/Restrictions: no known precautions/restrictions      TODAY'S VISIT    Time In Session:  Start Time: 1202  Stop Time: 1300  Time Calculation (min): 58 min   History/Vitals/Pain/Encounter Info - 23 1201        Injury History/Precautions/Daily Required Info    Document Type daily treatment     Primary Therapist Rui     Chief Complaint/Reason for Visit  pain, decreased ROM and strength     Onset of Illness/Injury or Date of Surgery 10/11/23     Referring Physician Dr. Dudley     Existing Precautions/Restrictions no known precautions/restrictions     History of present illness/functional impairment Alexandra is an 89 y/o female with PMH of SVT, PAF, CAD, RUTHIE, HLD, chronic hip/knee pain, osteoporosis presenting outpatient PT for TKA 10/11/23. She initially saw [facility] physicians 8-9 yrs ago, receiving injections for her knees that would last her symptoms for 7-8 months per patient. She trialed course of outpatient pre-hab and finally underwent TKA 10/11/23. Alexandra notes returning home 2 days post op. She notes that she has been only having to take tylenol and has been doing well. She endorses continued difficulty with walking. She is able to sleep on her side. She notes stiffness in the morning. Endorses some continued ankle swelling. Notes doing overall well, although some difficulty noted with SPC use due to coordination.     Patient/Family/Caregiver Comments/Observations Pt notes doing well. She has no new complaints reported.     Patient reported fall since last visit No         Pain Assessment    Currently in pain Yes     Preferred Pain Scale number (Numeric Rating Pain Scale)     Pain Side/Orientation left     Pain Rating (0-10): Pre Activity 1        Pain Intervention    Intervention  ROM, strengthening interventions     Post Intervention Comments No increased pain reported end of session, however increased stiffness noted        Pre Activity Vital Signs    Pulse 73     /67     BP Location Left upper arm     BP Method Automatic     Patient Position Sitting                Daily Treatment Assessment and Plan - 11/06/23 1201        Daily Treatment Assessment and Plan    Progress toward goals Progressing     Daily Outcome Summary Alexandra presents with low pain levels, she continues with limited knee range of motion. Initiated rocking on recumbent bike to improve knee flexion ROM. She remains with L antalgic gait pattern with SPC, and is limited with stair ambulation due to continued weakness, mobility deficits and pain. Noted passive knee flexion to 103 this session, with active assisted to 95 degrees. Educated on increased duration for knee flexion interventions to improve motion.     Plan and Recommendations Continue L knee flexion/extension ROM and strengthenining. Work on functional weightbearing activity and balance.                     OBJECTIVE DATA TAKEN TODAY:    None taken    Today's Treatment:    ORTHO PT FLOWSHEET    Diagnosis: L TKA 10/11/23   Precautions: osteoporosis, heart disease:SVT, AFIB, h/o melanoma,      Exercises Current Session Time Completed (Y/N/G)   MODALITIES- HEAT/ICE (28611) TOTAL TIME FOR SESSION Not performed    Heat     Ice     MODALITIES - E-STIM (49657) TOTAL TIME FOR SESSION Not performed    E-stim/H-Wave     THER ACT (83192) TOTAL TIME FOR SESSION Not performed    Assessment  See note     Vitals/pain See note Y   Education Educated patient on knee anatomy and findings of examination. Discussed HEP and goals for treatment/expected outcomes. The  "patient verbalizes agreement and understanding.     Body Mechanics/Work Training     GROUP (40359)  Not performed    THER EX (44377) TOTAL TIME FOR SESSION 23-37 Minutes    HEP 10/30/23: quad sets, knee extension mobilization, heel slides, hamstring stretch, seated heel slides Y   CARDIOVASCULAR      Nu Step     Stationary Bike Seat 9; 8 min Y   Elliptical     Treadmill     STRENGTHENING     Quad sets 10 x 10s Y   Heel slides/HS isos 10 x 10s Y   LAQ  SAQ 2 x 10; 3#  2 x 10; 3# Y  Y   Clamshells     Bridges 2 x 10; 3s Y   Squats     Resisted side stepping     FSU 2 x 10 Y   LSU     HR/TR     Hip 3 way     Lunges     TKE               STRETCHING/ROM     Hamstring stretch     Hip flexor stretch     Knee flexion stretch step 5 x 10s Y   Standing calf stretch 3 x 30s Y         NEURO RE-ED (49933) TOTAL TIME FOR SESSION 8-22 Minutes    Tandem balance 20s x 2 each Y   Anterior weight shifting 20x  Y   Cone taps 2 x 10; 6\" cone with black block on ground Y   Cone step overs     Postural re-education     Taping     GAIT TRAINING (79920) TOTAL TIME FOR SESSION Not performed    Stair Reviewed stair training involving step to pattern until symptoms and strength improve. Reviewed goals for progression    Gait Reviewed SPC use. Discussed use of walker if feelings of instability/weakness/pain limit gait with cane. The patient verbalizes agreement and understanding.     MANUAL (43652) TOTAL TIME FOR SESSION 8-22 Minutes    Manual stretching Hip flexor stretch/rectus fem stretch 3 x 30s with stabilization Y   Mobilization Patellar superior glides, inferior glides, bouts of 15s   Knee flexion mobilizations: bouts of 15s to tolerance in supine Y  Y   Y = yes; N = no; G = group                         "

## 2023-11-06 NOTE — OP PT TREATMENT LOG
"ORTHO PT FLOWSHEET    Diagnosis: L TKA 10/11/23   Precautions: osteoporosis, heart disease:SVT, AFIB, h/o melanoma,      Exercises Current Session Time Completed (Y/N/G)   MODALITIES- HEAT/ICE (42322) TOTAL TIME FOR SESSION Not performed    Heat     Ice     MODALITIES - E-STIM (40729) TOTAL TIME FOR SESSION Not performed    E-stim/H-Wave     THER ACT (60004) TOTAL TIME FOR SESSION Not performed    Assessment  See note     Vitals/pain See note Y   Education Educated patient on knee anatomy and findings of examination. Discussed HEP and goals for treatment/expected outcomes. The patient verbalizes agreement and understanding.     Body Mechanics/Work Training     GROUP (35520)  Not performed    THER EX (29490) TOTAL TIME FOR SESSION 23-37 Minutes    HEP 10/30/23: quad sets, knee extension mobilization, heel slides, hamstring stretch, seated heel slides Y   CARDIOVASCULAR      Nu Step     Stationary Bike Seat 9; 8 min Y   Elliptical     Treadmill     STRENGTHENING     Quad sets 10 x 10s Y   Heel slides/HS isos 10 x 10s Y   LAQ  SAQ 2 x 10; 3#  2 x 10; 3# Y  Y   Clamshells     Bridges 2 x 10; 3s Y   Squats     Resisted side stepping     FSU 2 x 10 Y   LSU     HR/TR     Hip 3 way     Lunges     TKE               STRETCHING/ROM     Hamstring stretch     Hip flexor stretch     Knee flexion stretch step 5 x 10s Y   Standing calf stretch 3 x 30s Y         NEURO RE-ED (42099) TOTAL TIME FOR SESSION 8-22 Minutes    Tandem balance 20s x 2 each Y   Anterior weight shifting 20x  Y   Cone taps 2 x 10; 6\" cone with black block on ground Y   Cone step overs     Postural re-education     Taping     GAIT TRAINING (29959) TOTAL TIME FOR SESSION Not performed    Stair Reviewed stair training involving step to pattern until symptoms and strength improve. Reviewed goals for progression    Gait Reviewed SPC use. Discussed use of walker if feelings of instability/weakness/pain limit gait with cane. The patient verbalizes agreement and " understanding.     MANUAL (46930) TOTAL TIME FOR SESSION 8-22 Minutes    Manual stretching Hip flexor stretch/rectus fem stretch 3 x 30s with stabilization Y   Mobilization Patellar superior glides, inferior glides, bouts of 15s   Knee flexion mobilizations: bouts of 15s to tolerance in supine Y  Y   Y = yes; N = no; G = group

## 2023-11-09 ENCOUNTER — HOSPITAL ENCOUNTER (OUTPATIENT)
Dept: PHYSICAL THERAPY | Age: 88
Setting detail: THERAPIES SERIES
Discharge: HOME | End: 2023-11-09
Attending: ORTHOPAEDIC SURGERY
Payer: COMMERCIAL

## 2023-11-09 DIAGNOSIS — Z96.652 PRESENCE OF LEFT ARTIFICIAL KNEE JOINT: Primary | ICD-10-CM

## 2023-11-09 DIAGNOSIS — M25.562 LEFT KNEE PAIN, UNSPECIFIED CHRONICITY: ICD-10-CM

## 2023-11-09 PROCEDURE — 97530 THERAPEUTIC ACTIVITIES: CPT | Mod: GP,CQ

## 2023-11-09 PROCEDURE — 97110 THERAPEUTIC EXERCISES: CPT | Mod: GP,CQ

## 2023-11-09 PROCEDURE — 97140 MANUAL THERAPY 1/> REGIONS: CPT | Mod: GP

## 2023-11-09 NOTE — OP PT TREATMENT LOG
"ORTHO PT FLOWSHEET    Diagnosis: L TKA 10/11/23   Precautions: osteoporosis, heart disease:SVT, AFIB, h/o melanoma,      Exercises Current Session Time Completed (Y/N/G)   MODALITIES- HEAT/ICE (65084) TOTAL TIME FOR SESSION Not performed    Heat     Ice     MODALITIES - E-STIM (09564) TOTAL TIME FOR SESSION Not performed    E-stim/H-Wave     THER ACT (54216) TOTAL TIME FOR SESSION 8-22 Minutes    Assessment  See note     Vitals/pain See note Y   Education Educated patient on knee anatomy and findings of examination. Discussed HEP and goals for treatment/expected outcomes. The patient verbalizes agreement and understanding.     Body Mechanics/Work Training/ Transfer technique Sit to stand transfer training focusing on equalizing weight bearing (leans to R due to compensation from before surgery) and equalizing foot placement. With mirror x 10 at raised table  Y   GROUP (34222)  Not performed    THER EX (50940) TOTAL TIME FOR SESSION 23-37 Minutes    HEP 10/30/23: quad sets, knee extension mobilization, heel slides, hamstring stretch, seated heel slides    CARDIOVASCULAR      Nu Step     Stationary Bike Seat 7 rocking; 8 min Y   Elliptical     Treadmill     STRENGTHENING     Quad sets 10 x 10s Y   Heel slides/HS isos 10 x 10s Y   SLR 2 x 10 Y   LAQ  SAQ 2 x 10; 3#  2 x 10; 3# VC for full ROM and eccentric control  Y  Y   Clamshells     Bridges 2 x 10; 3s    Squats     Resisted side stepping     FSU 2 x 10 VC to prevent circumduction  Y   LSU     HR/TR     Hip 3 way     Lunges     TKE               STRETCHING/ROM     Heel slides 10 x 10s Y   Hamstring stretch     Hip flexor stretch     Knee flexion stretch step 5 x 10s Y   Standing calf stretch 3 x 30s Y         NEURO RE-ED (38903) TOTAL TIME FOR SESSION Not performed    Tandem balance 20s x 2 each    Anterior weight shifting 20x     Cone taps 2 x 10; 6\" cone with black block on ground    Cone step overs     Postural re-education     Taping     GAIT TRAINING (44578) " TOTAL TIME FOR SESSION Not performed    Stair Reviewed stair training involving step to pattern until symptoms and strength improve. Reviewed goals for progression    Gait Reviewed SPC use. Discussed use of walker if feelings of instability/weakness/pain limit gait with cane. The patient verbalizes agreement and understanding.     MANUAL (50817) TOTAL TIME FOR SESSION 8-22 Minutes    Manual stretching Hip flexor stretch/rectus fem stretch 3 x 30s with stabilization    STM Distal quad; distal HS/ proximal gastroc (by Rui Del Cid, PT) Y   Mobilization Patellar superior glides, inferior glides, bouts of 15s (by Rui Del Cid, PT)  Knee flexion mobilizations: bouts of 15s to tolerance in supine (by Rui Del Cid, PT)  Knee extension mobilizations (by Rui Del Cid, PT) Y  Y    Y   Y = yes; N = no; G = group

## 2023-11-09 NOTE — ADDENDUM NOTE
Encounter addended by: Rui Del Cid, PT on: 11/9/2023 12:33 PM   Actions taken: Flowsheet accepted, Charge Capture section accepted

## 2023-11-09 NOTE — PROGRESS NOTES
Physical Therapy Visit    PT DAILY NOTE FOR OUTPATIENT THERAPY    Patient: Alexandra Bird MRN: [id number]  : 1935 88 y.o.  Referring Physician: Tonie Dudley MD  Date of Visit: 2023    Certification Dates: 10/30/23 through 23    Diagnosis:   1. Presence of left artificial knee joint    2. Left knee pain, unspecified chronicity        Chief Complaints:  pain, decreased ROM and strength    Precautions:   Existing Precautions/Restrictions: no known precautions/restrictions      TODAY'S VISIT    Time In Session:  Start Time: 1103  Stop Time: 1158  Time Calculation (min): 55 min   History/Vitals/Pain/Encounter Info - 23 1103        Injury History/Precautions/Daily Required Info    Document Type daily treatment     Primary Therapist Rui     Chief Complaint/Reason for Visit  pain, decreased ROM and strength     Onset of Illness/Injury or Date of Surgery 10/11/23     Referring Physician Dr. Dudley     Existing Precautions/Restrictions no known precautions/restrictions     History of present illness/functional impairment Alexandra is an 89 y/o female with PMH of SVT, PAF, CAD, RUTHIE, HLD, chronic hip/knee pain, osteoporosis presenting outpatient PT for TKA 10/11/23. She initially saw [facility] physicians 8-9 yrs ago, receiving injections for her knees that would last her symptoms for 7-8 months per patient. She trialed course of outpatient pre-hab and finally underwent TKA 10/11/23. Alexandra notes returning home 2 days post op. She notes that she has been only having to take tylenol and has been doing well. She endorses continued difficulty with walking. She is able to sleep on her side. She notes stiffness in the morning. Endorses some continued ankle swelling. Notes doing overall well, although some difficulty noted with SPC use due to coordination.     Patient/Family/Caregiver Comments/Observations Reports feeling a little sluggish, and stiffness to L knee. She was out in the morning and evening, may  have been on her feet for too long.     Patient reported fall since last visit No        Pain Assessment    Currently in pain No/Denies   sore and stiff       Pre Activity Vital Signs    Pulse 101     /73     BP Location Left upper arm     BP Method Automatic     Patient Position Sitting                Daily Treatment Assessment and Plan - 11/09/23 1104        Daily Treatment Assessment and Plan    Progress toward goals Progressing     Daily Outcome Summary Alexandra arrives with stiffness and soreness today, related to prolonged walking yesterday. Benefits from manual therapy today. Completes knee ROM and quad strengthening, ROM 0-100 degrees today. Completed sit to stand training with emphasis on equalizing weight bearing to prevent leaning to R and equalizing feet as ROM has improved to normalize transfers. Completes forward step ups with verbal cuing to increase WB on L side and prevent circumduction substitution.     Plan and Recommendations Continue ROM/ strengthening, improve weight shifting/ transfers to prevent substitutions                     OBJECTIVE DATA TAKEN TODAY:    None taken    Today's Treatment:    ORTHO PT FLOWSHEET    Diagnosis: L TKA 10/11/23   Precautions: osteoporosis, heart disease:SVT, AFIB, h/o melanoma,      Exercises Current Session Time Completed (Y/N/G)   MODALITIES- HEAT/ICE (27962) TOTAL TIME FOR SESSION Not performed    Heat     Ice     MODALITIES - E-STIM (51640) TOTAL TIME FOR SESSION Not performed    E-stim/H-Wave     THER ACT (25094) TOTAL TIME FOR SESSION 8-22 Minutes    Assessment  See note     Vitals/pain See note Y   Education Educated patient on knee anatomy and findings of examination. Discussed HEP and goals for treatment/expected outcomes. The patient verbalizes agreement and understanding.     Body Mechanics/Work Training/ Transfer technique Sit to stand transfer training focusing on equalizing weight bearing (leans to R due to compensation from before surgery) and  "equalizing foot placement. With mirror x 10 at raised table  Y   GROUP (60061)  Not performed    THER EX (61884) TOTAL TIME FOR SESSION 23-37 Minutes    HEP 10/30/23: quad sets, knee extension mobilization, heel slides, hamstring stretch, seated heel slides    CARDIOVASCULAR      Nu Step     Stationary Bike Seat 7 rocking; 8 min Y   Elliptical     Treadmill     STRENGTHENING     Quad sets 10 x 10s Y   Heel slides/HS isos 10 x 10s Y   SLR 2 x 10 Y   LAQ  SAQ 2 x 10; 3#  2 x 10; 3# VC for full ROM and eccentric control  Y  Y   Clamshells     Bridges 2 x 10; 3s    Squats     Resisted side stepping     FSU 2 x 10 VC to prevent circumduction  Y   LSU     HR/TR     Hip 3 way     Lunges     TKE               STRETCHING/ROM     Heel slides 10 x 10s Y   Hamstring stretch     Hip flexor stretch     Knee flexion stretch step 5 x 10s Y   Standing calf stretch 3 x 30s Y         NEURO RE-ED (08697) TOTAL TIME FOR SESSION Not performed    Tandem balance 20s x 2 each    Anterior weight shifting 20x     Cone taps 2 x 10; 6\" cone with black block on ground    Cone step overs     Postural re-education     Taping     GAIT TRAINING (05808) TOTAL TIME FOR SESSION Not performed    Stair Reviewed stair training involving step to pattern until symptoms and strength improve. Reviewed goals for progression    Gait Reviewed SPC use. Discussed use of walker if feelings of instability/weakness/pain limit gait with cane. The patient verbalizes agreement and understanding.     MANUAL (24258) TOTAL TIME FOR SESSION 8-22 Minutes    Manual stretching Hip flexor stretch/rectus fem stretch 3 x 30s with stabilization    STM Distal quad; distal HS/ proximal gastroc (by Rui Del Cid, PT) Y   Mobilization Patellar superior glides, inferior glides, bouts of 15s (by Rui Del Cid, PT)  Knee flexion mobilizations: bouts of 15s to tolerance in supine (by Rui Del Cid, PT)  Knee extension mobilizations (by Rui Del Cid, PT) Y  Y    Y   Y = yes; N = no; G = group   "

## 2023-11-13 ENCOUNTER — HOSPITAL ENCOUNTER (OUTPATIENT)
Dept: PHYSICAL THERAPY | Age: 88
Setting detail: THERAPIES SERIES
Discharge: HOME | End: 2023-11-13
Attending: ORTHOPAEDIC SURGERY
Payer: COMMERCIAL

## 2023-11-13 DIAGNOSIS — M25.562 LEFT KNEE PAIN, UNSPECIFIED CHRONICITY: ICD-10-CM

## 2023-11-13 DIAGNOSIS — Z96.652 PRESENCE OF LEFT ARTIFICIAL KNEE JOINT: Primary | ICD-10-CM

## 2023-11-13 PROCEDURE — 97110 THERAPEUTIC EXERCISES: CPT | Mod: GP,CQ

## 2023-11-13 PROCEDURE — 97112 NEUROMUSCULAR REEDUCATION: CPT | Mod: GP,CQ

## 2023-11-13 NOTE — PROGRESS NOTES
Physical Therapy Visit    PT DAILY NOTE FOR OUTPATIENT THERAPY    Patient: Alexandra Bird MRN: [id number]  : 1935 88 y.o.  Referring Physician: Tonie Dudley MD  Date of Visit: 2023    Certification Dates: 10/30/23 through 23    Diagnosis:   1. Presence of left artificial knee joint    2. Left knee pain, unspecified chronicity        Chief Complaints:  pain, decreased ROM and strength    Precautions:   Existing Precautions/Restrictions: no known precautions/restrictions  Precautions comments: osteoporosis      TODAY'S VISIT    Time In Session:  Start Time: 1101  Stop Time: 1200  Time Calculation (min): 59 min   History/Vitals/Pain/Encounter Info - 23 1107        Injury History/Precautions/Daily Required Info    Document Type daily treatment     Primary Therapist Rui     Chief Complaint/Reason for Visit  pain, decreased ROM and strength     Onset of Illness/Injury or Date of Surgery 10/11/23     Referring Physician Dr. Dudley     Existing Precautions/Restrictions no known precautions/restrictions     Precautions comments osteoporosis     History of present illness/functional impairment Alexandra is an 87 y/o female with PMH of SVT, PAF, CAD, RUTHIE, HLD, chronic hip/knee pain, osteoporosis presenting outpatient PT for TKA 10/11/23. She initially saw [facility] physicians 8-9 yrs ago, receiving injections for her knees that would last her symptoms for 7-8 months per patient. She trialed course of outpatient pre-hab and finally underwent TKA 10/11/23. Alexandra notes returning home 2 days post op. She notes that she has been only having to take tylenol and has been doing well. She endorses continued difficulty with walking. She is able to sleep on her side. She notes stiffness in the morning. Endorses some continued ankle swelling. Notes doing overall well, although some difficulty noted with SPC use due to coordination.     Patient/Family/Caregiver Comments/Observations Cokathy endorses compliance with  "HEP.  Denies pain, endorses stiffness to L knee.     Patient reported fall since last visit No        Pain Assessment    Currently in pain No/Denies   \"stiffness\" vs pain       Pre Activity Vital Signs    Pulse 82     SpO2 97 %     Oxygen Therapy None (Room air)     /81     BP Location Left upper arm     BP Method Automatic     Patient Position Sitting                Daily Treatment Assessment and Plan - 11/13/23 1107        Daily Treatment Assessment and Plan    Progress toward goals Progressing     Daily Outcome Summary Alexandra continues to demonstrate unequal weight bearing with an increased reliance on the right leg for transfers. She is educated on the need to avoid kicking the leg out when moving to sit. The home program is progressed to incorporate LAQs, and the patient is added to Physitrack with a copy of the exercise provided.     Plan and Recommendations Continue ROM/ strengthening, improve weight shifting/ transfers to prevent substitutions                     OBJECTIVE DATA TAKEN TODAY:    None taken    Today's Treatment:    ORTHO PT FLOWSHEET    Diagnosis: L TKA 10/11/23   Precautions: osteoporosis, heart disease:SVT, AFIB, h/o melanoma,      Exercises Current Session Time Completed (Y/N/G)   MODALITIES- HEAT/ICE (76583) TOTAL TIME FOR SESSION Not performed    Heat     Ice     MODALITIES - E-STIM (94962) TOTAL TIME FOR SESSION Not performed    E-stim/H-Wave     THER ACT (44297) TOTAL TIME FOR SESSION 0-7 Minutes    Assessment  See note     Vitals/pain See note Y   Education Educated patient on knee anatomy and findings of examination. Discussed HEP and goals for treatment/expected outcomes. The patient verbalizes agreement and understanding.   11/13: Pt educated on the need to avoid kicking the L leg out when moving to sit; verbalizes understanding.  Y   Body Mechanics/Work Training/ Transfer technique Sit to stand transfer training focusing on equalizing weight bearing (leans to R due to " "compensation from before surgery) and equalizing foot placement. With mirror x 10 at raised table     GROUP (62329)  Not performed    THER EX (83464) TOTAL TIME FOR SESSION 38-52 Minutes    HEP 10/30/23: quad sets, knee extension mobilization, heel slides, hamstring stretch, seated heel slides  11/13/23: added LAQs to Hep and added patient (with LAQs) to Physitrack. Pt verbalized understanding and performed correct return. Printed copy given to pt.     Y   CARDIOVASCULAR      Nu Step     Stationary Bike Seat 7 rocking; 8 min Y   Elliptical     Treadmill     STRENGTHENING     Quad sets 10 x 10s    Heel slides/HS isos 10 x 10s    SLR 2 x 10    LAQ  SAQ 2 x 10; 3#  2 x 10; 3# VC for full ROM and eccentric control  Y   Clamshells     Bridges 2 x 10; 3s    Squats     Resisted side stepping     FSU 2 x 10 VC to prevent circumduction  Y   LSU     HR/TR     Hip 3 way     Lunges     TKE               STRETCHING/ROM     Heel slides 10 x 10s in sitting w/strap and transfer board for <friction Y   Hamstring stretch     Hip flexor stretch     Knee flexion stretch step 10 x 10s Y   Standing calf stretch 3 x 30s with wedge Y         NEURO RE-ED (57260) TOTAL TIME FOR SESSION 8-22 Minutes    Tandem balance 30s x 2 each Y   Anterior weight shifting 20x     Cone taps 2 x 10; 6\" cone with black block on ground with cues to minimize UE support Y   Cone step overs     Postural re-education     Taping     GAIT TRAINING (78011) TOTAL TIME FOR SESSION Not performed    Stair Reviewed stair training involving step to pattern until symptoms and strength improve. Reviewed goals for progression    Gait Reviewed SPC use. Discussed use of walker if feelings of instability/weakness/pain limit gait with cane. The patient verbalizes agreement and understanding.     MANUAL (56834) TOTAL TIME FOR SESSION Not performed    Manual stretching Hip flexor stretch/rectus fem stretch 3 x 30s with stabilization    STM Distal quad; distal HS/ proximal gastroc " (by Rui Del Cid, PT)    Mobilization Patellar superior glides, inferior glides, bouts of 15s (by Rui Del Cid, PT)  Knee flexion mobilizations: bouts of 15s to tolerance in supine (by Rui Del Cid, PT)  Knee extension mobilizations (by Rui Del Cid, PT)          Y = yes; N = no; G = group

## 2023-11-13 NOTE — OP PT TREATMENT LOG
ORTHO PT FLOWSHEET    Diagnosis: L TKA 10/11/23   Precautions: osteoporosis, heart disease:SVT, AFIB, h/o melanoma,      Exercises Current Session Time Completed (Y/N/G)   MODALITIES- HEAT/ICE (15516) TOTAL TIME FOR SESSION Not performed    Heat     Ice     MODALITIES - E-STIM (77336) TOTAL TIME FOR SESSION Not performed    E-stim/H-Wave     THER ACT (45741) TOTAL TIME FOR SESSION 0-7 Minutes    Assessment  See note     Vitals/pain See note Y   Education Educated patient on knee anatomy and findings of examination. Discussed HEP and goals for treatment/expected outcomes. The patient verbalizes agreement and understanding.   11/13: Pt educated on the need to avoid kicking the L leg out when moving to sit; verbalizes understanding.  Y   Body Mechanics/Work Training/ Transfer technique Sit to stand transfer training focusing on equalizing weight bearing (leans to R due to compensation from before surgery) and equalizing foot placement. With mirror x 10 at raised table     GROUP (55848)  Not performed    THER EX (56744) TOTAL TIME FOR SESSION 38-52 Minutes    HEP 10/30/23: quad sets, knee extension mobilization, heel slides, hamstring stretch, seated heel slides  11/13/23: added LAQs to Hep and added patient (with LAQs) to Physitrack. Pt verbalized understanding and performed correct return. Printed copy given to pt.     Y   CARDIOVASCULAR      Nu Step     Stationary Bike Seat 7 rocking; 8 min Y   Elliptical     Treadmill     STRENGTHENING     Quad sets 10 x 10s    Heel slides/HS isos 10 x 10s    SLR 2 x 10    LAQ  SAQ 2 x 10; 3#  2 x 10; 3# VC for full ROM and eccentric control  Y   Clamshells     Bridges 2 x 10; 3s    Squats     Resisted side stepping     FSU 2 x 10 VC to prevent circumduction  Y   LSU     HR/TR     Hip 3 way     Lunges     TKE               STRETCHING/ROM     Heel slides 10 x 10s in sitting w/strap and transfer board for <friction Y   Hamstring stretch     Hip flexor stretch     Knee flexion stretch  "step 10 x 10s Y   Standing calf stretch 3 x 30s with wedge Y         NEURO RE-ED (84184) TOTAL TIME FOR SESSION 8-22 Minutes    Tandem balance 30s x 2 each Y   Anterior weight shifting 20x     Cone taps 2 x 10; 6\" cone with black block on ground with cues to minimize UE support Y   Cone step overs     Postural re-education     Taping     GAIT TRAINING (46973) TOTAL TIME FOR SESSION Not performed    Stair Reviewed stair training involving step to pattern until symptoms and strength improve. Reviewed goals for progression    Gait Reviewed SPC use. Discussed use of walker if feelings of instability/weakness/pain limit gait with cane. The patient verbalizes agreement and understanding.     MANUAL (27409) TOTAL TIME FOR SESSION Not performed    Manual stretching Hip flexor stretch/rectus fem stretch 3 x 30s with stabilization    STM Distal quad; distal HS/ proximal gastroc (by Rui Del Cid, PT)    Mobilization Patellar superior glides, inferior glides, bouts of 15s (by Rui Del Cid, PT)  Knee flexion mobilizations: bouts of 15s to tolerance in supine (by Rui Del Cid, PT)  Knee extension mobilizations (by Rui Del Cid, PT)          Y = yes; N = no; G = group  "

## 2023-11-14 ENCOUNTER — OFFICE VISIT (OUTPATIENT)
Dept: INTERNAL MEDICINE | Facility: CLINIC | Age: 88
End: 2023-11-14
Payer: COMMERCIAL

## 2023-11-14 VITALS
HEART RATE: 84 BPM | SYSTOLIC BLOOD PRESSURE: 130 MMHG | DIASTOLIC BLOOD PRESSURE: 88 MMHG | WEIGHT: 150 LBS | BODY MASS INDEX: 28.34 KG/M2

## 2023-11-14 DIAGNOSIS — M81.0 AGE-RELATED OSTEOPOROSIS WITHOUT CURRENT PATHOLOGICAL FRACTURE: ICD-10-CM

## 2023-11-14 DIAGNOSIS — I48.0 PAF (PAROXYSMAL ATRIAL FIBRILLATION) (CMS/HCC): Primary | ICD-10-CM

## 2023-11-14 DIAGNOSIS — I47.10 SVT (SUPRAVENTRICULAR TACHYCARDIA) (CMS/HCC): ICD-10-CM

## 2023-11-14 DIAGNOSIS — Z96.652 S/P TOTAL KNEE ARTHROPLASTY, LEFT: ICD-10-CM

## 2023-11-14 PROCEDURE — 3008F BODY MASS INDEX DOCD: CPT | Performed by: INTERNAL MEDICINE

## 2023-11-14 PROCEDURE — 99214 OFFICE O/P EST MOD 30 MIN: CPT | Performed by: INTERNAL MEDICINE

## 2023-11-14 RX ORDER — LORAZEPAM 1 MG/1
0.5 TABLET ORAL DAILY PRN
Qty: 30 TABLET | Refills: 0 | Status: SHIPPED | OUTPATIENT
Start: 2023-11-14 | End: 2024-11-19 | Stop reason: SDUPTHER

## 2023-11-14 RX ORDER — CETIRIZINE HYDROCHLORIDE 5 MG/1
5 TABLET ORAL DAILY PRN
Start: 2023-11-14

## 2023-11-14 ASSESSMENT — ENCOUNTER SYMPTOMS
PALPITATIONS: 0
CHILLS: 0
FEVER: 0
CONFUSION: 0
CONSTIPATION: 0
VOMITING: 0
HEADACHES: 0
COUGH: 0
FATIGUE: 0
DIARRHEA: 0
BACK PAIN: 0
NAUSEA: 0
SORE THROAT: 0
DIZZINESS: 0
FREQUENCY: 0
SHORTNESS OF BREATH: 0
ABDOMINAL PAIN: 0
DYSURIA: 0

## 2023-11-14 NOTE — PROGRESS NOTES
Subjective      Patient ID: Teresa Bird is a 88 y.o. female.    Patient seen for TKR follow-up.  She has a lump on her right pinkie.  No pain.  She says her knee is great.  She just takes Tylenol  She is doing therapy.  She has a weak anal sphincter.  She sometimes has two bowel movements back to back    Current Outpatient Medications   Medication Sig Dispense Refill    alirocumab (PRALUENT PEN) 150 mg/mL pen injector Inject 1 ml (150 mg) under skin every 14 days   >>hold for 2 weeks      cetirizine (ZyrTEC) 5 mg tablet Take 1 tablet (5 mg total) by mouth daily as needed for allergies.      denosumab (PROLIA) 60 mg/mL syringe Hold for 2 weeks      LORazepam (ATIVAN) 0.5 mg tablet Take 2 tablets (1 mg total) by mouth daily as needed for anxiety. 30 tablet 1    metoprolol succinate XL (TOPROL-XL) 25 mg 24 hr tablet Take 1 tablet (25 mg total) by mouth 2 (two) times a day. 180 tablet 3    rivaroxaban (XARELTO) 20 mg tablet TAKE 1 TABLET(20 MG) BY MOUTH DAILY WITH DINNER       No current facility-administered medications for this visit.     Allergies   Allergen Reactions    Penicillin Other (see comments)     Fainted and PCP said not to take    Pravastatin Sodium GI intolerance    Rosuvastatin Calcium GI intolerance     Other reaction(s): muscle cramps    Zetia [Ezetimibe]      myalgias     Past Medical History:   Diagnosis Date    A-fib (CMS/HCC)     Arthritis     Closed right ankle fracture age 62    Endometrial cancer (CMS/HCC) 12/06/2013    GERD (gastroesophageal reflux disease)     Lipid disorder     difficulty tolerating statins    Melanoma (CMS/HCC)     left shoulder    Osteoporosis     did not tolerate fosamax    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HCC)     gets follow up CT scans regularly    SVT (supraventricular tachycardia) 08/2021    Thyroid goiter      Past Surgical History:   Procedure Laterality Date    BUNIONECTOMY Bilateral     CATARACT EXTRACTION, BILATERAL  2006     CHOLECYSTECTOMY      HYSTERECTOMY  12/2013    MOHS SURGERY      SKIN CANCER EXCISION       Social History     Socioeconomic History    Marital status:      Spouse name: Not on file    Number of children: Not on file    Years of education: Not on file    Highest education level: Not on file   Occupational History    Not on file   Tobacco Use    Smoking status: Former     Packs/day: 0.50     Years: 25.00     Additional pack years: 0.00     Total pack years: 12.50     Types: Cigarettes     Quit date: 2008     Years since quitting: 15.8    Smokeless tobacco: Never   Vaping Use    Vaping Use: Never used   Substance and Sexual Activity    Alcohol use: Yes     Alcohol/week: 2.0 standard drinks of alcohol     Types: 2 Standard drinks or equivalent per week     Comment: social    Drug use: No    Sexual activity: Defer   Other Topics Concern    Not on file   Social History Narrative    Not on file     Social Determinants of Health     Financial Resource Strain: Low Risk  (10/12/2023)    Overall Financial Resource Strain (CARDIA)     Difficulty of Paying Living Expenses: Not hard at all   Food Insecurity: No Food Insecurity (10/30/2023)    Hunger Vital Sign     Worried About Running Out of Food in the Last Year: Never true     Ran Out of Food in the Last Year: Never true   Transportation Needs: No Transportation Needs (10/12/2023)    PRAPARE - Transportation     Lack of Transportation (Medical): No     Lack of Transportation (Non-Medical): No   Physical Activity: Not on file   Stress: Not on file   Social Connections: Not on file   Intimate Partner Violence: Not on file   Housing Stability: Low Risk  (10/12/2023)    Housing Stability Vital Sign     Unable to Pay for Housing in the Last Year: No     Number of Places Lived in the Last Year: 1     Unstable Housing in the Last Year: No     Family History   Problem Relation Age of Onset    Heart attack Biological Mother     Diabetes Biological  Mother     Lung cancer Biological Mother     COPD Biological Mother     Diabetes Biological Father     Prostate cancer Biological Brother          The following have been reviewed and updated as appropriate in this visit:   Allergies  Meds  Problems       Review of Systems   Constitutional: Negative for chills, fatigue and fever.   HENT: Negative for congestion, postnasal drip and sore throat.    Eyes: Negative for visual disturbance.   Respiratory: Negative for cough and shortness of breath.    Cardiovascular: Negative for chest pain and palpitations.   Gastrointestinal: Negative for abdominal pain, constipation, diarrhea, nausea and vomiting.   Genitourinary: Negative for dysuria and frequency.   Musculoskeletal: Negative for back pain.   Skin: Negative for rash.   Neurological: Negative for dizziness and headaches.   Psychiatric/Behavioral: Negative for confusion.       Objective     Vitals:    11/14/23 1409   BP: 130/88   BP Location: Left upper arm   Patient Position: Sitting   Pulse: 84   Weight: 68 kg (150 lb)     Body mass index is 28.34 kg/m².    Physical Exam  Vitals reviewed.   Constitutional:       Appearance: Normal appearance.   HENT:      Head: Normocephalic and atraumatic.   Eyes:      General: No scleral icterus.     Conjunctiva/sclera: Conjunctivae normal.   Cardiovascular:      Rate and Rhythm: Normal rate and regular rhythm.      Heart sounds: No murmur heard.  Pulmonary:      Effort: No respiratory distress.      Breath sounds: Normal breath sounds. No wheezing.   Abdominal:      Palpations: Abdomen is soft.      Tenderness: There is no abdominal tenderness. There is no guarding or rebound.   Musculoskeletal:         General: Normal range of motion.      Cervical back: Normal range of motion.      Comments: Left knee incision C/D/I  LLE edema   Skin:     General: Skin is warm.   Neurological:      Mental Status: She is alert and oriented to person, place, and time.         Orders Placed  This Encounter   Procedures    CBC     Standing Status:   Future     Number of Occurrences:   1     Standing Expiration Date:   5/14/2024     Order Specific Question:   Release to patient     Answer:   Immediate [1]    Comprehensive metabolic panel     Standing Status:   Future     Number of Occurrences:   1     Standing Expiration Date:   5/14/2024     Order Specific Question:   Has the patient fasted?     Answer:   Yes     Order Specific Question:   Release to patient     Answer:   Immediate [1]    Lipid panel     Standing Status:   Future     Number of Occurrences:   1     Standing Expiration Date:   5/14/2024     Order Specific Question:   Has the patient fasted?     Answer:   Yes     Order Specific Question:   Release to patient     Answer:   Immediate [1]    TSH 3rd Generation     Standing Status:   Future     Number of Occurrences:   1     Standing Expiration Date:   5/14/2024     Order Specific Question:   Has the patient fasted?     Answer:   Yes     Order Specific Question:   Release to patient     Answer:   Immediate [1]    Vitamin D 25 hydroxy     Standing Status:   Future     Number of Occurrences:   1     Standing Expiration Date:   5/14/2024     Order Specific Question:   Release to patient     Answer:   Immediate [1]       Assessment/Plan   Problem List Items Addressed This Visit        Circulatory    PAF (paroxysmal atrial fibrillation) (CMS/HCC) - Primary     Continue Metoprolol and Xarelto         Relevant Orders    CBC    Comprehensive metabolic panel    Lipid panel    TSH 3rd Generation    Vitamin D 25 hydroxy    SVT (supraventricular tachycardia)     Continue Metoprolol         Relevant Orders    CBC    Comprehensive metabolic panel    Lipid panel    TSH 3rd Generation       Musculoskeletal    Age-related osteoporosis without current pathological fracture     She had muscle aches with Prolia  Will try one more time and if gets side effect will stop         Relevant Orders    Vitamin D 25  hydroxy       Other    S/P total knee arthroplasty, left     PT/OT  Follow with Dr. Octavia Cooley MD  11/14/2023

## 2023-11-14 NOTE — PATIENT INSTRUCTIONS
Problem List Items Addressed This Visit          Circulatory    PAF (paroxysmal atrial fibrillation) (CMS/HCC) - Primary     Continue Metoprolol and Xarelto         Relevant Orders    CBC    Comprehensive metabolic panel    Lipid panel    TSH 3rd Generation    Vitamin D 25 hydroxy    SVT (supraventricular tachycardia)     Continue Metoprolol         Relevant Orders    CBC    Comprehensive metabolic panel    Lipid panel    TSH 3rd Generation       Musculoskeletal    Age-related osteoporosis without current pathological fracture     She had muscle aches with Prolia  Will try one more time and if gets side effect will stop         Relevant Orders    Vitamin D 25 hydroxy       Other    S/P total knee arthroplasty, left     PT/OT  Follow with Dr. Octavia Cooley MD  11/14/2023

## 2023-11-17 ENCOUNTER — HOSPITAL ENCOUNTER (OUTPATIENT)
Dept: PHYSICAL THERAPY | Age: 88
Setting detail: THERAPIES SERIES
Discharge: HOME | End: 2023-11-17
Attending: ORTHOPAEDIC SURGERY
Payer: COMMERCIAL

## 2023-11-17 DIAGNOSIS — Z96.652 PRESENCE OF LEFT ARTIFICIAL KNEE JOINT: Primary | ICD-10-CM

## 2023-11-17 DIAGNOSIS — M25.561 CHRONIC PAIN OF RIGHT KNEE: ICD-10-CM

## 2023-11-17 DIAGNOSIS — M17.0 OSTEOARTHRITIS OF BOTH KNEES, UNSPECIFIED OSTEOARTHRITIS TYPE: ICD-10-CM

## 2023-11-17 DIAGNOSIS — M25.562 LEFT KNEE PAIN, UNSPECIFIED CHRONICITY: ICD-10-CM

## 2023-11-17 DIAGNOSIS — G89.29 CHRONIC PAIN OF RIGHT KNEE: ICD-10-CM

## 2023-11-17 DIAGNOSIS — M25.562 CHRONIC PAIN OF LEFT KNEE: ICD-10-CM

## 2023-11-17 DIAGNOSIS — G89.29 CHRONIC PAIN OF LEFT KNEE: ICD-10-CM

## 2023-11-17 PROCEDURE — 97116 GAIT TRAINING THERAPY: CPT | Mod: GP,CQ

## 2023-11-17 PROCEDURE — 97112 NEUROMUSCULAR REEDUCATION: CPT | Mod: GP,CQ

## 2023-11-17 PROCEDURE — 97110 THERAPEUTIC EXERCISES: CPT | Mod: GP,CQ

## 2023-11-17 NOTE — PROGRESS NOTES
Physical Therapy Visit    PT DAILY NOTE FOR OUTPATIENT THERAPY    Patient: Alexandra Bird MRN: [id number]  : 1935 88 y.o.  Referring Physician: Tonie Dudley MD  Date of Visit: 2023    Certification Dates: 10/30/23 through 23    Diagnosis:   1. Presence of left artificial knee joint    2. Left knee pain, unspecified chronicity    3. Osteoarthritis of both knees, unspecified osteoarthritis type    4. Chronic pain of right knee    5. Chronic pain of left knee        Chief Complaints:  pain, decreased ROM and strength    Precautions:   Existing Precautions/Restrictions: no known precautions/restrictions  Precautions comments: osteoporosis      TODAY'S VISIT    Time In Session:  Start Time: 1100  Stop Time: 1200  Time Calculation (min): 60 min   History/Vitals/Pain/Encounter Info - 23 1101        Injury History/Precautions/Daily Required Info    Document Type daily treatment     Primary Therapist Rui     Chief Complaint/Reason for Visit  pain, decreased ROM and strength     Onset of Illness/Injury or Date of Surgery 10/11/23     Referring Physician Dr. Dudley     Existing Precautions/Restrictions no known precautions/restrictions     Precautions comments osteoporosis     History of present illness/functional impairment Alexandra is an 87 y/o female with PMH of SVT, PAF, CAD, RUTHIE, HLD, chronic hip/knee pain, osteoporosis presenting outpatient PT for TKA 10/11/23. She initially saw [facility] physicians 8-9 yrs ago, receiving injections for her knees that would last her symptoms for 7-8 months per patient. She trialed course of outpatient pre-hab and finally underwent TKA 10/11/23. Alexandra notes returning home 2 days post op. She notes that she has been only having to take tylenol and has been doing well. She endorses continued difficulty with walking. She is able to sleep on her side. She notes stiffness in the morning. Endorses some continued ankle swelling. Notes doing overall well, although some  "difficulty noted with SPC use due to coordination.     Patient/Family/Caregiver Comments/Observations Pt reports she is taking Tylenol twice a day. Having increased soreness and tightness today, \"no pain.\"     Patient reported fall since last visit No        Pain Assessment    Currently in pain No/Denies        Pain Intervention    Intervention  N/A     Post Intervention Comments N/A        Pre Activity Vital Signs    SpO2 98 %     Oxygen Therapy None (Room air)     /80     BP Location Left upper arm     BP Method Automatic     Patient Position Sitting                Daily Treatment Assessment and Plan - 11/17/23 1101        Daily Treatment Assessment and Plan    Progress toward goals Progressing     Daily Outcome Summary Began with RB for rock and stretch and able to progress to full revolutions today without hip hiking. Continued with Tyron per activity log progessing into prodominently standing activities. Pt demonstrates 110 deg of flexion in standing with standing knee flexion stretch. A portion of the session was dedicated to mobilizing with and without SPC to improve stance phase, heel strike and terminal knee extension. Pt progressing well and continues to benefit from skilled PT services to improve ROM, strength, gait, and balance.     Plan and Recommendations Continue ROM/ strengthening, improve weight shifting/ transfers to prevent substitutions. Consider adding TKE NV.                     OBJECTIVE DATA TAKEN TODAY:    None taken    Today's Treatment:    ORTHO PT FLOWSHEET    Diagnosis: L TKA 10/11/23   Precautions: osteoporosis, heart disease:SVT, AFIB, h/o melanoma,      Exercises Current Session Time Completed (Y/N/G)   MODALITIES- HEAT/ICE (51409) TOTAL TIME FOR SESSION Not performed    Heat     Ice     MODALITIES - E-STIM (69030) TOTAL TIME FOR SESSION Not performed    E-stim/H-Wave     THER ACT (72584) TOTAL TIME FOR SESSION 0-7 Minutes    Assessment  See note     Vitals/pain See note Y " "  Education Educated patient on knee anatomy and findings of examination. Discussed HEP and goals for treatment/expected outcomes. The patient verbalizes agreement and understanding.   11/13: Pt educated on the need to avoid kicking the L leg out when moving to sit; verbalizes understanding.     Body Mechanics/Work Training/ Transfer technique Sit to stand transfer training focusing on equalizing weight bearing (leans to R due to compensation from before surgery) and equalizing foot placement. With mirror x 10 at raised table     GROUP (05385)  Not performed    THER EX (62318) TOTAL TIME FOR SESSION 23-37 Minutes    HEP 10/30/23: quad sets, knee extension mobilization, heel slides, hamstring stretch, seated heel slides  11/13/23: added LAQs to Hep and added patient (with LAQs) to Physitrack. Pt verbalized understanding and performed correct return. Printed copy given to pt.        CARDIOVASCULAR      Nu Step     Stationary Bike Seat 7; 8 min full revolutions Y   Elliptical     Treadmill     STRENGTHENING     Quad sets 10 x 10s    Heel slides/HS isos 10 x 10s    SLR 2 x 10    LAQ  SAQ 2 x 10; 3#  2 x 10; 3# VC for full ROM and eccentric control  Y   Clamshells     Bridges 2 x 10; 3s    Squats     Resisted side stepping No resistance in // bars with finger tip assist Y   FSU 2 x 10 VC to prevent circumduction  Y   LSU 1 x 10 Y   HR/TR 1 x 10  Y   Hip 3 way 1 x 10 B/L Y   Lunges     TKE  NV             STRETCHING/ROM     Heel slides 10 x 10s in sitting w/strap and transfer board for <friction Y   Hamstring stretch 3 x 30s at stairs Y   Hip flexor stretch     Knee flexion stretch step 10 x 10s Y   Standing calf stretch 3 x 30s with wedge Y         NEURO RE-ED (18071) TOTAL TIME FOR SESSION 8-22 Minutes    Tandem balance 30s x 2 each n   Anterior weight shifting 20x  y   Cone taps 2 x 10; 6\" cone with black block on ground with cues to minimize UE support y   Cone step overs     Postural re-education     Taping     GAIT " TRAINING (04258) TOTAL TIME FOR SESSION 8-22 Minutes    Stair Reviewed stair training involving step to pattern until symptoms and strength improve. Reviewed goals for progression    Gait Reviewed SPC use. Discussed use of walker if feelings of instability/weakness/pain limit gait with cane. The patient verbalizes agreement and understanding.   11/17 Ambulation with SPC & in // bars       Y   MANUAL (13968) TOTAL TIME FOR SESSION Not performed    Manual stretching Hip flexor stretch/rectus fem stretch 3 x 30s with stabilization    STM Distal quad; distal HS/ proximal gastroc (by Rui Del Cid, PT)    Mobilization Patellar superior glides, inferior glides, bouts of 15s (by Rui Del Cid, PT)  Knee flexion mobilizations: bouts of 15s to tolerance in supine (by Rui Del Cid, PT)  Knee extension mobilizations (by Rui Del Cid, PT)          Y = yes; N = no; G = group

## 2023-11-17 NOTE — OP PT TREATMENT LOG
ORTHO PT FLOWSHEET    Diagnosis: L TKA 10/11/23   Precautions: osteoporosis, heart disease:SVT, AFIB, h/o melanoma,      Exercises Current Session Time Completed (Y/N/G)   MODALITIES- HEAT/ICE (81648) TOTAL TIME FOR SESSION Not performed    Heat     Ice     MODALITIES - E-STIM (85533) TOTAL TIME FOR SESSION Not performed    E-stim/H-Wave     THER ACT (95055) TOTAL TIME FOR SESSION 0-7 Minutes    Assessment  See note     Vitals/pain See note Y   Education Educated patient on knee anatomy and findings of examination. Discussed HEP and goals for treatment/expected outcomes. The patient verbalizes agreement and understanding.   11/13: Pt educated on the need to avoid kicking the L leg out when moving to sit; verbalizes understanding.     Body Mechanics/Work Training/ Transfer technique Sit to stand transfer training focusing on equalizing weight bearing (leans to R due to compensation from before surgery) and equalizing foot placement. With mirror x 10 at raised table     GROUP (53841)  Not performed    THER EX (98511) TOTAL TIME FOR SESSION 23-37 Minutes    HEP 10/30/23: quad sets, knee extension mobilization, heel slides, hamstring stretch, seated heel slides  11/13/23: added LAQs to Hep and added patient (with LAQs) to Physitrack. Pt verbalized understanding and performed correct return. Printed copy given to pt.        CARDIOVASCULAR      Nu Step     Stationary Bike Seat 7; 8 min full revolutions Y   Elliptical     Treadmill     STRENGTHENING     Quad sets 10 x 10s    Heel slides/HS isos 10 x 10s    SLR 2 x 10    LAQ  SAQ 2 x 10; 3#  2 x 10; 3# VC for full ROM and eccentric control  Y   Clamshells     Bridges 2 x 10; 3s    Squats     Resisted side stepping No resistance in // bars with finger tip assist Y   FSU 2 x 10 VC to prevent circumduction  Y   LSU 1 x 10 Y   HR/TR 1 x 10  Y   Hip 3 way 1 x 10 B/L Y   Lunges     TKE  NV             STRETCHING/ROM     Heel slides 10 x 10s in sitting w/strap and transfer board  "for <friction Y   Hamstring stretch 3 x 30s at stairs Y   Hip flexor stretch     Knee flexion stretch step 10 x 10s Y   Standing calf stretch 3 x 30s with wedge Y         NEURO RE-ED (03941) TOTAL TIME FOR SESSION 8-22 Minutes    Tandem balance 30s x 2 each n   Anterior weight shifting 20x  y   Cone taps 2 x 10; 6\" cone with black block on ground with cues to minimize UE support y   Cone step overs     Postural re-education     Taping     GAIT TRAINING (05924) TOTAL TIME FOR SESSION 8-22 Minutes    Stair Reviewed stair training involving step to pattern until symptoms and strength improve. Reviewed goals for progression    Gait Reviewed SPC use. Discussed use of walker if feelings of instability/weakness/pain limit gait with cane. The patient verbalizes agreement and understanding.   11/17 Ambulation with SPC & in // bars       Y   MANUAL (46807) TOTAL TIME FOR SESSION Not performed    Manual stretching Hip flexor stretch/rectus fem stretch 3 x 30s with stabilization    STM Distal quad; distal HS/ proximal gastroc (by Rui Del Cid, PT)    Mobilization Patellar superior glides, inferior glides, bouts of 15s (by Rui Del Cid, PT)  Knee flexion mobilizations: bouts of 15s to tolerance in supine (by Rui Del Cid, PT)  Knee extension mobilizations (by Rui Del Cid, PT)          Y = yes; N = no; G = group  "

## 2023-11-21 ENCOUNTER — HOSPITAL ENCOUNTER (OUTPATIENT)
Dept: PHYSICAL THERAPY | Age: 88
Setting detail: THERAPIES SERIES
Discharge: HOME | End: 2023-11-21
Attending: ORTHOPAEDIC SURGERY
Payer: COMMERCIAL

## 2023-11-21 DIAGNOSIS — M25.562 LEFT KNEE PAIN, UNSPECIFIED CHRONICITY: ICD-10-CM

## 2023-11-21 DIAGNOSIS — Z96.652 PRESENCE OF LEFT ARTIFICIAL KNEE JOINT: Primary | ICD-10-CM

## 2023-11-21 PROCEDURE — 97140 MANUAL THERAPY 1/> REGIONS: CPT | Mod: GP

## 2023-11-21 PROCEDURE — 97110 THERAPEUTIC EXERCISES: CPT | Mod: GP

## 2023-11-21 NOTE — PROGRESS NOTES
Physical Therapy Visit    PT DAILY NOTE FOR OUTPATIENT THERAPY    Patient: Alexandra Bird MRN: [id number]  : 1935 88 y.o.  Referring Physician: Tonie Dudley MD  Date of Visit: 2023    Certification Dates: 10/30/23 through 23    Diagnosis:   1. Presence of left artificial knee joint    2. Left knee pain, unspecified chronicity        Chief Complaints:  pain, decreased ROM and strength    Precautions:   Existing Precautions/Restrictions: no known precautions/restrictions  Precautions comments: osteoporosis      TODAY'S VISIT    Time In Session:  Start Time: 1100  Stop Time: 1200  Time Calculation (min): 60 min   History/Vitals/Pain/Encounter Info - 23 1059        Injury History/Precautions/Daily Required Info    Document Type daily treatment     Primary Therapist Rui     Chief Complaint/Reason for Visit  pain, decreased ROM and strength     Onset of Illness/Injury or Date of Surgery 10/11/23     Referring Physician Dr. Dudley     Existing Precautions/Restrictions no known precautions/restrictions     Precautions comments osteoporosis     History of present illness/functional impairment Alexandra is an 87 y/o female with PMH of SVT, PAF, CAD, RUTHIE, HLD, chronic hip/knee pain, osteoporosis presenting outpatient PT for TKA 10/11/23. She initially saw [facility] physicians 8-9 yrs ago, receiving injections for her knees that would last her symptoms for 7-8 months per patient. She trialed course of outpatient pre-hab and finally underwent TKA 10/11/23. Alexandra notes returning home 2 days post op. She notes that she has been only having to take tylenol and has been doing well. She endorses continued difficulty with walking. She is able to sleep on her side. She notes stiffness in the morning. Endorses some continued ankle swelling. Notes doing overall well, although some difficulty noted with SPC use due to coordination.     Patient/Family/Caregiver Comments/Observations Alexandra notes some tightness  "today. Reports 3/10 pain     Patient reported fall since last visit No        Pain Assessment    Currently in pain Yes     Preferred Pain Scale number (Numeric Rating Pain Scale)     Pain Side/Orientation left     Pain: Body location Knee     Pain Rating (0-10): Pre Activity 3        Pain Intervention    Intervention  stretching and strengthening therex; manual therapy     Post Intervention Comments No signficant increased pain reported end of session        Pre Activity Vital Signs    Pulse 76     Oxygen Therapy None (Room air)     /65     BP Location Left upper arm     BP Method Automatic     Patient Position Sitting                Daily Treatment Assessment and Plan - 11/21/23 1245        Daily Treatment Assessment and Plan    Progress toward goals Progressing     Daily Outcome Summary Alexandra continues to progress well with ROM of L knee. Able to attain 108 degrees actively and 116 degrees of knee flexion range of motion passively. Included functional strengthening progression for eccentric quadriceps strength with forward step down intervention. She continues to require bilateral UE support and 2\" step below to improve activity tolerance. Performed sit to stands this session with TB at knees for improving strength and quality of transfers. She has no signficant increased pain end of session     Plan and Recommendations Continue gradual functional strengthening progressions including sit to stands, step up/down, working into lunging progressions. Continue ROM progressions.                     OBJECTIVE DATA TAKEN TODAY:    None taken    Today's Treatment:    ORTHO PT FLOWSHEET    Diagnosis: L TKA 10/11/23   Precautions: osteoporosis, heart disease:SVT, AFIB, h/o melanoma,      Exercises Current Session Time Completed (Y/N/G)   MODALITIES- HEAT/ICE (81499) TOTAL TIME FOR SESSION Not performed    Heat     Ice     MODALITIES - E-STIM (26527) TOTAL TIME FOR SESSION Not performed    E-stim/H-Wave     THER ACT " "(99080) TOTAL TIME FOR SESSION 0-7 Minutes    Assessment  See note     Vitals/pain See note Y   Education Educated patient on knee anatomy and findings of examination. Discussed HEP and goals for treatment/expected outcomes. The patient verbalizes agreement and understanding.   11/13: Pt educated on the need to avoid kicking the L leg out when moving to sit; verbalizes understanding.     Body Mechanics/Work Training/ Transfer technique Sit to stand transfer training focusing on equalizing weight bearing (leans to R due to compensation from before surgery) and equalizing foot placement. With mirror x 10 at raised table     GROUP (22793)  Not performed    THER EX (38063) TOTAL TIME FOR SESSION 38-52 Minutes    HEP 10/30/23: quad sets, knee extension mobilization, heel slides, hamstring stretch, seated heel slides  11/13/23: added LAQs to Hep and added patient (with LAQs) to Physitrack. Pt verbalized understanding and performed correct return. Printed copy given to pt.        CARDIOVASCULAR      Nu Step     Stationary Bike Seat 7; 8 min full revolutions Y   Elliptical     Treadmill     STRENGTHENING     Quad sets 10 x 10s    HS curls  NV   SLR 2 x 10 Y   LAQ  SAQ 2 x 10; 3#  2 x 10; 3# VC for full ROM and eccentric control  Y   Clamshells     Bridges 2 x 10; 3s Y   Sit to stand with band 2 x 5; PT at knees Y   Resisted side stepping No resistance in // bars with finger tip assist    FSU 2 x 10 VC to prevent circumduction  Y   LSU 2 x 10 Y   Eccentric step down 2 x 10; to 2\"step from 6\" Y   HR/TR 1 x 10  Y   Standing high march 1 x 15 each Y   Hip 3 way 1 x 10 B/L    Lunges     TKE 2 x 15; GTB Y             STRETCHING/ROM     Heel slides 10 x 10s in sitting w/strap shoe off Y   Hamstring stretch 3 x 30s at stairs    Hip flexor stretch     Knee flexion stretch step 10 x 5s Y   Standing calf stretch 3 x 30s with wedge Y         NEURO RE-ED (20172) TOTAL TIME FOR SESSION Not performed    Tandem balance 30s x 2 each  " "  Anterior weight shifting 20x     Cone taps 2 x 10; 6\" cone with black block on ground with cues to minimize UE support    Cone step overs     Postural re-education     Taping     GAIT TRAINING (40068) TOTAL TIME FOR SESSION Not performed    Stair Reviewed stair training involving step to pattern until symptoms and strength improve. Reviewed goals for progression    Gait Reviewed SPC use. Discussed use of walker if feelings of instability/weakness/pain limit gait with cane. The patient verbalizes agreement and understanding.   11/17 Ambulation with SPC & in // bars        MANUAL (37483) TOTAL TIME FOR SESSION 8-22 Minutes    Manual stretching Hip flexor stretch/rectus fem stretch 3 x 30s with stabilization    STM Distal quad; distal HS/ proximal gastroc (by Rui Del Cid, PT) Y   Mobilization Patellar superior glides, inferior glides, bouts of 15s (by Rui Del Cid, PT)  Knee flexion mobilizations: bouts of 15s to tolerance in supine (by Rui Del Cid, PT)  Knee extension mobilizations (by Rui Del Cid, PT) Y    Y    Y   Y = yes; N = no; G = group                         "

## 2023-11-21 NOTE — OP PT TREATMENT LOG
"ORTHO PT FLOWSHEET    Diagnosis: L TKA 10/11/23   Precautions: osteoporosis, heart disease:SVT, AFIB, h/o melanoma,      Exercises Current Session Time Completed (Y/N/G)   MODALITIES- HEAT/ICE (62378) TOTAL TIME FOR SESSION Not performed    Heat     Ice     MODALITIES - E-STIM (48578) TOTAL TIME FOR SESSION Not performed    E-stim/H-Wave     THER ACT (62065) TOTAL TIME FOR SESSION 0-7 Minutes    Assessment  See note     Vitals/pain See note Y   Education Educated patient on knee anatomy and findings of examination. Discussed HEP and goals for treatment/expected outcomes. The patient verbalizes agreement and understanding.   11/13: Pt educated on the need to avoid kicking the L leg out when moving to sit; verbalizes understanding.     Body Mechanics/Work Training/ Transfer technique Sit to stand transfer training focusing on equalizing weight bearing (leans to R due to compensation from before surgery) and equalizing foot placement. With mirror x 10 at raised table     GROUP (71423)  Not performed    THER EX (74060) TOTAL TIME FOR SESSION 38-52 Minutes    HEP 10/30/23: quad sets, knee extension mobilization, heel slides, hamstring stretch, seated heel slides  11/13/23: added LAQs to Hep and added patient (with LAQs) to Physitrack. Pt verbalized understanding and performed correct return. Printed copy given to pt.        CARDIOVASCULAR      Nu Step     Stationary Bike Seat 7; 8 min full revolutions Y   Elliptical     Treadmill     STRENGTHENING     Quad sets 10 x 10s    HS curls  NV   SLR 2 x 10 Y   LAQ  SAQ 2 x 10; 3#  2 x 10; 3# VC for full ROM and eccentric control  Y   Clamshells     Bridges 2 x 10; 3s Y   Sit to stand with band 2 x 5; PT at knees Y   Resisted side stepping No resistance in // bars with finger tip assist    FSU 2 x 10 VC to prevent circumduction  Y   LSU 2 x 10 Y   Eccentric step down 2 x 10; to 2\"step from 6\" Y   HR/TR 1 x 10  Y   Standing high march 1 x 15 each Y   Hip 3 way 1 x 10 B/L    Lunges " "    TKE 2 x 15; GTB Y             STRETCHING/ROM     Heel slides 10 x 10s in sitting w/strap shoe off Y   Hamstring stretch 3 x 30s at stairs    Hip flexor stretch     Knee flexion stretch step 10 x 5s Y   Standing calf stretch 3 x 30s with wedge Y         NEURO RE-ED (40836) TOTAL TIME FOR SESSION Not performed    Tandem balance 30s x 2 each    Anterior weight shifting 20x     Cone taps 2 x 10; 6\" cone with black block on ground with cues to minimize UE support    Cone step overs     Postural re-education     Taping     GAIT TRAINING (58569) TOTAL TIME FOR SESSION Not performed    Stair Reviewed stair training involving step to pattern until symptoms and strength improve. Reviewed goals for progression    Gait Reviewed SPC use. Discussed use of walker if feelings of instability/weakness/pain limit gait with cane. The patient verbalizes agreement and understanding.   11/17 Ambulation with SPC & in // bars        MANUAL (27945) TOTAL TIME FOR SESSION 8-22 Minutes    Manual stretching Hip flexor stretch/rectus fem stretch 3 x 30s with stabilization    STM Distal quad; distal HS/ proximal gastroc (by Rui Del Cid, PT) Y   Mobilization Patellar superior glides, inferior glides, bouts of 15s (by Rui Del Cid, PT)  Knee flexion mobilizations: bouts of 15s to tolerance in supine (by Rui Del Cid, PT)  Knee extension mobilizations (by Rui Del Cid, PT) Y    Y    Y   Y = yes; N = no; G = group  "

## 2023-11-24 ENCOUNTER — HOSPITAL ENCOUNTER (OUTPATIENT)
Dept: PHYSICAL THERAPY | Age: 88
Setting detail: THERAPIES SERIES
Discharge: HOME | End: 2023-11-24
Attending: ORTHOPAEDIC SURGERY
Payer: COMMERCIAL

## 2023-11-24 DIAGNOSIS — Z96.652 PRESENCE OF LEFT ARTIFICIAL KNEE JOINT: Primary | ICD-10-CM

## 2023-11-24 DIAGNOSIS — M25.562 LEFT KNEE PAIN, UNSPECIFIED CHRONICITY: ICD-10-CM

## 2023-11-24 PROCEDURE — 97110 THERAPEUTIC EXERCISES: CPT | Mod: GP

## 2023-11-24 NOTE — PROGRESS NOTES
Physical Therapy Visit    PT DAILY NOTE FOR OUTPATIENT THERAPY    Patient: Alexandra Bird MRN: [id number]  : 1935 88 y.o.  Referring Physician: Tonie Dudley MD  Date of Visit: 2023    Certification Dates: 10/30/23 through 23    Diagnosis:   1. Presence of left artificial knee joint    2. Left knee pain, unspecified chronicity        Chief Complaints:  pain, decreased ROM and strength    Precautions:   Existing Precautions/Restrictions: no known precautions/restrictions  Precautions comments: osteoporosis      TODAY'S VISIT    Time In Session:  Start Time: 1105  Stop Time: 1210  Time Calculation (min): 65 min   History/Vitals/Pain/Encounter Info - 23 1105        Injury History/Precautions/Daily Required Info    Document Type daily treatment     Primary Therapist Rui     Chief Complaint/Reason for Visit  pain, decreased ROM and strength     Onset of Illness/Injury or Date of Surgery 10/11/23     Referring Physician Dr. Dudley     Existing Precautions/Restrictions no known precautions/restrictions     Precautions comments osteoporosis     History of present illness/functional impairment Alexandra is an 87 y/o female with PMH of SVT, PAF, CAD, RUTHIE, HLD, chronic hip/knee pain, osteoporosis presenting outpatient PT for TKA 10/11/23. She initially saw [facility] physicians 8-9 yrs ago, receiving injections for her knees that would last her symptoms for 7-8 months per patient. She trialed course of outpatient pre-hab and finally underwent TKA 10/11/23. Alexandra notes returning home 2 days post op. She notes that she has been only having to take tylenol and has been doing well. She endorses continued difficulty with walking. She is able to sleep on her side. She notes stiffness in the morning. Endorses some continued ankle swelling. Notes doing overall well, although some difficulty noted with SPC use due to coordination.     Patient/Family/Caregiver Comments/Observations Patient reports that she was  "doing her HEP this morning and now her knee is tight.     Patient reported fall since last visit No        Pain Assessment    Currently in pain Yes     Preferred Pain Scale number (Numeric Rating Pain Scale)     Pain: Body location Knee     Pain Rating (0-10): Pre Activity 1        Pain Intervention    Intervention  te; manual     Post Intervention Comments n/a        Pre Activity Vital Signs    Pulse 91     Oxygen Therapy None (Room air)     /64     BP Location Right upper arm     BP Method Automatic     Patient Position Sitting                Daily Treatment Assessment and Plan - 11/24/23 1105        Daily Treatment Assessment and Plan    Progress toward goals Progressing     Daily Outcome Summary Patient reported feeling stiff this am prior to session.  She completed all ther ex to improve ROM and strength of left lower extremity.  Alexandra was able to perform prone hamstring curls and progressed prone knee flexion from 80' to 85\" after contract relax.  Left lower extremity eccentric control training was addressed with 4\" step downs.  Form/technique improved with verbal cues.  Continue POC as per primary PT to achieve goals and maximize function.     Plan and Recommendations ROM, strengthening, functional mobility; stm                       Today's Treatment:    ORTHO PT FLOWSHEET    Diagnosis: L TKA 10/11/23   Precautions: osteoporosis, heart disease:SVT, AFIB, h/o melanoma,      Exercises Current Session Time Completed (Y/N/G)   MODALITIES- HEAT/ICE (66664) TOTAL TIME FOR SESSION Not performed    Heat     Ice     MODALITIES - E-STIM (91796) TOTAL TIME FOR SESSION Not performed    E-stim/H-Wave     THER ACT (31991) TOTAL TIME FOR SESSION 0-7 Minutes    Assessment  See note     Vitals/pain See note Y   Education Educated patient on knee anatomy and findings of examination. Discussed HEP and goals for treatment/expected outcomes. The patient verbalizes agreement and understanding.   11/13: Pt educated on the " "need to avoid kicking the L leg out when moving to sit; verbalizes understanding.     Body Mechanics/Work Training/ Transfer technique Sit to stand transfer training focusing on equalizing weight bearing (leans to R due to compensation from before surgery) and equalizing foot placement. With mirror x 10 at raised table     GROUP (27700)  Not performed    THER EX (45863) TOTAL TIME FOR SESSION 53-67 Minutes    HEP 10/30/23: quad sets, knee extension mobilization, heel slides, hamstring stretch, seated heel slides  11/13/23: added LAQs to Hep and added patient (with LAQs) to Physitrack. Pt verbalized understanding and performed correct return. Printed copy given to pt.        CARDIOVASCULAR      Nu Step     Stationary Bike Seat 7; 8 min full revolutions--needed to put seat on 10 for 2 min then seat 9-- today due to discomfort--but made full revolutions Y   Elliptical     Treadmill     STRENGTHENING          Quad sets 10 x 10s y   HS curls 10 x 2  Gentle contract/relax 3x   ROM from 80 to 85' after C/R y   SLR 2 x 10    LAQ  SAQ 2 x 10; 3#  2 x 10; 3# VC for full ROM and eccentric control  Y  y   Clamshells     Bridges 2 x 10; 3s  GTB Y   Sit to stand with band 2 x 5; PT at knees Y   Resisted side stepping No resistance in // bars with finger tip assist    FSU 2 x 10 VC to prevent circumduction  Y   LSU 2 x 10 Y   Eccentric step down 2 x 10; to 2\"step from 6\" Y   HR/TR 1 x 10  Y   Standing high march 1 x 15 each    Hip 3 way 1 x 10 B/L    Lunges     TKE 2 x 15; GTB Y             STRETCHING/ROM     Heel slides 10 x 10s in sitting w/strap shoe off    Hamstring stretch 3 x 30s at stairs    Hip flexor stretch At steps 20s x 3 with cues y   Knee flexion stretch step 10 x 5s    Standing calf stretch 3 x 30s with wedge Y         NEURO RE-ED (56127) TOTAL TIME FOR SESSION Not performed    Tandem balance 30s x 2 each    Anterior weight shifting 20x     Cone taps 2 x 10; 6\" cone with black block on ground with cues to minimize " UE support    Cone step overs     Postural re-education     Taping     GAIT TRAINING (36075) TOTAL TIME FOR SESSION Not performed    Stair Reviewed stair training involving step to pattern until symptoms and strength improve. Reviewed goals for progression    Gait Reviewed SPC use. Discussed use of walker if feelings of instability/weakness/pain limit gait with cane. The patient verbalizes agreement and understanding.   11/17 Ambulation with SPC & in // bars        MANUAL (05169) TOTAL TIME FOR SESSION 8-22 Minutes    Manual stretching Hip flexor stretch/rectus fem stretch 3 x 30s with stabilization    STM Distal quad; distal HS/ proximal gastroc (by Rui Del Cid, PT) Y   Mobilization Patellar superior glides, inferior glides, bouts of 15s (by Rui Del Cid, PT)  Knee flexion mobilizations: bouts of 15s to tolerance in supine (by Rui Del Cid, PT)  Knee extension mobilizations (by Rui Del Cid, PT) Y    Y    Y   Y = yes; N = no; G = group

## 2023-11-24 NOTE — OP PT TREATMENT LOG
ORTHO PT FLOWSHEET    Diagnosis: L TKA 10/11/23   Precautions: osteoporosis, heart disease:SVT, AFIB, h/o melanoma,      Exercises Current Session Time Completed (Y/N/G)   MODALITIES- HEAT/ICE (46107) TOTAL TIME FOR SESSION Not performed    Heat     Ice     MODALITIES - E-STIM (31006) TOTAL TIME FOR SESSION Not performed    E-stim/H-Wave     THER ACT (70447) TOTAL TIME FOR SESSION 0-7 Minutes    Assessment  See note     Vitals/pain See note Y   Education Educated patient on knee anatomy and findings of examination. Discussed HEP and goals for treatment/expected outcomes. The patient verbalizes agreement and understanding.   11/13: Pt educated on the need to avoid kicking the L leg out when moving to sit; verbalizes understanding.     Body Mechanics/Work Training/ Transfer technique Sit to stand transfer training focusing on equalizing weight bearing (leans to R due to compensation from before surgery) and equalizing foot placement. With mirror x 10 at raised table     GROUP (74112)  Not performed    THER EX (20582) TOTAL TIME FOR SESSION 53-67 Minutes    HEP 10/30/23: quad sets, knee extension mobilization, heel slides, hamstring stretch, seated heel slides  11/13/23: added LAQs to Hep and added patient (with LAQs) to Physitrack. Pt verbalized understanding and performed correct return. Printed copy given to pt.        CARDIOVASCULAR      Nu Step     Stationary Bike Seat 7; 8 min full revolutions--needed to put seat on 10 for 2 min then seat 9-- today due to discomfort--but made full revolutions Y   Elliptical     Treadmill     STRENGTHENING          Quad sets 10 x 10s y   HS curls 10 x 2  Gentle contract/relax 3x   ROM from 80 to 85' after C/R y   SLR 2 x 10    LAQ  SAQ 2 x 10; 3#  2 x 10; 3# VC for full ROM and eccentric control  Y  y   Clamshells     Bridges 2 x 10; 3s  GTB Y   Sit to stand with band 2 x 5; PT at knees Y   Resisted side stepping No resistance in // bars with finger tip assist    FSU 2 x 10 VC to  "prevent circumduction  Y   LSU 2 x 10 Y   Eccentric step down 2 x 10; to 2\"step from 6\" Y   HR/TR 1 x 10  Y   Standing high march 1 x 15 each    Hip 3 way 1 x 10 B/L    Lunges     TKE 2 x 15; GTB Y             STRETCHING/ROM     Heel slides 10 x 10s in sitting w/strap shoe off    Hamstring stretch 3 x 30s at stairs    Hip flexor stretch At steps 20s x 3 with cues y   Knee flexion stretch step 10 x 5s    Standing calf stretch 3 x 30s with wedge Y         NEURO RE-ED (71114) TOTAL TIME FOR SESSION Not performed    Tandem balance 30s x 2 each    Anterior weight shifting 20x     Cone taps 2 x 10; 6\" cone with black block on ground with cues to minimize UE support    Cone step overs     Postural re-education     Taping     GAIT TRAINING (53698) TOTAL TIME FOR SESSION Not performed    Stair Reviewed stair training involving step to pattern until symptoms and strength improve. Reviewed goals for progression    Gait Reviewed SPC use. Discussed use of walker if feelings of instability/weakness/pain limit gait with cane. The patient verbalizes agreement and understanding.   11/17 Ambulation with SPC & in // bars        MANUAL (44138) TOTAL TIME FOR SESSION 8-22 Minutes    Manual stretching Hip flexor stretch/rectus fem stretch 3 x 30s with stabilization    STM Distal quad; distal HS/ proximal gastroc (by Rui Del Cid, PT) Y   Mobilization Patellar superior glides, inferior glides, bouts of 15s (by Rui Del Cid, PT)  Knee flexion mobilizations: bouts of 15s to tolerance in supine (by Rui Del Cid, PT)  Knee extension mobilizations (by Rui Del Cid, PT) Y    Y    Y   Y = yes; N = no; G = group  "

## 2023-11-28 ENCOUNTER — HOSPITAL ENCOUNTER (OUTPATIENT)
Dept: PHYSICAL THERAPY | Age: 88
Setting detail: THERAPIES SERIES
Discharge: HOME | End: 2023-11-28
Attending: ORTHOPAEDIC SURGERY
Payer: COMMERCIAL

## 2023-11-28 DIAGNOSIS — M25.562 LEFT KNEE PAIN, UNSPECIFIED CHRONICITY: ICD-10-CM

## 2023-11-28 DIAGNOSIS — Z96.652 PRESENCE OF LEFT ARTIFICIAL KNEE JOINT: Primary | ICD-10-CM

## 2023-11-28 PROCEDURE — 97110 THERAPEUTIC EXERCISES: CPT | Mod: GP

## 2023-11-28 PROCEDURE — 97116 GAIT TRAINING THERAPY: CPT | Mod: GP

## 2023-11-28 PROCEDURE — 97530 THERAPEUTIC ACTIVITIES: CPT | Mod: GP

## 2023-11-28 ASSESSMENT — GAIT ASSESSMENTS
TUG TIME: 11
TUG TIME: SPC
TUG TIME: 10.4
TUG TIME: 10.8
TUG TIME: 10.9

## 2023-11-28 NOTE — OP PT TREATMENT LOG
ORTHO PT FLOWSHEET    Diagnosis: L TKA 10/11/23   Precautions: osteoporosis, heart disease:SVT, AFIB, h/o melanoma,      Exercises Current Session Time Completed (Y/N/G)   MODALITIES- HEAT/ICE (67196) TOTAL TIME FOR SESSION Not performed    Heat     Ice     MODALITIES - E-STIM (34028) TOTAL TIME FOR SESSION Not performed    E-stim/H-Wave     THER ACT (83123) TOTAL TIME FOR SESSION 23-37 Minutes    Assessment  See note  Y   Vitals/pain See note Y   Education Educated patient on knee anatomy and findings of examination. Discussed HEP and goals for treatment/expected outcomes. The patient verbalizes agreement and understanding.   11/13: Pt educated on the need to avoid kicking the L leg out when moving to sit; verbalizes understanding.     Body Mechanics/Work Training/ Transfer technique Sit to stand transfer training focusing on equalizing weight bearing (leans to R due to compensation from before surgery) and equalizing foot placement. With mirror x 10 at raised table     GROUP (87031)  Not performed    THER EX (11037) TOTAL TIME FOR SESSION 8-22 Minutes    HEP 10/30/23: quad sets, knee extension mobilization, heel slides, hamstring stretch, seated heel slides  11/13/23: added LAQs to Hep and added patient (with LAQs) to Physitrack. Pt verbalized understanding and performed correct return. Printed copy given to pt.  11/28/23: single leg stance, eccentric step down, hip abduction Y       CARDIOVASCULAR      Nu Step     Stationary Bike Seat 7; 8 min full revolutions--needed to put seat on 10 for 2 min then seat 9-- today due to discomfort--but made full revolutions Y   Elliptical     Treadmill     STRENGTHENING          Quad sets 10 x 10s    HS curls 10 x 2  Gentle contract/relax 3x   ROM from 80 to 85' after C/R    SLR 2 x 10    LAQ  SAQ 2 x 10; 3#  2 x 10; 3# VC for full ROM and eccentric control     Clamshells     Bridges 2 x 10; 3s  GTB    Sit to stand with band 2 x 5; PT at knees NV   Resisted side stepping OTB 4  ";laps // Y   FSU 2 x 10 VC to prevent circumduction  Y; NV   LSU 1 x 10 Y; NV   Eccentric step down 1 x 10 Y; NV   HR/TR 1 x 10     Standing high march 1 x 15 each    Standing hip abduction 1 x 10 B/L Y; NV   Lunges  NV   TKE 2 x 15; GTB              STRETCHING/ROM     Heel slides 10 x 10s in sitting w/strap shoe off    Hamstring stretch 3 x 30s at stairs    Hip flexor stretch At steps 20s x 3 with cues    Knee flexion stretch step 10 x 5s    Standing calf stretch 3 x 30s with wedge Y        NEURO RE-ED (52432) TOTAL TIME FOR SESSION Not performed    Tandem balance 30s x 2 each    Anterior weight shifting 20x     Cone taps 2 x 10; 6\" cone with black block on ground with cues to minimize UE support    Cone step overs  NV (progress dynamic balance)        Taping     GAIT TRAINING (86870) TOTAL TIME FOR SESSION 8-22 Minutes    Stair Reviewed progressions of stair ambulation as well as safety strategies for part practice of step up/step down Y   Gait No AD: speed walk with cue for increased strides  Retroambulation  Mirror cue walking 30 ft x 2  Monster walk(large lateral step out)  High knee march; butt kick walk/HS curl Y  Y  Y  Y  Y     MANUAL (27104) TOTAL TIME FOR SESSION Not performed    Manual stretching Hip flexor stretch/rectus fem stretch 3 x 30s with stabilization    STM Distal quad; distal HS/ proximal gastroc     Mobilization Patellar superior glides, inferior glides, bouts of 15s   Knee flexion mobilizations: bouts of 15s to tolerance in supine  Knee extension mobilizations     Y = yes; N = no; G = group  "

## 2023-11-29 NOTE — PROGRESS NOTES
Physical Therapy Progress Note    PT PROGRESS NOTE FOR OUTPATIENT THERAPY    Patient: Alexandra Bird   MRN: [id number]  : 1935 88 y.o.    Referring Physician: Tonie Dudley MD  Date of Visit: 2023    Certification Dates: 10/30/23 through 23    Recommended Frequency & Duration:  2 times/week for up to 6 weeks        Diagnosis:   1. Presence of left artificial knee joint    2. Left knee pain, unspecified chronicity        Chief Complaints:  Chief Complaint   Patient presents with    Balance Deficits    Dec Strength    Decreased recreational/play activity       Precautions:   Existing Precautions/Restrictions: no known precautions/restrictions  Precautions comments: osteoporosis    TODAY'S VISIT:    Time In Session:  Start Time: 1301  Stop Time: 1400  Time Calculation (min): 59 min   General Information - 23 1302        Session Details    Document Type progress note        General Information    Onset of Illness/Injury or Date of Surgery 10/11/23     Referring Physician Dr. Dudley     History of present illness/functional impairment Alexandra is an 89 y/o female with PMH of SVT, PAF, CAD, RUTHIE, HLD, chronic hip/knee pain, osteoporosis presenting outpatient PT for TKA 10/11/23. She initially saw [facility] physicians 8-9 yrs ago, receiving injections for her knees that would last her symptoms for 7-8 months per patient. She trialed course of outpatient pre-hab and finally underwent TKA 10/11/23. Alexandra notes returning home 2 days post op. She notes that she has been only having to take tylenol and has been doing well. She endorses continued difficulty with walking. She is able to sleep on her side. She notes stiffness in the morning. Endorses some continued ankle swelling. Notes doing overall well, although some difficulty noted with SPC use due to coordination.     Patient/Family/Caregiver Comments/Observations Alexandra notes that she has been feeling good. She notes that she continues to have  swelling with exercises.     Existing Precautions/Restrictions no known precautions/restrictions     Precautions comments osteoporosis                Daily Falls Screen - 11/28/23 2144        Daily Falls Assessment    Patient reported fall since last visit No                Pain/Vitals - 11/28/23 1302        Pain Assessment    Currently in pain No/Denies        Pre Activity Vital Signs    Pulse 75     Oxygen Therapy None (Room air)     /71     BP Location Left upper arm     BP Method Automatic     Patient Position Sitting                PT - 11/29/23 1041        Physical Therapy    Physical Therapy [facility] PT        PT Plan    Frequency of treatment 2 times/week     PT Duration 6 weeks     PT Cert From 10/30/23     PT Cert To 12/11/23     Date PT POC was sent to provider 10/30/23     Signed PT Plan of Care received?  Yes                Assessment and Plan - 11/28/23 1302        Assessment    Plan of Care reviewed and patient/family in agreement Yes     System Pathology/Pathophysiology Noted musculoskeletal;neuromuscular     Functional Limitations in Following Categories (PT Eval) home management;community/leisure     Rehab Potential/Prognosis good, to achieve stated therapy goals     Problem List decreased strength;decreased ROM;impaired balance;pain;impaired motor control     Clinical Assessment Alexandra has attended 8 visits of skilled physical therapy targeting her L TKA 10/11/23. She demonstrates improvements in knee ROM and strength since initial evaluation with improved gait quality as well. Noted improvements in TUG scores and balance, indicative of decreased falls risk. Able to complete 30s STS test this session with score of 10. She remains with deficits in stair ambulation, gait quality, static and dynamic balance that continue to impact her daily activity. Alexandra has been benefitting from physical therapy, and will benefit from continued PT to address remaining deficits for return  to PLOF.     Plan and Recommendations Focus on stair ambulation, dynamic gait and balance interventions and functional strengthening.     Planned Services CPT 50676 Gait training;CPT 69517 Manual therapy;CPT 57177 Neuromuscular Reeducation;CPT 98971 Therapeutic exercises;CPT 64447 Therapeutic activities;CPT 96795 Electrical stimulation UNATTENDED;CPT 41795 Hot/Cold Packs therapy                 OBJECTIVE MEASUREMENTS/DATA:    ROM    Range of Motion - 11/28/23 1300        LEFT: Lower Extremity PROM Assessment    Knee Flexion  125     Knee Extension  0        LEFT: Lower Extremity AROM Assessment    Knee Flexion   110     Knee Extension   -1               MMT    Manual Muscle Tests - 11/29/23 1041        RIGHT: Lower Extremity Manual Muscle Test Assessment    Hip Flexion gross movement (5/5) normal     Hip Extension gross movement (4/5) good     Hip Abduction gross movement (4/5) good     Hip Internal Rotation gross movement (4+/5) good plus     Hip External Rotation gross movement (4+/5) good plus     Knee Flexion strength (5/5) normal     Knee Extension strength (5/5) normal     Ankle Dorsiflexion gross movement (5/5) normal     Ankle Plantarflexion gross movement (5/5) normal     Ankle Inversion gross movement (5/5) normal     Ankle Eversion gross movement (5/5) normal        LEFT: Lower Extremity Manual Muscle Test Assessment    Hip Flexion gross movement (4+/5) good plus     Hip Extension gross movement (4/5) good     Hip Abduction gross movement (4/5) good     Hip Internal Rotation gross movement (5/5) normal     Hip External Rotation gross movement (5/5) normal     Knee Flexion strength (4+/5) good plus   uncomfortable    Knee Extension strength (4+/5) good plus   held full pressure    Ankle Dorsiflexion gross movement (4+/5) good plus     Ankle Plantarflexion gross movement (4+/5) good plus     Ankle Inversion gross movement (4+/5) good plus     Ankle Eversion gross movement (4+/5) good plus               Skin     Skin Assessment - 11/29/23 1041        LE Skin    Skin Condition Intact   incision is healing well with pt performing scar massage at home              Palpation    Palpation - 11/29/23 1041        Palpation    LE Palpation  Mild sensitivty along incision without pain reproduction               Balance/Posture    Balance and Posture - 11/29/23 1041        Balance Assessment    Comment, Balance tandem stance 30s bilaterally, single leg stance 3-5 seconds on avg bilaterally               Gait and Mobility    Gait and Mobility - 11/29/23 1041        Gait Training    Gait surface comments Remains with trunk shift in gait with SPC to R side               Outcome Measures    PT Outcome Measures - 11/28/23 1302        Objective Outcome Measures    30 Second Sit to Stand Test 10 repetitions   performed without UE       Subjective Outcome Measures    KOOS 59/80= 74%        NEW (2/6/23):  PSFS     PSFS ACTIVITY 1 dancing     PSFS ANSWER 1 5     PSFS ACTIVITY 2 shopping     PSFS ANSWER 2 6     PSFS ACTIVITY 3 stairs     PSFS ANSWER 3 1     PSFS ACTIVITY 4 prolonged walking     PSFS ANSWER 4 7     PSFS Sum of Activity Scores 19     PSFS Number of Activities 4     PSFS Percent Score 47.5 %        Timed Up and Go Test    Trial One: Timed Up and Go Test 11     Trial Two: Timed Up and Go Test 10.4     Trial Three: Timed Up and Go Test 10.9     Mean of 3 Trials: Timed Up and Go Test 10.8     Results, Timed Up and Go Test (Balance) SPC                 ROM and MMT        9/6/2023 10/2/2023 10/9/2023 10/30/2023 11/28/2023    13:00 11/29/2023    10:41   PT LE ROM Measurements   PROM: Right Hip Flexion 110 degrees        AROM: Right Hip Flexion 95 degrees        PROM: Left Hip Flexion 114 degrees        AROM: Left Hip Flexion 104 degrees        AROM: Right Hip IR 20 degrees        AROM: Left Hip IR 19 degrees        AROM: Right Hip ER 35 degrees        AROM: Left Hip ER 22 degrees        PROM: Right Knee Flexion 129  140      AROM:  Right Knee Flexion 125 135 135 135     PROM: Left Knee Flexion 121       painful into end range of motion  139 95 125    AROM: Left Knee Flexion 111 130 132 87 110    PROM: Right Knee Extension -4       lacking  0      AROM: Right Knee Extension -4 0 0 2     PROM: Left Knee Extension -2  0 0 0    AROM: Left Knee Extension -4       lacking -1 0 -4 -1    PT LE MMT   Right Hip Flexion (5/5) normal (5/5) normal (5/5) normal (5/5) normal  (5/5) normal   Left Hip Flexion (4+/5) good plus (4+/5) good plus (4+/5) good plus (4+/5) good plus  (4+/5) good plus   Right Hip Extension (3+/5) fair plus (4/5) good (4/5) good (4/5) good  (4/5) good   Left Hip Extension (3+/5) fair plus (4/5) good (4/5) good (4/5) good  (4/5) good   Right Hip ABD (3+/5) fair plus (4/5) good (4/5) good (4/5) good  (4/5) good   Left Hip ABD (4-/5) good minus (4/5) good (4-/5) good minus (4-/5) good minus  (4/5) good   Right Hip IR (5/5) normal (5/5) normal (5/5) normal (4+/5) good plus  (4+/5) good plus   Left Hip IR (5/5) normal (5/5) normal (5/5) normal (5/5) normal  (5/5) normal   Right Hip ER (5/5) normal (5/5) normal (5/5) normal (4+/5) good plus  (4+/5) good plus   Left Hip ER (5/5) normal (5/5) normal (5/5) normal (5/5) normal  (5/5) normal   Right Knee Flexion (4+/5) good plus (4+/5) good plus (4+/5) good plus (5/5) normal  (5/5) normal   Left Knee Flexion (4/5) good (4/5) good       uncomfortable (4/5) good       uncomfortable (4/5) good       uncomfortable  (4+/5) good plus       uncomfortable   Right Knee Extension (4+/5) good plus (4+/5) good plus (4+/5) good plus (5/5) normal  (5/5) normal   Left Knee Extension (4/5) good (4+/5) good plus (4+/5) good plus (4-/5) good minus       held full pressure  (4+/5) good plus       held full pressure   Right Ankle DF (5/5) normal (5/5) normal (5/5) normal (5/5) normal  (5/5) normal   Left Ankle DF (4+/5) good plus (4+/5) good plus (4+/5) good plus (4+/5) good plus  (4+/5) good plus   Right Ankle PF  (5/5) normal (5/5) normal (5/5) normal (5/5) normal  (5/5) normal   Left Ankle PF (4+/5) good plus (4+/5) good plus (4+/5) good plus (4+/5) good plus  (4+/5) good plus   Right Ankle Inversion (4+/5) good plus (4+/5) good plus (4+/5) good plus (5/5) normal  (5/5) normal   Left Ankle Inversion  (4+/5) good plus (4+/5) good plus (4+/5) good plus  (4+/5) good plus   Right Ankle Eversion (4+/5) good plus (4+/5) good plus (4+/5) good plus (5/5) normal  (5/5) normal   Left Ankle Eversion  (4+/5) good plus (4+/5) good plus (4+/5) good plus  (4+/5) good plus     Outcome Measures        9/6/2023    10:59 9/8/2023    13:15 10/2/2023    11:09 10/9/2023    11:01 10/30/2023    12:03 11/28/2023    13:02   PT OBJECTIVE Outcome Measures   30 Second Sit to Stand  9 repetitions 5 repetitions       pain limiting this session 8 repetitions  10 repetitions       performed without UE   TUG (Mean)  11.4   20 10.8   TUG - Results     Utilized SPC, L UE for pushup to standing SPC   PT SUBJECTIVE Outcome Measures   PSFS % Score 45 % 45 % 40 % 32.5 % 15 % 47.5 %   KOOS     KOS ADL: 43/80= 54% 59/80= 74%   Other WOMAC: 39/96: 40.6%  WOMAC: 15/96 = 15.6% WOMAC: 17/96 = 18%         Today's Treatment::    Education provided:  Yes: See treatment log for details of education provided  Methods: Discussion  Readiness: acceptance  Response: Verbalizes understanding    ORTHO PT FLOWSHEET    Diagnosis: L TKA 10/11/23   Precautions: osteoporosis, heart disease:SVT, AFIB, h/o melanoma,      Exercises Current Session Time Completed (Y/N/G)   MODALITIES- HEAT/ICE (54587) TOTAL TIME FOR SESSION Not performed    Heat     Ice     MODALITIES - E-STIM (44591) TOTAL TIME FOR SESSION Not performed    E-stim/H-Wave     THER ACT (69141) TOTAL TIME FOR SESSION 23-37 Minutes    Assessment  See note  Y   Vitals/pain See note Y   Education Educated patient on knee anatomy and findings of examination. Discussed HEP and goals for treatment/expected outcomes. The patient  verbalizes agreement and understanding.   11/13: Pt educated on the need to avoid kicking the L leg out when moving to sit; verbalizes understanding.     Body Mechanics/Work Training/ Transfer technique Sit to stand transfer training focusing on equalizing weight bearing (leans to R due to compensation from before surgery) and equalizing foot placement. With mirror x 10 at raised table     GROUP (07519)  Not performed    THER EX (99506) TOTAL TIME FOR SESSION 8-22 Minutes    HEP 10/30/23: quad sets, knee extension mobilization, heel slides, hamstring stretch, seated heel slides  11/13/23: added LAQs to Hep and added patient (with LAQs) to Physitrack. Pt verbalized understanding and performed correct return. Printed copy given to pt.  11/28/23: single leg stance, eccentric step down, hip abduction Y       CARDIOVASCULAR      Nu Step     Stationary Bike Seat 7; 8 min full revolutions--needed to put seat on 10 for 2 min then seat 9-- today due to discomfort--but made full revolutions Y   Elliptical     Treadmill     STRENGTHENING          Quad sets 10 x 10s    HS curls 10 x 2  Gentle contract/relax 3x   ROM from 80 to 85' after C/R    SLR 2 x 10    LAQ  SAQ 2 x 10; 3#  2 x 10; 3# VC for full ROM and eccentric control     Clamshells     Bridges 2 x 10; 3s  GTB    Sit to stand with band 2 x 5; PT at knees NV   Resisted side stepping OTB 4 ;laps // Y   FSU 2 x 10 VC to prevent circumduction  Y; NV   LSU 1 x 10 Y; NV   Eccentric step down 1 x 10 Y; NV   HR/TR 1 x 10     Standing high march 1 x 15 each    Standing hip abduction 1 x 10 B/L Y; NV   Lunges  NV   TKE 2 x 15; GTB              STRETCHING/ROM     Heel slides 10 x 10s in sitting w/strap shoe off    Hamstring stretch 3 x 30s at stairs    Hip flexor stretch At steps 20s x 3 with cues    Knee flexion stretch step 10 x 5s    Standing calf stretch 3 x 30s with wedge Y        NEURO RE-ED (43969) TOTAL TIME FOR SESSION Not performed    Tandem balance 30s x 2 each   "  Anterior weight shifting 20x     Cone taps 2 x 10; 6\" cone with black block on ground with cues to minimize UE support    Cone step overs  NV (progress dynamic balance)        Taping     GAIT TRAINING (90615) TOTAL TIME FOR SESSION 8-22 Minutes    Stair Reviewed progressions of stair ambulation as well as safety strategies for part practice of step up/step down Y   Gait No AD: speed walk with cue for increased strides  Retroambulation  Mirror cue walking 30 ft x 2  Monster walk(large lateral step out)  High knee march; butt kick walk/HS curl Y  Y  Y  Y  Y     MANUAL (03980) TOTAL TIME FOR SESSION Not performed    Manual stretching Hip flexor stretch/rectus fem stretch 3 x 30s with stabilization    STM Distal quad; distal HS/ proximal gastroc     Mobilization Patellar superior glides, inferior glides, bouts of 15s   Knee flexion mobilizations: bouts of 15s to tolerance in supine  Knee extension mobilizations     Y = yes; N = no; G = group       Goals Addressed                    This Visit's Progress      Mutually agreed upon pain goal   On track      Mutually agreed upon pain goal: Alexandra will have no pain with stair ambulation in 6 weeks        PT- Knee Alexandra goals (pt-stated)   On track      Alexandra's goals are to:    Improve tolerance to shopping  Improve stair ambulation  Improve prolonged walking ability        PT- L TKA   On track      Short Term Goals Time Frame Result Comment   Pt will increase L knee ROM >/= 10 degrees into flexion, extension to improve functional mobility 3 weeks Met    Increase KOS ADL >/=  10% to increase function   3 weeks Met    Decrease TUG </= 13.5 sec to decrease fall risk 4 weeks Met    Assess gait speed Asap     Pt will improve PSFS score >/= 20%  3 weeks Met    Pt will be independent with HEP to increase carryover at home 3 weeks Met    Pt will be able to walk >/= 1/4 mile without gait deviation  3 weeks Ongoing      Long Term Goals Time Frame Result Comment   Pt will " increase L LE  MMT into flexion, extension, abduction >/= ½ grade 6 weeks     Pt will increase L knee ROM >/= 20 degrees into flexion, extension to improve functional mobility 6 weeks     Increase KOS ADL >/=  20% to increase function   6 weeks     Pt to ambulate stairs step over step with unilateral support and no pain. 6 weeks     Assess gait speed 6 weeks     Pt will improve PSFS score >/= 40%  6 weeks     Pt will be independent with HEP to increase carryover at home 6 weeks     Pt will be able to walk >/= 1/2 mile without gait deviation  6 weeks                    Rui Del Cid, PT

## 2023-11-30 ENCOUNTER — HOSPITAL ENCOUNTER (OUTPATIENT)
Dept: PHYSICAL THERAPY | Age: 88
Setting detail: THERAPIES SERIES
Discharge: HOME | End: 2023-11-30
Attending: ORTHOPAEDIC SURGERY
Payer: COMMERCIAL

## 2023-11-30 DIAGNOSIS — M25.562 LEFT KNEE PAIN, UNSPECIFIED CHRONICITY: ICD-10-CM

## 2023-11-30 DIAGNOSIS — Z96.652 PRESENCE OF LEFT ARTIFICIAL KNEE JOINT: Primary | ICD-10-CM

## 2023-11-30 PROCEDURE — 97110 THERAPEUTIC EXERCISES: CPT | Mod: GP

## 2023-11-30 PROCEDURE — 97112 NEUROMUSCULAR REEDUCATION: CPT | Mod: GP

## 2023-12-05 ENCOUNTER — HOSPITAL ENCOUNTER (OUTPATIENT)
Dept: PHYSICAL THERAPY | Age: 88
Setting detail: THERAPIES SERIES
Discharge: HOME | End: 2023-12-05
Attending: ORTHOPAEDIC SURGERY
Payer: COMMERCIAL

## 2023-12-05 DIAGNOSIS — Z96.652 PRESENCE OF LEFT ARTIFICIAL KNEE JOINT: Primary | ICD-10-CM

## 2023-12-05 PROCEDURE — 97112 NEUROMUSCULAR REEDUCATION: CPT | Mod: GP

## 2023-12-05 PROCEDURE — 97116 GAIT TRAINING THERAPY: CPT | Mod: GP

## 2023-12-05 PROCEDURE — 97110 THERAPEUTIC EXERCISES: CPT | Mod: GP

## 2023-12-05 NOTE — PROGRESS NOTES
Physical Therapy Visit    PT DAILY NOTE FOR OUTPATIENT THERAPY    Patient: Alexandra Bird MRN: [id number]  : 1935 88 y.o.  Referring Physician: Tonie Dudley MD  Date of Visit: 2023    Certification Dates: 10/30/23 through 23    Diagnosis:   1. Presence of left artificial knee joint        Chief Complaints:  pain, decreased ROM and strength    Precautions:   Existing Precautions/Restrictions: no known precautions/restrictions  Precautions comments: osteoporosis      TODAY'S VISIT    Time In Session:  Start Time: 1100  Stop Time: 1200  Time Calculation (min): 60 min   History/Vitals/Pain/Encounter Info - 23 1106        Injury History/Precautions/Daily Required Info    Document Type daily treatment     Primary Therapist Rui     Chief Complaint/Reason for Visit  pain, decreased ROM and strength     Onset of Illness/Injury or Date of Surgery 10/11/23     Referring Physician Dr. Dudley     Existing Precautions/Restrictions no known precautions/restrictions     Precautions comments osteoporosis     History of present illness/functional impairment Alexandra is an 87 y/o female with PMH of SVT, PAF, CAD, RUTHIE, HLD, chronic hip/knee pain, osteoporosis presenting outpatient PT for TKA 10/11/23. She initially saw [facility] physicians 8-9 yrs ago, receiving injections for her knees that would last her symptoms for 7-8 months per patient. She trialed course of outpatient pre-hab and finally underwent TKA 10/11/23. Alexandra notes returning home 2 days post op. She notes that she has been only having to take tylenol and has been doing well. She endorses continued difficulty with walking. She is able to sleep on her side. She notes stiffness in the morning. Endorses some continued ankle swelling. Notes doing overall well, although some difficulty noted with SPC use due to coordination.     Patient/Family/Caregiver Comments/Observations Alexandra reports no changes in status since last session.     Patient reported  fall since last visit No        Pain Assessment    Currently in pain No/Denies     Preferred Pain Scale number (Numeric Rating Pain Scale)     Pain Rating (0-10): Pre Activity 0     Pain Rating (0-10): Activity 2     Pain Rating (0-10): Post Activity 0     Pain Management Interventions position adjusted        Pain Intervention    Intervention  TA     Post Intervention Comments pain as noted        Pre Activity Vital Signs    Pulse 65     /79     BP Location Left upper arm     BP Method Automatic     Patient Position Sitting                Daily Treatment Assessment and Plan - 12/05/23 1106        Daily Treatment Assessment and Plan    Progress toward goals Progressing     Daily Outcome Summary Completion of active warmup followed by stretching, patient completed lateral step downs with cues for form to simulate forward step downs, so this can be performed safely at home. Patient completed lateral weight shift practice and gait training following to decrease R lateral lean and improve L knee flexion in swing, mirror feedback was very successful. Readdition of the SPC increased R lateral lean deviation, but switching the cane to the L hand improved gait mechanics dramatically. This may be appropriate at this time due to the patient not requring the cane to offload the LLE, and being L handed and being able to receive full benefit for balance safety with the cane in the L hand.     Plan and Recommendations Continue dynamic gait and balance progressions, discuss transitions to IHEP.                     OBJECTIVE DATA TAKEN TODAY:    None taken    Today's Treatment:    ORTHO PT FLOWSHEET    Diagnosis: L TKA 10/11/23   Precautions: osteoporosis, heart disease:SVT, AFIB, h/o melanoma,      Exercises Current Session Time Completed (Y/N/G)   MODALITIES- HEAT/ICE (27666) TOTAL TIME FOR SESSION Not performed    Heat     Ice     MODALITIES - E-STIM (05612) TOTAL TIME FOR SESSION Not performed    E-stim/H-Wave     THER  ACT (60984) TOTAL TIME FOR SESSION 0-7 Minutes    Assessment  See note     Vitals/pain See note Y   Education Educated patient on knee anatomy and findings of examination. Discussed HEP and goals for treatment/expected outcomes. The patient verbalizes agreement and understanding.   11/13: Pt educated on the need to avoid kicking the L leg out when moving to sit; verbalizes understanding.     Body Mechanics/Work Training/ Transfer technique Sit to stand transfer training focusing on equalizing weight bearing (leans to R due to compensation from before surgery) and equalizing foot placement. With mirror x 10 at raised table     GROUP (54721)  Not performed    THER EX (12028) TOTAL TIME FOR SESSION 23-37 Minutes    HEP 10/30/23: quad sets, knee extension mobilization, heel slides, hamstring stretch, seated heel slides  11/13/23: added LAQs to Hep and added patient (with LAQs) to Physitrack. Pt verbalized understanding and performed correct return. Printed copy given to pt.  11/28/23: single leg stance, eccentric step down, hip abduction        CARDIOVASCULAR      Nu Step     Stationary Bike Seat 8; 8 min full revolutions--needed to put seat on 10 for 2 min then seat 9-- today due to discomfort--but made full revolutions Y   Elliptical     Treadmill     STRENGTHENING          Quad sets 10 x 10s    HS curls 10 x 2 standing    SLR 2 x 10    LAQ  SAQ 2 x 10; 3#  2 x 10; 3# VC for full ROM and eccentric control     Clamshells     Bridges 2 x 10; 3s  GTB    Sit to stand with band 1 x 10, 1 x 5; OTB at knees Y   Resisted side stepping OTB 4 ;laps // Y   FSU 2 x 10  Y    LSU 2 x 10 Y   Eccentric step down 2 x 10 Y   HR/TR 2 x 15    Standing high march 1 x 15 each    Standing hip abduction 1 x 20 B/L OTB knees    Lunges 1 x 10 each    TKE 2 x 15; GTB              STRETCHING/ROM     Heel slides 10 x 10s in sitting w/strap shoe off    Hamstring stretch 3 x 30s at stairs Y   Hip flexor stretch 3 x 30s at step Y   Knee flexion  "stretch step 10 x 10s Y   Standing calf stretch 3 x 30s with wedge Y   Prone quad stretch     NEURO RE-ED (22168) TOTAL TIME FOR SESSION 0-7 Minutes    Tandem balance 30s x 2 each    Anterior weight shifting 20x     Lateral WS and knee bend X 30 B Y   Cone taps 2 x 10; 6\" cone with black block on ground with cues to minimize UE support    Tandem stance 20s x 2 each    Single leg stance 5x to failure    Cone step overs 2 laps with step to pattern  3 laps step over step pattern     Cone step over with return 15x each forward and lateral    Taping     GAIT TRAINING (77004) TOTAL TIME FOR SESSION 8-22 Minutes    Stair Reviewed progressions of stair ambulation as well as safety strategies for part practice of step up/step down    Gait Cues for shoulders back, decreased lateral lean, LLE WB  Retroambulation  Mirror cue walking 30 ft x 8 no device  Cane in L hand 30 feet x 6       y  y   MANUAL (80079) TOTAL TIME FOR SESSION Not performed    Manual stretching Hip flexor stretch/rectus fem stretch 3 x 30s with stabilization    STM Distal quad; distal HS/ proximal gastroc     Mobilization Patellar superior glides, inferior glides, bouts of 15s   Knee flexion mobilizations: bouts of 15s to tolerance in supine  Knee extension mobilizations     Y = yes; N = no; G = group                         "

## 2023-12-05 NOTE — OP PT TREATMENT LOG
ORTHO PT FLOWSHEET    Diagnosis: L TKA 10/11/23   Precautions: osteoporosis, heart disease:SVT, AFIB, h/o melanoma,      Exercises Current Session Time Completed (Y/N/G)   MODALITIES- HEAT/ICE (21207) TOTAL TIME FOR SESSION Not performed    Heat     Ice     MODALITIES - E-STIM (37296) TOTAL TIME FOR SESSION Not performed    E-stim/H-Wave     THER ACT (50775) TOTAL TIME FOR SESSION 0-7 Minutes    Assessment  See note     Vitals/pain See note Y   Education Educated patient on knee anatomy and findings of examination. Discussed HEP and goals for treatment/expected outcomes. The patient verbalizes agreement and understanding.   11/13: Pt educated on the need to avoid kicking the L leg out when moving to sit; verbalizes understanding.     Body Mechanics/Work Training/ Transfer technique Sit to stand transfer training focusing on equalizing weight bearing (leans to R due to compensation from before surgery) and equalizing foot placement. With mirror x 10 at raised table     GROUP (40969)  Not performed    THER EX (53228) TOTAL TIME FOR SESSION 23-37 Minutes    HEP 10/30/23: quad sets, knee extension mobilization, heel slides, hamstring stretch, seated heel slides  11/13/23: added LAQs to Hep and added patient (with LAQs) to Physitrack. Pt verbalized understanding and performed correct return. Printed copy given to pt.  11/28/23: single leg stance, eccentric step down, hip abduction        CARDIOVASCULAR      Nu Step     Stationary Bike Seat 8; 8 min full revolutions--needed to put seat on 10 for 2 min then seat 9-- today due to discomfort--but made full revolutions Y   Elliptical     Treadmill     STRENGTHENING          Quad sets 10 x 10s    HS curls 10 x 2 standing    SLR 2 x 10    LAQ  SAQ 2 x 10; 3#  2 x 10; 3# VC for full ROM and eccentric control     Clamshells     Bridges 2 x 10; 3s  GTB    Sit to stand with band 1 x 10, 1 x 5; OTB at knees Y   Resisted side stepping OTB 4 ;laps // Y   FSU 2 x 10  Y    LSU 2 x 10 Y  "  Eccentric step down 2 x 10 Y   HR/TR 2 x 15    Standing high march 1 x 15 each    Standing hip abduction 1 x 20 B/L OTB knees    Lunges 1 x 10 each    TKE 2 x 15; GTB              STRETCHING/ROM     Heel slides 10 x 10s in sitting w/strap shoe off    Hamstring stretch 3 x 30s at stairs Y   Hip flexor stretch 3 x 30s at step Y   Knee flexion stretch step 10 x 10s Y   Standing calf stretch 3 x 30s with wedge Y   Prone quad stretch     NEURO RE-ED (97346) TOTAL TIME FOR SESSION 0-7 Minutes    Tandem balance 30s x 2 each    Anterior weight shifting 20x     Lateral WS and knee bend X 30 B Y   Cone taps 2 x 10; 6\" cone with black block on ground with cues to minimize UE support    Tandem stance 20s x 2 each    Single leg stance 5x to failure    Cone step overs 2 laps with step to pattern  3 laps step over step pattern     Cone step over with return 15x each forward and lateral    Taping     GAIT TRAINING (83975) TOTAL TIME FOR SESSION 8-22 Minutes    Stair Reviewed progressions of stair ambulation as well as safety strategies for part practice of step up/step down    Gait Cues for shoulders back, decreased lateral lean, LLE WB  Retroambulation  Mirror cue walking 30 ft x 8 no device  Cane in L hand 30 feet x 6       y  y   MANUAL (35523) TOTAL TIME FOR SESSION Not performed    Manual stretching Hip flexor stretch/rectus fem stretch 3 x 30s with stabilization    STM Distal quad; distal HS/ proximal gastroc     Mobilization Patellar superior glides, inferior glides, bouts of 15s   Knee flexion mobilizations: bouts of 15s to tolerance in supine  Knee extension mobilizations     Y = yes; N = no; G = group  "

## 2023-12-08 ENCOUNTER — HOSPITAL ENCOUNTER (OUTPATIENT)
Dept: PHYSICAL THERAPY | Age: 88
Setting detail: THERAPIES SERIES
Discharge: HOME | End: 2023-12-08
Attending: ORTHOPAEDIC SURGERY
Payer: COMMERCIAL

## 2023-12-08 DIAGNOSIS — M25.561 CHRONIC PAIN OF RIGHT KNEE: ICD-10-CM

## 2023-12-08 DIAGNOSIS — Z96.652 PRESENCE OF LEFT ARTIFICIAL KNEE JOINT: Primary | ICD-10-CM

## 2023-12-08 DIAGNOSIS — M17.0 OSTEOARTHRITIS OF BOTH KNEES, UNSPECIFIED OSTEOARTHRITIS TYPE: ICD-10-CM

## 2023-12-08 DIAGNOSIS — G89.29 CHRONIC PAIN OF RIGHT KNEE: ICD-10-CM

## 2023-12-08 DIAGNOSIS — M25.562 LEFT KNEE PAIN, UNSPECIFIED CHRONICITY: ICD-10-CM

## 2023-12-08 PROCEDURE — 97116 GAIT TRAINING THERAPY: CPT | Mod: GP,CQ

## 2023-12-08 PROCEDURE — 97110 THERAPEUTIC EXERCISES: CPT | Mod: GP,CQ

## 2023-12-08 NOTE — OP PT TREATMENT LOG
ORTHO PT FLOWSHEET    Diagnosis: L TKA 10/11/23   Precautions: osteoporosis, heart disease:SVT, AFIB, h/o melanoma,      Exercises Current Session Time Completed (Y/N/G)   MODALITIES- HEAT/ICE (76904) TOTAL TIME FOR SESSION Not performed    Heat     Ice     MODALITIES - E-STIM (56789) TOTAL TIME FOR SESSION Not performed    E-stim/H-Wave     THER ACT (59055) TOTAL TIME FOR SESSION 0-7 Minutes    Assessment  See note     Vitals/pain See note Y   Education Educated patient on knee anatomy and findings of examination. Discussed HEP and goals for treatment/expected outcomes. The patient verbalizes agreement and understanding.   11/13: Pt educated on the need to avoid kicking the L leg out when moving to sit; verbalizes understanding.     Body Mechanics/Work Training/ Transfer technique Sit to stand transfer training focusing on equalizing weight bearing (leans to R due to compensation from before surgery) and equalizing foot placement. With mirror x 10 at raised table     GROUP (83316)  Not performed    THER EX (28059) TOTAL TIME FOR SESSION 38-52 Minutes    HEP 10/30/23: quad sets, knee extension mobilization, heel slides, hamstring stretch, seated heel slides  11/13/23: added LAQs to Hep and added patient (with LAQs) to Physitrack. Pt verbalized understanding and performed correct return. Printed copy given to pt.  11/28/23: single leg stance, eccentric step down, hip abduction  12/8/23: added STS; entered above exercises to Physitrack so that patient has a record, printed and distributed copy to patient. Pt verbalized understanding and performed correct return. y       CARDIOVASCULAR      Nu Step     Stationary Bike Seat 8; 8 min full revolutions, level 1.0 Y   Elliptical     Treadmill     STRENGTHENING          Quad sets 10 x 10s    HS curls 10 x 2 standing    SLR 2 x 10    LAQ  SAQ 2 x 10; 3#  2 x 10; 3# VC for full ROM and eccentric control     Clamshells     Bridges 2 x 10; 3s  GTB    Sit to stand with band 1 x  "15; OTB at knees, cues for full ext Y   Resisted side stepping OTB 4 ;laps // Y   FSU 2 x 10  Y    LSU 2 x 10 Y   Eccentric step down 2 x 10 Y   HR/TR 2 x 15    Standing high march 1 x 15 each    Standing hip abduction 1 x 20 B/L OTB knees    Lunges 1 x 10 each    TKE 2 x 15; GTB              STRETCHING/ROM     Heel slides 10 x 10s in sitting w/strap shoe off    Hamstring stretch 3 x 30s at stairs Y   Hip flexor stretch 3 x 30s at step Y   Knee flexion stretch step 10 x 10s Y   Standing calf stretch 3 x 30s with wedge Y   Prone quad stretch     NEURO RE-ED (29996) TOTAL TIME FOR SESSION Not performed    Tandem balance 30s x 2 each    Anterior weight shifting 20x     Lateral WS and knee bend X 30 B    Cone taps 2 x 10; 6\" cone with black block on ground with cues to minimize UE support    Tandem stance 20s x 2 each    Single leg stance 5x to failure    Cone step overs 2 laps with step to pattern  3 laps step over step pattern     Cone step over with return 15x each forward and lateral    Taping     GAIT TRAINING (55655) TOTAL TIME FOR SESSION 8-22 Minutes    Stair Reviewed progressions of stair ambulation as well as safety strategies for part practice of step up/step down  Pt asc/desc one trial of reciprocal pattern x4 steps with B HR y   Gait Cues for shoulders back, decreased lateral lean, LLE WB  Retroambulation  Mirror cue walking 30 ft x 8 no device  Cane in L hand 30 feet x 6       y     MANUAL (70351) TOTAL TIME FOR SESSION Not performed    Manual stretching Hip flexor stretch/rectus fem stretch 3 x 30s with stabilization    STM Distal quad; distal HS/ proximal gastroc     Mobilization Patellar superior glides, inferior glides, bouts of 15s   Knee flexion mobilizations: bouts of 15s to tolerance in supine  Knee extension mobilizations     Y = yes; N = no; G = group  "

## 2023-12-08 NOTE — PROGRESS NOTES
Physical Therapy Visit    PT DAILY NOTE FOR OUTPATIENT THERAPY    Patient: Alexandra Bird MRN: [id number]  : 1935 88 y.o.  Referring Physician: Tonie Dudley MD  Date of Visit: 2023    Certification Dates: 10/30/23 through 23    Diagnosis:   1. Presence of left artificial knee joint    2. Left knee pain, unspecified chronicity    3. Osteoarthritis of both knees, unspecified osteoarthritis type    4. Chronic pain of right knee        Chief Complaints:  pain, decreased ROM and strength    Precautions:   Existing Precautions/Restrictions: no known precautions/restrictions  Precautions comments: osteoporosis      TODAY'S VISIT    Time In Session:  Start Time: 1100  Stop Time: 1200  Time Calculation (min): 60 min   History/Vitals/Pain/Encounter Info - 23 1103        Injury History/Precautions/Daily Required Info    Document Type daily treatment     Primary Therapist Riu     Chief Complaint/Reason for Visit  pain, decreased ROM and strength     Onset of Illness/Injury or Date of Surgery 10/11/23     Referring Physician Dr. Dudley     Existing Precautions/Restrictions no known precautions/restrictions     Precautions comments osteoporosis     History of present illness/functional impairment Alexandra is an 89 y/o female with PMH of SVT, PAF, CAD, RUTHIE, HLD, chronic hip/knee pain, osteoporosis presenting outpatient PT for TKA 10/11/23. She initially saw [facility] physicians 8-9 yrs ago, receiving injections for her knees that would last her symptoms for 7-8 months per patient. She trialed course of outpatient pre-hab and finally underwent TKA 10/11/23. Alexandra notes returning home 2 days post op. She notes that she has been only having to take tylenol and has been doing well. She endorses continued difficulty with walking. She is able to sleep on her side. She notes stiffness in the morning. Endorses some continued ankle swelling. Notes doing overall well, although some difficulty noted with SPC use due  to coordination.     Patient/Family/Caregiver Comments/Observations Alexandra reports that she likes to hold the cane in her left hand (started last session).     Patient reported fall since last visit No        Pain Assessment    Currently in pain No/Denies        Pre Activity Vital Signs    Pulse 67     Oxygen Therapy None (Room air)     /70     BP Location Left upper arm     BP Method Automatic     Patient Position Sitting                Daily Treatment Assessment and Plan - 12/08/23 1110        Daily Treatment Assessment and Plan    Progress toward goals Progressing     Daily Outcome Summary Continued dynamic gait practice, including stair negotiation in a reciprocal pattern (which patient stated she was unable to do before). Discussed transition to IHEP, with this clinician adding STS and the previous home exercises to Physitrack so as to have a central repository of information. Distributed a printed copy of all exercises to patient.     Plan and Recommendations Continue dynamic gait and balance progressions, discuss transitions to IHEP.                     OBJECTIVE DATA TAKEN TODAY:    None taken    Today's Treatment:    ORTHO PT FLOWSHEET    Diagnosis: L TKA 10/11/23   Precautions: osteoporosis, heart disease:SVT, AFIB, h/o melanoma,      Exercises Current Session Time Completed (Y/N/G)   MODALITIES- HEAT/ICE (92492) TOTAL TIME FOR SESSION Not performed    Heat     Ice     MODALITIES - E-STIM (45484) TOTAL TIME FOR SESSION Not performed    E-stim/H-Wave     THER ACT (15761) TOTAL TIME FOR SESSION 0-7 Minutes    Assessment  See note     Vitals/pain See note Y   Education Educated patient on knee anatomy and findings of examination. Discussed HEP and goals for treatment/expected outcomes. The patient verbalizes agreement and understanding.   11/13: Pt educated on the need to avoid kicking the L leg out when moving to sit; verbalizes understanding.     Body Mechanics/Work Training/ Transfer technique Sit to  "stand transfer training focusing on equalizing weight bearing (leans to R due to compensation from before surgery) and equalizing foot placement. With mirror x 10 at raised table     GROUP (76417)  Not performed    THER EX (90715) TOTAL TIME FOR SESSION 38-52 Minutes    HEP 10/30/23: quad sets, knee extension mobilization, heel slides, hamstring stretch, seated heel slides  11/13/23: added LAQs to Hep and added patient (with LAQs) to Physitrack. Pt verbalized understanding and performed correct return. Printed copy given to pt.  11/28/23: single leg stance, eccentric step down, hip abduction  12/8/23: added STS; entered above exercises to Physitrack so that patient has a record, printed and distributed copy to patient. Pt verbalized understanding and performed correct return. y       CARDIOVASCULAR      Nu Step     Stationary Bike Seat 8; 8 min full revolutions, level 1.0 Y   Elliptical     Treadmill     STRENGTHENING          Quad sets 10 x 10s    HS curls 10 x 2 standing    SLR 2 x 10    LAQ  SAQ 2 x 10; 3#  2 x 10; 3# VC for full ROM and eccentric control     Clamshells     Bridges 2 x 10; 3s  GTB    Sit to stand with band 1 x 15; OTB at knees, cues for full ext Y   Resisted side stepping OTB 4 ;laps // Y   FSU 2 x 10  Y    LSU 2 x 10 Y   Eccentric step down 2 x 10 Y   HR/TR 2 x 15    Standing high march 1 x 15 each    Standing hip abduction 1 x 20 B/L OTB knees    Lunges 1 x 10 each    TKE 2 x 15; GTB              STRETCHING/ROM     Heel slides 10 x 10s in sitting w/strap shoe off    Hamstring stretch 3 x 30s at stairs Y   Hip flexor stretch 3 x 30s at step Y   Knee flexion stretch step 10 x 10s Y   Standing calf stretch 3 x 30s with wedge Y   Prone quad stretch     NEURO RE-ED (58412) TOTAL TIME FOR SESSION Not performed    Tandem balance 30s x 2 each    Anterior weight shifting 20x     Lateral WS and knee bend X 30 B    Cone taps 2 x 10; 6\" cone with black block on ground with cues to minimize UE support  "   Tandem stance 20s x 2 each    Single leg stance 5x to failure    Cone step overs 2 laps with step to pattern  3 laps step over step pattern     Cone step over with return 15x each forward and lateral    Taping     GAIT TRAINING (80399) TOTAL TIME FOR SESSION 8-22 Minutes    Stair Reviewed progressions of stair ambulation as well as safety strategies for part practice of step up/step down  Pt asc/desc one trial of reciprocal pattern x4 steps with B HR y   Gait Cues for shoulders back, decreased lateral lean, LLE WB  Retroambulation  Mirror cue walking 30 ft x 8 no device  Cane in L hand 30 feet x 6       y     MANUAL (97048) TOTAL TIME FOR SESSION Not performed    Manual stretching Hip flexor stretch/rectus fem stretch 3 x 30s with stabilization    STM Distal quad; distal HS/ proximal gastroc     Mobilization Patellar superior glides, inferior glides, bouts of 15s   Knee flexion mobilizations: bouts of 15s to tolerance in supine  Knee extension mobilizations     Y = yes; N = no; G = group

## 2023-12-11 ENCOUNTER — HOSPITAL ENCOUNTER (OUTPATIENT)
Dept: PHYSICAL THERAPY | Age: 88
Setting detail: THERAPIES SERIES
Discharge: HOME | End: 2023-12-11
Attending: ORTHOPAEDIC SURGERY
Payer: COMMERCIAL

## 2023-12-11 DIAGNOSIS — M25.562 LEFT KNEE PAIN, UNSPECIFIED CHRONICITY: ICD-10-CM

## 2023-12-11 DIAGNOSIS — Z96.652 PRESENCE OF LEFT ARTIFICIAL KNEE JOINT: Primary | ICD-10-CM

## 2023-12-11 PROCEDURE — 97110 THERAPEUTIC EXERCISES: CPT | Mod: GP

## 2023-12-11 PROCEDURE — 97530 THERAPEUTIC ACTIVITIES: CPT | Mod: GP

## 2023-12-11 PROCEDURE — 97116 GAIT TRAINING THERAPY: CPT | Mod: GP

## 2023-12-11 NOTE — PROGRESS NOTES
Physical Therapy Discharge      PT DISCHARGE NOTE FOR OUTPATIENT THERAPY    Patient: Alexandra Bird MRN: [id number]  : 1935 88 y.o.  Referring Physician: Tonie Dudley MD  Date of Visit: 2023      Certification Dates: 10/30/23 through 23    Total Visit Count: 12    Chief Complaints:  No chief complaint on file.      Precautions:  Existing Precautions/Restrictions: no known precautions/restrictions  Precautions comments: osteoporosis      TODAY'S VISIT:    Time In Session:  Start Time: 1100  Stop Time: 1200  Time Calculation (min): 60 min   General Information - 23 1103        Session Details    Document Type discharge evaluation        General Information    Onset of Illness/Injury or Date of Surgery 10/11/23     Referring Physician Dr. Dudley     History of present illness/functional impairment Alexandra is an 89 y/o female with PMH of SVT, PAF, CAD, RUTHIE, HLD, chronic hip/knee pain, osteoporosis presenting outpatient PT for TKA 10/11/23. She initially saw [facility] physicians 8-9 yrs ago, receiving injections for her knees that would last her symptoms for 7-8 months per patient. She trialed course of outpatient pre-hab and finally underwent TKA 10/11/23. Alexandra notes returning home 2 days post op. She notes that she has been only having to take tylenol and has been doing well. She endorses continued difficulty with walking. She is able to sleep on her side. She notes stiffness in the morning. Endorses some continued ankle swelling. Notes doing overall well, although some difficulty noted with SPC use due to coordination.     Patient/Family/Caregiver Comments/Observations Alexandra notes that she has been doing well. She has some mild burning at her knee with progressed activity and continues with some swelling. She notes that she has been performing her exercises daily. She is looking to return to the [facility] for maintaining a gym routine.     Existing Precautions/Restrictions no known  precautions/restrictions     Precautions comments osteoporosis                Daily Falls Screen - 12/11/23 1103        Daily Falls Assessment    Patient reported fall since last visit No                Pain/Vitals - 12/11/23 1103        Pain Assessment    Currently in pain No/Denies        Pre Activity Vital Signs    Pulse 64     Oxygen Therapy None (Room air)     /70     BP Location Left upper arm     BP Method Automatic     Patient Position Sitting                PT - 12/11/23 1103        Physical Therapy    Physical Therapy [facility] PT        PT Plan    Frequency of treatment 2 times/week     PT Duration 6 weeks     PT Cert From 10/30/23     PT Cert To 12/11/23     Date PT POC was sent to provider 10/30/23     Signed PT Plan of Care received?  Yes                Assessment and Plan - 12/11/23 1103        Assessment    Clinical Assessment Alexandra has attended 12 visits of skilled physical therapy targeting her L TKA 10/11/23. She endorses overall improvements in pain levels, strength and function. She demonstrates improved range of motion and strength from prior evaluations, with functional range of motion able to be attained with warm up. She remains utilizing a SPC for mobilty with L hand use being the most beneficial for normalizing her gait despite her L TKA. She demonstrates signficant improvement in stair ambulation and tolerance to stair activity. Able to ascend/descend reciprocally with bilateral UE support. She remains with some balance deficits in single leg stance. She is independent in her program, and is ready to initiate independent home exercises. Educated on return to gym activity and discussed goals for activity and progression. Alexandra is appropriate for discharge from skilled PT at this time.     Plan and Recommendations Will DC PT at this time.                 OBJECTIVE MEASUREMENTS/DATA:    ROM    Range of Motion - 12/11/23 1100        LEFT: Lower Extremity PROM  Assessment    Knee Flexion  123     Knee Extension  0        LEFT: Lower Extremity AROM Assessment    Knee Flexion   110     Knee Extension   -1               MMT    Manual Muscle Tests - 12/11/23 1103        RIGHT: Lower Extremity Manual Muscle Test Assessment    Hip Flexion gross movement (5/5) normal     Hip Extension gross movement (4+/5) good plus     Hip Abduction gross movement (4+/5) good plus     Hip Internal Rotation gross movement (4+/5) good plus     Hip External Rotation gross movement (4+/5) good plus     Knee Flexion strength (5/5) normal     Knee Extension strength (5/5) normal     Ankle Dorsiflexion gross movement (5/5) normal     Ankle Plantarflexion gross movement (5/5) normal     Ankle Inversion gross movement (5/5) normal     Ankle Eversion gross movement (5/5) normal        LEFT: Lower Extremity Manual Muscle Test Assessment    Hip Flexion gross movement (4+/5) good plus     Hip Extension gross movement (4+/5) good plus     Hip Abduction gross movement (4+/5) good plus     Hip Internal Rotation gross movement (5/5) normal     Hip External Rotation gross movement (5/5) normal     Knee Flexion strength (5/5) normal     Knee Extension strength (5/5) normal     Ankle Dorsiflexion gross movement (5/5) normal     Ankle Plantarflexion gross movement (5/5) normal     Ankle Inversion gross movement (5/5) normal     Ankle Eversion gross movement (5/5) normal               Palpation    Palpation - 12/11/23 1103        Palpation    LE Palpation  L knee not tender to palpation        Manual Interventions    Manual technique #1 Patellar mobilizations within normal limits               Balance/Posture    Balance and Posture - 12/11/23 1103        Balance Assessment    Comment, Balance Tandem stance remains 30s bilaterally, single leg stance remains <3s bilaterally               Gait and Mobility    Gait and Mobility - 12/11/23 1103        Gait Training    Gait surface comments Ambulates SPC in L hand which  improves gait quality        Stairs Assessment    Comment can perform step over step pattern with bilateral UE support. Unilateral able to be performed with decreased stability and decreased speed.               Outcome Measures    PT Outcome Measures - 12/11/23 1103        Objective Outcome Measures    Gait Speed (m/sec) 1.19 m/sec   1.19m/s with cane; 1.2 m/s without cane with increased limp    30 Second Sit to Stand Test 11.5 repetitions        Subjective Outcome Measures    KOOS 64/80 = 80%        NEW (2/6/23):  PSFS     PSFS ACTIVITY 1 Dancing     PSFS ANSWER 1 5     PSFS ACTIVITY 2 shopping     PSFS ANSWER 2 6     PSFS ACTIVITY 3 stairs     PSFS ANSWER 3 6     PSFS ACTIVITY 4 prolonged walking     PSFS ANSWER 4 6     PSFS Sum of Activity Scores 23     PSFS Number of Activities 4     PSFS Percent Score 57.5 %                 ROM and MMT        10/2/2023 10/9/2023 10/30/2023 11/28/2023    13:00 11/29/2023    10:41 12/11/2023   PT LE ROM Measurements   PROM: Right Knee Flexion  140       AROM: Right Knee Flexion 135 135 135      PROM: Left Knee Flexion  139 95 125  123   AROM: Left Knee Flexion 130 132 87 110  110   PROM: Right Knee Extension  0       AROM: Right Knee Extension 0 0 2      PROM: Left Knee Extension  0 0 0  0   AROM: Left Knee Extension -1 0 -4 -1  -1   PT LE MMT   Right Hip Flexion (5/5) normal (5/5) normal (5/5) normal  (5/5) normal (5/5) normal   Left Hip Flexion (4+/5) good plus (4+/5) good plus (4+/5) good plus  (4+/5) good plus (4+/5) good plus   Right Hip Extension (4/5) good (4/5) good (4/5) good  (4/5) good (4+/5) good plus   Left Hip Extension (4/5) good (4/5) good (4/5) good  (4/5) good (4+/5) good plus   Right Hip ABD (4/5) good (4/5) good (4/5) good  (4/5) good (4+/5) good plus   Left Hip ABD (4/5) good (4-/5) good minus (4-/5) good minus  (4/5) good (4+/5) good plus   Right Hip IR (5/5) normal (5/5) normal (4+/5) good plus  (4+/5) good plus (4+/5) good plus   Left Hip IR (5/5) normal  (5/5) normal (5/5) normal  (5/5) normal (5/5) normal   Right Hip ER (5/5) normal (5/5) normal (4+/5) good plus  (4+/5) good plus (4+/5) good plus   Left Hip ER (5/5) normal (5/5) normal (5/5) normal  (5/5) normal (5/5) normal   Right Knee Flexion (4+/5) good plus (4+/5) good plus (5/5) normal  (5/5) normal (5/5) normal   Left Knee Flexion (4/5) good       uncomfortable (4/5) good       uncomfortable (4/5) good       uncomfortable  (4+/5) good plus       uncomfortable (5/5) normal   Right Knee Extension (4+/5) good plus (4+/5) good plus (5/5) normal  (5/5) normal (5/5) normal   Left Knee Extension (4+/5) good plus (4+/5) good plus (4-/5) good minus       held full pressure  (4+/5) good plus       held full pressure (5/5) normal   Right Ankle DF (5/5) normal (5/5) normal (5/5) normal  (5/5) normal (5/5) normal   Left Ankle DF (4+/5) good plus (4+/5) good plus (4+/5) good plus  (4+/5) good plus (5/5) normal   Right Ankle PF (5/5) normal (5/5) normal (5/5) normal  (5/5) normal (5/5) normal   Left Ankle PF (4+/5) good plus (4+/5) good plus (4+/5) good plus  (4+/5) good plus (5/5) normal   Right Ankle Inversion (4+/5) good plus (4+/5) good plus (5/5) normal  (5/5) normal (5/5) normal   Left Ankle Inversion (4+/5) good plus (4+/5) good plus (4+/5) good plus  (4+/5) good plus (5/5) normal   Right Ankle Eversion (4+/5) good plus (4+/5) good plus (5/5) normal  (5/5) normal (5/5) normal   Left Ankle Eversion (4+/5) good plus (4+/5) good plus (4+/5) good plus  (4+/5) good plus (5/5) normal     Outcome Measures        9/8/2023    13:15 10/2/2023    11:09 10/9/2023    11:01 10/30/2023    12:03 11/28/2023    13:02 12/11/2023    11:03   PT OBJECTIVE Outcome Measures   Gait Speed (m/sec)      1.19 m/sec       1.19m/s with cane; 1.2 m/s without cane with increased limp   30 Second Sit to Stand 9 repetitions 5 repetitions       pain limiting this session 8 repetitions  10 repetitions       performed without UE 11.5 repetitions   TUG  (Mean) 11.4   20 10.8    TUG - Results    Utilized SPC, L UE for pushup to standing SPC    PT SUBJECTIVE Outcome Measures   PSFS % Score 45 % 40 % 32.5 % 15 % 47.5 % 57.5 %   KOOS    KOS ADL: 43/80= 54% 59/80= 74% 64/80 = 80%   Other  WOMAC: 15/96 = 15.6% WOMAC: 17/96 = 18%          Today's Treatment:    Education provided:  Yes: See treatment log for details of education provided  Methods: Discussion  Readiness: acceptance  Response: Verbalizes understanding    ORTHO PT FLOWSHEET    Diagnosis: L TKA 10/11/23   Precautions: osteoporosis, heart disease:SVT, AFIB, h/o melanoma,      Exercises Current Session Time Completed (Y/N/G)   MODALITIES- HEAT/ICE (21044) TOTAL TIME FOR SESSION Not performed    Heat     Ice     MODALITIES - E-STIM (80629) TOTAL TIME FOR SESSION Not performed    E-stim/H-Wave     THER ACT (70469) TOTAL TIME FOR SESSION 8-22 Minutes    Assessment  See note  Y   Vitals/pain See note Y   Education Educated patient on knee anatomy and findings of examination. Discussed HEP and goals for treatment/expected outcomes. The patient verbalizes agreement and understanding.   11/13: Pt educated on the need to avoid kicking the L leg out when moving to sit; verbalizes understanding.     Body Mechanics/Work Training/ Transfer technique Sit to stand transfer training focusing on equalizing weight bearing (leans to R due to compensation from before surgery) and equalizing foot placement. With mirror x 10 at raised table     GROUP (29487)  Not performed    THER EX (92430) TOTAL TIME FOR SESSION 23-37 Minutes    HEP 10/30/23: quad sets, knee extension mobilization, heel slides, hamstring stretch, seated heel slides  11/13/23: added LAQs to Hep and added patient (with LAQs) to Physitrack. Pt verbalized understanding and performed correct return. Printed copy given to pt.  11/28/23: single leg stance, eccentric step down, hip abduction  12/8/23: added STS; entered above exercises to Physitrack so that patient has a  "record, printed and distributed copy to patient. Pt verbalized understanding and performed correct return.  12/11/23: reviewed HEP, discussed incorporation of weight shifting to practice single leg stance, stair ambulation consistency and variate y       CARDIOVASCULAR      Nu Step     Stationary Bike Seat 8; 8 min full revolutions, level 1.0 Y   Elliptical     Treadmill     STRENGTHENING          Quad sets 10 x 10s    HS curls 10 x 2 standing    SLR 1 x 15 Y   LAQ  SAQ 3 x 10; 5# Y   Clamshells     Bridges 2 x 10; 3s  GTB    Sit to stand with band 1 x 15; OTB at knees, cues for full ext    Resisted side stepping OTB 4 ;laps //    FSU 2 x 10  Y   LSU 2 x 10    Eccentric step down 2 x 10 Y   HR/TR 2 x 15    Standing high march 1 x 15 each    Standing hip abduction 1 x 20 B/L OTB knees    Lunges 1 x 10 each    TKE 2 x 15; GTB              STRETCHING/ROM     Heel slides 10 x 10s in sitting w/strap shoe off Y   Hamstring stretch 3 x 30s at stairs    Hip flexor stretch 3 x 30s at step    Knee flexion stretch step 10 x 10s    Standing calf stretch 3 x 30s with wedge    Prone quad stretch     NEURO RE-ED (91985) TOTAL TIME FOR SESSION Not performed    Tandem balance 30s x 2 each    Anterior weight shifting 20x     Lateral WS and knee bend X 30 B    Cone taps 2 x 10; 6\" cone with black block on ground with cues to minimize UE support    Tandem stance 20s x 2 each    Single leg stance 5x to failure    Cone step overs 2 laps with step to pattern  3 laps step over step pattern     Cone step over with return 15x each forward and lateral    Taping     GAIT TRAINING (66569) TOTAL TIME FOR SESSION 8-22 Minutes    Stair Reviewed progressions of stair ambulation as well as safety strategies for part practice of step up/step down    Practiced unilateral stair ambulation, feeling unsteady, recommended holding for home at this time until confidence improves. Able to complete step over step slowly with bilateral UE support.  Y      Y "   Gait Cues for shoulders back, decreased lateral lean, LLE WB  Retroambulation  Mirror cue walking 30 ft x 8 no device  Cane in L hand 30 feet x 6            MANUAL (24770) TOTAL TIME FOR SESSION Not performed    Manual stretching Hip flexor stretch/rectus fem stretch 3 x 30s with stabilization    STM Distal quad; distal HS/ proximal gastroc     Mobilization Patellar superior glides, inferior glides, bouts of 15s   Knee flexion mobilizations: bouts of 15s to tolerance in supine  Knee extension mobilizations     Y = yes; N = no; G = group       Goals Addressed                    This Visit's Progress    •  COMPLETED: Mutually agreed upon pain goal         Mutually agreed upon pain goal: Alexandra will have no pain with stair ambulation in 6 weeks    Met; no pain but remains challenging      •  COMPLETED: PT- Knee Alexandra goals (pt-stated)         Alexandra's goals are to:    Improve tolerance to shopping  Improve stair ambulation  Improve prolonged walking ability    Met      •  COMPLETED: PT- L TKA         Short Term Goals Time Frame Result Comment   Pt will increase L knee ROM >/= 10 degrees into flexion, extension to improve functional mobility 3 weeks Met    Increase KOS ADL >/=  10% to increase function   3 weeks Met    Decrease TUG </= 13.5 sec to decrease fall risk 4 weeks Met    Assess gait speed Asap     Pt will improve PSFS score >/= 20%  3 weeks Met    Pt will be independent with HEP to increase carryover at home 3 weeks Met    Pt will be able to walk >/= 1/4 mile without gait deviation  3 weeks Met with cane      Long Term Goals Time Frame Result Comment   Pt will increase L LE  MMT into flexion, extension, abduction >/= ½ grade 6 weeks Met    Pt will increase L knee ROM >/= 20 degrees into flexion, extension to improve functional mobility 6 weeks Met 120 deg   Increase KOS ADL >/=  20% to increase function   6 weeks Met    Pt to ambulate stairs step over step with unilateral support and no pain. 6 weeks  Partially met No pain, unilateral support, however increased time required and feels unsteady    Assess gait speed 6 weeks Met    Pt will improve PSFS score >/= 40%  6 weeks Met    Pt will be independent with HEP to increase carryover at home 6 weeks Met    Pt will be able to walk >/= 1/2 mile without gait deviation  6 weeks Met with cane                   Discharge information for CARF:    Reason for D/C: Goals met  Was care interrupted for medical reason?: No  Care was interrupted due to hospitalization?: No  Did the patient demonstrate increased independence: Yes  Demonstration of independece:: [place] in HEP, Increased functional activity, Increased functional independence  Has the patient returned to productive activity?: Yes  Increased production assessment:: Return to household activities, Return to participation in hobbies/leisure activities, Increased community independence  Skin integrity: Intact

## 2023-12-11 NOTE — OP PT TREATMENT LOG
ORTHO PT FLOWSHEET    Diagnosis: L TKA 10/11/23   Precautions: osteoporosis, heart disease:SVT, AFIB, h/o melanoma,      Exercises Current Session Time Completed (Y/N/G)   MODALITIES- HEAT/ICE (99258) TOTAL TIME FOR SESSION Not performed    Heat     Ice     MODALITIES - E-STIM (73155) TOTAL TIME FOR SESSION Not performed    E-stim/H-Wave     THER ACT (12801) TOTAL TIME FOR SESSION 8-22 Minutes    Assessment  See note  Y   Vitals/pain See note Y   Education Educated patient on knee anatomy and findings of examination. Discussed HEP and goals for treatment/expected outcomes. The patient verbalizes agreement and understanding.   11/13: Pt educated on the need to avoid kicking the L leg out when moving to sit; verbalizes understanding.     Body Mechanics/Work Training/ Transfer technique Sit to stand transfer training focusing on equalizing weight bearing (leans to R due to compensation from before surgery) and equalizing foot placement. With mirror x 10 at raised table     GROUP (54699)  Not performed    THER EX (50373) TOTAL TIME FOR SESSION 23-37 Minutes    HEP 10/30/23: quad sets, knee extension mobilization, heel slides, hamstring stretch, seated heel slides  11/13/23: added LAQs to Hep and added patient (with LAQs) to Physitrack. Pt verbalized understanding and performed correct return. Printed copy given to pt.  11/28/23: single leg stance, eccentric step down, hip abduction  12/8/23: added STS; entered above exercises to Physitrack so that patient has a record, printed and distributed copy to patient. Pt verbalized understanding and performed correct return.  12/11/23: reviewed HEP, discussed incorporation of weight shifting to practice single leg stance, stair ambulation consistency and variate y       CARDIOVASCULAR      Nu Step     Stationary Bike Seat 8; 8 min full revolutions, level 1.0 Y   Elliptical     Treadmill     STRENGTHENING          Quad sets 10 x 10s    HS curls 10 x 2 standing    SLR 1 x 15 Y  "  LAQ  SAQ 3 x 10; 5# Y   Clamshells     Bridges 2 x 10; 3s  GTB    Sit to stand with band 1 x 15; OTB at knees, cues for full ext    Resisted side stepping OTB 4 ;laps //    FSU 2 x 10  Y   LSU 2 x 10    Eccentric step down 2 x 10 Y   HR/TR 2 x 15    Standing high march 1 x 15 each    Standing hip abduction 1 x 20 B/L OTB knees    Lunges 1 x 10 each    TKE 2 x 15; GTB              STRETCHING/ROM     Heel slides 10 x 10s in sitting w/strap shoe off Y   Hamstring stretch 3 x 30s at stairs    Hip flexor stretch 3 x 30s at step    Knee flexion stretch step 10 x 10s    Standing calf stretch 3 x 30s with wedge    Prone quad stretch     NEURO RE-ED (07000) TOTAL TIME FOR SESSION Not performed    Tandem balance 30s x 2 each    Anterior weight shifting 20x     Lateral WS and knee bend X 30 B    Cone taps 2 x 10; 6\" cone with black block on ground with cues to minimize UE support    Tandem stance 20s x 2 each    Single leg stance 5x to failure    Cone step overs 2 laps with step to pattern  3 laps step over step pattern     Cone step over with return 15x each forward and lateral    Taping     GAIT TRAINING (48199) TOTAL TIME FOR SESSION 8-22 Minutes    Stair Reviewed progressions of stair ambulation as well as safety strategies for part practice of step up/step down    Practiced unilateral stair ambulation, feeling unsteady, recommended holding for home at this time until confidence improves. Able to complete step over step slowly with bilateral UE support.  Y      Y   Gait Cues for shoulders back, decreased lateral lean, LLE WB  Retroambulation  Mirror cue walking 30 ft x 8 no device  Cane in L hand 30 feet x 6            MANUAL (25220) TOTAL TIME FOR SESSION Not performed    Manual stretching Hip flexor stretch/rectus fem stretch 3 x 30s with stabilization    STM Distal quad; distal HS/ proximal gastroc     Mobilization Patellar superior glides, inferior glides, bouts of 15s   Knee flexion mobilizations: bouts of 15s to " tolerance in supine  Knee extension mobilizations     Y = yes; N = no; G = group

## 2023-12-13 ENCOUNTER — APPOINTMENT (RX ONLY)
Dept: URBAN - METROPOLITAN AREA CLINIC 374 | Facility: CLINIC | Age: 88
Setting detail: DERMATOLOGY
End: 2023-12-13

## 2023-12-13 DIAGNOSIS — D18.0 HEMANGIOMA: ICD-10-CM

## 2023-12-13 DIAGNOSIS — L57.0 ACTINIC KERATOSIS: ICD-10-CM

## 2023-12-13 DIAGNOSIS — L81.4 OTHER MELANIN HYPERPIGMENTATION: ICD-10-CM

## 2023-12-13 DIAGNOSIS — L82.1 OTHER SEBORRHEIC KERATOSIS: ICD-10-CM

## 2023-12-13 DIAGNOSIS — Z71.89 OTHER SPECIFIED COUNSELING: ICD-10-CM

## 2023-12-13 DIAGNOSIS — D22 MELANOCYTIC NEVI: ICD-10-CM

## 2023-12-13 PROBLEM — D22.5 MELANOCYTIC NEVI OF TRUNK: Status: ACTIVE | Noted: 2023-12-13

## 2023-12-13 PROBLEM — D22.62 MELANOCYTIC NEVI OF LEFT UPPER LIMB, INCLUDING SHOULDER: Status: ACTIVE | Noted: 2023-12-13

## 2023-12-13 PROBLEM — D22.72 MELANOCYTIC NEVI OF LEFT LOWER LIMB, INCLUDING HIP: Status: ACTIVE | Noted: 2023-12-13

## 2023-12-13 PROBLEM — D18.01 HEMANGIOMA OF SKIN AND SUBCUTANEOUS TISSUE: Status: ACTIVE | Noted: 2023-12-13

## 2023-12-13 PROBLEM — D22.71 MELANOCYTIC NEVI OF RIGHT LOWER LIMB, INCLUDING HIP: Status: ACTIVE | Noted: 2023-12-13

## 2023-12-13 PROBLEM — D22.61 MELANOCYTIC NEVI OF RIGHT UPPER LIMB, INCLUDING SHOULDER: Status: ACTIVE | Noted: 2023-12-13

## 2023-12-13 PROCEDURE — ? SUNSCREEN RECOMMENDATIONS

## 2023-12-13 PROCEDURE — 17000 DESTRUCT PREMALG LESION: CPT

## 2023-12-13 PROCEDURE — 99213 OFFICE O/P EST LOW 20 MIN: CPT | Mod: 25

## 2023-12-13 PROCEDURE — ? COUNSELING

## 2023-12-13 PROCEDURE — ? FULL BODY SKIN EXAM

## 2023-12-13 PROCEDURE — ? PREMALIGNANT DESTRUCTION

## 2023-12-13 ASSESSMENT — LOCATION DETAILED DESCRIPTION DERM
LOCATION DETAILED: RIGHT POSTERIOR SHOULDER
LOCATION DETAILED: RIGHT ANTERIOR PROXIMAL UPPER ARM
LOCATION DETAILED: RIGHT SUPERIOR MEDIAL UPPER BACK
LOCATION DETAILED: EPIGASTRIC SKIN
LOCATION DETAILED: RIGHT MEDIAL SUPERIOR CHEST
LOCATION DETAILED: RIGHT ANTERIOR DISTAL THIGH
LOCATION DETAILED: RIGHT ANTERIOR DISTAL UPPER ARM
LOCATION DETAILED: INFERIOR THORACIC SPINE
LOCATION DETAILED: LEFT ANTERIOR PROXIMAL UPPER ARM
LOCATION DETAILED: LEFT ANTERIOR DISTAL THIGH
LOCATION DETAILED: SUPERIOR LUMBAR SPINE
LOCATION DETAILED: LEFT SUPERIOR MEDIAL UPPER BACK
LOCATION DETAILED: LEFT POSTERIOR SHOULDER
LOCATION DETAILED: LEFT ANTERIOR PROXIMAL THIGH

## 2023-12-13 ASSESSMENT — LOCATION SIMPLE DESCRIPTION DERM
LOCATION SIMPLE: RIGHT UPPER ARM
LOCATION SIMPLE: LEFT UPPER ARM
LOCATION SIMPLE: CHEST
LOCATION SIMPLE: RIGHT UPPER BACK
LOCATION SIMPLE: RIGHT THIGH
LOCATION SIMPLE: UPPER BACK
LOCATION SIMPLE: RIGHT SHOULDER
LOCATION SIMPLE: LEFT THIGH
LOCATION SIMPLE: LOWER BACK
LOCATION SIMPLE: ABDOMEN
LOCATION SIMPLE: LEFT UPPER BACK
LOCATION SIMPLE: LEFT SHOULDER

## 2023-12-13 ASSESSMENT — LOCATION ZONE DERM
LOCATION ZONE: ARM
LOCATION ZONE: TRUNK
LOCATION ZONE: LEG

## 2023-12-13 NOTE — HPI: EVALUATION OF SKIN LESION(S)
What Type Of Note Output Would You Prefer (Optional)?: Standard Output
Hpi Title: Evaluation of Skin Lesions
How Severe Are Your Spot(S)?: mild
Family Member: Sister
Location: Back

## 2023-12-13 NOTE — PROCEDURE: FULL BODY SKIN EXAM
Instructions: This plan will send the code FBSE to the PM system.  DO NOT or CHANGE the price.
Body Of Note (Please Add Your Own Text Here): monitor annually
Price (Do Not Change): 0.00
Detail Level: Generalized

## 2023-12-13 NOTE — PROCEDURE: PREMALIGNANT DESTRUCTION
Consent: The patient's consent was obtained including but not limited to risks of crusting, scabbing, blistering, scarring, darker or lighter pigmentary change, recurrence, incomplete removal and infection.
Detail Level: Zone
Anesthesia Volume In Cc: 0.5
Render Post-Care In The Note: No
Method: liquid nitrogen
Post-Care Instructions: I reviewed with the patient in detail post-care instructions. Patient is to wear sunprotection, and avoid picking at any of the treated lesions. Pt may apply Vaseline to crusted or scabbing areas.

## 2023-12-21 NOTE — PROGRESS NOTES
Cardiology Note    Kunal La DO          Teresa Bird is a 85 y.o. 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 fib seen on monitor 2018 no clinical recurrence multinodular goiter with negative biopsy mixed hyperlipidemia      Lab work July CBC normal  Creatinine 0.7  Potassium 5.8  Cholesterol 118  LDL 40 thyroids normal    She has no cardiovascular complaints              Instructions    Follow up with CTa of cor arteries   Echo at visit  6 months labs and visit                                                                     Patient Active Problem List    Diagnosis Date Noted   • Dilated aortic root (CMS/Formerly [facility]) 08/02/2019   • RBBB 08/02/2018   • PAF (paroxysmal atrial fibrillation) (CMS/Formerly [facility]) 04/24/2018   • Coronary artery disease involving native coronary artery of native heart without angina pectoris 04/08/2018   • Nicotine dependence in remission 04/08/2018   • Multinodular goiter 03/13/2018   • Mixed hyperlipidemia 03/13/2018   • Pulmonary artery abnormality 04/04/2017   • Malignant neoplasm of uterus (CMS/Formerly [facility]) 03/22/2017       Allergy  Penicillin, Pravastatin sodium, Rosuvastatin calcium, Penicillins, and Zetia [ezetimibe]    MED LIST     Current Outpatient Medications   Medication Sig Dispense Refill   • alirocumab (PRALUENT PEN) 150 mg/mL pen injector Inject 150 pens under the skin every 14 (fourteen) days. 4 pen 6   • cetirizine (ZyrTEC) 5 mg tablet Take 5 mg by mouth daily.     • LORazepam (ATIVAN) 0.5 mg tablet Take 0.5 mg by mouth every 6 (six) hours as needed.     • metoprolol succinate XL (TOPROL-XL) 25 mg 24 hr tablet Take 1 tablet (25 mg total) by mouth 2 (two) times a day. 182 tablet 6   • pantoprazole  Vitamin D is low- Start prescription vitamin D3 for 12 weeks than start OTC vitamin D3 2000 iu daily  CBC, CMP, thyroid are normal  Triglycerides are borderline elevated- limit junk foods, sweets, sugary beverages that elevate the TG levels.  (PROTONIX) 40 mg EC tablet Take 40 mg by mouth as needed.       • rivaroxaban (XARELTO) 20 mg tablet Take 1 tablet (20 mg total) by mouth daily with dinner. 90 tablet 6     No current facility-administered medications for this visit.         Review of Systems   Constitution: Negative for malaise/fatigue, weight gain and weight loss.   HENT: Negative for hearing loss and hoarse voice.    Eyes: Negative for visual disturbance.   Cardiovascular: Negative for chest pain, claudication, cyanosis, dyspnea on exertion, irregular heartbeat, leg swelling, near-syncope, orthopnea, palpitations, paroxysmal nocturnal dyspnea and syncope.   Respiratory: Negative for cough, hemoptysis, shortness of breath, sleep disturbances due to breathing, snoring, sputum production and wheezing.    Endocrine: Negative for cold intolerance and heat intolerance.   Hematologic/Lymphatic: Negative.  Negative for bleeding problem. Does not bruise/bleed easily.   Skin: Negative.  Negative for rash.   Musculoskeletal: Negative for arthritis, falls, joint pain, muscle cramps and myalgias.   Gastrointestinal: Negative for abdominal pain, anorexia, change in bowel habit, constipation, diarrhea, dysphagia, heartburn, jaundice and nausea.   Genitourinary: Negative for frequency and nocturia.   Neurological: Negative for dizziness, focal weakness, headaches, light-headedness, numbness, tremors and vertigo.   Psychiatric/Behavioral: Negative for memory loss. The patient does not have insomnia and is not nervous/anxious.    Allergic/Immunologic: Negative for hives.       Labs   Lab Results   Component Value Date    WBC 6.5 01/22/2020    HGB 15.7 (H) 01/22/2020    HCT 46.7 (H) 01/22/2020     01/22/2020    CHOL 143 01/22/2020    TRIG 73 01/22/2020    HDL 71 01/22/2020    LDLCALC 57 01/22/2020    ALT 11 01/22/2020    AST 14 01/22/2020     01/22/2020    K 4.7 01/22/2020     01/22/2020    CREATININE 0.65 01/22/2020    BUN 17 01/22/2020    CO2  "30 01/22/2020    TSH 1.01 01/22/2020       Lab Results   Component Value Date    GLUCOSE 90 01/22/2020    CALCIUM 9.6 01/22/2020     01/22/2020    K 4.7 01/22/2020    CO2 30 01/22/2020     01/22/2020    BUN 17 01/22/2020    CREATININE 0.65 01/22/2020       Objective   Vitals:    11/09/20 1129   BP: 118/68   BP Location: Left upper arm   Patient Position: Sitting   Pulse: 60   SpO2: 99%   Weight: 65.3 kg (144 lb)   Height: 1.549 m (5' 1\")     Physical Exam   Constitutional: She is oriented to person, place, and time. She appears well-developed and well-nourished. No distress.   HENT:   Head: Normocephalic and atraumatic.   Nose: Nose normal.   Eyes: Conjunctivae are normal. No scleral icterus.   Neck: No JVD present.   Cardiovascular: Normal rate, regular rhythm, normal heart sounds and intact distal pulses. Exam reveals no gallop and no friction rub.   No murmur heard.  Systolic click   Pulmonary/Chest: Effort normal. No stridor. No respiratory distress. She has no wheezes. She has no rales. She exhibits no tenderness.   Abdominal: There is no abdominal tenderness.   Musculoskeletal:         General: No deformity or edema.   Neurological: She is alert and oriented to person, place, and time.   Skin: Skin is warm and dry.   Psychiatric: She has a normal mood and affect.       Cardiac Procedures  Cardiac Procedures  Cardiac Procedures      STRESS    Stress echocardiogram March 2017 moderate pulmonic insufficiency pulmonary artery for mild mitral regurgitation negative for ischemia     Stress echo 2/19   vpc in recovery neg ishcmia  VPCs during test apical septal abnormal wall motion related to that      ECHO  Echo 4/18 pulm a 4.2 aorta 3.7cm    Echo oc 2020dilated LA mild mr aorta 3.7 PA 4.1   mild PI prom eus valve     OTHER  Ultrasound thyroid July 2017 11 mm nodule       CAT SCAN   Chest CTA April 2018 no change from 2017 dilated pulmonary artery 4.7 thyroid nodules coronary artery calcifications renal " cyst Lrhyr mass    cta 1/19 [facility] pul artery 4.2 mildy dilated aortic root      cta 5/19 pulmonary artery 47 mm ectatic right pulmonary artery 31 mm aortic root 4.3    CTA July 2020 aorta 4.7  MM 6 mm right upper lobe probably seen prior        Cor cta June 2021    :pulm arter 3.2 cm  1.  Coronary Arteries: Mild nonobstructive coronary artery disease.  No evidence  of flow-limiting stenosis by FFR CT.  2.  Cardiac Structures: Small left ventricular cavity with concentric left  ventricular hypertrophy.  Bi-atrial enlargement.  Focally calcified trileaflet  aortic valve.  3.  Mildly dilated aortic root at the sinuses of Valsalva.  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  4.  Normal left ventricular systolic function.  5.  Coronary calcium score 41.  This places the patient in the WAYNE 25% risk  percentile for age and gender matched individuals.          EKGnsr rbbbnon spec st t changes stable November 2020             nochange  EKG    Assessment/Plan:          Pulmonary artery abnormality  She is followed for pulmonary artery aneurysm and thoracic aneurysm last imaged May 2019 follows with Dr. Fajardo  Echo today all numbers [facility] pulmonary artery four-point 1 aortic root 3.7 by CTA aortic root was 4  Continue yearly imaging  Next year we will get coronary CT angiogram to image her coronary arteries also will be due May 2020    Coronary artery disease involving native coronary artery of native heart without angina pectoris  This was diagnosed by calcification seen on CAT scan stress echo was negative in February 2019 she does have right bundle branch block  Plan coronary CT angiogram with imaging of her pulmonary thoracic aneurysm in May 2020    PAF (paroxysmal atrial fibrillation) (CMS/HCC) (HCC)  Seen on a monitor in April 2018 2% of total heartbeats no clinical recurrence remains anticoagulated with Xarelto      Mixed hyperlipidemia  Phenomenal result  to the response to the PCSK9 inhibitor presently LDL cholesterol is now 40 she is intolerant to statins and Zetia with GI upset    Multinodular goiter  Had negative biopsy 2017 follows with Dr. 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

## 2024-02-12 ENCOUNTER — TELEPHONE (OUTPATIENT)
Dept: INTERNAL MEDICINE | Facility: CLINIC | Age: 89
End: 2024-02-12
Payer: COMMERCIAL

## 2024-02-12 NOTE — TELEPHONE ENCOUNTER
[facility] Appointment Request   Provider: Dr. Cooley  Appointment Type: Diagnostic Services  Reason for Visit: Prolia Shot  Available Day and Time: Next week  Best Contact Number: [phone]    The practice will reach out to schedule your appointment within the next 2 business days.

## 2024-02-20 ENCOUNTER — CLINICAL SUPPORT (OUTPATIENT)
Dept: INTERNAL MEDICINE | Facility: CLINIC | Age: 89
End: 2024-02-20
Payer: COMMERCIAL

## 2024-02-20 DIAGNOSIS — M81.0 OSTEOPOROSIS, UNSPECIFIED OSTEOPOROSIS TYPE, UNSPECIFIED PATHOLOGICAL FRACTURE PRESENCE: Primary | ICD-10-CM

## 2024-02-20 PROCEDURE — 96372 THER/PROPH/DIAG INJ SC/IM: CPT | Performed by: INTERNAL MEDICINE

## 2024-03-18 PROBLEM — Z01.818 PREOPERATIVE EXAMINATION: Status: RESOLVED | Noted: 2023-09-27 | Resolved: 2024-03-18

## 2024-03-18 NOTE — PROGRESS NOTES
Cardiology Note    Sergei Cooley MD    Teresa Bird is a 88 y.o. female 1935     She returns for follow-up and echocardiogram last time I saw her she is can have total knee replacement  She has a diagnosis of pulmonary artery aneurysm and thoracic aneurysm she follows in aortic wellness clinic with Dr. Bolton  Has been evaluated by CT surgery  most recent imaging October 2023 pulmonary artery 4.8cm stable from 2019  Mildly dilated aortic root by echo normal on CTA October 2023 they recommended a 2-year follow-up  On echocardiogram today March 2024  Stable finding pulmonary artery 4.1 cm thoracic aorta 3.7 cm LVH normal LV systolic function  Nonobstructive CAD coronary CT angiogram June 2021 lesions 30% coronary calcium score 41  Paroxysmal atrial fibrillation 2% burden seen on the monitor in 2018 anticoagulated with Xarelto    Lab work March 2024  Cholesterol 145 triglycerides 65 LDL 54  CBC normal  Creatinine 0.77 potassium 5.2  Thyroids normal  Vitamin D low at 10                              She returns for follow-up and echocardiogram.  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 she follows in aortic wellness clinic due for repeat imaging September 2023  y last imaged CTA September 2022, 4.7 cm, stable  Echocardiogram today March 2023  Stable findings   top normal aortic root size 4.1 cm aortic root 3.9 LVH preserved ejection fraction moderate mitral regurgitation mitral annular calcification  She has nonobstructive CAD based on coronary CT angiogram June 2021 with a calcium score 40  She has hyperlipidemia treated with PCSK9 inhibitor Praluent  2% burden of PAF was picked up on a monitor in 2018 she remains anticoagulated with Xarelto  History of multinodular goiter with negative biopsy in the past follows with endocrinology Dr Ha Wheatley        Lab work   March 2024  CBC normal  Glucose 87, K 5.2  BUN 17, Cr 0.77  HDL 78, LDL 54  TSH 1.89    March 2023  CBC normal creatinine  0.72  Potassium 5.4  Cholesterol LDL 54                Patient Active Problem List    Diagnosis Date Noted    S/P total knee arthroplasty, left 10/11/2023    History of uterine cancer 04/11/2023    Primary osteoarthritis of left knee 04/11/2023    Visit for screening mammogram 04/11/2023    History of fracture of left ankle 04/11/2023    History of fracture of right ankle 04/11/2023    Positive FIT (fecal immunochemical test) 04/11/2023    SVT (supraventricular tachycardia) 07/20/2021    Pulmonary nodule 11/04/2020    RBBB 08/02/2018    PAF (paroxysmal atrial fibrillation) (CMS/Former[facility]) 04/24/2018    Coronary artery disease involving native coronary artery of native heart without angina pectoris 04/08/2018    Nicotine dependence in remission 04/08/2018    Multinodular goiter 03/13/2018    Mixed hyperlipidemia 03/13/2018    Age-related osteoporosis without current pathological fracture 03/13/2018    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Former[facility]) 04/04/2017       Allergy  Penicillin, Pravastatin sodium, Rosuvastatin calcium, and Zetia [ezetimibe]    MED LIST     Current Outpatient Medications   Medication Sig Dispense Refill    alirocumab (PRALUENT PEN) 150 mg/mL pen injector Inject 1 ml (150 mg) under skin every 14 days   >>hold for 2 weeks      cetirizine (ZyrTEC) 5 mg tablet Take 1 tablet (5 mg total) by mouth daily as needed for allergies.      denosumab (PROLIA) 60 mg/mL syringe Hold for 2 weeks      ergocalciferol (VITAMIN D2) 50,000 unit(1250 mcg) capsule Take 1 capsule (50,000 Units total) by mouth once a week. 12 capsule 0    LORazepam (ATIVAN) 1 mg tablet Take 0.5 tablets (0.5 mg total) by mouth daily as needed for anxiety. 30 tablet 0    metoprolol succinate XL (TOPROL-XL) 25 mg 24 hr tablet Take 1 tablet (25 mg total) by mouth 2 (two) times a day. 180 tablet 3    rivaroxaban (XARELTO) 15 mg tablet TAKE 1 TABLET(20 MG) BY MOUTH DAILY WITH DINNER 90 tablet 3     No current facility-administered medications for this visit.  "       ROS    Labs   Lab Results   Component Value Date    WBC 6.6 03/22/2024    HGB 14.6 03/22/2024    HCT 42.8 03/22/2024     03/22/2024    CHOL 145 03/22/2024    TRIG 65 03/22/2024    HDL 78 03/22/2024    LDLCALC 54 03/22/2024    ALT 8 03/22/2024    AST 16 03/22/2024     03/22/2024    K 5.2 03/22/2024     (H) 03/22/2024    CREATININE 0.77 03/22/2024    BUN 17 03/22/2024    CO2 25 03/22/2024    TSH 1.890 03/22/2024   [id number]    Lab Results   Component Value Date    GLUCOSE 87 03/22/2024    CALCIUM 8.1 (L) 10/13/2023     03/22/2024    K 5.2 03/22/2024    CO2 25 03/22/2024     (H) 03/22/2024    BUN 17 03/22/2024    CREATININE 0.77 03/22/2024       Objective   Vitals:    03/28/24 1304   BP: 122/70   BP Location: Left upper arm   Patient Position: Sitting   Weight: 68 kg (150 lb)   Height: 1.537 m (5' 0.5\")          Physical Exam  Constitutional:       General: She is not in acute distress.     Appearance: She is well-developed.   HENT:      Head: Normocephalic and atraumatic.      Nose: Nose normal.   Eyes:      General: No scleral icterus.     Conjunctiva/sclera: Conjunctivae normal.   Neck:      Vascular: No JVD.   Cardiovascular:      Rate and Rhythm: Normal rate and regular rhythm.      Pulses: Intact distal pulses.      Heart sounds: Normal heart sounds. No murmur heard.     No friction rub. No gallop.      Comments: Systolic click  1/6 systolic murmur second left intercostal space  Pulmonary:      Effort: Pulmonary effort is normal. No respiratory distress.      Breath sounds: No stridor. No wheezing or rales.   Chest:      Chest wall: No tenderness.   Abdominal:      Tenderness: There is no abdominal tenderness.   Musculoskeletal:         General: No deformity.   Skin:     General: Skin is warm and dry.   Neurological:      Mental Status: She is alert and oriented to person, place, and time.         Cardiac Procedures:      STRESS  Stress echocardiogram March 2017 moderate pulmonic " insufficiency pulmonary artery for mild mitral regurgitation negative for ischemia     Stress echo 2/19   vpc in recovery neg ishcmia  VPCs during test apical septal abnormal wall motion related to that      ECHO  Echo march 2023  Mild biatrial dilatation  Mild left ventricular hypertrophy  Mildly dilated right ventricle preserved ejection fraction  No regional wall motion abnormalities preserved ejection fraction 60%  Grade 2 diastolic dysfunction  Aortic sclerosis  Top normal aortic root 3.7 cm  Mitral annular calcification mild to moderate mitral regurgitation  Mild tricuspid regurgitation estimated pulmonary systolic pressure 40 mmHg  Dilated pulmonary artery 4.1 cm moderate pulmonic insufficiency  Normal pericardium  No significant change from study of 2018      OTHER  Ultrasound thyroid July 2017 11 mm nodule       CAT SCAN   Chest CTA April 2018 no change from 2017 dilated pulmonary artery 4.7 thyroid nodules coronary artery calcifications renal cyst Lrhyr mass    cta 1/19 [facility] pul artery 4.2 mildy dilated aortic root    cta 5/19 pulmonary artery 47 mm ectatic right pulmonary artery 31 mm aortic root 4.3    CTA July 2020 aorta 4.7  MM 6 mm right upper lobe probably seen prior        Cor cta June 2021  non obst cad    :pulm artery 4.8 cm aorta 3.9 cm aortic root 4.3 cm  1.  Coronary Arteries: Mild nonobstructive coronary artery disease.  No evidence  of flow-limiting stenosis by FFR CT.  2.  Cardiac Structures: Small left ventricular cavity with concentric left  ventricular hypertrophy.  Bi-atrial enlargement.  Focally calcified trileaflet  aortic valve.  3.  Mildly dilated aortic root at the sinuses of Valsalva.  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  4.  Normal left ventricular systolic function.  5.  Coronary calcium score 41.  This places the patient in the WAYNE 25% risk  percentile for age and gender matched individuals.  LAD:  Proximal:  Patent.  Mid: Calcified plaque causing minimal (less than 30%) stenosis.  Misregistration  artifact.  Distal: Patent.  FFR CT in the distal vessel is 0.86 suggesting there is no flow  limiting stenosis.  DIAG 1: Small vessel.  Patent.  DIAG 2: Small vessel.  Patent.     LCX:  Proximal: Calcified plaque at the origin of the first obtuse marginal causing  mild (30-50%) stenosis.  Mid: Patent.  Distal: Patent.  FFR CT in the distal vessel is 0.94 suggesting no flow limiting  stenosis.  OM1:  Patent.  OM2:  Patent.     RCA:  Proximal: Patent.    LUNG BRUNER: Previously described right upper lobe nodule is somewhat less  prominent on today's examination measuring approximately 4 mm, previously  measuring 6 mm. Dependent hypoventilatory changes are noted.  LYMPH NODES: No pathologically enlarged lymph nodes visualized.  OSSEOUS STRUCTURES: No evidence of suspicious destructive bony lesions.       CTA October 2023  IMPRESSION:     1.  Stable interval appearance of the pulmonary artery aneurysm involving the  main pulmonary artery, which measures up to 47 mm in diameter..  Stable from 2019         ELECTROPHYSIOLOGY    HOLTER July 2021  SHORT BURSTS NON SUST AVG UP TO 20 BEATS  The patient was monitored for 24 hours.   2. The predominant rhythm was sinus bradycardia. Ectopic atrial rhythm was noted.   3. The average heart rate was 59 bpm.   4. The minimum heart rate was 49 bpm.   5. The maximum heart rate was 111 bpm.   6. There were 35 supraventricular beats. There were 9 SVE tachycardias. The longest lasted 16 beatd 6.7 secs with an average  heart rate of 142 bpm at   4:22 pm on 7/20/2021. The fastest lasted 3 beats 1.2 secs with an average heart rate of 148 bpm at  5:11 pm on 7/20/2021.   7. There were 156 premature ventricular beats. There were 11 March 2024 right bundle secondary ST-T wave changes stable from 2020    EKG  Aug 2022          Assessment/Plan:          Pulmonary artery abnormality  She is followed for  pulmonary artery aneurysm and thoracic aneurysm last imaged May 2019 follows with Dr. Fajardo  Echo today all numbers [facility] pulmonary artery four-point 1 aortic root 3.7 by CTA aortic root was 4  Continue yearly imaging  Next year we will get coronary CT angiogram to image her coronary arteries also will be due May 2020    Coronary artery disease involving native coronary artery of native heart without angina pectoris  This was diagnosed by calcification seen on CAT scan stress echo was negative in February 2019 she does have right bundle branch block  Plan coronary CT angiogram with imaging of her pulmonary thoracic aneurysm in May 2020    PAF (paroxysmal atrial fibrillation) (CMS/HCC) (HCC)  Seen on a monitor in April 2018 2% of total heartbeats no clinical recurrence remains anticoagulated with Xarelto      Mixed hyperlipidemia  Phenomenal result to the response to the PCSK9 inhibitor presently LDL cholesterol is now 40 she is intolerant to statins and Zetia with GI upset    Multinodular goiter  Had negative biopsy 2017 follows with Dr. Ha Wheatley      She is followed for pulmonary artery aneurysm  Nonobstructive CAD  Paroxysmal atrial fibrillation decrease  today I decreased Xarelto dose to  to 15 mg  creatinine clearance hovering at 50 cc/min  Mixed hyperlipidemia numbers look good on PCSK9 inhibitor   Praluent  Follow-up in 1 year with echocardiogram  Follow-up with surgeons and CTA of pulmonary artery aneurysm October 2025, Dr. Alfonso

## 2024-03-22 LAB
ERYTHROCYTE [DISTWIDTH] IN BLOOD BY AUTOMATED COUNT: 13.1 % (ref 11.7–15.4)
HCT VFR BLD AUTO: 42.8 % (ref 34–46.6)
HGB BLD-MCNC: 14.6 G/DL (ref 11.1–15.9)
MCH RBC QN AUTO: 31.8 PG (ref 26.6–33)
MCHC RBC AUTO-ENTMCNC: 34.1 G/DL (ref 31.5–35.7)
MCV RBC AUTO: 93 FL (ref 79–97)
PLATELET # BLD AUTO: 284 X10E3/UL (ref 150–450)
RBC # BLD AUTO: 4.59 X10E6/UL (ref 3.77–5.28)
WBC # BLD AUTO: 6.6 X10E3/UL (ref 3.4–10.8)

## 2024-03-23 LAB
25(OH)D3+25(OH)D2 SERPL-MCNC: 10.2 NG/ML (ref 30–100)
ALBUMIN SERPL-MCNC: 4 G/DL (ref 3.7–4.7)
ALBUMIN/GLOB SERPL: 1.7 {RATIO} (ref 1.2–2.2)
ALP SERPL-CCNC: 62 IU/L (ref 44–121)
ALT SERPL-CCNC: 8 IU/L (ref 0–32)
AST SERPL-CCNC: 16 IU/L (ref 0–40)
BILIRUB SERPL-MCNC: 0.6 MG/DL (ref 0–1.2)
BUN SERPL-MCNC: 17 MG/DL (ref 8–27)
BUN/CREAT SERPL: 22 (ref 12–28)
CALCIUM SERPL-MCNC: 9.4 MG/DL (ref 8.7–10.3)
CHLORIDE SERPL-SCNC: 107 MMOL/L (ref 96–106)
CHOLEST SERPL-MCNC: 145 MG/DL (ref 100–199)
CO2 SERPL-SCNC: 25 MMOL/L (ref 20–29)
CREAT SERPL-MCNC: 0.77 MG/DL (ref 0.57–1)
EGFRCR SERPLBLD CKD-EPI 2021: 75 ML/MIN/1.73
GLOBULIN SER CALC-MCNC: 2.3 G/DL (ref 1.5–4.5)
GLUCOSE SERPL-MCNC: 87 MG/DL (ref 70–99)
HDLC SERPL-MCNC: 78 MG/DL
LDLC SERPL CALC-MCNC: 54 MG/DL (ref 0–99)
POTASSIUM SERPL-SCNC: 5.2 MMOL/L (ref 3.5–5.2)
PROT SERPL-MCNC: 6.3 G/DL (ref 6–8.5)
SODIUM SERPL-SCNC: 143 MMOL/L (ref 134–144)
TRIGL SERPL-MCNC: 65 MG/DL (ref 0–149)
TSH SERPL DL<=0.005 MIU/L-ACNC: 1.89 UIU/ML (ref 0.45–4.5)
VLDLC SERPL CALC-MCNC: 13 MG/DL (ref 5–40)

## 2024-03-25 RX ORDER — ERGOCALCIFEROL 1.25 MG/1
50000 CAPSULE ORAL WEEKLY
Qty: 12 CAPSULE | Refills: 0 | Status: SHIPPED | OUTPATIENT
Start: 2024-03-25

## 2024-03-25 NOTE — RESULT ENCOUNTER NOTE
Called and left a message on her voicemail  Start Vitamin D 14295 units weekly for 12 weeks  Then Vitamin D3 2000 units daily

## 2024-03-28 ENCOUNTER — HOSPITAL ENCOUNTER (OUTPATIENT)
Dept: CARDIOLOGY | Facility: CLINIC | Age: 89
Discharge: HOME | End: 2024-03-28
Attending: INTERNAL MEDICINE
Payer: COMMERCIAL

## 2024-03-28 ENCOUNTER — OFFICE VISIT (OUTPATIENT)
Dept: CARDIOLOGY | Facility: CLINIC | Age: 89
End: 2024-03-28
Payer: COMMERCIAL

## 2024-03-28 VITALS
DIASTOLIC BLOOD PRESSURE: 80 MMHG | SYSTOLIC BLOOD PRESSURE: 120 MMHG | HEIGHT: 61 IN | BODY MASS INDEX: 28.32 KG/M2 | WEIGHT: 150 LBS

## 2024-03-28 VITALS
HEIGHT: 61 IN | DIASTOLIC BLOOD PRESSURE: 70 MMHG | BODY MASS INDEX: 28.32 KG/M2 | WEIGHT: 150 LBS | SYSTOLIC BLOOD PRESSURE: 122 MMHG

## 2024-03-28 DIAGNOSIS — I28.1 PULMONARY ARTERY ANEURYSM (CMS/HCC): Primary | ICD-10-CM

## 2024-03-28 DIAGNOSIS — E04.2 MULTINODULAR GOITER: ICD-10-CM

## 2024-03-28 DIAGNOSIS — I28.1 PULMONARY ARTERY ANEURYSM (CMS/HCC): ICD-10-CM

## 2024-03-28 DIAGNOSIS — I48.0 PAF (PAROXYSMAL ATRIAL FIBRILLATION) (CMS/HCC): Primary | ICD-10-CM

## 2024-03-28 DIAGNOSIS — I45.10 RBBB: ICD-10-CM

## 2024-03-28 DIAGNOSIS — I48.0 PAF (PAROXYSMAL ATRIAL FIBRILLATION) (CMS/HCC): ICD-10-CM

## 2024-03-28 DIAGNOSIS — I25.10 CORONARY ARTERY DISEASE INVOLVING NATIVE CORONARY ARTERY OF NATIVE HEART WITHOUT ANGINA PECTORIS: ICD-10-CM

## 2024-03-28 LAB
AORTIC ROOT ANNULUS: 3.8 CM
ASCENDING AORTA: 3.5 CM
ATRIAL RATE: 55
AV PEAK GRADIENT: 8 MMHG
AV PEAK VELOCITY-S: 1.43 M/S
AV VALVE AREA: 1.88 CM2
BSA FOR ECHO PROCEDURE: 1.71 M2
E WAVE DECELERATION TIME: 201 MS
E/A RATIO: 1.6
E/E' RATIO: 16.8
E/LAT E' RATIO: 9.5
EDV (BP): 64.7 CM3
EF (A4C): 74.2 %
EF A2C: 59.5 %
EJECTION FRACTION: 68.6 %
EST RIGHT VENT SYSTOLIC PRESSURE BY TRICUSPID REGURGITATION JET: 46 MMHG
ESV (BP): 20.3 CM3
FRACTIONAL SHORTENING: 31.08 %
INTERVENTRICULAR SEPTUM: 1.3 CM
LA ESV (BP): 85.6 CM3
LA ESV INDEX (A2C): 45.56 CM3/M2
LA ESV INDEX (BP): 50.06 CM3/M2
LA/AORTA RATIO: 1.34
LAAS-AP2: 24.6 CM2
LAAS-AP4: 27 CM2
LAD 2D: 5.1 CM
LALD A4C: 6.11 CM
LALD A4C: 6.36 CM
LAV-S: 77.9 CM3
LEFT ATRIUM VOLUME INDEX: 54.8 CM3/M2
LEFT ATRIUM VOLUME: 93.7 CM3
LEFT INTERNAL DIMENSION IN SYSTOLE: 2.86 CM (ref 2.35–3.56)
LEFT VENTRICLE DIASTOLIC VOLUME INDEX: 36.2 CM3/M2
LEFT VENTRICLE DIASTOLIC VOLUME: 61.9 CM3
LEFT VENTRICLE SYSTOLIC VOLUME INDEX: 9.36 CM3/M2
LEFT VENTRICLE SYSTOLIC VOLUME: 16 CM3
LEFT VENTRICULAR INTERNAL DIMENSION IN DIASTOLE: 4.15 CM (ref 3.96–5.5)
LEFT VENTRICULAR POSTERIOR WALL IN END DIASTOLE: 1.22 CM (ref 0.52–0.96)
LV DIASTOLIC VOLUME: 63.8 CM3
LV ESV (APICAL 2 CHAMBER): 25.8 CM3
LVAD-AP2: 21.9 CM2
LVAD-AP4: 22 CM2
LVAS-AP2: 12.3 CM2
LVAS-AP4: 10.3 CM2
LVEDVI(A2C): 37.31 CM3/M2
LVEDVI(BP): 37.84 CM3/M2
LVESVI(A2C): 15.09 CM3/M2
LVESVI(BP): 11.87 CM3/M2
LVLD-AP2: 6.16 CM
LVLD-AP4: 6.56 CM
LVLS-AP2: 5.24 CM
LVLS-AP4: 5.53 CM
LVOT 2D: 2 CM
LVOT A: 3.14 CM2
LVOT PEAK VELOCITY: 1.09 M/S
LVOT PG: 5 MMHG
MLH CV ECHO AVA INDEX VELOCITY RATIO: 1.1
MV E'TISSUE VEL-LAT: 0.1 M/S
MV E'TISSUE VEL-MED: 0.06 M/S
MV PEAK A VEL: 0.62 M/S
MV PEAK E VEL: 0.96 M/S
P AXIS: 77
POSTERIOR WALL: 1.22 CM
PR INTERVAL: 184
PULMONARY REGURGITATION LATE DIASTOLIC VELOCITY: 1.38 M/S
PV PEAK GRADIENT: 3 MMHG
PV PV: 0.89 M/S
QRS DURATION: 136
QT INTERVAL: 430
QTC CALCULATION(BAZETT): 411
R AXIS: 51
RAP: 8 MMHG
RVOT VMAX: 0.8 M/S
RVOT VTI: 20.6 CM
SEPTAL TISSUE DOPPLER FREE WALL LATE DIA VELOCITY (APICAL 4 CHAMBER VIEW): 0.14 M/S
T WAVE AXIS: -57
TR MAX PG: 37.7 MMHG
TRICUSPID VALVE PEAK REGURGITATION VELOCITY: 3.07 M/S
VENTRICULAR RATE: 55
Z-SCORE OF LEFT VENTRICULAR DIMENSION IN END DIASTOLE: -1.18
Z-SCORE OF LEFT VENTRICULAR DIMENSION IN END SYSTOLE: -0.09
Z-SCORE OF LEFT VENTRICULAR POSTERIOR WALL IN END DIASTOLE: 2.87

## 2024-03-28 PROCEDURE — 93000 ELECTROCARDIOGRAM COMPLETE: CPT | Performed by: INTERNAL MEDICINE

## 2024-03-28 PROCEDURE — 99214 OFFICE O/P EST MOD 30 MIN: CPT | Performed by: INTERNAL MEDICINE

## 2024-03-28 PROCEDURE — 93306 TTE W/DOPPLER COMPLETE: CPT | Performed by: INTERNAL MEDICINE

## 2024-03-28 PROCEDURE — 3008F BODY MASS INDEX DOCD: CPT | Performed by: INTERNAL MEDICINE

## 2024-03-28 NOTE — ASSESSMENT & PLAN NOTE
2018 monitor showed short burst of atrial fibrillation 2% of total heartbeats anticoagulant with Xarelto at decreased dose today creatinine clearance is having on 50 cc/min, 15 mg

## 2024-03-28 NOTE — ASSESSMENT & PLAN NOTE
She has pulmonary artery aneurysm 4.8 cm last imaged October 2023 follows with Dr. Bolton  Due for CTA October 2025 stable measurements from 2020  Echocardiogram today stable March 2024 by echo measures 4.1 cm echo minimally dilated thoracic aorta 3.8 cm table from 2020

## 2024-04-01 ENCOUNTER — TELEPHONE (OUTPATIENT)
Dept: SCHEDULING | Facility: CLINIC | Age: 89
End: 2024-04-01
Payer: COMMERCIAL

## 2024-04-01 DIAGNOSIS — I48.0 PAF (PAROXYSMAL ATRIAL FIBRILLATION) (CMS/HCC): ICD-10-CM

## 2024-04-01 NOTE — TELEPHONE ENCOUNTER
Called and spoke with Alexandra.   Prescription for Xarelto 15 mg for 90 days resent to her pharmacy.  She understands.  No questions.

## 2024-04-01 NOTE — TELEPHONE ENCOUNTER
Pt calling regarding her recent medication refill and only received 9 pills , and pt has questions regarding her Xarelto      Please advise    P:[phone]

## 2024-04-02 DIAGNOSIS — E78.2 MIXED HYPERLIPIDEMIA: ICD-10-CM

## 2024-04-02 RX ORDER — ALIROCUMAB 150 MG/ML
INJECTION, SOLUTION SUBCUTANEOUS
Qty: 6 ML | Refills: 3 | Status: SHIPPED | OUTPATIENT
Start: 2024-04-02

## 2024-04-02 NOTE — TELEPHONE ENCOUNTER
"Medicine Refill Request [facility]    Name of Patient: Teresa Bird    Caller's name/Relationship: екатерина    Callback number: [phone]    Medication Name, Dosage, Supply:   alirocumab (PRALUENT PEN) 150 mg/mL pen injector     Quantity left: 0    Pharmacy: [id number]    Last Office Visit: 3/28/24  Last Consult Visit: Visit date not found  Last Telemedicine Visit: Visit date not found    Next Appointment: Visit date not found      Current Outpatient Medications:     alirocumab (PRALUENT PEN) 150 mg/mL pen injector, Inject 1 ml (150 mg) under skin every 14 days   >>hold for 2 weeks, Disp: , Rfl:     cetirizine (ZyrTEC) 5 mg tablet, Take 1 tablet (5 mg total) by mouth daily as needed for allergies., Disp: , Rfl:     denosumab (PROLIA) 60 mg/mL syringe, Hold for 2 weeks, Disp: , Rfl:     ergocalciferol (VITAMIN D2) 50,000 unit(1250 mcg) capsule, Take 1 capsule (50,000 Units total) by mouth once a week., Disp: 12 capsule, Rfl: 0    LORazepam (ATIVAN) 1 mg tablet, Take 0.5 tablets (0.5 mg total) by mouth daily as needed for anxiety., Disp: 30 tablet, Rfl: 0    metoprolol succinate XL (TOPROL-XL) 25 mg 24 hr tablet, Take 1 tablet (25 mg total) by mouth 2 (two) times a day., Disp: 180 tablet, Rfl: 3    rivaroxaban (XARELTO) 15 mg tablet, TAKE 1 TABLET(20 MG) BY MOUTH DAILY WITH DINNER, Disp: 90 tablet, Rfl: 3      BP Readings from Last 3 Encounters:   03/28/24 120/80   03/28/24 122/70   11/14/23 130/88       Recent Lab results:  Lab Results   Component Value Date    CHOL 145 03/22/2024   ,   Lab Results   Component Value Date    HDL 78 03/22/2024   ,   Lab Results   Component Value Date    LDLCALC 54 03/22/2024   ,   Lab Results   Component Value Date    TRIG 65 03/22/2024        Lab Results   Component Value Date    GLUCOSE 87 03/22/2024   , No results found for: \"HGBA1C\"      Lab Results   Component Value Date    CREATININE 0.77 03/22/2024       Lab Results   Component Value Date    TSH 1.890 03/22/2024           "

## 2024-04-10 ENCOUNTER — TELEPHONE (OUTPATIENT)
Dept: SCHEDULING | Facility: CLINIC | Age: 89
End: 2024-04-10
Payer: COMMERCIAL

## 2024-05-16 ENCOUNTER — OFFICE VISIT (OUTPATIENT)
Dept: INTERNAL MEDICINE | Facility: CLINIC | Age: 89
End: 2024-05-16
Payer: COMMERCIAL

## 2024-05-16 VITALS
HEART RATE: 71 BPM | OXYGEN SATURATION: 92 % | WEIGHT: 147 LBS | DIASTOLIC BLOOD PRESSURE: 86 MMHG | BODY MASS INDEX: 28.24 KG/M2 | SYSTOLIC BLOOD PRESSURE: 118 MMHG

## 2024-05-16 DIAGNOSIS — E55.9 VITAMIN D DEFICIENCY: ICD-10-CM

## 2024-05-16 DIAGNOSIS — R15.9 INCONTINENCE OF FECES WITH FECAL URGENCY: ICD-10-CM

## 2024-05-16 DIAGNOSIS — Z12.31 VISIT FOR SCREENING MAMMOGRAM: ICD-10-CM

## 2024-05-16 DIAGNOSIS — I77.810 DILATED AORTIC ROOT (CMS/HCC): ICD-10-CM

## 2024-05-16 DIAGNOSIS — I48.0 PAF (PAROXYSMAL ATRIAL FIBRILLATION) (CMS/HCC): Primary | ICD-10-CM

## 2024-05-16 DIAGNOSIS — R15.2 INCONTINENCE OF FECES WITH FECAL URGENCY: ICD-10-CM

## 2024-05-16 DIAGNOSIS — I47.10 SVT (SUPRAVENTRICULAR TACHYCARDIA) (CMS/HCC): ICD-10-CM

## 2024-05-16 PROCEDURE — 99214 OFFICE O/P EST MOD 30 MIN: CPT | Performed by: INTERNAL MEDICINE

## 2024-05-16 PROCEDURE — G22XX PR COMPLEXITY INHERENT TO E&M -ADD ON NON-BILLABLE: HCPCS | Performed by: INTERNAL MEDICINE

## 2024-05-16 PROCEDURE — 3008F BODY MASS INDEX DOCD: CPT | Performed by: INTERNAL MEDICINE

## 2024-05-16 RX ORDER — AZITHROMYCIN 500 MG/1
TABLET, FILM COATED ORAL
Qty: 1 TABLET | Refills: 0 | Status: SHIPPED | OUTPATIENT
Start: 2024-05-16

## 2024-05-16 ASSESSMENT — ENCOUNTER SYMPTOMS
VOMITING: 0
COUGH: 0
BACK PAIN: 0
DYSURIA: 0
NAUSEA: 0
FREQUENCY: 0
DIZZINESS: 0
CHILLS: 0
FATIGUE: 0
FEVER: 0
HEADACHES: 0
SORE THROAT: 0
CONFUSION: 0
DIARRHEA: 0
PALPITATIONS: 0
SHORTNESS OF BREATH: 0
ABDOMINAL PAIN: 0
CONSTIPATION: 0

## 2024-05-16 ASSESSMENT — PATIENT HEALTH QUESTIONNAIRE - PHQ9: SUM OF ALL RESPONSES TO PHQ9 QUESTIONS 1 & 2: 0

## 2024-05-16 NOTE — PROGRESS NOTES
Subjective      Patient ID: Teresa Bird is a 88 y.o. female.    Patient seen for PAF, OP, CAD  She has head congestion in the mornings  She feels full sinuses.  She has no fever or cough  She has some fecal incontinence and urgency      Current Outpatient Medications   Medication Sig Dispense Refill    alirocumab (PRALUENT PEN) 150 mg/mL pen injector Inject 1 ml (150 mg) under skin every 14 days 6 mL 3    azithromycin (ZITHROMAX) 500 mg tablet Take one tablet 30-60 minutes before procedure 1 tablet 0    cetirizine (ZyrTEC) 5 mg tablet Take 1 tablet (5 mg total) by mouth daily as needed for allergies.      denosumab (PROLIA) 60 mg/mL syringe Hold for 2 weeks      ergocalciferol (VITAMIN D2) 50,000 unit(1250 mcg) capsule Take 1 capsule (50,000 Units total) by mouth once a week. 12 capsule 0    LORazepam (ATIVAN) 1 mg tablet Take 0.5 tablets (0.5 mg total) by mouth daily as needed for anxiety. 30 tablet 0    metoprolol succinate XL (TOPROL-XL) 25 mg 24 hr tablet Take 1 tablet (25 mg total) by mouth 2 (two) times a day. 180 tablet 3    rivaroxaban (XARELTO) 15 mg tablet TAKE 1 TABLET(20 MG) BY MOUTH DAILY WITH DINNER 90 tablet 3     No current facility-administered medications for this visit.     Allergies   Allergen Reactions    Penicillin Other (see comments)     Fainted and PCP said not to take    Pravastatin Sodium GI intolerance    Rosuvastatin Calcium GI intolerance     Other reaction(s): muscle cramps    Zetia [Ezetimibe]      myalgias     Past Medical History:   Diagnosis Date    A-fib (CMS/[facility])     Arthritis     Closed right ankle fracture age 62    Endometrial cancer (CMS/HCC) 12/06/2013    GERD (gastroesophageal reflux disease)     Lipid disorder     difficulty tolerating statins    Melanoma (CMS/HCC)     left shoulder    Osteoporosis     did not tolerate fosamax    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[facility])     gets follow up CT scans regularly    SVT (supraventricular tachycardia) (CMS/[facility]) 08/2021    Thyroid  goiter      Past Surgical History:   Procedure Laterality Date    BUNIONECTOMY Bilateral     CATARACT EXTRACTION, BILATERAL  2006    CHOLECYSTECTOMY      HYSTERECTOMY  2013    KNEE SURGERY Left 10/2023    MOHS SURGERY      SKIN CANCER EXCISION       Social History     Socioeconomic History    Marital status:      Spouse name: Not on file    Number of children: Not on file    Years of education: Not on file    Highest education level: Not on file   Occupational History    Not on file   Tobacco Use    Smoking status: Former     Packs/day: 0.50     Years: 25.00     Additional pack years: 0.00     Total pack years: 12.50     Types: Cigarettes     Quit date:      Years since quittin.3    Smokeless tobacco: Never   Vaping Use    Vaping Use: Never used   Substance and Sexual Activity    Alcohol use: Yes     Alcohol/week: 2.0 standard drinks of alcohol     Types: 2 Standard drinks or equivalent per week     Comment: social    Drug use: No    Sexual activity: Defer   Other Topics Concern    Not on file   Social History Narrative    Not on file     Social Determinants of Health     Financial Resource Strain: Low Risk  (10/12/2023)    Overall Financial Resource Strain (CARDIA)     Difficulty of Paying Living Expenses: Not hard at all   Food Insecurity: No Food Insecurity (10/30/2023)    Hunger Vital Sign     Worried About Running Out of Food in the Last Year: Never true     Ran Out of Food in the Last Year: Never true   Transportation Needs: No Transportation Needs (10/12/2023)    PRAPARE - Transportation     Lack of Transportation (Medical): No     Lack of Transportation (Non-Medical): No   Physical Activity: Not on file   Stress: Not on file   Social Connections: Not on file   Intimate Partner Violence: Not on file   Housing Stability: Low Risk  (10/12/2023)    Housing Stability Vital Sign     Unable to Pay for Housing in the Last Year: No     Number of Places Lived in the Last Year: 1     Unstable Housing  in the Last Year: No     Family History   Problem Relation Age of Onset    Heart attack Biological Mother     Diabetes Biological Mother     Lung cancer Biological Mother     COPD Biological Mother     Diabetes Biological Father     Prostate cancer Biological Brother          The following have been reviewed and updated as appropriate in this visit:   Meds  Problems       Review of Systems   Constitutional:  Negative for chills, fatigue and fever.   HENT:  Negative for congestion, postnasal drip and sore throat.    Eyes:  Negative for visual disturbance.   Respiratory:  Negative for cough and shortness of breath.    Cardiovascular:  Negative for chest pain and palpitations.   Gastrointestinal:  Negative for abdominal pain, constipation, diarrhea, nausea and vomiting.        Fecal incontience   Genitourinary:  Negative for dysuria and frequency.   Musculoskeletal:  Negative for back pain.   Skin:  Negative for rash.   Neurological:  Negative for dizziness and headaches.   Psychiatric/Behavioral:  Negative for confusion.        Objective     Vitals:    05/16/24 1023   BP: 118/86   BP Location: Left upper arm   Patient Position: Sitting   Pulse: 71   SpO2: 92%   Weight: 66.7 kg (147 lb)     Body mass index is 28.24 kg/m².    Physical Exam  Vitals reviewed.   Constitutional:       Appearance: Normal appearance.   HENT:      Head: Normocephalic and atraumatic.      Mouth/Throat:      Mouth: Mucous membranes are moist.      Pharynx: Oropharynx is clear. No posterior oropharyngeal erythema.   Eyes:      General: No scleral icterus.     Conjunctiva/sclera: Conjunctivae normal.   Cardiovascular:      Rate and Rhythm: Normal rate and regular rhythm.      Heart sounds: No murmur heard.  Pulmonary:      Effort: No respiratory distress.      Breath sounds: Normal breath sounds. No wheezing.   Abdominal:      Palpations: Abdomen is soft.      Tenderness: There is no abdominal tenderness. There is no guarding or rebound.    Musculoskeletal:         General: Normal range of motion.      Cervical back: Normal range of motion.   Skin:     General: Skin is warm.   Neurological:      Mental Status: She is alert and oriented to person, place, and time.         Orders Placed This Encounter   Procedures    BI SCREENING MAMMOGRAM BILATERAL(TOMOSYNTHESIS)     Standing Status:   Future     Standing Expiration Date:   7/16/2025     Order Specific Question:   Release to patient     Answer:   Immediate [1]    Comprehensive metabolic panel     Standing Status:   Future     Number of Occurrences:   1     Standing Expiration Date:   9/16/2024     Order Specific Question:   Has the patient fasted?     Answer:   Yes     Order Specific Question:   Release to patient     Answer:   Immediate [1]    Vitamin D 25 hydroxy     Standing Status:   Future     Number of Occurrences:   1     Standing Expiration Date:   9/16/2024     Order Specific Question:   Release to patient     Answer:   Immediate [1]    Ambulatory Referral to Pelvic Floor Rehab (Physical Therapy)     To schedule an appointment for outpatient therapy, please call:    [phone]     Standing Status:   Future     Standing Expiration Date:   11/16/2024     Referral Priority:   Routine     Referral Type:   Physical Therapy     Referral Reason:   Specialty Services Required     Number of Visits Requested:   10       Assessment/Plan   Problem List Items Addressed This Visit          Circulatory    Dilated aortic root (CMS/HCC)    PAF (paroxysmal atrial fibrillation) (CMS/HCC) - Primary     Continue Metoprolol and Xarelto         Relevant Orders    Comprehensive metabolic panel    SVT (supraventricular tachycardia)     Continue Metoprolol            Digestive    Fecal incontinence    Relevant Orders    Ambulatory Referral to Pelvic Floor Rehab (Physical Therapy)       Endocrine/Metabolic    Vitamin D deficiency     Continue weekly Vitamin D  Then start Vitamin D 2000units daily         Relevant  Orders    Vitamin D 25 hydroxy       Other    Visit for screening mammogram    Relevant Orders    BI SCREENING MAMMOGRAM BILATERAL(TOMOSYNTHESIS)       Sergei Cooley MD  5/16/2024    I attest that this visit supports the complexity inherent to evaluation and management associated with medical care services that serve as the continuing focal point for all needed health care services and/or medical care services that are part of ongoing care related to this patient's single, serious condition or a complex condition.

## 2024-05-16 NOTE — PATIENT INSTRUCTIONS
Problem List Items Addressed This Visit          Circulatory    Dilated aortic root (CMS/HCC)    PAF (paroxysmal atrial fibrillation) (CMS/HCC) - Primary     Continue Metoprolol and Xarelto         Relevant Orders    Comprehensive metabolic panel    SVT (supraventricular tachycardia)     Continue Metoprolol            Digestive    Fecal incontinence    Relevant Orders    Ambulatory Referral to Pelvic Floor Rehab (Physical Therapy)       Endocrine/Metabolic    Vitamin D deficiency     Continue weekly Vitamin D  Then start Vitamin D 2000units daily         Relevant Orders    Vitamin D 25 hydroxy       Other    Visit for screening mammogram    Relevant Orders    BI SCREENING MAMMOGRAM BILATERAL(TOMOSYNTHESIS)       Sergei Cooley MD  5/16/2024

## 2024-05-28 RX ORDER — METOPROLOL SUCCINATE 25 MG/1
25 TABLET, EXTENDED RELEASE ORAL 2 TIMES DAILY
Qty: 180 TABLET | Refills: 3 | Status: SHIPPED | OUTPATIENT
Start: 2024-05-28

## 2024-06-10 ENCOUNTER — HOSPITAL ENCOUNTER (OUTPATIENT)
Dept: RADIOLOGY | Age: 89
Discharge: HOME | End: 2024-06-10
Attending: INTERNAL MEDICINE
Payer: COMMERCIAL

## 2024-06-10 DIAGNOSIS — Z12.31 VISIT FOR SCREENING MAMMOGRAM: ICD-10-CM

## 2024-06-10 PROCEDURE — 77063 BREAST TOMOSYNTHESIS BI: CPT

## 2024-06-17 ENCOUNTER — HOSPITAL ENCOUNTER (OUTPATIENT)
Dept: PHYSICAL THERAPY | Age: 89
Setting detail: THERAPIES SERIES
Discharge: HOME | End: 2024-06-17
Attending: INTERNAL MEDICINE
Payer: COMMERCIAL

## 2024-06-17 DIAGNOSIS — R15.9 INCONTINENCE OF FECES WITH FECAL URGENCY: ICD-10-CM

## 2024-06-17 DIAGNOSIS — R15.2 INCONTINENCE OF FECES WITH FECAL URGENCY: ICD-10-CM

## 2024-06-17 PROCEDURE — 97163 PT EVAL HIGH COMPLEX 45 MIN: CPT | Mod: GP

## 2024-06-17 NOTE — OP PT TREATMENT LOG
Initial Eval      Progress Notes      Re-evaluation      Exercises Current Session Performed   Yes/No Time   NEURO RE-ED  [id number]      EMG-biofeedback [employer] EMG-video  Resting tone PF  Resting tone TA  PFC long hold  PFC quick flick  TAC                   THER ACT  40061      HEP Bowel schedule after meals at least breakfast, fiber 20-25 g, fluid 50-60 mostly water, stool under feet yes    Normal Bladder/bowel education Bladder function and behavior  Bowel function and behavior  Fluids 50-60 oz  Fiber-20-25 g     Yes  Yes  yes    Bowel or bladder irritants caffeine yes    Toilet posture and evacuation For bladder including posture, breathing, position changes and relaxation  Bowel including posture, breathing, stool under feet, relaxation,  Eccentric abdominal with exhale       yes    Posture education Sleeping  Sitting  standing     Bowel or bladder void diary and schedule Void diary   Bowel schedule after meals especially breakfast   yes    Knack With cough  With sit to stand     Urge suppression With quick flicks  With heel raises     Goal Review  yes    outcomes PFDI  PFIQ  VQ  FSFI  CPSI  Mod oswestry Yes  yes    education Bowel function and behavioral management  Fiber and fluid intake  PF muscles and function   yes    symptoms Fecal urgency  Fecal incontinence  Decreased urge to have bowel movement yes                THER EX  61484      Diaphragmatic breathing With assessment/manual  With exercises  With EMG     TAC With assessment/manual  With exercises on exhale  With EMG     PFC  Fast/slow twitch With assessment/manual  With exercises on exhale  With EMG  Long hold  Quick flicks     Stretches      Groin S Butterfly supine with DB  Butterfly sitting with DB  Butterfly against wall with DB  Supine at wall legs extended and abducted with DB  Sitting Single leg abd with DB     HS S Supine at wall with DB  Supine with strap with DB  Supine active with DF glide and DB  Seated HS S with DB     Piriformis  S Supine at wall figure 4 with DB  Supine figure four push/pull with DB  Supine figure 4 pull towards chest with DB       pigeon Modified on table with DB  Floor with DB  With props     Hip flexor S Supine one leg off table other ktc with DB  Lunge with DB       Abdominal S FREDDIE with breath and neck ext/flex  PPU with breath  Standing ext S hands on hips     Oblique S Seated side bend in pretzel sit  Seated side bend in chair  Seated side bend in low kneeling     Obturator Internus S      Trunk rotation S LTR supine with DB  Seated trunk rotation with breath     SKTC With breath     DKTC/Happy baby S DKTC with breath  Single leg happy baby with DB  Double leg happy baby with DB     Child pose With DB  With props     Deep squat With DB  With props     Trunk flexion S Pretzel sit forward fold with DB  From chair forward fold with DB  From low bench forward fold       Chest S On 1/2 foam     PPT/APT Supine with breath  Seated on ball with breath     Lateral pelvic tilts Seated on ball with breath       Pelvic circles Seated on ball with breath     Heel slide With PFC on exhale  With TAC on exhale     Knee fall out With PFC on exhale  With TAC on exhale     bridge With PFC on exhale  With TAC on exhale     Dead bug progression With PFC on exhale  With TAC on exhale     clamshell Supine  Side  sit  With PFC on exhale  With TAC on exhale     Side abduction With PFC on exhale  With TAC on exhale     Cat camel With breath     Quadruped/birdog progression With PFC on exhale  With TAC on exhale  With support  Alternate arms  Alternate legs  Alternate arm/leg     Prone plank progression With PFC on exhale  With TAC on exhale  Standing at wall  Push up (tricep/chest)  Hip flexion, abd, ext  Floor on elbows/knees  Floor ext arms/knees-shoulder tap  Floor full plank on elbows  Floor full plank arms ext     Side plank progression With PFC on exhale  With TAC on exhale  Elbow knees flexed  Elbow one knee flexed other ext  Elbow  knees ext  Full arm and knees flexed  Full arm one knee flexed other ext  Full arm knees ext     PB PFC on exhale  TAC on exhale  Cough with PFC  Alternate arm leg raise  MB raise/push  UE exercises (row, LPD, H abd, scaption, bicep, tricep)     Sit to stand With PFC on exhale  With TAC on exhale  With MB raise     Mini-squat With PFC on exhale  With TAC on exhale  With weight     Wide leg squat With PFC on exhale  With TAC on exhale  With weight     Hip hinge With PFC on exhale  With TAC on exhale  With weight     lunge With PFC on exhale  With TAC on exhale  Forward  Backward  Lateral  Walking lunge  With weight     lifting With PFC on exhale  With TAC on exhale  Low bench to waist  Floor to waist      Carseat  High chair  Crib  Lift/lower  carry     Walk in place With proper breath and submax core activation     walk With proper breath and submax core activation     jump With PFC on exhale  With TAC on exhale  Double leg  Single leg     jog With proper breath and submax core activation           MMT      ROM                  GAIT TRAINING  40384            MANUAL  95462      External PF evaluation Sidelying manual palpation external LA/Coccygeus     Internal PF evaluation See assessment flow     Hip evaluation      Back/SI evaluation      STM PF:  LA  OI  Coccygeus  Perineum    Abdominals:  TA, RA, obliques    Hip flexors  Hip adductors    Diaphragm release  Colon massage    Scar massage    Lumbar:  Laminal release  Paraspine, quadratus  Gluts  piriformis  HS     Joint mobilization Coccyx  Lumbar  thoracic     MFR      TPR      Strain counterstrain      Passive stretch            MODALITIES      Heat  27666      Cold  57109      Estim Attend  96750      Estim Unattend        US  40782      Biofeedback  Uqheccf=61301  [id number]          EMG     Sensor Used External pads  3/9:00  Right GT  Right TA    Speed of Recruitment     Relaxation     Endurance     Resting Baseline (goal <2uV)           SUPINE: Legs extended and abd  Hooklying    10 sec hold 10 sec rest      PF Work average     PF Rest average     TA work     TA rest          2 sec hold 4 sec rest     PF Work average     PF Rest average     TA work     TA rest          Sidelying:     PF Work average     PF Rest average     TA work     TA rest          Prone:     PF Work average     PF Rest average     TA work     TA rest          Quadruped:     PF Work average     PF Rest average     TA work     TA rest          SITTING:     10 sec hold 10 sec rest     PF Work average     PF Rest average     TA work     TA rest          STANDING:     10 sec hold 10 sec rest     PF Work average     PF Rest average     TA work     TA rest           Bladder symptoms 24   Frequency No  3-4 x day   Urgency no   Incontinence no   Difficulty starting flow no   Difficulty emptying/ retention no   Post void dribble occasional   Nocturia no   Prolapse Surgery 42 years old   Fluids 145 # see below   Irritants See below   UTI history A few in 50's   Pain  no   Pads etc    Other      Bowel Symptoms 24   Frequency Every 3 days   Urgency Occasionally now since increasing fiber   Incontinence Occasionally yes   Straining No   Difficulty emptying  no   Alcona Scale 3   Fiber and diet    Fluid 20 ounces of water   Irritants Caffeine and decaf 3 cups per day,  diet coke afternoon   Prolapse    Pain    other      OBGYN 2024   Pregnancies 5   Deliveries Vaginal 1 still born   /Vaginal vaginal   Tears/Episiotomy Perineum,    Suction/forceps    Other birthing complications    Surgeries/Procedures tubal ligation   Menstruation    Menopause    Fibroids/Endometriosis    Scars    pain    other      Sexual Activity 2024   Type Self- no complaints   Pain     Orgasm    Masturbation    Libido    lubrication    other        Other 2024   Physical activity 20-30 m bike, machines for resistance training   PLOF    Living environment/ Lives with self   Work retired    Medications that may contribute to symptoms    History of abuse no   Sleep  no   Other      Outcomes 6/17/2024      PFDI 35/200  C=31  U=4      PFIQ Bowel=14               Systems Review:   Cord questions:  Pins and needles or tingling in both arms and both legs at the same time? denies  Problems with stumbling or falling? Denies     Cauda equina questions:  Problems with bowel and bladder control? Specifically retention? See above  Pins and needles or numbness in the saddle area? Denies     Review of systems:  General - (chills, night sweats, recent infection, fever, weight loss/gain, unexplained night pain, excessive fatigue): Denies  Do you have a history of cancer? Endometrial cancer, melanoma back, squamous,   Gastrointestinal system - (abdominal pain, bowel changes, nausea, vomiting, bloating): Reflux, hiatal hernia  Cardiovascular system - (chest pain, palpitations, orthopnea, other): Afib- medicated,   Respiratory system - (cough, SOB, sputum production, other): Denies  Musculoskeletal system: osteoporosis, vertebral fracture? Arthritis fingers  Endocrine - (polyuria, polydipsia, heat or cold intolerance, other): Denies  Neurological - (numbness/tingling, falling/stumbling, HA, dizziness, diplopia, dysphagia/dysarthria, double vision, tinnitus, memory, drop attacks, other): Denies  Any long-term steroid use? Denies  Rectal Exam: TBA   Verbal consent    Assessment Position    Anal wink    sensation    skin    Perineum    PFM activation external    Contract    Relax    Bulge    PFM activation internal    External Sphincter    Puborectalis    Pubococcygeus    Iliococcygeus    Levator Ani:    Power    Endurance    Repetitions    Fast twitch    Bulge (dyssynergia)    Relaxation of PFM    cough    Co-contraction of PF with TrA    Accessory muscles    breath    Coccygeus    Obturator Internus    Piriformis    nerves    ligaments    Coccyx alignment/mobility    Pain    Prolapse    Hemorrhoids    other Posture  sitting ppt forward shoulders and head  Standing C curve posture, increase kyphosis forward head

## 2024-06-17 NOTE — PROGRESS NOTES
Physical Therapy Evaluation    [facility] Fax: [phone]    PT EVALUATION FOR OUTPATIENT THERAPY    Patient: Alexandra Bird    MRN: [id number]  : 1935 88 y.o.     Referring Physician: Sergei Cooley MD  Date of Visit: 2024      Certification Dates:  24 through 24         Recommended Frequency & Duration:  1 time/week for up to 3 months     Diagnosis:   1. Incontinence of feces with fecal urgency        Chief Complaints:   Chief Complaint   Patient presents with    Encopresis       Precautions: fall  Precautions additional comments: osteoporosis    Past Medical History:   Past Medical History:   Diagnosis Date    A-fib (CMS/[facility])     Arthritis     Closed right ankle fracture age 62    Endometrial cancer (CMS/HCC) 2013    GERD (gastroesophageal reflux disease)     Lipid disorder     difficulty tolerating statins    Melanoma (CMS/HCC)     left shoulder    Osteoporosis     did not tolerate fosamax    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HCC)     gets follow up CT scans regularly    SVT (supraventricular tachycardia) (CMS/[facility]) 2021    Thyroid goiter        Past Surgical History:   Past Surgical History:   Procedure Laterality Date    BUNIONECTOMY Bilateral     CATARACT EXTRACTION, BILATERAL  2006    CHOLECYSTECTOMY      HYSTERECTOMY  2013    KNEE SURGERY Left 10/2023    MOHS SURGERY      SKIN CANCER EXCISION           LEARNING ASSESSMENT    Assessment completed:  Yes    Learner name:  Teresa Bird      Learner: Patient    Learning Barriers:  Learning barriers: No Barriers    Preferred Language: English     Needed: No    Education Provided:   Method: Discussion, Handout, and Demonstration  Readiness: acceptance and eager  Response: Demonstrated understanding, Needs reinforcement, and Verbalizes understanding      CO-LEARNER ASSESSMENT:    Completed: No      Welcome letter discussed: Yes Patient provided with Welcome Letter, which includes attendance policy.  Provided education regarding cancellation and no-show policy. Education regarding the importance of participation and regular attendance to maximize goal attainment.       OBJECTIVE MEASUREMENTS/DATA:    Time In Session:  Start Time: 1710  Stop Time: 1800  Time Calculation (min): 50 min   Assessment and Plan - 06/17/24 1715          Assessment    Plan of Care reviewed and patient/family in agreement Yes     System Pathology/Pathophysiology Noted musculoskeletal;neuromuscular;genitourinary     Functional Limitations in Following Categories (PT Eval) self-care;home management;community/leisure     Rehab Potential/Prognosis good, to achieve stated therapy goals     Problem List decreased endurance;decreased strength;impaired coordination;decreased flexibility;impaired postural control;other (see comments)   fecal urgency and incontinence    Clinical Assessment Pt is an 88 y/0 female with history of 5 vaginal births.  When she was 42 she had a rectocele and cystocele.  She then had a hysterectomy about 2014 due to Endometrial cancer.  She had a colonoscopy 5 years ago and was told she had a weak ES.  She has noticed increased fecal leakage in her underwear over the years.   She denies pain and urinary issues besides very occasional JONG which she is not concerned with.   Pt recently increased her fiber and has better stool which has decreased her leakage.  We discussed fluid intake and fiber intake today.  PT was educated with [place] stool scale.  We also reviewed void timing and toilet posture.  Pt was given handouts and will work on this this week.  PFDI and PFIQ show dysfunction.  She also has a history of cancer and osteoporosis.  Pt is very active and proactive with her health.  She is an excellent candidate for PFPT and should make good progress.     Plan and Recommendations Next visit will assess how she is doing with bowel routine.  Will then perform rectal exam, review DB and rib mobility, assess PF strength,  endurance and coodination with breathing.  Will give stretches as needed and or PF exercises.  Next visit will review standing and sitting posture, work on TM for walking,  assess core, hip strength and flexibility,  Will give rib excusion exercises and progress PF and TA exercises as needed.  Follow with EMG in future.     Planned Services CPT 15034 Gait training;CPT 25986 Manual therapy;CPT 27963 Neuromuscular Reeducation;CPT 98841 Self-care/Home management training;CPT 92513 Therapeutic activities;CPT 77546 Therapeutic exercises;CPT 46573 Therapeutic Massage;CPT 86225 Electrical stimulation ATTENDED;CPT 50197 Electrical stimulation UNATTENDED;CPT 13667 Hot/Cold Packs therapy;CPT 01740 Ultrasound     Comments/Additional Services EMG with biofeedback                    General Information - 06/17/24 1715          Session Details    Document Type initial evaluation     Mode of Treatment individual therapy        General Information    Onset of Illness/Injury or Date of Surgery 05/16/24     Referring Physician Dr Sergei Cooley     History of present illness/functional impairment Alexandra is an 88 year old female with 5 vaginal births. When she was 42 she had a rectocele and cystocele.  She then had a hysterectomy about 2014 due to Endometrial cancer.  She had a colonoscopy 5 years ago and was told she had a weak ES.  She has noticed increased fecal leakage in her underwear over the years.  Pt recently increased her fiber and has better stool which has decreased her leakage.  Her PCP sent her to pelvic PT.     Limitations/Impairments hearing     Existing Precautions/Restrictions fall     Precautions comments osteoporosis         Services    Do You Speak a Language Other Than English at Home? no        Behavioral Health Related Communication    Suicidal Ideation No                    Pain/Vitals - 06/17/24 1715          Pain Assessment    Currently in pain No/Denies                    Falls/Food Screening -  06/17/24 1715          Initial Falls Assessment    One or more falls in the last year Yes     How many times 1     Was the patient injured in any fall No     Recommended plan to address falls PT        Food Insecurity    Within the past 12 months, you worried that your food would run out before you got the money to buy more. Never true     Within the past 12 months, the food you bought just didn't last and you didn't have money to get more. Never true                    PT - 06/17/24 1715          Physical Therapy    Physical Therapy Specialty Pelvic Floor Program: Age <14        PT Plan    Frequency of treatment 1 time/week     PT Duration 3 months     PT Cert From 06/17/24     PT Cert To 09/17/24     Date PT POC was sent to provider 06/17/24     Signed PT Plan of Care received?  No                          Goals          Patient Stated      PFPT patient goals (pt-stated)       No fecal urgency  No fecal incontinence         Other      PFPT STG/LTG           Goals Short-Long Time Frame Result Progress   Pt will be supervision with HEP STG 6 weeks     Pt will identify bladder irritants and correct fluid intake STG 6 weeks     Pt will verbalize an understanding of pelvic floor anatomy and causes of incontinence STG 6 weeks     Pt will improve fiber intake STG 6 weeks     Pt will verbalize rationale and purposes for exercises STG 6 weeks     Pt will demonstrate good toileting posture for proper elimination and sitting posture for improved use of core musculature. STG 6 weeks     Pt will decrease frequency of UTI's LTG 12 weeks     Pt will coordinate use of the PF with functional activities that cause symptoms LTG 12 weeks     Pt will be I with HEP. LTG 12 weeks     Pt will normalize PFM tone LTG 12 weeks     Pt will Improve BM frequency to daily or every other day per week w/o straining, pain. LTG 12 weeks     Pt will demonstrate decreased overflow muscle activity during PFM contraction LTG 12 weeks     Pt will  demonstrate an increase in PFM contraction to normal grade as measured by MMT LTG 12 weeks     Pt will demonstrate an increase in PFM endurance to 10 second hold x 10 repetitions as measured by EMG or digital exam LTG 12 weeks     Pt will demonstrate use of functional PFM contraction by performing a precontraction (knack) to eliminate UI during stress situation LTG 12 weeks     Pt will decrease urinary/bowel leakage to 0 episodes LTG 12 weeks     Pt able to empty bladder/bowel completely. LTG 12 weeks     Pt will be able to have sexual intercourse, a gynecological examination, or use tampons, without pain. LTG 12 weeks     Pt will improve PFDI from 35 to 0. LTG 12 weeks     Pt will improve PFIQ from 14 to 0. LTG 12 weeks                       TREATMENT PLAN:         Initial Eval      Progress Notes      Re-evaluation      Exercises Current Session Performed   Yes/No Time   NEURO RE-ED  76460      EMG-biofeedback [employer] EMG-video  Resting tone PF  Resting tone TA  PFC long hold  PFC quick flick  TAC                   THER ACT  45858      HEP Bowel schedule after meals at least breakfast, fiber 20-25 g, fluid 50-60 mostly water, stool under feet yes    Normal Bladder/bowel education Bladder function and behavior  Bowel function and behavior  Fluids 50-60 oz  Fiber-20-25 g     Yes  Yes  yes    Bowel or bladder irritants caffeine yes    Toilet posture and evacuation For bladder including posture, breathing, position changes and relaxation  Bowel including posture, breathing, stool under feet, relaxation,  Eccentric abdominal with exhale       yes    Posture education Sleeping  Sitting  standing     Bowel or bladder void diary and schedule Void diary   Bowel schedule after meals especially breakfast   yes    Knack With cough  With sit to stand     Urge suppression With quick flicks  With heel raises     Goal Review  yes    outcomes PFDI  PFIQ  VQ  FSFI  CPSI  Mod oswestry Yes  yes    education Bowel function and  behavioral management  Fiber and fluid intake  PF muscles and function   yes    symptoms Fecal urgency  Fecal incontinence  Decreased urge to have bowel movement yes                THER EX  21629      Diaphragmatic breathing With assessment/manual  With exercises  With EMG     TAC With assessment/manual  With exercises on exhale  With EMG     PFC  Fast/slow twitch With assessment/manual  With exercises on exhale  With EMG  Long hold  Quick flicks     Stretches      Groin S Butterfly supine with DB  Butterfly sitting with DB  Butterfly against wall with DB  Supine at wall legs extended and abducted with DB  Sitting Single leg abd with DB     HS S Supine at wall with DB  Supine with strap with DB  Supine active with DF glide and DB  Seated HS S with DB     Piriformis S Supine at wall figure 4 with DB  Supine figure four push/pull with DB  Supine figure 4 pull towards chest with DB       pigeon Modified on table with DB  Floor with DB  With props     Hip flexor S Supine one leg off table other ktc with DB  Lunge with DB       Abdominal S FREDDIE with breath and neck ext/flex  PPU with breath  Standing ext S hands on hips     Oblique S Seated side bend in pretzel sit  Seated side bend in chair  Seated side bend in low kneeling     Obturator Internus S      Trunk rotation S LTR supine with DB  Seated trunk rotation with breath     SKTC With breath     DKTC/Happy baby S DKTC with breath  Single leg happy baby with DB  Double leg happy baby with DB     Child pose With DB  With props     Deep squat With DB  With props     Trunk flexion S Pretzel sit forward fold with DB  From chair forward fold with DB  From low bench forward fold       Chest S On 1/2 foam     PPT/APT Supine with breath  Seated on ball with breath     Lateral pelvic tilts Seated on ball with breath       Pelvic circles Seated on ball with breath     Heel slide With PFC on exhale  With TAC on exhale     Knee fall out With PFC on exhale  With TAC on exhale      bridge With PFC on exhale  With TAC on exhale     Dead bug progression With PFC on exhale  With TAC on exhale     clamshell Supine  Side  sit  With PFC on exhale  With TAC on exhale     Side abduction With PFC on exhale  With TAC on exhale     Cat camel With breath     Quadruped/birdog progression With PFC on exhale  With TAC on exhale  With support  Alternate arms  Alternate legs  Alternate arm/leg     Prone plank progression With PFC on exhale  With TAC on exhale  Standing at wall  Push up (tricep/chest)  Hip flexion, abd, ext  Floor on elbows/knees  Floor ext arms/knees-shoulder tap  Floor full plank on elbows  Floor full plank arms ext     Side plank progression With PFC on exhale  With TAC on exhale  Elbow knees flexed  Elbow one knee flexed other ext  Elbow knees ext  Full arm and knees flexed  Full arm one knee flexed other ext  Full arm knees ext     PB PFC on exhale  TAC on exhale  Cough with PFC  Alternate arm leg raise  MB raise/push  UE exercises (row, LPD, H abd, scaption, bicep, tricep)     Sit to stand With PFC on exhale  With TAC on exhale  With MB raise     Mini-squat With PFC on exhale  With TAC on exhale  With weight     Wide leg squat With PFC on exhale  With TAC on exhale  With weight     Hip hinge With PFC on exhale  With TAC on exhale  With weight     lunge With PFC on exhale  With TAC on exhale  Forward  Backward  Lateral  Walking lunge  With weight     lifting With PFC on exhale  With TAC on exhale  Low bench to waist  Floor to waist      [facility]  High chair  Crib  Lift/lower  carry     Walk in place With proper breath and submax core activation     walk With proper breath and submax core activation     jump With PFC on exhale  With TAC on exhale  Double leg  Single leg     jog With proper breath and submax core activation           MMT      ROM                  GAIT TRAINING  18180            MANUAL  96927      External PF evaluation Sidelying manual palpation external  LA/Coccygeus     Internal PF evaluation See assessment flow     Hip evaluation      Back/SI evaluation      STM PF:  LA  OI  Coccygeus  Perineum    Abdominals:  TA, RA, obliques    Hip flexors  Hip adductors    Diaphragm release  Colon massage    Scar massage    Lumbar:  Laminal release  Paraspine, quadratus  Gluts  piriformis  HS     Joint mobilization Coccyx  Lumbar  thoracic     MFR      TPR      Strain counterstrain      Passive stretch            MODALITIES      Heat  58434      Cold  77259      Estim Attend  32266      Estim Unattend        US  83300      Biofeedback  [id number]  [id number]          EMG     Sensor Used External pads  3/9:00  Right GT  Right TA    Speed of Recruitment     Relaxation     Endurance     Resting Baseline (goal <2uV)          SUPINE: Legs extended and abd  Hooklying    10 sec hold 10 sec rest      PF Work average     PF Rest average     TA work     TA rest          2 sec hold 4 sec rest     PF Work average     PF Rest average     TA work     TA rest          Sidelying:     PF Work average     PF Rest average     TA work     TA rest          Prone:     PF Work average     PF Rest average     TA work     TA rest          Quadruped:     PF Work average     PF Rest average     TA work     TA rest          SITTING:     10 sec hold 10 sec rest     PF Work average     PF Rest average     TA work     TA rest          STANDING:     10 sec hold 10 sec rest     PF Work average     PF Rest average     TA work     TA rest           Bladder symptoms 6/17/24   Frequency No  3-4 x day   Urgency no   Incontinence no   Difficulty starting flow no   Difficulty emptying/ retention no   Post void dribble occasional   Nocturia no   Prolapse Surgery 42 years old   Fluids 145 # see below   Irritants See below   UTI history A few in 50's   Pain  no   Pads etc    Other      Bowel Symptoms 6/17/24   Frequency Every 3 days   Urgency Occasionally now since increasing fiber   Incontinence Occasionally  yes   Straining No   Difficulty emptying  no   [place] Scale 3   Fiber and diet    Fluid 20 ounces of water   Irritants Caffeine and decaf 3 cups per day,  diet coke afternoon   Prolapse    Pain    other      OBGYN 2024   Pregnancies 5   Deliveries Vaginal 1 still born   /Vaginal vaginal   Tears/Episiotomy Perineum,    Suction/forceps    Other birthing complications    Surgeries/Procedures tubal ligation   Menstruation    Menopause    Fibroids/Endometriosis    Scars    pain    other      Sexual Activity 2024   Type Self- no complaints   Pain     Orgasm    Masturbation    Libido    lubrication    other        Other 2024   Physical activity 20-30 m bike, machines for resistance training   PLOF    Living environment/ Lives with self   Work retired   Medications that may contribute to symptoms    History of abuse no   Sleep  no   Other      Outcomes 2024      PFDI 35/200  C=31  U=4      PFIQ Bowel=14               Systems Review:   Cord questions:  Pins and needles or tingling in both arms and both legs at the same time? denies  Problems with stumbling or falling? Denies     Cauda equina questions:  Problems with bowel and bladder control? Specifically retention? See above  Pins and needles or numbness in the saddle area? Denies     Review of systems:  General - (chills, night sweats, recent infection, fever, weight loss/gain, unexplained night pain, excessive fatigue): Denies  Do you have a history of cancer? 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 Denies  Rectal Exam: TBA   Verbal consent    Assessment Position    Anal wink    sensation    skin    Perineum    PFM activation external    Contract    Relax    Bulge    PFM activation internal    External Sphincter    Puborectalis    Pubococcygeus    Iliococcygeus    Levator Ani:    Power    Endurance    Repetitions    Fast twitch    Bulge (dyssynergia)    Relaxation of PFM    cough    Co-contraction of PF with TrA    Accessory muscles    breath    Coccygeus    Obturator Internus    Piriformis    nerves    ligaments    Coccyx alignment/mobility    Pain    Prolapse    Hemorrhoids    other Posture sitting ppt forward shoulders and head  Standing C curve posture, increase kyphosis forward head             ASSESSMENT:    This 88 y.o. year old female presents to PT with above stated diagnosis. Physical Therapy evaluation reveals decreased endurance, decreased strength, impaired coordination, decreased flexibility, impaired postural control, other (see comments) (fecal urgency and incontinence) resulting in self-care, home management, community/leisure limitations. Teresa Bird will benefit from skilled PT services to address limitation, work towards rehab and patient goals and maximize PLOF of chosen ADLs.     Planned Services: The patient's treatment will include CPT 08859 Gait training, CPT 65877 Manual therapy, CPT 23970 Neuromuscular Reeducation, CPT 58369 Self-care/Home management training, CPT 36776 Therapeutic activities, CPT 00784 Therapeutic exercises, CPT 52234 Therapeutic Massage, CPT 51757 Electrical stimulation ATTENDED, CPT 61033 Electrical stimulation UNATTENDED, CPT 89875 Hot/Cold Packs therapy, CPT 09082 Ultrasound,EMG with biofeedback.     Davide Taylor, PT

## 2024-06-20 ENCOUNTER — TELEPHONE (OUTPATIENT)
Dept: REGISTRATION | Age: 89
End: 2024-06-20
Payer: COMMERCIAL

## 2024-06-27 ENCOUNTER — HOSPITAL ENCOUNTER (OUTPATIENT)
Dept: PHYSICAL THERAPY | Age: 89
Setting detail: THERAPIES SERIES
Discharge: HOME | End: 2024-06-27
Attending: INTERNAL MEDICINE
Payer: COMMERCIAL

## 2024-06-27 DIAGNOSIS — R15.2 INCONTINENCE OF FECES WITH FECAL URGENCY: Primary | ICD-10-CM

## 2024-06-27 DIAGNOSIS — R15.9 INCONTINENCE OF FECES WITH FECAL URGENCY: Primary | ICD-10-CM

## 2024-06-27 PROCEDURE — 97110 THERAPEUTIC EXERCISES: CPT | Mod: GP

## 2024-06-27 PROCEDURE — 97530 THERAPEUTIC ACTIVITIES: CPT | Mod: GP

## 2024-06-27 PROCEDURE — 97140 MANUAL THERAPY 1/> REGIONS: CPT | Mod: GP

## 2024-06-27 NOTE — OP PT TREATMENT LOG
Initial Eval 6/17/24     Progress Notes 7/17 8/17     Re-evaluation 9/17     Exercises Current Session Performed   Yes/No Time   NEURO RE-ED  36820      EMG-biofeedback [facility] EMG-video  Resting tone PF  Resting tone TA  PFC long hold  PFC quick flick  TAC                   THER ACT  21976   25 min   HEP Bowel schedule after meals at least breakfast, fiber 20-25 g, fluid 50-60 mostly water, stool under feet  DB  PFC long and QF yes    Normal Bladder/bowel education Bladder function and behavior  Bowel function and behavior  Fluids 50-60 oz  Fiber-20-25 g     Yes  Yes  yes    Bowel or bladder irritants caffeine yes    Toilet posture and evacuation For bladder including posture, breathing, position changes and relaxation  Bowel including posture, breathing, stool under feet, relaxation,  Eccentric abdominal with exhale       yes    Posture education Sleeping  Sitting  standing     Bowel or bladder void diary and schedule Void diary   Bowel schedule after meals especially breakfast   yes    Knack With cough  With sit to stand     Urge suppression With quick flicks  With heel raises     Goal Review  n    outcomes PFDI  PFIQ  VQ  FSFI  CPSI  Mod oswestry N  n    education Bowel function and behavioral management  Fiber and fluid intake  PF muscles and function   yes    symptoms Fecal urgency  Fecal incontinence  Decreased urge to have bowel movement yes                THER EX  66283   10 min   Diaphragmatic breathing With assessment/manual  With exercises  With EMG yes    TAC With assessment/manual  With exercises on exhale  With EMG     PFC  Fast/slow twitch With assessment/manual  With exercises on exhale  With EMG  Long hold  Quick flicks Yes      Yes  yes    Stretches      Groin S Butterfly supine with DB  Butterfly sitting with DB  Butterfly against wall with DB  Supine at wall legs extended and abducted with DB  Sitting Single leg abd with DB     HS S Supine at wall with DB  Supine with strap with  DB  Supine active with DF glide and DB  Seated HS S with DB     Piriformis S Supine at wall figure 4 with DB  Supine figure four push/pull with DB  Supine figure 4 pull towards chest with DB       pigeon Modified on table with DB  Floor with DB  With props     Hip flexor S Supine one leg off table other ktc with DB  Lunge with DB       Abdominal S FREDDIE with breath and neck ext/flex  PPU with breath  Standing ext S hands on hips     Oblique S Seated side bend in pretzel sit  Seated side bend in chair  Seated side bend in low kneeling     Obturator Internus S      Trunk rotation S LTR supine with DB  Seated trunk rotation with breath     SKTC With breath     DKTC/Happy baby S DKTC with breath  Single leg happy baby with DB  Double leg happy baby with DB     Child pose With DB  With props     Deep squat With DB  With props     Trunk flexion S Pretzel sit forward fold with DB  From chair forward fold with DB  From low bench forward fold       Chest S On 1/2 foam     PPT/APT Supine with breath  Seated on ball with breath     Lateral pelvic tilts Seated on ball with breath       Pelvic circles Seated on ball with breath     Heel slide With PFC on exhale  With TAC on exhale     Knee fall out With PFC on exhale  With TAC on exhale     bridge With PFC on exhale  With TAC on exhale     Dead bug progression With PFC on exhale  With TAC on exhale     clamshell Supine  Side  sit  With PFC on exhale  With TAC on exhale     Side abduction With PFC on exhale  With TAC on exhale     Cat camel With breath     Quadruped/birdog progression With PFC on exhale  With TAC on exhale  With support  Alternate arms  Alternate legs  Alternate arm/leg     Prone plank progression With PFC on exhale  With TAC on exhale  Standing at wall  Push up (tricep/chest)  Hip flexion, abd, ext  Floor on elbows/knees  Floor ext arms/knees-shoulder tap  Floor full plank on elbows  Floor full plank arms ext     Side plank progression With PFC on exhale  With TAC  on exhale  Elbow knees flexed  Elbow one knee flexed other ext  Elbow knees ext  Full arm and knees flexed  Full arm one knee flexed other ext  Full arm knees ext     PB PFC on exhale  TAC on exhale  Cough with PFC  Alternate arm leg raise  MB raise/push  UE exercises (row, LPD, H abd, scaption, bicep, tricep)     Sit to stand With PFC on exhale  With TAC on exhale  With MB raise     Mini-squat With PFC on exhale  With TAC on exhale  With weight     Wide leg squat With PFC on exhale  With TAC on exhale  With weight     Hip hinge With PFC on exhale  With TAC on exhale  With weight     lunge With PFC on exhale  With TAC on exhale  Forward  Backward  Lateral  Walking lunge  With weight     lifting With PFC on exhale  With TAC on exhale  Low bench to waist  Floor to waist      Carseat  High chair  Crib  Lift/lower  carry     Walk in place With proper breath and submax core activation     walk With proper breath and submax core activation     jump With PFC on exhale  With TAC on exhale  Double leg  Single leg     jog With proper breath and submax core activation           MMT      ROM                  GAIT TRAINING  50049            MANUAL  11199   25 min   External PF evaluation Sidelying manual palpation external LA/Coccygeus     Internal PF evaluation See assessment flow yes    Hip evaluation      Back/SI evaluation      STM PF:  LA  OI  Coccygeus  Perineum    Abdominals:  TA, RA, obliques    Hip flexors  Hip adductors    Diaphragm release  Colon massage    Scar massage    Lumbar:  Laminal release  Paraspine, quadratus  Gluts  piriformis  HS     Joint mobilization Coccyx  Lumbar  thoracic     MFR      TPR      Strain counterstrain      Passive stretch            MODALITIES      Heat  09135      Cold  28772      Estim Attend  [id number]      Estim Unattend          [id number]      Biofeedback  [id number]  [id number]          EMG     Sensor Used External pads  3/9:00  Right GT  Right TA    Speed of  Recruitment     Relaxation     Endurance     Resting Baseline (goal <2uV)          SUPINE: Legs extended and abd  Hooklying    10 sec hold 10 sec rest      PF Work average     PF Rest average     TA work     TA rest          2 sec hold 4 sec rest     PF Work average     PF Rest average     TA work     TA rest          Sidelying:     PF Work average     PF Rest average     TA work     TA rest          Prone:     PF Work average     PF Rest average     TA work     TA rest          Quadruped:     PF Work average     PF Rest average     TA work     TA rest          SITTING:     10 sec hold 10 sec rest     PF Work average     PF Rest average     TA work     TA rest          STANDING:     10 sec hold 10 sec rest     PF Work average     PF Rest average     TA work     TA rest           Bladder symptoms 24   Frequency No  3-4 x day   Urgency no   Incontinence no   Difficulty starting flow no   Difficulty emptying/ retention no   Post void dribble occasional   Nocturia no   Prolapse Surgery 42 years old   Fluids 145 # see below   Irritants See below   UTI history A few in 50's   Pain  no   Pads etc    Other      Bowel Symptoms 24   Frequency Every 3 days   Urgency Occasionally now since increasing fiber   Incontinence Occasionally yes   Straining No   Difficulty emptying  no   [place] Scale 3   Fiber and diet    Fluid 20 ounces of water   Irritants Caffeine and decaf 3 cups per day,  diet coke afternoon   Prolapse    Pain    other      OBGYN 2024   Pregnancies 5   Deliveries Vaginal 1 still born   /Vaginal vaginal   Tears/Episiotomy Perineum,    Suction/forceps    Other birthing complications    Surgeries/Procedures tubal ligation   Menstruation    Menopause    Fibroids/Endometriosis    Scars    pain    other      Sexual Activity 2024   Type Self- no complaints   Pain     Orgasm    Masturbation    Libido    lubrication    other        Other 2024   Physical activity 20-30 m bike, machines for  resistance training   PLOF    Living environment/ Lives with self   Work retired   Medications that may contribute to symptoms    History of abuse no   Sleep  no   Other      Outcomes 6/17/2024      PFDI 35/200  C=31  U=4      PFIQ Bowel=14               Systems Review:   Cord questions:  Pins and needles or tingling in both arms and both legs at the same time? denies  Problems with stumbling or falling? Denies     Cauda equina questions:  Problems with bowel and bladder control? Specifically retention? See above  Pins and needles or numbness in the saddle area? Denies     Review of systems:  General - (chills, night sweats, recent infection, fever, weight loss/gain, unexplained night pain, excessive fatigue): Denies  Do you have a history of cancer? Endometrial cancer, melanoma back, squamous,   Gastrointestinal system - (abdominal pain, bowel changes, nausea, vomiting, bloating): Reflux, hiatal hernia  Cardiovascular system - (chest pain, palpitations, orthopnea, other): Afib- medicated,   Respiratory system - (cough, SOB, sputum production, other): Denies  Musculoskeletal system: osteoporosis, vertebral fracture? Arthritis fingers  Endocrine - (polyuria, polydipsia, heat or cold intolerance, other): Denies  Neurological - (numbness/tingling, falling/stumbling, HA, dizziness, diplopia, dysphagia/dysarthria, double vision, tinnitus, memory, drop attacks, other): Denies  Any long-term steroid use? Denies  Rectal Exam: 6/27/2024   Verbal consent yes   Assessment Position Sidelying pillow between knees in gown and sheet   Anal wink absent   sensation Intact light touch   skin Mild redness at rectum   Perineum    PFM activation external    Contract mild   Relax present   Bulge absent   PFM activation internal    External Sphincter 2/5 for 1-2s   Puborectalis 3+/5   Pubococcygeus 3/5   Iliococcygeus 3/5   Levator Ani:    Power 3/5   Endurance 4s   Repetitions 4x   Fast twitch 4x   Bulge (dyssynergia) absent   Relaxation  of PFM present   cough Reviewed anais   Co-contraction of PF with TrA TBA   Accessory muscles Breath holding, upper body tension   breath Reviewed DB   Coccygeus    Obturator Internus    Piriformis    nerves    ligaments    Coccyx alignment/mobility    Pain none   Prolapse    Hemorrhoids    other Posture sitting ppt forward shoulders and head  Standing C curve posture, increase kyphosis forward head

## 2024-06-27 NOTE — PROGRESS NOTES
PT DAILY NOTE FOR OUTPATIENT THERAPY    Patient: Teresa Bird   MRN: [id number]  : 1935 88 y.o.  Referring Physician: Sergei Cooley MD  Date of Visit: 24    Certification Dates: 24 through 24    Diagnosis:   1. Incontinence of feces with fecal urgency        Chief Complaints: No chief complaint on file.      Precautions:       General Information - 24 1113          Session Details    Document Type daily treatment        General Information    Onset of Illness/Injury or Date of Surgery 24     Referring Physician Dr Sergei Cooley     History of present illness/functional impairment Alexandra is an 88 year old female with 5 vaginal births. When she was 42 she had a rectocele and cystocele.  She then had a hysterectomy about  due to Endometrial cancer.  She had a colonoscopy 5 years ago and was told she had a weak ES.  She has noticed increased fecal leakage in her underwear over the years.  Pt recently increased her fiber and has better stool which has decreased her leakage.  Her PCP sent her to pelvic PT.     Existing Precautions/Restrictions fall     Precautions comments osteoporosis         Services    Do You Speak a Language Other Than English at Home? no                    Pain/Vitals - 24 1113          Pain Assessment    Currently in pain No/Denies                    Daily Treatment Assessment and Plan - 24 1322          Daily Treatment Assessment and Plan    Progress toward goals Progressing     Daily Outcome Summary Pt reports that she was away for a few weeks visiting her family and she noticed that she was eating poorly and drinking more wine which loosened her stool and she ended up with more leakage.  Since she came home, she has been working on her fiber intake and trying to go to the bathroom after meals which is starting to help.  We reviewed toileting after meals and also after irritants such as caffeine.  Pt also working on fiber  intake to fluff and bulk stool.  We reviewed diaphragmatic breathing at rest and with exercises.  Internal rectal exam reveals normal sensation around rectum but no sphincter reflex.  Weak ES contraction strength and endurance.  MS was stronger as was Pubococcygeus and Illiococcygeus but still lacked endurance and pt tended to want to hold breath and substitute with other muscles.  She was guided with exercises for home to perform in supine, sitting and standing for longer holds 5s and quick flicks giving time for full relaxation.  Pt to perform longer holds on exhale for now and will only contract 50% to try to avoid substitution or breath holding.  She was given handout to work with.     Plan and Recommendations Next visit will review stool consistency and fiber, toileting after meals.  Then start with standing posture review and sitting posture review.  Review breathing and  inner core and TA muscles as well as PFM.  Will start with supine and sitting and work on TAC on exhale.  Then PFC with ball squeeze, then work on HS activation on bench with TAC and PFC and progress to bridge feet on bench with PFC.  Will practice sitting with ball squeeze, review cough with PFC, review sitting with UE exercises.  Will progress to sit to stand and mini squat with ball squeeze.  Eventually clamshell on side then side stepping.  EMG would be a good adjunct with internal probe or stickers.                     Today's Treatment::         Initial Eval 6/17/24     Progress Notes 7/17 8/17     Re-evaluation 9/17     Exercises Current Session Performed   Yes/No Time   NEURO RE-ED  08547      EMG-biofeedback [employer] EMG-video  Resting tone PF  Resting tone TA  PFC long hold  PFC quick flick  TAC                   THER ACT  60193   25 min   HEP Bowel schedule after meals at least breakfast, fiber 20-25 g, fluid 50-60 mostly water, stool under feet  DB  PFC long and QF yes    Normal Bladder/bowel education Bladder function and  behavior  Bowel function and behavior  Fluids 50-60 oz  Fiber-20-25 g     Yes  Yes  yes    Bowel or bladder irritants caffeine yes    Toilet posture and evacuation For bladder including posture, breathing, position changes and relaxation  Bowel including posture, breathing, stool under feet, relaxation,  Eccentric abdominal with exhale       yes    Posture education Sleeping  Sitting  standing     Bowel or bladder void diary and schedule Void diary   Bowel schedule after meals especially breakfast   yes    Knack With cough  With sit to stand     Urge suppression With quick flicks  With heel raises     Goal Review  n    outcomes PFDI  PFIQ  VQ  FSFI  CPSI  Mod oswestry N  n    education Bowel function and behavioral management  Fiber and fluid intake  PF muscles and function   yes    symptoms Fecal urgency  Fecal incontinence  Decreased urge to have bowel movement yes                THER EX  57373   10 min   Diaphragmatic breathing With assessment/manual  With exercises  With EMG yes    TAC With assessment/manual  With exercises on exhale  With EMG     PFC  Fast/slow twitch With assessment/manual  With exercises on exhale  With EMG  Long hold  Quick flicks Yes      Yes  yes    Stretches      Groin S Butterfly supine with DB  Butterfly sitting with DB  Butterfly against wall with DB  Supine at wall legs extended and abducted with DB  Sitting Single leg abd with DB     HS S Supine at wall with DB  Supine with strap with DB  Supine active with DF glide and DB  Seated HS S with DB     Piriformis S Supine at wall figure 4 with DB  Supine figure four push/pull with DB  Supine figure 4 pull towards chest with DB       pigeon Modified on table with DB  Floor with DB  With props     Hip flexor S Supine one leg off table other ktc with DB  Lunge with DB       Abdominal S FREDDIE with breath and neck ext/flex  PPU with breath  Standing ext S hands on hips     Oblique S Seated side bend in pretzel sit  Seated side bend in  chair  Seated side bend in low kneeling     Obturator Internus S      Trunk rotation S LTR supine with DB  Seated trunk rotation with breath     SKTC With breath     DKTC/Happy baby S DKTC with breath  Single leg happy baby with DB  Double leg happy baby with DB     Child pose With DB  With props     Deep squat With DB  With props     Trunk flexion S Pretzel sit forward fold with DB  From chair forward fold with DB  From low bench forward fold       Chest S On 1/2 foam     PPT/APT Supine with breath  Seated on ball with breath     Lateral pelvic tilts Seated on ball with breath       Pelvic circles Seated on ball with breath     Heel slide With PFC on exhale  With TAC on exhale     Knee fall out With PFC on exhale  With TAC on exhale     bridge With PFC on exhale  With TAC on exhale     Dead bug progression With PFC on exhale  With TAC on exhale     clamshell Supine  Side  sit  With PFC on exhale  With TAC on exhale     Side abduction With PFC on exhale  With TAC on exhale     Cat camel With breath     Quadruped/birdog progression With PFC on exhale  With TAC on exhale  With support  Alternate arms  Alternate legs  Alternate arm/leg     Prone plank progression With PFC on exhale  With TAC on exhale  Standing at wall  Push up (tricep/chest)  Hip flexion, abd, ext  Floor on elbows/knees  Floor ext arms/knees-shoulder tap  Floor full plank on elbows  Floor full plank arms ext     Side plank progression With PFC on exhale  With TAC on exhale  Elbow knees flexed  Elbow one knee flexed other ext  Elbow knees ext  Full arm and knees flexed  Full arm one knee flexed other ext  Full arm knees ext     PB PFC on exhale  TAC on exhale  Cough with PFC  Alternate arm leg raise  MB raise/push  UE exercises (row, LPD, H abd, scaption, bicep, tricep)     Sit to stand With PFC on exhale  With TAC on exhale  With MB raise     Mini-squat With PFC on exhale  With TAC on exhale  With weight     Wide leg squat With PFC on exhale  With TAC  on exhale  With weight     Hip hinge With PFC on exhale  With TAC on exhale  With weight     lunge With PFC on exhale  With TAC on exhale  Forward  Backward  Lateral  Walking lunge  With weight     lifting With PFC on exhale  With TAC on exhale  Low bench to waist  Floor to waist      Carseat  High chair  Crib  Lift/lower  carry     Walk in place With proper breath and submax core activation     walk With proper breath and submax core activation     jump With PFC on exhale  With TAC on exhale  Double leg  Single leg     jog With proper breath and submax core activation           MMT      ROM                  GAIT TRAINING  83255            MANUAL  87155   25 min   External PF evaluation Sidelying manual palpation external LA/Coccygeus     Internal PF evaluation See assessment flow yes    Hip evaluation      Back/SI evaluation      STM PF:  LA  OI  Coccygeus  Perineum    Abdominals:  TA, RA, obliques    Hip flexors  Hip adductors    Diaphragm release  Colon massage    Scar massage    Lumbar:  Laminal release  Paraspine, quadratus  Gluts  piriformis  HS     Joint mobilization Coccyx  Lumbar  thoracic     MFR      TPR      Strain counterstrain      Passive stretch            MODALITIES      Heat  40748      Cold  22344      Estim Attend  [id number]      Estim Unattend        US  [id number]      Biofeedback  [id number]  [id number]          EMG     Sensor Used External pads  3/9:00  Right GT  Right TA    Speed of Recruitment     Relaxation     Endurance     Resting Baseline (goal <2uV)          SUPINE: Legs extended and abd  Hooklying    10 sec hold 10 sec rest      PF Work average     PF Rest average     TA work     TA rest          2 sec hold 4 sec rest     PF Work average     PF Rest average     TA work     TA rest          Sidelying:     PF Work average     PF Rest average     TA work     TA rest          Prone:     PF Work average     PF Rest average     TA work     TA rest          Quadruped:     PF Work  average     PF Rest average     TA work     TA rest          SITTING:     10 sec hold 10 sec rest     PF Work average     PF Rest average     TA work     TA rest          STANDING:     10 sec hold 10 sec rest     PF Work average     PF Rest average     TA work     TA rest           Bladder symptoms 24   Frequency No  3-4 x day   Urgency no   Incontinence no   Difficulty starting flow no   Difficulty emptying/ retention no   Post void dribble occasional   Nocturia no   Prolapse Surgery 42 years old   Fluids 145 # see below   Irritants See below   UTI history A few in 50's   Pain  no   Pads etc    Other      Bowel Symptoms 24   Frequency Every 3 days   Urgency Occasionally now since increasing fiber   Incontinence Occasionally yes   Straining No   Difficulty emptying  no   [place] Scale 3   Fiber and diet    Fluid 20 ounces of water   Irritants Caffeine and decaf 3 cups per day,  diet coke afternoon   Prolapse    Pain    other      OBGYN 2024   Pregnancies 5   Deliveries Vaginal 1 still born   /Vaginal vaginal   Tears/Episiotomy Perineum,    Suction/forceps    Other birthing complications    Surgeries/Procedures tubal ligation   Menstruation    Menopause    Fibroids/Endometriosis    Scars    pain    other      Sexual Activity 2024   Type Self- no complaints   Pain     Orgasm    Masturbation    Libido    lubrication    other        Other 2024   Physical activity 20-30 m bike, machines for resistance training   PLOF    Living environment/ Lives with self   Work retired   Medications that may contribute to symptoms    History of abuse no   Sleep  no   Other      Outcomes 2024      PFDI 35/200  C=31  U=4      PFIQ Bowel=14               Systems Review:   Cord questions:  Pins and needles or tingling in both arms and both legs at the same time? denies  Problems with stumbling or falling? Denies     Cauda equina questions:  Problems with bowel and bladder control? Specifically  retention? See above  Pins and needles or numbness in the saddle area? Denies     Review of systems:  General - (chills, night sweats, recent infection, fever, weight loss/gain, unexplained night pain, excessive fatigue): Denies  Do you have a history of cancer? Endometrial cancer, melanoma back, squamous,   Gastrointestinal system - (abdominal pain, bowel changes, nausea, vomiting, bloating): Reflux, hiatal hernia  Cardiovascular system - (chest pain, palpitations, orthopnea, other): Afib- medicated,   Respiratory system - (cough, SOB, sputum production, other): Denies  Musculoskeletal system: osteoporosis, vertebral fracture? Arthritis fingers  Endocrine - (polyuria, polydipsia, heat or cold intolerance, other): Denies  Neurological - (numbness/tingling, falling/stumbling, HA, dizziness, diplopia, dysphagia/dysarthria, double vision, tinnitus, memory, drop attacks, other): Denies  Any long-term steroid use? Denies  Rectal Exam: 6/27/2024   Verbal consent yes   Assessment Position Sidelying pillow between knees in gown and sheet   Anal wink absent   sensation Intact light touch   skin Mild redness at rectum   Perineum    PFM activation external    Contract mild   Relax present   Bulge absent   PFM activation internal    External Sphincter 2/5 for 1-2s   Puborectalis 3+/5   Pubococcygeus 3/5   Iliococcygeus 3/5   Levator Ani:    Power 3/5   Endurance 4s   Repetitions 4x   Fast twitch 4x   Bulge (dyssynergia) absent   Relaxation of PFM present   cough Reviewed knack   Co-contraction of PF with TrA TBA   Accessory muscles Breath holding, upper body tension   breath Reviewed DB   Coccygeus    Obturator Internus    Piriformis    nerves    ligaments    Coccyx alignment/mobility    Pain none   Prolapse    Hemorrhoids    other Posture sitting ppt forward shoulders and head  Standing C curve posture, increase kyphosis forward head

## 2024-07-09 ENCOUNTER — HOSPITAL ENCOUNTER (OUTPATIENT)
Dept: PHYSICAL THERAPY | Age: 89
Setting detail: THERAPIES SERIES
Discharge: HOME | End: 2024-07-09
Attending: INTERNAL MEDICINE
Payer: COMMERCIAL

## 2024-07-09 DIAGNOSIS — R15.2 INCONTINENCE OF FECES WITH FECAL URGENCY: Primary | ICD-10-CM

## 2024-07-09 DIAGNOSIS — R15.9 INCONTINENCE OF FECES WITH FECAL URGENCY: Primary | ICD-10-CM

## 2024-07-09 PROCEDURE — 97530 THERAPEUTIC ACTIVITIES: CPT | Mod: GP

## 2024-07-09 PROCEDURE — 97110 THERAPEUTIC EXERCISES: CPT | Mod: GP

## 2024-07-09 NOTE — PROGRESS NOTES
Physical Therapy Progress Note    PT PROGRESS NOTE FOR OUTPATIENT THERAPY    Patient: Alexandra Bird   MRN: [id number]  : 1935 88 y.o.    Referring Physician: Sergei Cooley MD  Date of Visit: 2024    Certification Dates: 24 through 24    Recommended Frequency & Duration:  1 time/week for up to 3 months        Diagnosis:   1. Incontinence of feces with fecal urgency        Chief Complaints:  Chief Complaint   Patient presents with    Fecal Incontinence       Precautions:   Existing Precautions/Restrictions: fall  Precautions comments: osteoporosis    TODAY'S VISIT:    Time In Session:  Start Time: 1105  Stop Time: 1200  Time Calculation (min): 55 min   General Information - 24 1105          Session Details    Document Type progress note        General Information    Onset of Illness/Injury or Date of Surgery 24     Referring Physician Dr Sergei Cooley     History of present illness/functional impairment Alexandra is an 88 year old female with 5 vaginal births. When she was 42 she had a rectocele and cystocele.  She then had a hysterectomy about  due to Endometrial cancer.  She had a colonoscopy 5 years ago and was told she had a weak ES.  She has noticed increased fecal leakage in her underwear over the years.  Pt recently increased her fiber and has better stool which has decreased her leakage.  Her PCP sent her to pelvic PT.     Existing Precautions/Restrictions fall     Precautions comments osteoporosis         Services    Do You Speak a Language Other Than English at Home? no                      Pain/Vitals - 24 1105          Pain Assessment    Currently in pain No/Denies                    PT - 24 1105          Physical Therapy    Physical Therapy Specialty Pelvic Floor Program: Age <14        PT Plan    Frequency of treatment 1 time/week     PT Duration 3 months     PT Cert From 24     PT Cert To 24     Date PT POC was sent to  provider 06/17/24     Signed PT Plan of Care received?  Yes                    Assessment and Plan - 07/09/24 1106          Assessment    Clinical Assessment Pt reports that she is doing slightly better.  Her PFDI has improved.  We discussed her bowel movements and she will work towards sitting on toilet after breakfast everyday as she has not been doing this yet.  We also discussed sitting longer to empty more effectively.  Today we then worked on sitting posture, TAC with PFC and focus on ES verse anterior triangle muscles with exercises for hips and arms.  She needed cues for posture, breathing, and to coordinate the exercises.     Plan and Recommendations Next visit will review standing posture then try to work on posture with walking either in building or on TM.  Will then return to exercises such as sitting with tband abd using TAC and PFC, then try mini squat with ball squeeze, protraction in semi reclined position, and assess bridge when ready.                             Today's Treatment::    Education provided:  Yes: See treatment log for details of education provided         Initial Eval 6/17/24     Progress Notes 7/17 8/17     Re-evaluation 9/17     Exercises Current Session Performed   Yes/No Time   NEURO RE-ED  55646      EMG-biofeedback [employer] EMG-video  Resting tone PF  Resting tone TA  PFC long hold  PFC quick flick  TAC                   THER ACT  69095   20 min   HEP Bowel schedule after meals at least breakfast, fiber 20-25 g, fluid 50-60 mostly water, stool under feet  DB  PFC long and QF yes    Normal Bladder/bowel education Bladder function and behavior  Bowel function and behavior  Fluids 50-60 oz  Fiber-20-25 g     Yes  Yes  yes    Bowel or bladder irritants caffeine yes    Toilet posture and evacuation For bladder including posture, breathing, position changes and relaxation  Bowel including posture, breathing, stool under feet, relaxation,  Eccentric abdominal with exhale       yes     Posture education Sleeping  Sitting  standing   yes    Bowel or bladder void diary and schedule Void diary   Bowel schedule after meals especially breakfast   yes    Knack With cough  With sit to stand     Urge suppression With quick flicks  With heel raises     Goal Review  yes    outcomes PFDI  PFIQ  VQ  FSFI  CPSI  Mod oswestry yes  yes    education Bowel function and behavioral management  Fiber and fluid intake  PF muscles and function   yes    symptoms Fecal urgency  Fecal incontinence  Decreased urge to have bowel movement yes                THER EX  67451   35 min   Diaphragmatic breathing With assessment/manual  With exercises  With EMG yes    TAC With assessment/manual  With exercises on exhale  With EMG   yes    PFC  Fast/slow twitch With assessment/manual  With exercises on exhale  With EMG  Long hold  Quick flicks Yes      Yes  yes    Stretches      Groin S Butterfly supine with DB  Butterfly sitting with DB  Butterfly against wall with DB  Supine at wall legs extended and abducted with DB  Sitting Single leg abd with DB     HS S Supine at wall with DB  Supine with strap with DB  Supine active with DF glide and DB  Seated HS S with DB     Piriformis S Supine at wall figure 4 with DB  Supine figure four push/pull with DB  Supine figure 4 pull towards chest with DB       pigeon Modified on table with DB  Floor with DB  With props     Hip flexor S Supine one leg off table other ktc with DB  Lunge with DB       Abdominal S FREDDIE with breath and neck ext/flex  PPU with breath  Standing ext S hands on hips     Oblique S Seated side bend in pretzel sit  Seated side bend in chair  Seated side bend in low kneeling     Obturator Internus S      Trunk rotation S LTR supine with DB  Seated trunk rotation with breath     SKTC With breath     DKTC/Happy baby S DKTC with breath  Single leg happy baby with DB  Double leg happy baby with DB     Child pose With DB  With props     Deep squat With DB  With props     Trunk  flexion S Pretzel sit forward fold with DB  From chair forward fold with DB  From low bench forward fold       Chest S On 1/2 foam     PPT/APT Supine with breath  Seated  with breath   yes    Lateral pelvic tilts Seated on ball with breath       Pelvic circles Seated on ball with breath     Heel slide With PFC on exhale  With TAC on exhale     Knee fall out With PFC on exhale  With TAC on exhale     Sitting  Scaption with red tband  with exhale TAC   With PFC Yes  Yes  yes    Ball squeeze With exhale and TAC and ES contraction supine and sitting yes    bridge With PFC on exhale  With TAC on exhale  Feet on bench with HS activation Yes  Yes  yes    Dead bug progression With PFC on exhale  With TAC on exhale     clamshell Supine  Side  sit  With PFC on exhale  With TAC on exhale     Side abduction With PFC on exhale  With TAC on exhale     Cat camel With breath     Quadruped/birdog progression With PFC on exhale  With TAC on exhale  With support  Alternate arms  Alternate legs  Alternate arm/leg     Prone plank progression With PFC on exhale  With TAC on exhale  Standing at wall  Push up (tricep/chest)  Hip flexion, abd, ext  Floor on elbows/knees  Floor ext arms/knees-shoulder tap  Floor full plank on elbows  Floor full plank arms ext     Side plank progression With PFC on exhale  With TAC on exhale  Elbow knees flexed  Elbow one knee flexed other ext  Elbow knees ext  Full arm and knees flexed  Full arm one knee flexed other ext  Full arm knees ext     PB PFC on exhale  TAC on exhale  Cough with PFC  Alternate arm leg raise  MB raise/push  UE exercises (row, LPD, H abd, scaption, bicep, tricep)     Sit to stand With PFC on exhale  With TAC on exhale  With MB raise     Mini-squat With PFC on exhale  With TAC on exhale  With weight     Wide leg squat With PFC on exhale  With TAC on exhale  With weight     Hip hinge With PFC on exhale  With TAC on exhale  With weight     lunge With PFC on exhale  With TAC on  exhale  Forward  Backward  Lateral  Walking lunge  With weight     lifting With PFC on exhale  With TAC on exhale  Low bench to waist  Floor to waist      [facility]  High chair  Crib  Lift/lower  carry     Walk in place With proper breath and submax core activation     walk With proper breath and submax core activation     jump With PFC on exhale  With TAC on exhale  Double leg  Single leg     jog With proper breath and submax core activation           MMT      ROM                  GAIT TRAINING  91096            MANUAL  51510      External PF evaluation Sidelying manual palpation external LA/Coccygeus     Internal PF evaluation See assessment flow n    Hip evaluation      Back/SI evaluation      STM PF:  LA  OI  Coccygeus  Perineum    Abdominals:  TA, RA, obliques    Hip flexors  Hip adductors    Diaphragm release  Colon massage    Scar massage    Lumbar:  Laminal release  Paraspine, quadratus  Gluts  piriformis  HS     Joint mobilization Coccyx  Lumbar  thoracic     MFR      TPR      Strain counterstrain      Passive stretch            MODALITIES      Heat  51239      Cold  93320      Estim Attend  [id number]      Estim Unattend        US  [id number]      Biofeedback  [id number]  [id number]          EMG     Sensor Used External pads  3/9:00  Right GT  Right TA    Speed of Recruitment     Relaxation     Endurance     Resting Baseline (goal <2uV)          SUPINE: Legs extended and abd  Hooklying    10 sec hold 10 sec rest      PF Work average     PF Rest average     TA work     TA rest          2 sec hold 4 sec rest     PF Work average     PF Rest average     TA work     TA rest          Sidelying:     PF Work average     PF Rest average     TA work     TA rest          Prone:     PF Work average     PF Rest average     TA work     TA rest          Quadruped:     PF Work average     PF Rest average     TA work     TA rest          SITTING:     10 sec hold 10 sec rest     PF Work average     PF Rest average      TA work     TA rest          STANDING:     10 sec hold 10 sec rest     PF Work average     PF Rest average     TA work     TA rest           Bladder symptoms 24   Frequency No  3-4 x day   Urgency no   Incontinence no   Difficulty starting flow no   Difficulty emptying/ retention no   Post void dribble occasional   Nocturia no   Prolapse Surgery 42 years old   Fluids 145 # see below   Irritants See below   UTI history A few in 50's   Pain  no   Pads etc    Other      Bowel Symptoms 24   Frequency Every 3 days   Urgency Occasionally now since increasing fiber   Incontinence Occasionally yes   Straining No   Difficulty emptying  no   [place] Scale 3   Fiber and diet    Fluid 20 ounces of water   Irritants Caffeine and decaf 3 cups per day,  diet coke afternoon   Prolapse    Pain    other      OBGYN 2024   Pregnancies 5   Deliveries Vaginal 1 still born   /Vaginal vaginal   Tears/Episiotomy Perineum,    Suction/forceps    Other birthing complications    Surgeries/Procedures tubal ligation   Menstruation    Menopause    Fibroids/Endometriosis    Scars    pain    other      Sexual Activity 2024   Type Self- no complaints   Pain     Orgasm    Masturbation    Libido    lubrication    other        Other 2024   Physical activity 20-30 m bike, machines for resistance training   PLOF    Living environment/ Lives with self   Work retired   Medications that may contribute to symptoms    History of abuse no   Sleep  no   Other      Outcomes 2024     PFDI 35/200  C=31  U=4 C=18.75     PFIQ Bowel=14               Systems Review:   Cord questions:  Pins and needles or tingling in both arms and both legs at the same time? denies  Problems with stumbling or falling? Denies     Cauda equina questions:  Problems with bowel and bladder control? Specifically retention? See above  Pins and needles or numbness in the saddle area? Denies     Review of systems:  General - (chills, night sweats,  recent infection, fever, weight loss/gain, unexplained night pain, excessive fatigue): Denies  Do you have a history of cancer? Endometrial cancer, melanoma back, squamous,   Gastrointestinal system - (abdominal pain, bowel changes, nausea, vomiting, bloating): Reflux, hiatal hernia  Cardiovascular system - (chest pain, palpitations, orthopnea, other): Afib- medicated,   Respiratory system - (cough, SOB, sputum production, other): Denies  Musculoskeletal system: osteoporosis, vertebral fracture? Arthritis fingers  Endocrine - (polyuria, polydipsia, heat or cold intolerance, other): Denies  Neurological - (numbness/tingling, falling/stumbling, HA, dizziness, diplopia, dysphagia/dysarthria, double vision, tinnitus, memory, drop attacks, other): Denies  Any long-term steroid use? Denies  Rectal Exam: 6/27/2024   Verbal consent yes   Assessment Position Sidelying pillow between knees in gown and sheet   Anal wink absent   sensation Intact light touch   skin Mild redness at rectum   Perineum    PFM activation external    Contract mild   Relax present   Bulge absent   PFM activation internal    External Sphincter 2/5 for 1-2s   Puborectalis 3+/5   Pubococcygeus 3/5   Iliococcygeus 3/5   Levator Ani:    Power 3/5   Endurance 4s   Repetitions 4x   Fast twitch 4x   Bulge (dyssynergia) absent   Relaxation of PFM present   cough Reviewed anais   Co-contraction of PF with TrA TBA   Accessory muscles Breath holding, upper body tension   breath Reviewed DB   Coccygeus    Obturator Internus    Piriformis    nerves    ligaments    Coccyx alignment/mobility    Pain none   Prolapse    Hemorrhoids    other Posture sitting ppt forward shoulders and head  Standing C curve posture, increase kyphosis forward head              Goals Addressed                   This Visit's Progress     PFPT STG/LTG            Goals Short-Long Time Frame Result Progress   Pt will be supervision with HEP STG 6 weeks MET    Pt will identify bladder irritants  and correct fluid intake STG 6 weeks MET    Pt will verbalize an understanding of pelvic floor anatomy and causes of incontinence STG 6 weeks MET    Pt will improve fiber intake STG 6 weeks MET    Pt will verbalize rationale and purposes for exercises STG 6 weeks MET    Pt will demonstrate good toileting posture for proper elimination and sitting posture for improved use of core musculature. STG 6 weeks ongoing    Pt will decrease frequency of UTI's LTG 12 weeks     Pt will coordinate use of the PF with functional activities that cause symptoms LTG 12 weeks     Pt will be I with HEP. LTG 12 weeks     Pt will normalize PFM tone LTG 12 weeks     Pt will Improve BM frequency to daily or every other day per week w/o straining, pain. LTG 12 weeks     Pt will demonstrate decreased overflow muscle activity during PFM contraction LTG 12 weeks     Pt will demonstrate an increase in PFM contraction to normal grade as measured by MMT LTG 12 weeks     Pt will demonstrate an increase in PFM endurance to 10 second hold x 10 repetitions as measured by EMG or digital exam LTG 12 weeks     Pt will demonstrate use of functional PFM contraction by performing a precontraction (knack) to eliminate UI during stress situation LTG 12 weeks     Pt will decrease urinary/bowel leakage to 0 episodes LTG 12 weeks     Pt able to empty bladder/bowel completely. LTG 12 weeks     Pt will be able to have sexual intercourse, a gynecological examination, or use tampons, without pain. LTG 12 weeks     Pt will improve PFDI from 35 to 0. LTG 12 weeks ongoing 18.7   Pt will improve PFIQ from 14 to 0. LTG 12 weeks                     Davide Taylor, PT

## 2024-07-09 NOTE — OP PT TREATMENT LOG
Initial Eval 6/17/24     Progress Notes 7/17 8/17     Re-evaluation 9/17     Exercises Current Session Performed   Yes/No Time   NEURO RE-ED  07227      EMG-biofeedback [facility] EMG-video  Resting tone PF  Resting tone TA  PFC long hold  PFC quick flick  TAC                   THER ACT  67404   20 min   HEP Bowel schedule after meals at least breakfast, fiber 20-25 g, fluid 50-60 mostly water, stool under feet  DB  PFC long and QF yes    Normal Bladder/bowel education Bladder function and behavior  Bowel function and behavior  Fluids 50-60 oz  Fiber-20-25 g     Yes  Yes  yes    Bowel or bladder irritants caffeine yes    Toilet posture and evacuation For bladder including posture, breathing, position changes and relaxation  Bowel including posture, breathing, stool under feet, relaxation,  Eccentric abdominal with exhale       yes    Posture education Sleeping  Sitting  standing   yes    Bowel or bladder void diary and schedule Void diary   Bowel schedule after meals especially breakfast   yes    Knack With cough  With sit to stand     Urge suppression With quick flicks  With heel raises     Goal Review  yes    outcomes PFDI  PFIQ  VQ  FSFI  CPSI  Mod oswestry yes  yes    education Bowel function and behavioral management  Fiber and fluid intake  PF muscles and function   yes    symptoms Fecal urgency  Fecal incontinence  Decreased urge to have bowel movement yes                THER EX  78524   35 min   Diaphragmatic breathing With assessment/manual  With exercises  With EMG yes    TAC With assessment/manual  With exercises on exhale  With EMG   yes    PFC  Fast/slow twitch With assessment/manual  With exercises on exhale  With EMG  Long hold  Quick flicks Yes      Yes  yes    Stretches      Groin S Butterfly supine with DB  Butterfly sitting with DB  Butterfly against wall with DB  Supine at wall legs extended and abducted with DB  Sitting Single leg abd with DB     HS S Supine at wall with DB  Supine with  strap with DB  Supine active with DF glide and DB  Seated HS S with DB     Piriformis S Supine at wall figure 4 with DB  Supine figure four push/pull with DB  Supine figure 4 pull towards chest with DB       pigeon Modified on table with DB  Floor with DB  With props     Hip flexor S Supine one leg off table other ktc with DB  Lunge with DB       Abdominal S FREDDIE with breath and neck ext/flex  PPU with breath  Standing ext S hands on hips     Oblique S Seated side bend in pretzel sit  Seated side bend in chair  Seated side bend in low kneeling     Obturator Internus S      Trunk rotation S LTR supine with DB  Seated trunk rotation with breath     SKTC With breath     DKTC/Happy baby S DKTC with breath  Single leg happy baby with DB  Double leg happy baby with DB     Child pose With DB  With props     Deep squat With DB  With props     Trunk flexion S Pretzel sit forward fold with DB  From chair forward fold with DB  From low bench forward fold       Chest S On 1/2 foam     PPT/APT Supine with breath  Seated  with breath   yes    Lateral pelvic tilts Seated on ball with breath       Pelvic circles Seated on ball with breath     Heel slide With PFC on exhale  With TAC on exhale     Knee fall out With PFC on exhale  With TAC on exhale     Sitting  Scaption with red tband  with exhale TAC   With PFC Yes  Yes  yes    Ball squeeze With exhale and TAC and ES contraction supine and sitting yes    bridge With PFC on exhale  With TAC on exhale  Feet on bench with HS activation Yes  Yes  yes    Dead bug progression With PFC on exhale  With TAC on exhale     clamshell Supine  Side  sit  With PFC on exhale  With TAC on exhale     Side abduction With PFC on exhale  With TAC on exhale     Cat camel With breath     Quadruped/birdog progression With PFC on exhale  With TAC on exhale  With support  Alternate arms  Alternate legs  Alternate arm/leg     Prone plank progression With PFC on exhale  With TAC on exhale  Standing at  wall  Push up (tricep/chest)  Hip flexion, abd, ext  Floor on elbows/knees  Floor ext arms/knees-shoulder tap  Floor full plank on elbows  Floor full plank arms ext     Side plank progression With PFC on exhale  With TAC on exhale  Elbow knees flexed  Elbow one knee flexed other ext  Elbow knees ext  Full arm and knees flexed  Full arm one knee flexed other ext  Full arm knees ext     PB PFC on exhale  TAC on exhale  Cough with PFC  Alternate arm leg raise  MB raise/push  UE exercises (row, LPD, H abd, scaption, bicep, tricep)     Sit to stand With PFC on exhale  With TAC on exhale  With MB raise     Mini-squat With PFC on exhale  With TAC on exhale  With weight     Wide leg squat With PFC on exhale  With TAC on exhale  With weight     Hip hinge With PFC on exhale  With TAC on exhale  With weight     lunge With PFC on exhale  With TAC on exhale  Forward  Backward  Lateral  Walking lunge  With weight     lifting With PFC on exhale  With TAC on exhale  Low bench to waist  Floor to waist      [facility]  [facility]  Crib  Lift/lower  carry     Walk in place With proper breath and submax core activation     walk With proper breath and submax core activation     jump With PFC on exhale  With TAC on exhale  Double leg  Single leg     jog With proper breath and submax core activation           MMT      ROM                  GAIT TRAINING  26473            MANUAL  70654      External PF evaluation Sidelying manual palpation external LA/Coccygeus     Internal PF evaluation See assessment flow n    Hip evaluation      Back/SI evaluation      STM PF:  LA  OI  Coccygeus  Perineum    Abdominals:  TA, RA, obliques    Hip flexors  Hip adductors    Diaphragm release  Colon massage    Scar massage    Lumbar:  Laminal release  Paraspine, quadratus  Gluts  piriformis  HS     Joint mobilization Coccyx  Lumbar  thoracic     MFR      TPR      Strain counterstrain      Passive stretch            MODALITIES      Heat  08897       Cold  28498      Estim Attend  [id number]      Estim Unattend          07104      Biofeedback  [id number]  [id number]          EMG     Sensor Used External pads  3/9:00  Right GT  Right TA    Speed of Recruitment     Relaxation     Endurance     Resting Baseline (goal <2uV)          SUPINE: Legs extended and abd  Hooklying    10 sec hold 10 sec rest      PF Work average     PF Rest average     TA work     TA rest          2 sec hold 4 sec rest     PF Work average     PF Rest average     TA work     TA rest          Sidelying:     PF Work average     PF Rest average     TA work     TA rest          Prone:     PF Work average     PF Rest average     TA work     TA rest          Quadruped:     PF Work average     PF Rest average     TA work     TA rest          SITTING:     10 sec hold 10 sec rest     PF Work average     PF Rest average     TA work     TA rest          STANDING:     10 sec hold 10 sec rest     PF Work average     PF Rest average     TA work     TA rest           Bladder symptoms 24   Frequency No  3-4 x day   Urgency no   Incontinence no   Difficulty starting flow no   Difficulty emptying/ retention no   Post void dribble occasional   Nocturia no   Prolapse Surgery 42 years old   Fluids 145 # see below   Irritants See below   UTI history A few in 50's   Pain  no   Pads etc    Other      Bowel Symptoms 24   Frequency Every 3 days   Urgency Occasionally now since increasing fiber   Incontinence Occasionally yes   Straining No   Difficulty emptying  no   [place] Scale 3   Fiber and diet    Fluid 20 ounces of water   Irritants Caffeine and decaf 3 cups per day,  diet coke afternoon   Prolapse    Pain    other      OBGYN 2024   Pregnancies 5   Deliveries Vaginal 1 still born   /Vaginal vaginal   Tears/Episiotomy Perineum,    Suction/forceps    Other birthing complications    Surgeries/Procedures tubal ligation   Menstruation    Menopause    Fibroids/Endometriosis    Scars     pain    other      Sexual Activity 6/17/2024   Type Self- no complaints   Pain     Orgasm    Masturbation    Libido    lubrication    other        Other 6/17/2024   Physical activity 20-30 m bike, machines for resistance training   PLOF    Living environment/ Lives with self   Work retired   Medications that may contribute to symptoms    History of abuse no   Sleep  no   Other      Outcomes 6/17/2024 7/9/24     PFDI 35/200  C=31  U=4 C=18.75     PFIQ Bowel=14               Systems Review:   Cord questions:  Pins and needles or tingling in both arms and both legs at the same time? denies  Problems with stumbling or falling? Denies     Cauda equina questions:  Problems with bowel and bladder control? Specifically retention? See above  Pins and needles or numbness in the saddle area? Denies     Review of systems:  General - (chills, night sweats, recent infection, fever, weight loss/gain, unexplained night pain, excessive fatigue): Denies  Do you have a history of cancer? Endometrial cancer, melanoma back, squamous,   Gastrointestinal system - (abdominal pain, bowel changes, nausea, vomiting, bloating): Reflux, hiatal hernia  Cardiovascular system - (chest pain, palpitations, orthopnea, other): Afib- medicated,   Respiratory system - (cough, SOB, sputum production, other): Denies  Musculoskeletal system: osteoporosis, vertebral fracture? Arthritis fingers  Endocrine - (polyuria, polydipsia, heat or cold intolerance, other): Denies  Neurological - (numbness/tingling, falling/stumbling, HA, dizziness, diplopia, dysphagia/dysarthria, double vision, tinnitus, memory, drop attacks, other): Denies  Any long-term steroid use? Denies  Rectal Exam: 6/27/2024   Verbal consent yes   Assessment Position Sidelying pillow between knees in gown and sheet   Anal wink absent   sensation Intact light touch   skin Mild redness at rectum   Perineum    PFM activation external    Contract mild   Relax present   Bulge absent   PFM  activation internal    External Sphincter 2/5 for 1-2s   Puborectalis 3+/5   Pubococcygeus 3/5   Iliococcygeus 3/5   Levator Ani:    Power 3/5   Endurance 4s   Repetitions 4x   Fast twitch 4x   Bulge (dyssynergia) absent   Relaxation of PFM present   cough Reviewed anais   Co-contraction of PF with TrA TBA   Accessory muscles Breath holding, upper body tension   breath Reviewed DB   Coccygeus    Obturator Internus    Piriformis    nerves    ligaments    Coccyx alignment/mobility    Pain none   Prolapse    Hemorrhoids    other Posture sitting ppt forward shoulders and head  Standing C curve posture, increase kyphosis forward head

## 2024-07-16 ENCOUNTER — HOSPITAL ENCOUNTER (OUTPATIENT)
Dept: PHYSICAL THERAPY | Age: 89
Setting detail: THERAPIES SERIES
Discharge: HOME | End: 2024-07-16
Attending: INTERNAL MEDICINE
Payer: COMMERCIAL

## 2024-07-16 DIAGNOSIS — R15.9 INCONTINENCE OF FECES WITH FECAL URGENCY: Primary | ICD-10-CM

## 2024-07-16 DIAGNOSIS — R15.2 INCONTINENCE OF FECES WITH FECAL URGENCY: Primary | ICD-10-CM

## 2024-07-16 PROCEDURE — 97110 THERAPEUTIC EXERCISES: CPT | Mod: GP

## 2024-07-16 NOTE — OP PT TREATMENT LOG
Initial Eval 6/17/24     Progress Notes 7/17 8/17     Re-evaluation 9/17     Exercises Current Session Performed   Yes/No Time   NEURO RE-ED  20417      EMG-biofeedback [facility] EMG-video  Resting tone PF  Resting tone TA  PFC long hold  PFC quick flick  TAC                   THER ACT  24114   With TE   HEP Bowel schedule after meals at least breakfast, fiber 20-25 g, fluid 50-60 mostly water, stool under feet  DB  PFC long and QF yes    Normal Bladder/bowel education Bladder function and behavior  Bowel function and behavior  Fluids 50-60 oz  Fiber-20-25 g     Yes  Yes  yes    Bowel or bladder irritants caffeine yes    Toilet posture and evacuation For bladder including posture, breathing, position changes and relaxation  Bowel including posture, breathing, stool under feet, relaxation,  Eccentric abdominal with exhale       yes    Posture education Sleeping  Sitting  standing   yes    Bowel or bladder void diary and schedule Void diary   Bowel schedule after meals especially breakfast   yes    Knack With cough  With sit to stand     Urge suppression With quick flicks  With heel raises     Goal Review  n    outcomes PFDI  PFIQ  VQ  FSFI  CPSI  Mod oswestry N  n    education Bowel function and behavioral management  Fiber and fluid intake  PF muscles and function   yes    symptoms Fecal urgency  Fecal incontinence  Decreased urge to have bowel movement yes                THER EX  98131   55 min   Diaphragmatic breathing With assessment/manual  With exercises  With EMG yes    TAC With assessment/manual  With exercises on exhale  With EMG   yes    PFC  Fast/slow twitch With assessment/manual  With exercises on exhale  With EMG  Long hold  Quick flicks Yes      Yes  yes    TM 0.9 mph 1% incline with cues for breathing, equal stride, neutral pelvis, TAC 5 min yes    Stretches      Groin S Butterfly supine with DB  Butterfly sitting with DB  Butterfly against wall with DB  Supine at wall legs extended and  abducted with DB  Sitting Single leg abd with DB     HS S Supine at wall with DB  Supine with strap with DB  Supine active with DF glide and DB  Seated HS S with DB     Piriformis S Supine at wall figure 4 with DB  Supine figure four push/pull with DB  Supine figure 4 pull towards chest with DB       pigeon Modified on table with DB  Floor with DB  With props     Hip flexor S Supine one leg off table other ktc with DB  Lunge with DB       Abdominal S FREDDIE with breath and neck ext/flex  PPU with breath  Standing ext S hands on hips     Oblique S Seated side bend in pretzel sit  Seated side bend in chair  Seated side bend in low kneeling     Obturator Internus S      Trunk rotation S LTR supine with DB  Seated trunk rotation with breath     SKTC With breath     DKTC/Happy baby S DKTC with breath  Single leg happy baby with DB  Double leg happy baby with DB     Child pose With DB  With props     Deep squat With DB  With props     Trunk flexion S Pretzel sit forward fold with DB  From chair forward fold with DB  From low bench forward fold       Chest S On 1/2 foam     PPT/APT Supine with breath  Seated  with breath   yes    Lateral pelvic tilts Seated on ball with breath       Pelvic circles Seated on ball with breath     Heel slide With PFC on exhale  With TAC on exhale     Knee fall out With PFC on exhale  With TAC on exhale     Sitting  Scaption with red tband  with exhale TAC   With PFC n  n  n    Ball squeeze With exhale and TAC and ES contraction supine and sitting n    bridge With PFC on exhale  With TAC on exhale  Feet on bench with HS activation n  n  n    Dead bug progression With PFC on exhale  With TAC on exhale     clamshell Supine  Side no resistence 5 s hold 2 x 10  Sit red tband 2 x 10  With PFC on exhale  With TAC on exhale   yes  Yes  yes  yes    Side abduction With PFC on exhale  With TAC on exhale     Cat camel With breath     Quadruped/birdog progression With PFC on exhale  With TAC on exhale  With  support  Alternate arms  Alternate legs  Alternate arm/leg     Prone plank progression With PFC on exhale  With TAC on exhale  Standing at wall  Push up (tricep/chest)  Hip flexion, abd, ext  Floor on elbows/knees  Floor ext arms/knees-shoulder tap  Floor full plank on elbows  Floor full plank arms ext     Side plank progression With PFC on exhale  With TAC on exhale  Elbow knees flexed  Elbow one knee flexed other ext  Elbow knees ext  Full arm and knees flexed  Full arm one knee flexed other ext  Full arm knees ext     PB PFC on exhale  TAC on exhale  Cough with PFC  Alternate arm leg raise  MB raise/push  UE exercises (row, LPD, H abd, scaption, bicep, tricep)     Sit to stand With PFC on exhale  With TAC on exhale  With MB raise     standing With exhale and TAC  Row with green tband Yes  yes    Mini-squat With PFC on exhale  With TAC on exhale  With yellow tband for hip abd- unable to do without right knee pain  With weight   Yes  yes    Wide leg squat With PFC on exhale  With TAC on exhale  With weight     Hip hinge With PFC on exhale  With TAC on exhale  With weight     lunge With PFC on exhale  With TAC on exhale  Forward  Backward  Lateral  Walking lunge  With weight     lifting With PFC on exhale  With TAC on exhale  Low bench to waist  Floor to waist      Carseat  High chair  Crib  Lift/lower  carry     Walk in place With proper breath and submax core activation     walk With proper breath and submax core activation     Side stepping TAC with breathing red band     jump With PFC on exhale  With TAC on exhale  Double leg  Single leg     jog With proper breath and submax core activation           MMT      ROM                  GAIT TRAINING  07712            MANUAL  29764      External PF evaluation Sidelying manual palpation external LA/Coccygeus     Internal PF evaluation See assessment flow n    Hip evaluation      Back/SI evaluation      STM PF:  LA  OI  Coccygeus  Perineum    Abdominals:  TA,  RA, obliques    Hip flexors  Hip adductors    Diaphragm release  Colon massage    Scar massage    Lumbar:  Laminal release  Paraspine, quadratus  Gluts  piriformis  HS     Joint mobilization Coccyx  Lumbar  thoracic     MFR      TPR      Strain counterstrain      Passive stretch            MODALITIES      Heat  76300      Cold  71712      Estim Attend  [id number]      Estim Unattend        [place]  22923      Biofeedback  [id number]  [id number]          EMG     Sensor Used External pads  3/9:00  Right GT  Right TA    Speed of Recruitment     Relaxation     Endurance     Resting Baseline (goal <2uV)          SUPINE: Legs extended and abd  Hooklying    10 sec hold 10 sec rest      PF Work average     PF Rest average     TA work     TA rest          2 sec hold 4 sec rest     PF Work average     PF Rest average     TA work     TA rest          Sidelying:     PF Work average     PF Rest average     TA work     TA rest          Prone:     PF Work average     PF Rest average     TA work     TA rest          Quadruped:     PF Work average     PF Rest average     TA work     TA rest          SITTING:     10 sec hold 10 sec rest     PF Work average     PF Rest average     TA work     TA rest          STANDING:     10 sec hold 10 sec rest     PF Work average     PF Rest average     TA work     TA rest           Bladder symptoms 6/17/24   Frequency No  3-4 x day   Urgency no   Incontinence no   Difficulty starting flow no   Difficulty emptying/ retention no   Post void dribble occasional   Nocturia no   Prolapse Surgery 42 years old   Fluids 145 # see below   Irritants See below   UTI history A few in 50's   Pain  no   Pads etc    Other      Bowel Symptoms 6/17/24   Frequency Every 3 days   Urgency Occasionally now since increasing fiber   Incontinence Occasionally yes   Straining No   Difficulty emptying  no   [place] Scale 3   Fiber and diet    Fluid 20 ounces of water   Irritants Caffeine and decaf 3 cups per day,  diet  coke afternoon   Prolapse    Pain    other      OBGYN 2024   Pregnancies 5   Deliveries Vaginal 1 still born   /Vaginal vaginal   Tears/Episiotomy Perineum,    Suction/forceps    Other birthing complications    Surgeries/Procedures tubal ligation   Menstruation    Menopause    Fibroids/Endometriosis    Scars    pain    other      Sexual Activity 2024   Type Self- no complaints   Pain     Orgasm    Masturbation    Libido    lubrication    other        Other 2024   Physical activity 20-30 m bike, machines for resistance training   PLOF    Living environment/ Lives with self   Work retired   Medications that may contribute to symptoms    History of abuse no   Sleep  no   Other      Outcomes 2024     PFDI 35/200  C=31  U=4 C=18.75     PFIQ Bowel=14               Systems Review:   Cord questions:  Pins and needles or tingling in both arms and both legs at the same time? denies  Problems with stumbling or falling? Denies     Cauda equina questions:  Problems with bowel and bladder control? Specifically retention? See above  Pins and needles or numbness in the saddle area? Denies     Review of systems:  General - (chills, night sweats, recent infection, fever, weight loss/gain, unexplained night pain, excessive fatigue): Denies  Do you have a history of cancer? Endometrial cancer, melanoma back, squamous,   Gastrointestinal system - (abdominal pain, bowel changes, nausea, vomiting, bloating): Reflux, hiatal hernia  Cardiovascular system - (chest pain, palpitations, orthopnea, other): Afib- medicated,   Respiratory system - (cough, SOB, sputum production, other): Denies  Musculoskeletal system: osteoporosis, vertebral fracture? Arthritis fingers  Endocrine - (polyuria, polydipsia, heat or cold intolerance, other): Denies  Neurological - (numbness/tingling, falling/stumbling, HA, dizziness, diplopia, dysphagia/dysarthria, double vision, tinnitus, memory, drop attacks, other): Denies  Any  long-term steroid use? Denies  Rectal Exam: 6/27/2024   Verbal consent yes   Assessment Position Sidelying pillow between knees in gown and sheet   Anal wink absent   sensation Intact light touch   skin Mild redness at rectum   Perineum    PFM activation external    Contract mild   Relax present   Bulge absent   PFM activation internal    External Sphincter 2/5 for 1-2s   Puborectalis 3+/5   Pubococcygeus 3/5   Iliococcygeus 3/5   Levator Ani:    Power 3/5   Endurance 4s   Repetitions 4x   Fast twitch 4x   Bulge (dyssynergia) absent   Relaxation of PFM present   cough Reviewed anais   Co-contraction of PF with TrA TBA   Accessory muscles Breath holding, upper body tension   breath Reviewed DB   Coccygeus    Obturator Internus    Piriformis    nerves    ligaments    Coccyx alignment/mobility    Pain none   Prolapse    Hemorrhoids    other Posture sitting ppt forward shoulders and head  Standing C curve posture, increase kyphosis forward head

## 2024-07-16 NOTE — PROGRESS NOTES
PT DAILY NOTE FOR OUTPATIENT THERAPY    Patient: Teresa Bird   MRN: [id number]  : 1935 88 y.o.  Referring Physician: Sergei Cooley MD  Date of Visit: 24    Certification Dates: 24 through 24    Diagnosis:   1. Incontinence of feces with fecal urgency        Chief Complaints: No chief complaint on file.      Precautions:       General Information - 24 1501          Session Details    Document Type daily treatment        General Information    Onset of Illness/Injury or Date of Surgery 24     Referring Physician Dr Sergei Cooley     History of present illness/functional impairment Alexandra is an 88 year old female with 5 vaginal births. When she was 42 she had a rectocele and cystocele.  She then had a hysterectomy about  due to Endometrial cancer.  She had a colonoscopy 5 years ago and was told she had a weak ES.  She has noticed increased fecal leakage in her underwear over the years.  Pt recently increased her fiber and has better stool which has decreased her leakage.  Her PCP sent her to pelvic PT.     Existing Precautions/Restrictions fall     Precautions comments osteoporosis         Services    Do You Speak a Language Other Than English at Home? no                    Pain/Vitals - 24 1501          Pain Assessment    Currently in pain No/Denies                    Daily Treatment Assessment and Plan - 24 1501          Daily Treatment Assessment and Plan    Progress toward goals Progressing     Daily Outcome Summary Pt is very happy that she is now going to the bathroom every day which she hasn’t done in years.  She also reports that she is going to the bathroom after meals has helped her empty better.  Very little leakage lately.  Today we discussed standing posture and sitting posture again.  We practiced slow walk on the TM with neutral posture, equal stride, breathing rhythm.  Pt noted with weak medial glut right worse than left and  decreased stride on left likely due to med glut weakness.  We added TAC with clamshell in sitting and sidelying.  Pt tried to perform mini squat with hip ER but had knee pain on the right side.  Row added as well today in standing.  Manual and verbal cues needed throughout session.  Pt given exercises and bands for home.     Plan and Recommendations Assess with EMG to determine if PFM are holding longer and without breath holding or substitutiuon.  Then return to core exercises.  Try to review bridge, side clam and add abduction, try wall squat minimal range and review other exercises.                     Today's Treatment::         Initial Eval 6/17/24     Progress Notes 7/17 8/17     Re-evaluation 9/17     Exercises Current Session Performed   Yes/No Time   NEURO RE-ED  [id number]      EMG-biofeedback [employer] EMG-video  Resting tone PF  Resting tone TA  PFC long hold  PFC quick flick  TAC                   THER ACT  96972   With TE   HEP Bowel schedule after meals at least breakfast, fiber 20-25 g, fluid 50-60 mostly water, stool under feet  DB  PFC long and QF yes    Normal Bladder/bowel education Bladder function and behavior  Bowel function and behavior  Fluids 50-60 oz  Fiber-20-25 g     Yes  Yes  yes    Bowel or bladder irritants caffeine yes    Toilet posture and evacuation For bladder including posture, breathing, position changes and relaxation  Bowel including posture, breathing, stool under feet, relaxation,  Eccentric abdominal with exhale       yes    Posture education Sleeping  Sitting  standing   yes    Bowel or bladder void diary and schedule Void diary   Bowel schedule after meals especially breakfast   yes    Knack With cough  With sit to stand     Urge suppression With quick flicks  With heel raises     Goal Review  n    outcomes PFDI  PFIQ  VQ  FSFI  CPSI  Mod oswestry N  n    education Bowel function and behavioral management  Fiber and fluid intake  PF muscles and function   yes    symptoms  Fecal urgency  Fecal incontinence  Decreased urge to have bowel movement yes                THER EX  76551   55 min   Diaphragmatic breathing With assessment/manual  With exercises  With EMG yes    TAC With assessment/manual  With exercises on exhale  With EMG   yes    PFC  Fast/slow twitch With assessment/manual  With exercises on exhale  With EMG  Long hold  Quick flicks Yes      Yes  yes    TM 0.9 mph 1% incline with cues for breathing, equal stride, neutral pelvis, TAC 5 min yes    Stretches      Groin S Butterfly supine with DB  Butterfly sitting with DB  Butterfly against wall with DB  Supine at wall legs extended and abducted with DB  Sitting Single leg abd with DB     HS S Supine at wall with DB  Supine with strap with DB  Supine active with DF glide and DB  Seated HS S with DB     Piriformis S Supine at wall figure 4 with DB  Supine figure four push/pull with DB  Supine figure 4 pull towards chest with DB       pigeon Modified on table with DB  Floor with DB  With props     Hip flexor S Supine one leg off table other ktc with DB  Lunge with DB       Abdominal S FREDDIE with breath and neck ext/flex  PPU with breath  Standing ext S hands on hips     Oblique S Seated side bend in pretzel sit  Seated side bend in chair  Seated side bend in low kneeling     Obturator Internus S      Trunk rotation S LTR supine with DB  Seated trunk rotation with breath     SKTC With breath     DKTC/Happy baby S DKTC with breath  Single leg happy baby with DB  Double leg happy baby with DB     Child pose With DB  With props     Deep squat With DB  With props     Trunk flexion S Pretzel sit forward fold with DB  From chair forward fold with DB  From low bench forward fold       Chest S On 1/2 foam     PPT/APT Supine with breath  Seated  with breath   yes    Lateral pelvic tilts Seated on ball with breath       Pelvic circles Seated on ball with breath     Heel slide With PFC on exhale  With TAC on exhale     Knee fall out With PFC on  exhale  With TAC on exhale     Sitting  Scaption with red tband  with exhale TAC   With PFC n  n  n    Ball squeeze With exhale and TAC and ES contraction supine and sitting n    bridge With PFC on exhale  With TAC on exhale  Feet on bench with HS activation n  n  n    Dead bug progression With PFC on exhale  With TAC on exhale     clamshell Supine  Side no resistence 5 s hold 2 x 10  Sit red tband 2 x 10  With PFC on exhale  With TAC on exhale   yes  Yes  yes  yes    Side abduction With PFC on exhale  With TAC on exhale     Cat camel With breath     Quadruped/birdog progression With PFC on exhale  With TAC on exhale  With support  Alternate arms  Alternate legs  Alternate arm/leg     Prone plank progression With PFC on exhale  With TAC on exhale  Standing at wall  Push up (tricep/chest)  Hip flexion, abd, ext  Floor on elbows/knees  Floor ext arms/knees-shoulder tap  Floor full plank on elbows  Floor full plank arms ext     Side plank progression With PFC on exhale  With TAC on exhale  Elbow knees flexed  Elbow one knee flexed other ext  Elbow knees ext  Full arm and knees flexed  Full arm one knee flexed other ext  Full arm knees ext     PB PFC on exhale  TAC on exhale  Cough with PFC  Alternate arm leg raise  MB raise/push  UE exercises (row, LPD, H abd, scaption, bicep, tricep)     Sit to stand With PFC on exhale  With TAC on exhale  With MB raise     standing With exhale and TAC  Row with green tband Yes  yes    Mini-squat With PFC on exhale  With TAC on exhale  With yellow tband for hip abd- unable to do without right knee pain  With weight   Yes  yes    Wide leg squat With PFC on exhale  With TAC on exhale  With weight     Hip hinge With PFC on exhale  With TAC on exhale  With weight     lunge With PFC on exhale  With TAC on exhale  Forward  Backward  Lateral  Walking lunge  With weight     lifting With PFC on exhale  With TAC on exhale  Low bench to waist  Floor to waist      Carseat  High  chair  Crib  Lift/lower  carry     Walk in place With proper breath and submax core activation     walk With proper breath and submax core activation     Side stepping TAC with breathing red band     jump With PFC on exhale  With TAC on exhale  Double leg  Single leg     jog With proper breath and submax core activation           MMT      ROM                  GAIT TRAINING  87198            MANUAL  [id number]      External PF evaluation Sidelying manual palpation external LA/Coccygeus     Internal PF evaluation See assessment flow n    Hip evaluation      Back/SI evaluation      STM PF:  LA  OI  Coccygeus  Perineum    Abdominals:  TA, RA, obliques    Hip flexors  Hip adductors    Diaphragm release  Colon massage    Scar massage    Lumbar:  Laminal release  Paraspine, quadratus  Gluts  piriformis  HS     Joint mobilization Coccyx  Lumbar  thoracic     MFR      TPR      Strain counterstrain      Passive stretch            MODALITIES      Heat  44133      Cold  35099      Estim Attend  [id number]      Estim Unattend        US  [id number]      Biofeedback  Beeigil=[id number]  [id number]          EMG     Sensor Used External pads  3/9:00  Right GT  Right TA    Speed of Recruitment     Relaxation     Endurance     Resting Baseline (goal <2uV)          SUPINE: Legs extended and abd  Hooklying    10 sec hold 10 sec rest      PF Work average     PF Rest average     TA work     TA rest          2 sec hold 4 sec rest     PF Work average     PF Rest average     TA work     TA rest          Sidelying:     PF Work average     PF Rest average     TA work     TA rest          Prone:     PF Work average     PF Rest average     TA work     TA rest          Quadruped:     PF Work average     PF Rest average     TA work     TA rest          SITTING:     10 sec hold 10 sec rest     PF Work average     PF Rest average     TA work     TA rest          STANDING:     10 sec hold 10 sec rest     PF Work average     PF Rest average     TA work     TA  rest           Bladder symptoms 24   Frequency No  3-4 x day   Urgency no   Incontinence no   Difficulty starting flow no   Difficulty emptying/ retention no   Post void dribble occasional   Nocturia no   Prolapse Surgery 42 years old   Fluids 145 # see below   Irritants See below   UTI history A few in 50's   Pain  no   Pads etc    Other      Bowel Symptoms 24   Frequency Every 3 days   Urgency Occasionally now since increasing fiber   Incontinence Occasionally yes   Straining No   Difficulty emptying  no   Nome Scale 3   Fiber and diet    Fluid 20 ounces of water   Irritants Caffeine and decaf 3 cups per day,  diet coke afternoon   Prolapse    Pain    other      OBGYN 2024   Pregnancies 5   Deliveries Vaginal 1 still born   /Vaginal vaginal   Tears/Episiotomy Perineum,    Suction/forceps    Other birthing complications    Surgeries/Procedures tubal ligation   Menstruation    Menopause    Fibroids/Endometriosis    Scars    pain    other      Sexual Activity 2024   Type Self- no complaints   Pain     Orgasm    Masturbation    Libido    lubrication    other        Other 2024   Physical activity 20-30 m bike, machines for resistance training   PLOF    Living environment/ Lives with self   Work retired   Medications that may contribute to symptoms    History of abuse no   Sleep  no   Other      Outcomes 2024     PFDI 35/200  C=31  U=4 C=18.75     PFIQ Bowel=14               Systems Review:   Cord questions:  Pins and needles or tingling in both arms and both legs at the same time? denies  Problems with stumbling or falling? Denies     Cauda equina questions:  Problems with bowel and bladder control? Specifically retention? See above  Pins and needles or numbness in the saddle area? Denies     Review of systems:  General - (chills, night sweats, recent infection, fever, weight loss/gain, unexplained night pain, excessive fatigue): Denies  Do you have a history of cancer?  Endometrial cancer, melanoma back, squamous,   Gastrointestinal system - (abdominal pain, bowel changes, nausea, vomiting, bloating): Reflux, hiatal hernia  Cardiovascular system - (chest pain, palpitations, orthopnea, other): Afib- medicated,   Respiratory system - (cough, SOB, sputum production, other): Denies  Musculoskeletal system: osteoporosis, vertebral fracture? Arthritis fingers  Endocrine - (polyuria, polydipsia, heat or cold intolerance, other): Denies  Neurological - (numbness/tingling, falling/stumbling, HA, dizziness, diplopia, dysphagia/dysarthria, double vision, tinnitus, memory, drop attacks, other): Denies  Any long-term steroid use? Denies  Rectal Exam: 6/27/2024   Verbal consent yes   Assessment Position Sidelying pillow between knees in gown and sheet   Anal wink absent   sensation Intact light touch   skin Mild redness at rectum   Perineum    PFM activation external    Contract mild   Relax present   Bulge absent   PFM activation internal    External Sphincter 2/5 for 1-2s   Puborectalis 3+/5   Pubococcygeus 3/5   Iliococcygeus 3/5   Levator Ani:    Power 3/5   Endurance 4s   Repetitions 4x   Fast twitch 4x   Bulge (dyssynergia) absent   Relaxation of PFM present   cough Reviewed kndeidre   Co-contraction of PF with TrA TBA   Accessory muscles Breath holding, upper body tension   breath Reviewed DB   Coccygeus    Obturator Internus    Piriformis    nerves    ligaments    Coccyx alignment/mobility    Pain none   Prolapse    Hemorrhoids    other Posture sitting ppt forward shoulders and head  Standing C curve posture, increase kyphosis forward head

## 2024-07-23 ENCOUNTER — HOSPITAL ENCOUNTER (OUTPATIENT)
Dept: PHYSICAL THERAPY | Age: 89
Setting detail: THERAPIES SERIES
Discharge: HOME | End: 2024-07-23
Attending: INTERNAL MEDICINE
Payer: COMMERCIAL

## 2024-07-23 DIAGNOSIS — R15.9 INCONTINENCE OF FECES WITH FECAL URGENCY: Primary | ICD-10-CM

## 2024-07-23 DIAGNOSIS — R15.2 INCONTINENCE OF FECES WITH FECAL URGENCY: Primary | ICD-10-CM

## 2024-07-23 PROCEDURE — 97530 THERAPEUTIC ACTIVITIES: CPT | Mod: GP

## 2024-07-23 PROCEDURE — 97110 THERAPEUTIC EXERCISES: CPT | Mod: GP

## 2024-07-23 PROCEDURE — 97112 NEUROMUSCULAR REEDUCATION: CPT | Mod: GP

## 2024-07-23 NOTE — PROGRESS NOTES
PT DAILY NOTE FOR OUTPATIENT THERAPY    Patient: Teresa Bird   MRN: [id number]  : 1935 88 y.o.  Referring Physician: Sergei Cooley MD  Date of Visit: 24    Certification Dates: 24 through 24    Diagnosis:   1. Incontinence of feces with fecal urgency        Chief Complaints: No chief complaint on file.      Precautions:       General Information - 24 1240          Session Details    Document Type daily treatment        General Information    Onset of Illness/Injury or Date of Surgery 24     Referring Physician Dr Sergei Cooley     History of present illness/functional impairment Alexandra is an 88 year old female with 5 vaginal births. When she was 42 she had a rectocele and cystocele.  She then had a hysterectomy about  due to Endometrial cancer.  She had a colonoscopy 5 years ago and was told she had a weak ES.  She has noticed increased fecal leakage in her underwear over the years.  Pt recently increased her fiber and has better stool which has decreased her leakage.  Her PCP sent her to pelvic PT.     Existing Precautions/Restrictions fall     Precautions comments osteoporosis         Services    Do You Speak a Language Other Than English at Home? no                    Pain/Vitals - 24 1240          Pain Assessment    Currently in pain No/Denies                    Daily Treatment Assessment and Plan - 24 1240          Daily Treatment Assessment and Plan    Progress toward goals Progressing     Daily Outcome Summary Pt reports that she has been doing well with daily bowel movements.  Today however she went to the bathroom 4 x.  We discussed sitting longer to empty more and to watch her fiber to avoid overly soft stool as she is still having some leakage.  Pt also asked to perform quick flicks when finished on the toilet.  EMG reveals good PFC for longer but some substitution of gluts.  Pt also has difficulty with breathing rhythm but with  practice improved.  TAC was more challenging but also improved with practice.  She has more difficulty holding on inhale due to decreased breathing coordination with exercises.     Plan and Recommendations Next visit will continue to practice exercises that improve TAC in supine, sitting and standing.  Will review her HEP and try with TAC as well as some reps with PFC.                     Today's Treatment::         Initial Eval 6/17/24     Progress Notes 7/17 8/17     Re-evaluation 9/17     Exercises Current Session Performed   Yes/No Time   NEURO RE-ED  08113   30 min   EMG-biofeedback Prom[employer] EMG-video  Resting tone PF  Resting tone TA  PFC long hold  PFC quick flick  TAC   Yes  Yes  Yes  Yes  Yes  yes                THER ACT  53675   10 min   HEP Bowel schedule after meals at least breakfast, fiber 20-25 g, fluid 50-60 mostly water, stool under feet  DB  PFC long and QF yes    Normal Bladder/bowel education Bladder function and behavior  Bowel function and behavior  Fluids 50-60 oz  Fiber-20-25 g     Yes  Yes  yes    Bowel or bladder irritants caffeine yes    Toilet posture and evacuation For bladder including posture, breathing, position changes and relaxation  Bowel including posture, breathing, stool under feet, relaxation,  Eccentric abdominal with exhale       yes    Posture education Sleeping  Sitting  standing   yes    Bowel or bladder void diary and schedule Void diary   Bowel schedule after meals especially breakfast   yes    Knack With cough  With sit to stand     Urge suppression With quick flicks  With heel raises     Goal Review  n    outcomes PFDI  PFIQ  VQ  FSFI  CPSI  Mod oswestry N  n    education Bowel function and behavioral management  Fiber and fluid intake  PF muscles and function   yes    symptoms Fecal urgency  Fecal incontinence  Decreased urge to have bowel movement yes                THER EX  77930   15 min   Diaphragmatic breathing With assessment/manual  With exercises  With EMG  N    yes    TAC With assessment/manual  With exercises on exhale  With EMG   N  yes    PFC  Fast/slow twitch With assessment/manual  With exercises on exhale  With EMG  Long hold  Quick flicks N    Yes  Yes  Yes    TM 0.9 mph 1% incline with cues for breathing, equal stride, neutral pelvis, TAC 5 min n    Stretches      Groin S Butterfly supine with DB  Butterfly sitting with DB  Butterfly against wall with DB  Supine at wall legs extended and abducted with DB  Sitting Single leg abd with DB     HS S Supine at wall with DB  Supine with strap with DB  Supine active with DF glide and DB  Seated HS S with DB     Piriformis S Supine at wall figure 4 with DB  Supine figure four push/pull with DB  Supine figure 4 pull towards chest with DB       pigeon Modified on table with DB  Floor with DB  With props     Hip flexor S Supine one leg off table other ktc with DB  Lunge with DB       Abdominal S FREDDIE with breath and neck ext/flex  PPU with breath  Standing ext S hands on hips     Oblique S Seated side bend in pretzel sit  Seated side bend in chair  Seated side bend in low kneeling     Obturator Internus S      Trunk rotation S LTR supine with DB  Seated trunk rotation with breath     SKTC With breath     DKTC/Happy baby S DKTC with breath  Single leg happy baby with DB  Double leg happy baby with DB     Child pose With DB  With props     Deep squat With DB  With props     Trunk flexion S Pretzel sit forward fold with DB  From chair forward fold with DB  From low bench forward fold       Chest S On 1/2 foam     PPT/APT Supine with breath  Seated  with breath   n    Lateral pelvic tilts Seated on ball with breath       Pelvic circles Seated on ball with breath     Heel slide With PFC on exhale  With TAC on exhale     Knee fall out With PFC on exhale  With TAC on exhale     Sitting  Scaption with red tband  with exhale TAC   With PFC n  n  n    Ball squeeze With exhale and TAC and ES contraction supine and sitting n    bridge  With PFC on exhale  With TAC on exhale  Feet on bench with HS activation n  n  n    Dead bug progression With PFC on exhale  With TAC on exhale     clamshell Supine  Side no resistence 5 s hold 2 x 10  Sit red tband 2 x 10  With PFC on exhale  With TAC on exhale   N  N  N  n    Side abduction With PFC on exhale  With TAC on exhale     Cat camel With breath     Quadruped/birdog progression With PFC on exhale  With TAC on exhale  With support  Alternate arms  Alternate legs  Alternate arm/leg     Prone plank progression With PFC on exhale  With TAC on exhale  Standing at wall  Push up (tricep/chest)  Hip flexion, abd, ext  Floor on elbows/knees  Floor ext arms/knees-shoulder tap  Floor full plank on elbows  Floor full plank arms ext     Side plank progression With PFC on exhale  With TAC on exhale  Elbow knees flexed  Elbow one knee flexed other ext  Elbow knees ext  Full arm and knees flexed  Full arm one knee flexed other ext  Full arm knees ext     PB PFC on exhale  TAC on exhale  Cough with PFC  Alternate arm leg raise  MB raise/push  UE exercises (row, LPD, H abd, scaption, bicep, tricep)     Sit to stand With PFC on exhale  With TAC on exhale  With MB raise     standing With exhale and TAC  Row with green tband N  n    Mini-squat With PFC on exhale  With TAC on exhale  With yellow tband for hip abd- unable to do without right knee pain  With weight   N  n    Wide leg squat With PFC on exhale  With TAC on exhale  With weight     Hip hinge With PFC on exhale  With TAC on exhale  With weight     lunge With PFC on exhale  With TAC on exhale  Forward  Backward  Lateral  Walking lunge  With weight     lifting With PFC on exhale  With TAC on exhale  Low bench to waist  Floor to waist     [facility]  High chair  Crib  Lift/lower  carry     Walk in place With proper breath and submax core activation     walk With proper breath and submax core activation     Side stepping TAC with breathing red band     jump With  PFC on exhale  With TAC on exhale  Double leg  Single leg     jog With proper breath and submax core activation           MMT      ROM                  GAIT TRAINING  97799            MANUAL  [id number]      External PF evaluation Sidelying manual palpation external LA/Coccygeus     Internal PF evaluation See assessment flow n    Hip evaluation      Back/SI evaluation      STM PF:  LA  OI  Coccygeus  Perineum    Abdominals:  TA, RA, obliques    Hip flexors  Hip adductors    Diaphragm release  Colon massage    Scar massage    Lumbar:  Laminal release  Paraspine, quadratus  Gluts  piriformis  HS     Joint mobilization Coccyx  Lumbar  thoracic     MFR      TPR      Strain counterstrain      Passive stretch            MODALITIES      Heat  88237      Cold  25162      Estim Attend  [id number]      Estim Unattend        US  54025      Biofeedback  [id number]  [id number]          EMG 7/23/2024    Sensor Used External pads  3/9:00  Right GT  Right TA    Speed of Recruitment     Relaxation     Endurance     Resting Baseline (goal <2uV) 0 uV         SUPINE: Legs extended and abd  Hooklying    10 sec hold 10 sec rest      PF Work average 5-7 uV PFC  4-5 uV with TAC    PF Rest average 0 uV    TA work 1.5 uV with PFC  1-2 with TAC    TA rest 0 uV         2 sec hold 4 sec rest     PF Work average 7 uV    PF Rest average 0 uV    TA work 0 uV    TA rest 0 uV         Sidelying:     PF Work average     PF Rest average     TA work     TA rest          Prone:     PF Work average     PF Rest average     TA work     TA rest          Quadruped:     PF Work average     PF Rest average     TA work     TA rest          SITTING:     10 sec hold 10 sec rest     PF Work average     PF Rest average     TA work     TA rest          STANDING:     10 sec hold 10 sec rest     PF Work average     PF Rest average     TA work     TA rest           Bladder symptoms 6/17/24   Frequency No  3-4 x day   Urgency no   Incontinence no   Difficulty starting  flow no   Difficulty emptying/ retention no   Post void dribble occasional   Nocturia no   Prolapse Surgery 42 years old   Fluids 145 # see below   Irritants See below   UTI history A few in 50's   Pain  no   Pads etc    Other      Bowel Symptoms 24   Frequency Every 3 days   Urgency Occasionally now since increasing fiber   Incontinence Occasionally yes   Straining No   Difficulty emptying  no   [place] Scale 3   Fiber and diet    Fluid 20 ounces of water   Irritants Caffeine and decaf 3 cups per day,  diet coke afternoon   Prolapse    Pain    other      OBGYN 2024   Pregnancies 5   Deliveries Vaginal 1 still born   /Vaginal vaginal   Tears/Episiotomy Perineum,    Suction/forceps    Other birthing complications    Surgeries/Procedures tubal ligation   Menstruation    Menopause    Fibroids/Endometriosis    Scars    pain    other      Sexual Activity 2024   Type Self- no complaints   Pain     Orgasm    Masturbation    Libido    lubrication    other        Other 2024   Physical activity 20-30 m bike, machines for resistance training   PLOF    Living environment/ Lives with self   Work retired   Medications that may contribute to symptoms    History of abuse no   Sleep  no   Other      Outcomes 2024     PFDI 35/200  C=31  U=4 C=18.75     PFIQ Bowel=14               Systems Review:   Cord questions:  Pins and needles or tingling in both arms and both legs at the same time? denies  Problems with stumbling or falling? Denies     Cauda equina questions:  Problems with bowel and bladder control? Specifically retention? See above  Pins and needles or numbness in the saddle area? Denies     Review of systems:  General - (chills, night sweats, recent infection, fever, weight loss/gain, unexplained night pain, excessive fatigue): Denies  Do you have a history of cancer? Endometrial cancer, melanoma back, squamous,   Gastrointestinal system - (abdominal pain, bowel changes, nausea,  vomiting, bloating): Reflux, hiatal hernia  Cardiovascular system - (chest pain, palpitations, orthopnea, other): Afib- medicated,   Respiratory system - (cough, SOB, sputum production, other): Denies  Musculoskeletal system: osteoporosis, vertebral fracture? Arthritis fingers  Endocrine - (polyuria, polydipsia, heat or cold intolerance, other): Denies  Neurological - (numbness/tingling, falling/stumbling, HA, dizziness, diplopia, dysphagia/dysarthria, double vision, tinnitus, memory, drop attacks, other): Denies  Any long-term steroid use? Denies  Rectal Exam: 6/27/2024   Verbal consent yes   Assessment Position Sidelying pillow between knees in gown and sheet   Anal wink absent   sensation Intact light touch   skin Mild redness at rectum   Perineum    PFM activation external    Contract mild   Relax present   Bulge absent   PFM activation internal    External Sphincter 2/5 for 1-2s   Puborectalis 3+/5   Pubococcygeus 3/5   Iliococcygeus 3/5   Levator Ani:    Power 3/5   Endurance 4s   Repetitions 4x   Fast twitch 4x   Bulge (dyssynergia) absent   Relaxation of PFM present   cough Reviewed anais   Co-contraction of PF with TrA TBA   Accessory muscles Breath holding, upper body tension   breath Reviewed DB   Coccygeus    Obturator Internus    Piriformis    nerves    ligaments    Coccyx alignment/mobility    Pain none   Prolapse    Hemorrhoids    other Posture sitting ppt forward shoulders and head  Standing C curve posture, increase kyphosis forward head

## 2024-07-23 NOTE — OP PT TREATMENT LOG
Initial Eval 6/17/24     Progress Notes 7/17 8/17     Re-evaluation 9/17     Exercises Current Session Performed   Yes/No Time   NEURO RE-ED  18707   30 min   EMG-biofeedback [employer] EMG-video  Resting tone PF  Resting tone TA  PFC long hold  PFC quick flick  TAC   Yes  Yes  Yes  Yes  Yes  yes                THER ACT  81123   10 min   HEP Bowel schedule after meals at least breakfast, fiber 20-25 g, fluid 50-60 mostly water, stool under feet  DB  PFC long and QF yes    Normal Bladder/bowel education Bladder function and behavior  Bowel function and behavior  Fluids 50-60 oz  Fiber-20-25 g     Yes  Yes  yes    Bowel or bladder irritants caffeine yes    Toilet posture and evacuation For bladder including posture, breathing, position changes and relaxation  Bowel including posture, breathing, stool under feet, relaxation,  Eccentric abdominal with exhale       yes    Posture education Sleeping  Sitting  standing   yes    Bowel or bladder void diary and schedule Void diary   Bowel schedule after meals especially breakfast   yes    Knack With cough  With sit to stand     Urge suppression With quick flicks  With heel raises     Goal Review  n    outcomes PFDI  PFIQ  VQ  FSFI  CPSI  Mod oswestry N  n    education Bowel function and behavioral management  Fiber and fluid intake  PF muscles and function   yes    symptoms Fecal urgency  Fecal incontinence  Decreased urge to have bowel movement yes                THER EX  67436   15 min   Diaphragmatic breathing With assessment/manual  With exercises  With EMG N    yes    TAC With assessment/manual  With exercises on exhale  With EMG   N  yes    PFC  Fast/slow twitch With assessment/manual  With exercises on exhale  With EMG  Long hold  Quick flicks N    Yes  Yes  Yes    TM 0.9 mph 1% incline with cues for breathing, equal stride, neutral pelvis, TAC 5 min n    Stretches      Groin S Butterfly supine with DB  Butterfly sitting with DB  Butterfly against wall with  DB  Supine at wall legs extended and abducted with DB  Sitting Single leg abd with DB     HS S Supine at wall with DB  Supine with strap with DB  Supine active with DF glide and DB  Seated HS S with DB     Piriformis S Supine at wall figure 4 with DB  Supine figure four push/pull with DB  Supine figure 4 pull towards chest with DB       pigeon Modified on table with DB  Floor with DB  With props     Hip flexor S Supine one leg off table other ktc with DB  Lunge with DB       Abdominal S FREDDIE with breath and neck ext/flex  PPU with breath  Standing ext S hands on hips     Oblique S Seated side bend in pretzel sit  Seated side bend in chair  Seated side bend in low kneeling     Obturator Internus S      Trunk rotation S LTR supine with DB  Seated trunk rotation with breath     SKTC With breath     DKTC/Happy baby S DKTC with breath  Single leg happy baby with DB  Double leg happy baby with DB     Child pose With DB  With props     Deep squat With DB  With props     Trunk flexion S Pretzel sit forward fold with DB  From chair forward fold with DB  From low bench forward fold       Chest S On 1/2 foam     PPT/APT Supine with breath  Seated  with breath   n    Lateral pelvic tilts Seated on ball with breath       Pelvic circles Seated on ball with breath     Heel slide With PFC on exhale  With TAC on exhale     Knee fall out With PFC on exhale  With TAC on exhale     Sitting  Scaption with red tband  with exhale TAC   With PFC n  n  n    Ball squeeze With exhale and TAC and ES contraction supine and sitting n    bridge With PFC on exhale  With TAC on exhale  Feet on bench with HS activation n  n  n    Dead bug progression With PFC on exhale  With TAC on exhale     clamshell Supine  Side no resistence 5 s hold 2 x 10  Sit red tband 2 x 10  With PFC on exhale  With TAC on exhale   N  N  N  n    Side abduction With PFC on exhale  With TAC on exhale     Cat camel With breath     Quadruped/birdog progression With PFC on  exhale  With TAC on exhale  With support  Alternate arms  Alternate legs  Alternate arm/leg     Prone plank progression With PFC on exhale  With TAC on exhale  Standing at wall  Push up (tricep/chest)  Hip flexion, abd, ext  Floor on elbows/knees  Floor ext arms/knees-shoulder tap  Floor full plank on elbows  Floor full plank arms ext     Side plank progression With PFC on exhale  With TAC on exhale  Elbow knees flexed  Elbow one knee flexed other ext  Elbow knees ext  Full arm and knees flexed  Full arm one knee flexed other ext  Full arm knees ext     PB PFC on exhale  TAC on exhale  Cough with PFC  Alternate arm leg raise  MB raise/push  UE exercises (row, LPD, H abd, scaption, bicep, tricep)     Sit to stand With PFC on exhale  With TAC on exhale  With MB raise     standing With exhale and TAC  Row with green tband N  n    Mini-squat With PFC on exhale  With TAC on exhale  With yellow tband for hip abd- unable to do without right knee pain  With weight   N  n    Wide leg squat With PFC on exhale  With TAC on exhale  With weight     Hip hinge With PFC on exhale  With TAC on exhale  With weight     lunge With PFC on exhale  With TAC on exhale  Forward  Backward  Lateral  Walking lunge  With weight     lifting With PFC on exhale  With TAC on exhale  Low bench to waist  Floor to waist      [facility]  High chair  Crib  Lift/lower  carry     Walk in place With proper breath and submax core activation     walk With proper breath and submax core activation     Side stepping TAC with breathing red band     jump With PFC on exhale  With TAC on exhale  Double leg  Single leg     jog With proper breath and submax core activation           MMT      ROM                  GAIT TRAINING  75812            MANUAL  43902      External PF evaluation Sidelying manual palpation external LA/Coccygeus     Internal PF evaluation See assessment flow n    Hip evaluation      Back/SI evaluation      STM  PF:  LA  OI  Coccygeus  Perineum    Abdominals:  TA, RA, obliques    Hip flexors  Hip adductors    Diaphragm release  Colon massage    Scar massage    Lumbar:  Laminal release  Paraspine, quadratus  Gluts  piriformis  HS     Joint mobilization Coccyx  Lumbar  thoracic     MFR      TPR      Strain counterstrain      Passive stretch            MODALITIES      Heat  30605      Cold  05250      Estim Attend  [id number]      Estim Unattend        US  67738      Biofeedback  [id number]  [id number]          EMG 7/23/2024    Sensor Used External pads  3/9:00  Right GT  Right TA    Speed of Recruitment     Relaxation     Endurance     Resting Baseline (goal <2uV) 0 uV         SUPINE: Legs extended and abd  Hooklying    10 sec hold 10 sec rest      PF Work average 5-7 uV PFC  4-5 uV with TAC    PF Rest average 0 uV    TA work 1.5 uV with PFC  1-2 with TAC    TA rest 0 uV         2 sec hold 4 sec rest     PF Work average 7 uV    PF Rest average 0 uV    TA work 0 uV    TA rest 0 uV         Sidelying:     PF Work average     PF Rest average     TA work     TA rest          Prone:     PF Work average     PF Rest average     TA work     TA rest          Quadruped:     PF Work average     PF Rest average     TA work     TA rest          SITTING:     10 sec hold 10 sec rest     PF Work average     PF Rest average     TA work     TA rest          STANDING:     10 sec hold 10 sec rest     PF Work average     PF Rest average     TA work     TA rest           Bladder symptoms 6/17/24   Frequency No  3-4 x day   Urgency no   Incontinence no   Difficulty starting flow no   Difficulty emptying/ retention no   Post void dribble occasional   Nocturia no   Prolapse Surgery 42 years old   Fluids 145 # see below   Irritants See below   UTI history A few in 50's   Pain  no   Pads etc    Other      Bowel Symptoms 6/17/24   Frequency Every 3 days   Urgency Occasionally now since increasing fiber   Incontinence Occasionally yes   Straining No    Difficulty emptying  no   [place] Scale 3   Fiber and diet    Fluid 20 ounces of water   Irritants Caffeine and decaf 3 cups per day,  diet coke afternoon   Prolapse    Pain    other      OBGYN 2024   Pregnancies 5   Deliveries Vaginal 1 still born   /Vaginal vaginal   Tears/Episiotomy Perineum,    Suction/forceps    Other birthing complications    Surgeries/Procedures tubal ligation   Menstruation    Menopause    Fibroids/Endometriosis    Scars    pain    other      Sexual Activity 2024   Type Self- no complaints   Pain     Orgasm    Masturbation    Libido    lubrication    other        Other 2024   Physical activity 20-30 m bike, machines for resistance training   PLOF    Living environment/ Lives with self   Work retired   Medications that may contribute to symptoms    History of abuse no   Sleep  no   Other      Outcomes 2024     PFDI 35/200  C=31  U=4 C=18.75     PFIQ Bowel=14               Systems Review:   Cord questions:  Pins and needles or tingling in both arms and both legs at the same time? denies  Problems with stumbling or falling? Denies     Cauda equina questions:  Problems with bowel and bladder control? Specifically retention? See above  Pins and needles or numbness in the saddle area? Denies     Review of systems:  General - (chills, night sweats, recent infection, fever, weight loss/gain, unexplained night pain, excessive fatigue): Denies  Do you have a history of cancer? 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 Denies  Rectal Exam: 6/27/2024   Verbal consent yes   Assessment Position Sidelying pillow between knees in gown and sheet   Anal wink absent   sensation Intact light touch   skin Mild redness at rectum   Perineum    PFM activation external    Contract mild   Relax present   Bulge absent   PFM activation internal    External Sphincter 2/5 for 1-2s   Puborectalis 3+/5   Pubococcygeus 3/5   Iliococcygeus 3/5   Levator Ani:    Power 3/5   Endurance 4s   Repetitions 4x   Fast twitch 4x   Bulge (dyssynergia) absent   Relaxation of PFM present   cough Reviewed anais   Co-contraction of PF with TrA TBA   Accessory muscles Breath holding, upper body tension   breath Reviewed DB   Coccygeus    Obturator Internus    Piriformis    nerves    ligaments    Coccyx alignment/mobility    Pain none   Prolapse    Hemorrhoids    other Posture sitting ppt forward shoulders and head  Standing C curve posture, increase kyphosis forward head

## 2024-08-05 ENCOUNTER — TELEPHONE (OUTPATIENT)
Dept: SCHEDULING | Facility: CLINIC | Age: 89
End: 2024-08-05
Payer: COMMERCIAL

## 2024-08-05 NOTE — TELEPHONE ENCOUNTER
I spoke with her, she is due to see provider in March 2025 with [facility]. We will call her to schedule all the appts once the schedule is available.

## 2024-08-06 ENCOUNTER — HOSPITAL ENCOUNTER (OUTPATIENT)
Dept: PHYSICAL THERAPY | Age: 89
Setting detail: THERAPIES SERIES
Discharge: HOME | End: 2024-08-06
Attending: INTERNAL MEDICINE
Payer: COMMERCIAL

## 2024-08-06 DIAGNOSIS — R15.2 INCONTINENCE OF FECES WITH FECAL URGENCY: Primary | ICD-10-CM

## 2024-08-06 DIAGNOSIS — R15.9 INCONTINENCE OF FECES WITH FECAL URGENCY: Primary | ICD-10-CM

## 2024-08-06 PROCEDURE — 97110 THERAPEUTIC EXERCISES: CPT | Mod: GP

## 2024-08-06 PROCEDURE — 97530 THERAPEUTIC ACTIVITIES: CPT | Mod: GP

## 2024-08-06 NOTE — PROGRESS NOTES
PT DAILY NOTE FOR OUTPATIENT THERAPY    Patient: Teresa Bird   MRN: [id number]  : 1935 88 y.o.  Referring Physician: Sergei Cooley MD  Date of Visit: 24    Certification Dates: 24 through 24    Diagnosis:   1. Incontinence of feces with fecal urgency        Chief Complaints: No chief complaint on file.      Precautions:       General Information - 24 1403          Session Details    Document Type daily treatment        General Information    Onset of Illness/Injury or Date of Surgery 24     Referring Physician Dr Sergei Cooley     History of present illness/functional impairment Alexandra is an 88 year old female with 5 vaginal births. When she was 42 she had a rectocele and cystocele.  She then had a hysterectomy about  due to Endometrial cancer.  She had a colonoscopy 5 years ago and was told she had a weak ES.  She has noticed increased fecal leakage in her underwear over the years.  Pt recently increased her fiber and has better stool which has decreased her leakage.  Her PCP sent her to pelvic PT.     Existing Precautions/Restrictions fall     Precautions comments osteoporosis         Services    Do You Speak a Language Other Than English at Home? no                    Pain/Vitals - 24 1403          Pain Assessment    Currently in pain No/Denies                    Daily Treatment Assessment and Plan - 24 1403          Daily Treatment Assessment and Plan    Progress toward goals Progressing     Daily Outcome Summary Pt reports that her symptoms have been pretty good.  She is still spotting but notices soft feces is what leaks depending on her diet.  Today we focused on review of some stretches followed by sitting and standing exercises with focus on her PFC and TAC with breathing.  Pt was able to perform 50 PF and TA contractions during session today.  I have asked her to try at least 60 PFC per day to see progress.     Plan and  Recommendations Next visit assess outcomes.  Will then start with TM again for review of breathing, posture and then return to stretches such as pelvic tilts, forward trunk flexion, then work on supine bridge, standing with mini squat, wall push ups, side clam, standing row, side stepping all with PFC and TAC and breathing.                     Today's Treatment::         Initial Eval 6/17/24     Progress Notes 7/17 8/17     Re-evaluation 9/17     Exercises Current Session Performed   Yes/No Time   NEURO RE-ED  19895      EMG-biofeedback [employer] EMG-video  Resting tone PF  Resting tone TA  PFC long hold  PFC quick flick  TAC   N  N  N  N  N  n                THER ACT  59583   10 min   HEP Bowel schedule after meals at least breakfast, fiber 20-25 g, fluid 50-60 mostly water, stool under feet  DB  PFC long and QF yes    Normal Bladder/bowel education Bladder function and behavior  Bowel function and behavior  Fluids 50-60 oz  Fiber-20-25 g     Yes  Yes  yes    Bowel or bladder irritants caffeine yes    Toilet posture and evacuation For bladder including posture, breathing, position changes and relaxation  Bowel including posture, breathing, stool under feet, relaxation,  Eccentric abdominal with exhale       yes    Posture education Sleeping  Sitting  standing   yes    Bowel or bladder void diary and schedule Void diary   Bowel schedule after meals especially breakfast   yes    Knack With cough  With sit to stand     Urge suppression With quick flicks  With heel raises     Goal Review  n    outcomes PFDI  PFIQ  VQ  FSFI  CPSI  Mod oswestry N  n    education Bowel function and behavioral management  Fiber and fluid intake  PF muscles and function   yes    symptoms Fecal urgency  Fecal incontinence  Decreased urge to have bowel movement yes                THER EX  98837   45 min   Diaphragmatic breathing With assessment/manual  With exercises  With EMG N  yes  n    TAC With assessment/manual  With exercises on  exhale  With EMG   yes  n    PFC  Fast/slow twitch With assessment/manual  With exercises on exhale  With EMG  Long hold  Quick flicks N  yes  n  Yes  n    TM 0.9 mph 1% incline with cues for breathing, equal stride, neutral pelvis, TAC 5 min n    Stretches      Groin S Butterfly supine with DB  Butterfly sitting with DB  Butterfly against wall with DB  Supine at wall legs extended and abducted with DB  Sitting Single leg abd with DB     HS S Supine at wall with DB  Supine with strap with DB  Supine active with DF glide and DB  Seated HS S with DB     Piriformis S Supine at wall figure 4 with DB  Supine figure four push/pull with DB  Supine figure 4 pull towards chest with DB       pigeon Modified on table with DB  Floor with DB  With props     Hip flexor S Supine one leg off table other ktc with DB  Lunge with DB       Abdominal S FREDDIE with breath and neck ext/flex  PPU with breath  Standing ext S hands on hips     Oblique S Seated side bend in pretzel sit  Seated side bend in chair  Seated side bend in low kneeling     Obturator Internus S      Trunk rotation S LTR supine with DB  Seated trunk rotation with breath     SKTC With breath     DKTC/Happy baby S DKTC with breath  Single leg happy baby with DB  Double leg happy baby with DB     Child pose With DB  With props     Deep squat With DB  With props     Trunk flexion S Pretzel sit forward fold with DB  From chair forward fold to bench and to sides with DB  From low bench forward fold     yes    Chest S On 1/2 foam     PPT/APT Supine with breath  Seated  with breath   yes    Lateral pelvic tilts Seated on ball with breath       Pelvic circles Seated on ball with breath     Heel slide With PFC on exhale  With TAC on exhale     Knee fall out With PFC on exhale  With TAC on exhale     Sitting  Scaption with red tband with exhale TAC/PFC   yes      Ball squeeze With exhale and TAC and ES contraction supine and sitting yes    bridge With PFC on exhale  With TAC on  exhale  Feet on bench with HS activation n  n  n    Dead bug progression With PFC on exhale  With TAC on exhale     clamshell Supine  Side no resistence 5 s hold 2 x 10  Sit red tband 2 x 10  With PFC on exhale  With TAC on exhale   N  yes  N  n    Side abduction With PFC on exhale  With TAC on exhale     Cat camel With breath     Quadruped/birdog progression With PFC on exhale  With TAC on exhale  With support  Alternate arms  Alternate legs  Alternate arm/leg     Prone plank progression With PFC on exhale  With TAC on exhale  Standing at wall  Push up (tricep/chest)  Hip flexion, abd, ext  Floor on elbows/knees  Floor ext arms/knees-shoulder tap  Floor full plank on elbows  Floor full plank arms ext     Side plank progression With PFC on exhale  With TAC on exhale  Elbow knees flexed  Elbow one knee flexed other ext  Elbow knees ext  Full arm and knees flexed  Full arm one knee flexed other ext  Full arm knees ext     PB PFC on exhale  TAC on exhale  Cough with PFC  Alternate arm leg raise  MB raise/push  UE exercises (row, LPD, H abd, scaption, bicep, tricep)     Sit to stand With PFC on exhale  With TAC on exhale  With MB raise     standing With exhale and TAC/PFC  Row with green tband   N  n    Mini-squat With PFC on exhale  With TAC on exhale  To raised table  With yellow tband for hip abd- unable to do without right knee pain  With weight Yes  yes  Yes  N      Wide leg squat With PFC on exhale  With TAC on exhale  With weight     Hip hinge With PFC on exhale  With TAC on exhale  With weight     lunge With PFC on exhale  With TAC on exhale  Forward  Backward  Lateral  Walking lunge  With weight     lifting With PFC on exhale  With TAC on exhale  Low bench to waist  Floor to waist     [facility]  High chair  Crib  Lift/lower  carry     Walk in place With proper breath and submax core activation     walk With proper breath and submax core activation     Side stepping TAC/PFC with breathing      jump With  PFC on exhale  With TAC on exhale  Double leg  Single leg     jog With proper breath and submax core activation           MMT      ROM                  GAIT TRAINING  03989            MANUAL  23511      External PF evaluation Sidelying manual palpation external LA/Coccygeus     Internal PF evaluation See assessment flow n    Hip evaluation      Back/SI evaluation      STM PF:  LA  OI  Coccygeus  Perineum    Abdominals:  TA, RA, obliques    Hip flexors  Hip adductors    Diaphragm release  Colon massage    Scar massage    Lumbar:  Laminal release  Paraspine, quadratus  Gluts  piriformis  HS     Joint mobilization Coccyx  Lumbar  thoracic     MFR      TPR      Strain counterstrain      Passive stretch            MODALITIES      Heat  54794      Cold  05357      Estim Attend  [id number]      Estim Unattend        US  [id number]      Biofeedback  [id number]  [id number]          EMG 7/23/2024    Sensor Used External pads  3/9:00  Right GT  Right TA    Speed of Recruitment     Relaxation     Endurance     Resting Baseline (goal <2uV) 0 uV         SUPINE: Legs extended and abd  Hooklying    10 sec hold 10 sec rest      PF Work average 5-7 uV PFC  4-5 uV with TAC    PF Rest average 0 uV    TA work 1.5 uV with PFC  1-2 with TAC    TA rest 0 uV         2 sec hold 4 sec rest     PF Work average 7 uV    PF Rest average 0 uV    TA work 0 uV    TA rest 0 uV         Sidelying:     PF Work average     PF Rest average     TA work     TA rest          Prone:     PF Work average     PF Rest average     TA work     TA rest          Quadruped:     PF Work average     PF Rest average     TA work     TA rest          SITTING:     10 sec hold 10 sec rest     PF Work average     PF Rest average     TA work     TA rest          STANDING:     10 sec hold 10 sec rest     PF Work average     PF Rest average     TA work     TA rest           Bladder symptoms 6/17/24   Frequency No  3-4 x day   Urgency no   Incontinence no   Difficulty starting  flow no   Difficulty emptying/ retention no   Post void dribble occasional   Nocturia no   Prolapse Surgery 42 years old   Fluids 145 # see below   Irritants See below   UTI history A few in 50's   Pain  no   Pads etc    Other      Bowel Symptoms 24   Frequency Every 3 days   Urgency Occasionally now since increasing fiber   Incontinence Occasionally yes   Straining No   Difficulty emptying  no   Falkner Scale 3   Fiber and diet    Fluid 20 ounces of water   Irritants Caffeine and decaf 3 cups per day,  diet coke afternoon   Prolapse    Pain    other      OBGYN 2024   Pregnancies 5   Deliveries Vaginal 1 still born   /Vaginal vaginal   Tears/Episiotomy Perineum,    Suction/forceps    Other birthing complications    Surgeries/Procedures tubal ligation   Menstruation    Menopause    Fibroids/Endometriosis    Scars    pain    other      Sexual Activity 2024   Type Self- no complaints   Pain     Orgasm    Masturbation    Libido    lubrication    other        Other 2024   Physical activity 20-30 m bike, machines for resistance training   PLOF    Living environment/ Lives with self   Work retired   Medications that may contribute to symptoms    History of abuse no   Sleep  no   Other      Outcomes 2024     PFDI 35/200  C=31  U=4 C=18.75     PFIQ Bowel=14               Systems Review:   Cord questions:  Pins and needles or tingling in both arms and both legs at the same time? denies  Problems with stumbling or falling? Denies     Cauda equina questions:  Problems with bowel and bladder control? Specifically retention? See above  Pins and needles or numbness in the saddle area? Denies     Review of systems:  General - (chills, night sweats, recent infection, fever, weight loss/gain, unexplained night pain, excessive fatigue): Denies  Do you have a history of cancer? Endometrial cancer, melanoma back, squamous,   Gastrointestinal system - (abdominal pain, bowel changes, nausea,  vomiting, bloating): Reflux, hiatal hernia  Cardiovascular system - (chest pain, palpitations, orthopnea, other): Afib- medicated,   Respiratory system - (cough, SOB, sputum production, other): Denies  Musculoskeletal system: osteoporosis, vertebral fracture? Arthritis fingers  Endocrine - (polyuria, polydipsia, heat or cold intolerance, other): Denies  Neurological - (numbness/tingling, falling/stumbling, HA, dizziness, diplopia, dysphagia/dysarthria, double vision, tinnitus, memory, drop attacks, other): Denies  Any long-term steroid use? Denies  Rectal Exam: 6/27/2024   Verbal consent yes   Assessment Position Sidelying pillow between knees in gown and sheet   Anal wink absent   sensation Intact light touch   skin Mild redness at rectum   Perineum    PFM activation external    Contract mild   Relax present   Bulge absent   PFM activation internal    External Sphincter 2/5 for 1-2s   Puborectalis 3+/5   Pubococcygeus 3/5   Iliococcygeus 3/5   Levator Ani:    Power 3/5   Endurance 4s   Repetitions 4x   Fast twitch 4x   Bulge (dyssynergia) absent   Relaxation of PFM present   cough Reviewed anais   Co-contraction of PF with TrA TBA   Accessory muscles Breath holding, upper body tension   breath Reviewed DB   Coccygeus    Obturator Internus    Piriformis    nerves    ligaments    Coccyx alignment/mobility    Pain none   Prolapse    Hemorrhoids    other Posture sitting ppt forward shoulders and head  Standing C curve posture, increase kyphosis forward head

## 2024-08-06 NOTE — OP PT TREATMENT LOG
Initial Eval 6/17/24     Progress Notes 7/17 8/17     Re-evaluation 9/17     Exercises Current Session Performed   Yes/No Time   NEURO RE-ED  [id number]      EMG-biofeedback [employer] EMG-video  Resting tone PF  Resting tone TA  PFC long hold  PFC quick flick  TAC   N  N  N  N  N  n                THER ACT  88633   10 min   HEP Bowel schedule after meals at least breakfast, fiber 20-25 g, fluid 50-60 mostly water, stool under feet  DB  PFC long and QF yes    Normal Bladder/bowel education Bladder function and behavior  Bowel function and behavior  Fluids 50-60 oz  Fiber-20-25 g     Yes  Yes  yes    Bowel or bladder irritants caffeine yes    Toilet posture and evacuation For bladder including posture, breathing, position changes and relaxation  Bowel including posture, breathing, stool under feet, relaxation,  Eccentric abdominal with exhale       yes    Posture education Sleeping  Sitting  standing   yes    Bowel or bladder void diary and schedule Void diary   Bowel schedule after meals especially breakfast   yes    Knack With cough  With sit to stand     Urge suppression With quick flicks  With heel raises     Goal Review  n    outcomes PFDI  PFIQ  VQ  FSFI  CPSI  Mod oswestry N  n    education Bowel function and behavioral management  Fiber and fluid intake  PF muscles and function   yes    symptoms Fecal urgency  Fecal incontinence  Decreased urge to have bowel movement yes                THER EX  55096   45 min   Diaphragmatic breathing With assessment/manual  With exercises  With EMG N  yes  n    TAC With assessment/manual  With exercises on exhale  With EMG   yes  n    PFC  Fast/slow twitch With assessment/manual  With exercises on exhale  With EMG  Long hold  Quick flicks N  yes  n  Yes  n    TM 0.9 mph 1% incline with cues for breathing, equal stride, neutral pelvis, TAC 5 min n    Stretches      Groin S Butterfly supine with DB  Butterfly sitting with DB  Butterfly against wall with DB  Supine at wall  legs extended and abducted with DB  Sitting Single leg abd with DB     HS S Supine at wall with DB  Supine with strap with DB  Supine active with DF glide and DB  Seated HS S with DB     Piriformis S Supine at wall figure 4 with DB  Supine figure four push/pull with DB  Supine figure 4 pull towards chest with DB       pigeon Modified on table with DB  Floor with DB  With props     Hip flexor S Supine one leg off table other ktc with DB  Lunge with DB       Abdominal S FREDDIE with breath and neck ext/flex  PPU with breath  Standing ext S hands on hips     Oblique S Seated side bend in pretzel sit  Seated side bend in chair  Seated side bend in low kneeling     Obturator Internus S      Trunk rotation S LTR supine with DB  Seated trunk rotation with breath     SKTC With breath     DKTC/Happy baby S DKTC with breath  Single leg happy baby with DB  Double leg happy baby with DB     Child pose With DB  With props     Deep squat With DB  With props     Trunk flexion S Pretzel sit forward fold with DB  From chair forward fold to bench and to sides with DB  From low bench forward fold     yes    Chest S On 1/2 foam     PPT/APT Supine with breath  Seated  with breath   yes    Lateral pelvic tilts Seated on ball with breath       Pelvic circles Seated on ball with breath     Heel slide With PFC on exhale  With TAC on exhale     Knee fall out With PFC on exhale  With TAC on exhale     Sitting  Scaption with red tband with exhale TAC/PFC   yes      Ball squeeze With exhale and TAC and ES contraction supine and sitting yes    bridge With PFC on exhale  With TAC on exhale  Feet on bench with HS activation n  n  n    Dead bug progression With PFC on exhale  With TAC on exhale     clamshell Supine  Side no resistence 5 s hold 2 x 10  Sit red tband 2 x 10  With PFC on exhale  With TAC on exhale   N  yes  N  n    Side abduction With PFC on exhale  With TAC on exhale     Cat camel With breath     Quadruped/birdog progression With PFC on  exhale  With TAC on exhale  With support  Alternate arms  Alternate legs  Alternate arm/leg     Prone plank progression With PFC on exhale  With TAC on exhale  Standing at wall  Push up (tricep/chest)  Hip flexion, abd, ext  Floor on elbows/knees  Floor ext arms/knees-shoulder tap  Floor full plank on elbows  Floor full plank arms ext     Side plank progression With PFC on exhale  With TAC on exhale  Elbow knees flexed  Elbow one knee flexed other ext  Elbow knees ext  Full arm and knees flexed  Full arm one knee flexed other ext  Full arm knees ext     PB PFC on exhale  TAC on exhale  Cough with PFC  Alternate arm leg raise  MB raise/push  UE exercises (row, LPD, H abd, scaption, bicep, tricep)     Sit to stand With PFC on exhale  With TAC on exhale  With MB raise     standing With exhale and TAC/PFC  Row with green tband   N  n    Mini-squat With PFC on exhale  With TAC on exhale  To raised table  With yellow tband for hip abd- unable to do without right knee pain  With weight Yes  yes  Yes  N      Wide leg squat With PFC on exhale  With TAC on exhale  With weight     Hip hinge With PFC on exhale  With TAC on exhale  With weight     lunge With PFC on exhale  With TAC on exhale  Forward  Backward  Lateral  Walking lunge  With weight     lifting With PFC on exhale  With TAC on exhale  Low bench to waist  Floor to waist      [facility]  High chair  Crib  Lift/lower  carry     Walk in place With proper breath and submax core activation     walk With proper breath and submax core activation     Side stepping TAC/PFC with breathing      jump With PFC on exhale  With TAC on exhale  Double leg  Single leg     jog With proper breath and submax core activation           MMT      ROM                  GAIT TRAINING  07553            MANUAL  36208      External PF evaluation Sidelying manual palpation external LA/Coccygeus     Internal PF evaluation See assessment flow n    Hip evaluation      Back/SI evaluation       STM PF:  LA  OI  Coccygeus  Perineum    Abdominals:  TA, RA, obliques    Hip flexors  Hip adductors    Diaphragm release  Colon massage    Scar massage    Lumbar:  Laminal release  Paraspine, quadratus  Gluts  piriformis  HS     Joint mobilization Coccyx  Lumbar  thoracic     MFR      TPR      Strain counterstrain      Passive stretch            MODALITIES      Heat  59785      Cold  55922      Estim Attend  [id number]      Estim Unattend        US  [id number]      Biofeedback  [id number]  [id number]          EMG 7/23/2024    Sensor Used External pads  3/9:00  Right GT  Right TA    Speed of Recruitment     Relaxation     Endurance     Resting Baseline (goal <2uV) 0 uV         SUPINE: Legs extended and abd  Hooklying    10 sec hold 10 sec rest      PF Work average 5-7 uV PFC  4-5 uV with TAC    PF Rest average 0 uV    TA work 1.5 uV with PFC  1-2 with TAC    TA rest 0 uV         2 sec hold 4 sec rest     PF Work average 7 uV    PF Rest average 0 uV    TA work 0 uV    TA rest 0 uV         Sidelying:     PF Work average     PF Rest average     TA work     TA rest          Prone:     PF Work average     PF Rest average     TA work     TA rest          Quadruped:     PF Work average     PF Rest average     TA work     TA rest          SITTING:     10 sec hold 10 sec rest     PF Work average     PF Rest average     TA work     TA rest          STANDING:     10 sec hold 10 sec rest     PF Work average     PF Rest average     TA work     TA rest           Bladder symptoms 6/17/24   Frequency No  3-4 x day   Urgency no   Incontinence no   Difficulty starting flow no   Difficulty emptying/ retention no   Post void dribble occasional   Nocturia no   Prolapse Surgery 42 years old   Fluids 145 # see below   Irritants See below   UTI history A few in 50's   Pain  no   Pads etc    Other      Bowel Symptoms 6/17/24   Frequency Every 3 days   Urgency Occasionally now since increasing fiber   Incontinence Occasionally yes    Straining No   Difficulty emptying  no   [place] Scale 3   Fiber and diet    Fluid 20 ounces of water   Irritants Caffeine and decaf 3 cups per day,  diet coke afternoon   Prolapse    Pain    other      OBGYN 2024   Pregnancies 5   Deliveries Vaginal 1 still born   /Vaginal vaginal   Tears/Episiotomy Perineum,    Suction/forceps    Other birthing complications    Surgeries/Procedures tubal ligation   Menstruation    Menopause    Fibroids/Endometriosis    Scars    pain    other      Sexual Activity 2024   Type Self- no complaints   Pain     Orgasm    Masturbation    Libido    lubrication    other        Other 2024   Physical activity 20-30 m bike, machines for resistance training   PLOF    Living environment/ Lives with self   Work retired   Medications that may contribute to symptoms    History of abuse no   Sleep  no   Other      Outcomes 2024     PFDI 35/200  C=31  U=4 C=18.75     PFIQ Bowel=14               Systems Review:   Cord questions:  Pins and needles or tingling in both arms and both legs at the same time? denies  Problems with stumbling or falling? Denies     Cauda equina questions:  Problems with bowel and bladder control? Specifically retention? See above  Pins and needles or numbness in the saddle area? Denies     Review of systems:  General - (chills, night sweats, recent infection, fever, weight loss/gain, unexplained night pain, excessive fatigue): Denies  Do you have a history of cancer? Endometrial cancer, melanoma back, squamous,   Gastrointestinal system - (abdominal pain, bowel changes, nausea, vomiting, bloating): Reflux, hiatal hernia  Cardiovascular system - (chest pain, palpitations, orthopnea, other): Afib- medicated,   Respiratory system - (cough, SOB, sputum production, other): Denies  Musculoskeletal system: osteoporosis, vertebral fracture? Arthritis fingers  Endocrine - (polyuria, polydipsia, heat or cold intolerance, other): Denies  Neurological  - (numbness/tingling, falling/stumbling, HA, dizziness, diplopia, dysphagia/dysarthria, double vision, tinnitus, memory, drop attacks, other): Denies  Any long-term steroid use? Denies  Rectal Exam: 6/27/2024   Verbal consent yes   Assessment Position Sidelying pillow between knees in gown and sheet   Anal wink absent   sensation Intact light touch   skin Mild redness at rectum   Perineum    PFM activation external    Contract mild   Relax present   Bulge absent   PFM activation internal    External Sphincter 2/5 for 1-2s   Puborectalis 3+/5   Pubococcygeus 3/5   Iliococcygeus 3/5   Levator Ani:    Power 3/5   Endurance 4s   Repetitions 4x   Fast twitch 4x   Bulge (dyssynergia) absent   Relaxation of PFM present   cough Reviewed anais   Co-contraction of PF with TrA TBA   Accessory muscles Breath holding, upper body tension   breath Reviewed DB   Coccygeus    Obturator Internus    Piriformis    nerves    ligaments    Coccyx alignment/mobility    Pain none   Prolapse    Hemorrhoids    other Posture sitting ppt forward shoulders and head  Standing C curve posture, increase kyphosis forward head

## 2024-08-14 ENCOUNTER — DOCUMENTATION (OUTPATIENT)
Dept: PHYSICAL THERAPY | Age: 89
End: 2024-08-14
Payer: COMMERCIAL

## 2024-08-14 LAB
ALBUMIN SERPL-MCNC: 4 G/DL (ref 3.7–4.7)
ALP SERPL-CCNC: 61 IU/L (ref 44–121)
ALT SERPL-CCNC: 19 IU/L (ref 0–32)
AST SERPL-CCNC: 19 IU/L (ref 0–40)
BILIRUB SERPL-MCNC: 0.5 MG/DL (ref 0–1.2)
BUN SERPL-MCNC: 16 MG/DL (ref 8–27)
BUN/CREAT SERPL: 22 (ref 12–28)
CALCIUM SERPL-MCNC: 9.5 MG/DL (ref 8.7–10.3)
CHLORIDE SERPL-SCNC: 105 MMOL/L (ref 96–106)
CO2 SERPL-SCNC: 26 MMOL/L (ref 20–29)
CREAT SERPL-MCNC: 0.74 MG/DL (ref 0.57–1)
EGFRCR SERPLBLD CKD-EPI 2021: 79 ML/MIN/1.73
GLOBULIN SER CALC-MCNC: 2.2 G/DL (ref 1.5–4.5)
GLUCOSE SERPL-MCNC: 88 MG/DL (ref 70–99)
POTASSIUM SERPL-SCNC: 5.3 MMOL/L (ref 3.5–5.2)
PROT SERPL-MCNC: 6.2 G/DL (ref 6–8.5)
SODIUM SERPL-SCNC: 144 MMOL/L (ref 134–144)

## 2024-08-14 NOTE — PROGRESS NOTES
Physical Therapy Discharge      PT DISCHARGE NOTE FOR OUTPATIENT THERAPY    Patient: Alexandra Bird MRN: [id number]  : 1935 88 y.o.  Referring Physician: No ref. provider found  Date of Visit: 2024      Certification Dates: 24 through 24    Total Visit Count:     Diagnosis: No diagnosis found.    Chief Complaints:  Chief Complaint   Patient presents with    Discharge From Treatment     Pt reports that her symptoms are resolved and she is feeling better.       Precautions:         TODAY'S VISIT:    Time In Session:            PT - 24 0946          Physical Therapy    Physical Therapy Specialty Pelvic Floor Program: Age <14        PT Plan    Frequency of treatment 1 time/week     PT Duration 3 months     PT Cert From 24     PT Cert To 24     Date PT POC was sent to provider 24     Signed PT Plan of Care received?  Yes                       OBJECTIVE MEASUREMENTS/DATA:    None taken          Today's Treatment:    Education provided:  None/NA         Goals Addressed                      This Visit's Progress       Patient Stated      COMPLETED: PFPT patient goals (pt-stated)         No fecal urgency  MET  No fecal incontinence  MET         Other      PFPT STG/LTG             Goals Short-Long Time Frame Result Progress   Pt will be supervision with HEP STG 6 weeks MET    Pt will identify bladder irritants and correct fluid intake STG 6 weeks MET    Pt will verbalize an understanding of pelvic floor anatomy and causes of incontinence STG 6 weeks MET    Pt will improve fiber intake STG 6 weeks MET    Pt will verbalize rationale and purposes for exercises STG 6 weeks MET    Pt will demonstrate good toileting posture for proper elimination and sitting posture for improved use of core musculature. STG 6 weeks MET    Pt will decrease frequency of UTI's LTG 12 weeks MET    Pt will coordinate use of the PF with functional activities that cause symptoms LTG 12 weeks MET    Pt will  be I with HEP. LTG 12 weeks MET    Pt will normalize PFM tone LTG 12 weeks MET    Pt will Improve BM frequency to daily or every other day per week w/o straining, pain. LTG 12 weeks MET    Pt will demonstrate decreased overflow muscle activity during PFM contraction LTG 12 weeks MET    Pt will demonstrate an increase in PFM contraction to normal grade as measured by MMT LTG 12 weeks MET    Pt will demonstrate an increase in PFM endurance to 10 second hold x 10 repetitions as measured by EMG or digital exam LTG 12 weeks MET    Pt will demonstrate use of functional PFM contraction by performing a precontraction (anais) to eliminate UI during stress situation LTG 12 weeks MET    Pt will decrease urinary/bowel leakage to 0 episodes LTG 12 weeks MET    Pt able to empty bladder/bowel completely. LTG 12 weeks MET    Pt will be able to have sexual intercourse, a gynecological examination, or use tampons, without pain. LTG 12 weeks MET    Pt will improve PFDI from 35 to 0. LTG 12 weeks Not MET 18.7   Pt will improve PFIQ from 14 to 0. LTG 12 weeks Not MET

## 2024-08-15 LAB — 25(OH)D3+25(OH)D2 SERPL-MCNC: 37.3 NG/ML (ref 30–100)

## 2024-08-22 ENCOUNTER — CLINICAL SUPPORT (OUTPATIENT)
Dept: INTERNAL MEDICINE | Facility: CLINIC | Age: 89
End: 2024-08-22
Payer: COMMERCIAL

## 2024-08-22 DIAGNOSIS — M81.0 OSTEOPOROSIS, UNSPECIFIED OSTEOPOROSIS TYPE, UNSPECIFIED PATHOLOGICAL FRACTURE PRESENCE: Primary | ICD-10-CM

## 2024-08-22 PROCEDURE — 96372 THER/PROPH/DIAG INJ SC/IM: CPT | Performed by: INTERNAL MEDICINE

## 2024-09-05 DIAGNOSIS — I48.0 PAF (PAROXYSMAL ATRIAL FIBRILLATION) (CMS/HCC): ICD-10-CM

## 2024-09-05 DIAGNOSIS — I28.1 PULMONARY ARTERY ANEURYSM (CMS/HCC): Primary | ICD-10-CM

## 2024-09-05 DIAGNOSIS — I25.10 CORONARY ARTERY DISEASE INVOLVING NATIVE CORONARY ARTERY OF NATIVE HEART WITHOUT ANGINA PECTORIS: ICD-10-CM

## 2024-09-05 DIAGNOSIS — I45.10 RBBB: ICD-10-CM

## 2024-09-05 DIAGNOSIS — I77.810 DILATED AORTIC ROOT (CMS/HCC): ICD-10-CM

## 2024-11-19 ENCOUNTER — OFFICE VISIT (OUTPATIENT)
Dept: INTERNAL MEDICINE | Facility: CLINIC | Age: 89
End: 2024-11-19
Payer: COMMERCIAL

## 2024-11-19 VITALS
DIASTOLIC BLOOD PRESSURE: 78 MMHG | OXYGEN SATURATION: 99 % | HEART RATE: 60 BPM | SYSTOLIC BLOOD PRESSURE: 132 MMHG | BODY MASS INDEX: 28.62 KG/M2 | WEIGHT: 149 LBS

## 2024-11-19 DIAGNOSIS — I28.1 PULMONARY ARTERY ANEURYSM (CMS/HCC): ICD-10-CM

## 2024-11-19 DIAGNOSIS — M81.0 AGE-RELATED OSTEOPOROSIS WITHOUT CURRENT PATHOLOGICAL FRACTURE: ICD-10-CM

## 2024-11-19 DIAGNOSIS — I48.0 PAF (PAROXYSMAL ATRIAL FIBRILLATION) (CMS/HCC): Primary | ICD-10-CM

## 2024-11-19 DIAGNOSIS — E78.2 MIXED HYPERLIPIDEMIA: ICD-10-CM

## 2024-11-19 PROCEDURE — 99214 OFFICE O/P EST MOD 30 MIN: CPT | Performed by: INTERNAL MEDICINE

## 2024-11-19 PROCEDURE — 3008F BODY MASS INDEX DOCD: CPT | Performed by: INTERNAL MEDICINE

## 2024-11-19 RX ORDER — LORAZEPAM 1 MG/1
0.5 TABLET ORAL DAILY PRN
Qty: 30 TABLET | Refills: 0 | Status: SHIPPED | OUTPATIENT
Start: 2024-11-19 | End: 2025-02-14

## 2024-11-19 ASSESSMENT — ENCOUNTER SYMPTOMS
DIZZINESS: 0
HEADACHES: 0
SHORTNESS OF BREATH: 0
CONFUSION: 0
FREQUENCY: 0
FEVER: 0
FATIGUE: 0
COUGH: 0
SORE THROAT: 0
DIARRHEA: 0
CONSTIPATION: 0
DYSURIA: 0
PALPITATIONS: 0
VOMITING: 0
NAUSEA: 0
CHILLS: 0
BACK PAIN: 0
ABDOMINAL PAIN: 0

## 2024-11-19 NOTE — PATIENT INSTRUCTIONS
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

## 2024-12-13 ENCOUNTER — APPOINTMENT (OUTPATIENT)
Dept: URBAN - METROPOLITAN AREA CLINIC 374 | Facility: CLINIC | Age: 89
Setting detail: DERMATOLOGY
End: 2024-12-13

## 2024-12-13 DIAGNOSIS — D22 MELANOCYTIC NEVI: ICD-10-CM | Status: UNCHANGED

## 2024-12-13 DIAGNOSIS — L91.8 OTHER HYPERTROPHIC DISORDERS OF THE SKIN: ICD-10-CM | Status: UNCHANGED

## 2024-12-13 DIAGNOSIS — L57.0 ACTINIC KERATOSIS: ICD-10-CM | Status: INADEQUATELY CONTROLLED

## 2024-12-13 DIAGNOSIS — Z71.89 OTHER SPECIFIED COUNSELING: ICD-10-CM

## 2024-12-13 DIAGNOSIS — L81.4 OTHER MELANIN HYPERPIGMENTATION: ICD-10-CM | Status: UNCHANGED

## 2024-12-13 DIAGNOSIS — D18.0 HEMANGIOMA: ICD-10-CM | Status: UNCHANGED

## 2024-12-13 DIAGNOSIS — L81.2 FRECKLES: ICD-10-CM | Status: UNCHANGED

## 2024-12-13 DIAGNOSIS — Z85.820 PERSONAL HISTORY OF MALIGNANT MELANOMA OF SKIN: ICD-10-CM

## 2024-12-13 DIAGNOSIS — L82.1 OTHER SEBORRHEIC KERATOSIS: ICD-10-CM | Status: UNCHANGED

## 2024-12-13 DIAGNOSIS — Z86.007 PERSONAL HISTORY OF IN-SITU NEOPLASM OF SKIN: ICD-10-CM

## 2024-12-13 DIAGNOSIS — Z80.8 FAMILY HISTORY OF MALIGNANT NEOPLASM OF OTHER ORGANS OR SYSTEMS: ICD-10-CM

## 2024-12-13 DIAGNOSIS — L81.5 LEUKODERMA, NOT ELSEWHERE CLASSIFIED: ICD-10-CM | Status: UNCHANGED

## 2024-12-13 PROBLEM — D22.5 MELANOCYTIC NEVI OF TRUNK: Status: ACTIVE | Noted: 2024-12-13

## 2024-12-13 PROBLEM — D18.01 HEMANGIOMA OF SKIN AND SUBCUTANEOUS TISSUE: Status: ACTIVE | Noted: 2024-12-13

## 2024-12-13 PROCEDURE — ? DEFER

## 2024-12-13 PROCEDURE — 99213 OFFICE O/P EST LOW 20 MIN: CPT | Mod: 25

## 2024-12-13 PROCEDURE — 17003 DESTRUCT PREMALG LES 2-14: CPT

## 2024-12-13 PROCEDURE — ? DIAGNOSIS COMMENT

## 2024-12-13 PROCEDURE — ? COUNSELING

## 2024-12-13 PROCEDURE — ? LIQUID NITROGEN

## 2024-12-13 PROCEDURE — ? TREATMENT REGIMEN

## 2024-12-13 PROCEDURE — ? SUNSCREEN RECOMMENDATIONS

## 2024-12-13 PROCEDURE — ? FULL BODY SKIN EXAM

## 2024-12-13 PROCEDURE — 17000 DESTRUCT PREMALG LESION: CPT

## 2024-12-13 ASSESSMENT — LOCATION DETAILED DESCRIPTION DERM
LOCATION DETAILED: LEFT PROXIMAL PRETIBIAL REGION
LOCATION DETAILED: RIGHT DISTAL DORSAL FOREARM
LOCATION DETAILED: RIGHT CENTRAL MALAR CHEEK
LOCATION DETAILED: 2ND WEB SPACE LEFT HAND
LOCATION DETAILED: RIGHT INFERIOR LATERAL NECK
LOCATION DETAILED: EPIGASTRIC SKIN
LOCATION DETAILED: RIGHT RADIAL DORSAL HAND
LOCATION DETAILED: RIGHT PROXIMAL DORSAL FOREARM
LOCATION DETAILED: LEFT ULNAR DORSAL HAND
LOCATION DETAILED: LEFT DISTAL DORSAL FOREARM
LOCATION DETAILED: RIGHT AXILLARY VAULT
LOCATION DETAILED: RIGHT MEDIAL UPPER BACK
LOCATION DETAILED: LEFT INFERIOR FOREHEAD
LOCATION DETAILED: INFERIOR THORACIC SPINE
LOCATION DETAILED: LEFT RADIAL DORSAL HAND
LOCATION DETAILED: LEFT PROXIMAL DORSAL MIDDLE FINGER
LOCATION DETAILED: LEFT PROXIMAL DORSAL FOREARM
LOCATION DETAILED: LEFT INFERIOR LATERAL NECK
LOCATION DETAILED: LEFT FOREHEAD
LOCATION DETAILED: RIGHT POSTERIOR SHOULDER
LOCATION DETAILED: LEFT POSTERIOR SHOULDER
LOCATION DETAILED: 4TH WEB SPACE LEFT HAND
LOCATION DETAILED: LEFT AXILLARY VAULT
LOCATION DETAILED: LEFT INFERIOR LATERAL FOREHEAD
LOCATION DETAILED: RIGHT PROXIMAL PRETIBIAL REGION
LOCATION DETAILED: RIGHT ULNAR DORSAL HAND
LOCATION DETAILED: LEFT INFERIOR MEDIAL MALAR CHEEK
LOCATION DETAILED: RIGHT DORSAL INDEX METACARPOPHALANGEAL JOINT
LOCATION DETAILED: SUPERIOR THORACIC SPINE

## 2024-12-13 ASSESSMENT — LOCATION SIMPLE DESCRIPTION DERM
LOCATION SIMPLE: RIGHT FOREARM
LOCATION SIMPLE: LEFT SHOULDER
LOCATION SIMPLE: RIGHT UPPER BACK
LOCATION SIMPLE: RIGHT ANTERIOR NECK
LOCATION SIMPLE: LEFT MIDDLE FINGER
LOCATION SIMPLE: RIGHT PRETIBIAL REGION
LOCATION SIMPLE: RIGHT HAND
LOCATION SIMPLE: LEFT ANTERIOR NECK
LOCATION SIMPLE: LEFT FOREHEAD
LOCATION SIMPLE: RIGHT SHOULDER
LOCATION SIMPLE: LEFT PRETIBIAL REGION
LOCATION SIMPLE: LEFT FOREARM
LOCATION SIMPLE: LEFT CHEEK
LOCATION SIMPLE: ABDOMEN
LOCATION SIMPLE: LEFT HAND
LOCATION SIMPLE: RIGHT AXILLARY VAULT
LOCATION SIMPLE: RIGHT CHEEK
LOCATION SIMPLE: UPPER BACK
LOCATION SIMPLE: LEFT AXILLARY VAULT

## 2024-12-13 ASSESSMENT — TOTAL NUMBER OF LESIONS: # OF LESIONS?: 6

## 2024-12-13 ASSESSMENT — LOCATION ZONE DERM
LOCATION ZONE: ARM
LOCATION ZONE: TRUNK
LOCATION ZONE: NECK
LOCATION ZONE: LEG
LOCATION ZONE: FACE
LOCATION ZONE: HAND
LOCATION ZONE: AXILLAE
LOCATION ZONE: FINGER

## 2024-12-13 ASSESSMENT — PAIN INTENSITY VAS: HOW INTENSE IS YOUR PAIN 0 BEING NO PAIN, 10 BEING THE MOST SEVERE PAIN POSSIBLE?: NO PAIN

## 2024-12-13 NOTE — PROCEDURE: MIPS QUALITY
Quality 137: Melanoma: Continuity Of Care - Recall System: Patient information entered into a recall system that includes: target date for the next exam specified AND a process to follow up with patients regarding missed or unscheduled appointments
Quality 226: Preventive Care And Screening: Tobacco Use: Screening And Cessation Intervention: Patient screened for tobacco use and is an ex/non-smoker
Quality 431: Preventive Care And Screening: Unhealthy Alcohol Use - Screening: Patient not identified as an unhealthy alcohol user when screened for unhealthy alcohol use using a systematic screening method
Detail Level: Detailed
Quality 130: Documentation Of Current Medications In The Medical Record: Current Medications Documented

## 2024-12-13 NOTE — HPI: EVALUATION OF SKIN LESION(S)
What Type Of Note Output Would You Prefer (Optional)?: Bullet Format
Hpi Title: Evaluation of Skin Lesions
How Severe Are Your Spot(S)?: mild
Family Member: Brother , sister
Location: Upper back

## 2024-12-13 NOTE — PROCEDURE: LIQUID NITROGEN
Show Aperture Variable?: Yes
Consent: The patient's consent was obtained including but not limited to risks of crusting, scabbing, blistering, scarring, darker or lighter pigmentary change, recurrence, incomplete removal and infection.
Detail Level: Detailed
Duration Of Freeze Thaw-Cycle (Seconds): 15
Application Tool (Optional): Liquid Nitrogen Sprayer
Number Of Freeze-Thaw Cycles: 2 freeze-thaw cycles
Post-Care Instructions: I reviewed with the patient in detail post-care instructions, including but not only, to wash daily soap and water and to apply white petrolatum (Vaseline) to affected area. In addition, photoprotection, including consistent use of sunscreen SPF>30/broad spectrum every 2 hours.
Render Note In Bullet Format When Appropriate: No

## 2024-12-13 NOTE — PROCEDURE: DIAGNOSIS COMMENT
Detail Level: Simple
Render Risk Assessment In Note?: no
Comment: Family history of melanoma: brother, diagnosed in his 50s, and sister diagnosed in her 70s. In the context of a patient with a personal history of melanoma. No personal or family history of brain or pancreatic cancer. 
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 Hays on 06/01/21\\n

## 2025-02-11 ENCOUNTER — APPOINTMENT (OUTPATIENT)
Dept: URBAN - METROPOLITAN AREA CLINIC 374 | Facility: CLINIC | Age: 89
Setting detail: DERMATOLOGY
End: 2025-02-11

## 2025-02-11 DIAGNOSIS — Z71.89 OTHER SPECIFIED COUNSELING: ICD-10-CM

## 2025-02-11 DIAGNOSIS — L57.0 ACTINIC KERATOSIS: ICD-10-CM | Status: INADEQUATELY CONTROLLED

## 2025-02-11 PROCEDURE — 17003 DESTRUCT PREMALG LES 2-14: CPT

## 2025-02-11 PROCEDURE — 17000 DESTRUCT PREMALG LESION: CPT

## 2025-02-11 PROCEDURE — ? COUNSELING

## 2025-02-11 PROCEDURE — 99212 OFFICE O/P EST SF 10 MIN: CPT | Mod: 25

## 2025-02-11 PROCEDURE — ? LIQUID NITROGEN

## 2025-02-11 PROCEDURE — ? TREATMENT REGIMEN

## 2025-02-11 ASSESSMENT — PAIN INTENSITY VAS: HOW INTENSE IS YOUR PAIN 0 BEING NO PAIN, 10 BEING THE MOST SEVERE PAIN POSSIBLE?: NO PAIN

## 2025-02-11 ASSESSMENT — LOCATION SIMPLE DESCRIPTION DERM
LOCATION SIMPLE: NOSE
LOCATION SIMPLE: LEFT ZYGOMA
LOCATION SIMPLE: RIGHT FOREARM
LOCATION SIMPLE: LEFT FOREHEAD

## 2025-02-11 ASSESSMENT — LOCATION DETAILED DESCRIPTION DERM
LOCATION DETAILED: LEFT LATERAL FOREHEAD
LOCATION DETAILED: LEFT INFERIOR FOREHEAD
LOCATION DETAILED: NASAL ROOT
LOCATION DETAILED: RIGHT DISTAL DORSAL FOREARM
LOCATION DETAILED: LEFT CENTRAL ZYGOMA

## 2025-02-11 ASSESSMENT — LOCATION ZONE DERM
LOCATION ZONE: NOSE
LOCATION ZONE: ARM
LOCATION ZONE: FACE

## 2025-02-11 ASSESSMENT — TOTAL NUMBER OF LESIONS: # OF LESIONS?: 7

## 2025-02-11 NOTE — PROCEDURE: LIQUID NITROGEN
Render Note In Bullet Format When Appropriate: No
Detail Level: Detailed
Show Applicator Variable?: Yes
Post-Care Instructions: I reviewed with the patient in detail post-care instructions. Patient is to wear sunprotection, and avoid picking at any of the treated lesions. Pt may apply Vaseline to crusted or scabbing areas.
Number Of Freeze-Thaw Cycles: 2 freeze-thaw cycles
Duration Of Freeze Thaw-Cycle (Seconds): 15
Consent: The patient's consent was obtained including but not limited to risks of crusting, scabbing, blistering, scarring, darker or lighter pigmentary change, recurrence, incomplete removal and infection.

## 2025-02-14 RX ORDER — LORAZEPAM 1 MG/1
0.5 TABLET ORAL DAILY PRN
Qty: 30 TABLET | Refills: 0 | Status: SHIPPED | OUTPATIENT
Start: 2025-02-14

## 2025-02-20 ENCOUNTER — TELEPHONE (OUTPATIENT)
Dept: INTERNAL MEDICINE | Facility: CLINIC | Age: 89
End: 2025-02-20
Payer: COMMERCIAL

## 2025-02-20 LAB
ALBUMIN SERPL-MCNC: 3.9 G/DL (ref 3.7–4.7)
ALBUMIN SERPL-MCNC: 4 G/DL (ref 3.7–4.7)
ALP SERPL-CCNC: 60 IU/L (ref 44–121)
ALP SERPL-CCNC: 60 IU/L (ref 44–121)
ALT SERPL-CCNC: 10 IU/L (ref 0–32)
ALT SERPL-CCNC: 11 IU/L (ref 0–32)
AST SERPL-CCNC: 17 IU/L (ref 0–40)
AST SERPL-CCNC: 18 IU/L (ref 0–40)
BASOPHILS # BLD AUTO: 0 X10E3/UL (ref 0–0.2)
BASOPHILS NFR BLD AUTO: 1 %
BILIRUB SERPL-MCNC: 0.5 MG/DL (ref 0–1.2)
BILIRUB SERPL-MCNC: 0.5 MG/DL (ref 0–1.2)
BUN SERPL-MCNC: 17 MG/DL (ref 8–27)
BUN SERPL-MCNC: 17 MG/DL (ref 8–27)
BUN/CREAT SERPL: 22 (ref 12–28)
BUN/CREAT SERPL: 24 (ref 12–28)
CALCIUM SERPL-MCNC: 9.2 MG/DL (ref 8.7–10.3)
CALCIUM SERPL-MCNC: 9.3 MG/DL (ref 8.7–10.3)
CHLORIDE SERPL-SCNC: 106 MMOL/L (ref 96–106)
CHLORIDE SERPL-SCNC: 108 MMOL/L (ref 96–106)
CHOLEST SERPL-MCNC: 142 MG/DL (ref 100–199)
CO2 SERPL-SCNC: 24 MMOL/L (ref 20–29)
CO2 SERPL-SCNC: 25 MMOL/L (ref 20–29)
CREAT SERPL-MCNC: 0.7 MG/DL (ref 0.57–1)
CREAT SERPL-MCNC: 0.76 MG/DL (ref 0.57–1)
EGFRCR SERPLBLD CKD-EPI 2021: 76 ML/MIN/1.73
EGFRCR SERPLBLD CKD-EPI 2021: 84 ML/MIN/1.73
EOSINOPHIL # BLD AUTO: 0.2 X10E3/UL (ref 0–0.4)
EOSINOPHIL NFR BLD AUTO: 3 %
ERYTHROCYTE [DISTWIDTH] IN BLOOD BY AUTOMATED COUNT: 11.9 % (ref 11.7–15.4)
ERYTHROCYTE [DISTWIDTH] IN BLOOD BY AUTOMATED COUNT: 12.2 % (ref 11.7–15.4)
GLOBULIN SER CALC-MCNC: 2.2 G/DL (ref 1.5–4.5)
GLOBULIN SER CALC-MCNC: 2.4 G/DL (ref 1.5–4.5)
GLUCOSE SERPL-MCNC: 86 MG/DL (ref 70–99)
GLUCOSE SERPL-MCNC: 86 MG/DL (ref 70–99)
HCT VFR BLD AUTO: 43.9 % (ref 34–46.6)
HCT VFR BLD AUTO: 44.1 % (ref 34–46.6)
HDLC SERPL-MCNC: 73 MG/DL
HGB BLD-MCNC: 14.6 G/DL (ref 11.1–15.9)
HGB BLD-MCNC: 14.6 G/DL (ref 11.1–15.9)
IMM GRANULOCYTES # BLD AUTO: 0 X10E3/UL (ref 0–0.1)
IMM GRANULOCYTES NFR BLD AUTO: 0 %
LDLC SERPL CALC-MCNC: 55 MG/DL (ref 0–99)
LYMPHOCYTES # BLD AUTO: 2 X10E3/UL (ref 0.7–3.1)
LYMPHOCYTES NFR BLD AUTO: 33 %
MCH RBC QN AUTO: 31.7 PG (ref 26.6–33)
MCH RBC QN AUTO: 31.9 PG (ref 26.6–33)
MCHC RBC AUTO-ENTMCNC: 33.1 G/DL (ref 31.5–35.7)
MCHC RBC AUTO-ENTMCNC: 33.3 G/DL (ref 31.5–35.7)
MCV RBC AUTO: 96 FL (ref 79–97)
MCV RBC AUTO: 96 FL (ref 79–97)
MONOCYTES # BLD AUTO: 0.3 X10E3/UL (ref 0.1–0.9)
MONOCYTES NFR BLD AUTO: 4 %
NEUTROPHILS # BLD AUTO: 3.6 X10E3/UL (ref 1.4–7)
NEUTROPHILS NFR BLD AUTO: 59 %
PLATELET # BLD AUTO: 261 X10E3/UL (ref 150–450)
PLATELET # BLD AUTO: 262 X10E3/UL (ref 150–450)
POTASSIUM SERPL-SCNC: 4.5 MMOL/L (ref 3.5–5.2)
POTASSIUM SERPL-SCNC: 4.9 MMOL/L (ref 3.5–5.2)
PROT SERPL-MCNC: 6.2 G/DL (ref 6–8.5)
PROT SERPL-MCNC: 6.3 G/DL (ref 6–8.5)
RBC # BLD AUTO: 4.57 X10E6/UL (ref 3.77–5.28)
RBC # BLD AUTO: 4.6 X10E6/UL (ref 3.77–5.28)
SODIUM SERPL-SCNC: 143 MMOL/L (ref 134–144)
SODIUM SERPL-SCNC: 145 MMOL/L (ref 134–144)
TRIGL SERPL-MCNC: 73 MG/DL (ref 0–149)
VLDLC SERPL CALC-MCNC: 14 MG/DL (ref 5–40)
WBC # BLD AUTO: 6.1 X10E3/UL (ref 3.4–10.8)
WBC # BLD AUTO: 6.2 X10E3/UL (ref 3.4–10.8)

## 2025-02-20 NOTE — TELEPHONE ENCOUNTER
Called patient to verify insurance to start prolia process and it does look like she has a new ID card. Patient stated she will drop it off tomorrow. Can you ladies let me know when she does so that I can submit a prior auth? Thanks!

## 2025-02-21 LAB
25(OH)D3+25(OH)D2 SERPL-MCNC: 37.9 NG/ML (ref 30–100)
CHOLEST SERPL-MCNC: 143 MG/DL (ref 100–199)
HDLC SERPL-MCNC: 73 MG/DL
LDLC SERPL CALC-MCNC: 56 MG/DL (ref 0–99)
TRIGL SERPL-MCNC: 72 MG/DL (ref 0–149)
TSH SERPL DL<=0.005 MIU/L-ACNC: 1.37 UIU/ML (ref 0.45–4.5)
VLDLC SERPL CALC-MCNC: 14 MG/DL (ref 5–40)

## 2025-02-25 ENCOUNTER — CLINICAL SUPPORT (OUTPATIENT)
Dept: INTERNAL MEDICINE | Facility: CLINIC | Age: 89
End: 2025-02-25
Payer: COMMERCIAL

## 2025-02-25 DIAGNOSIS — M81.0 AGE-RELATED OSTEOPOROSIS WITHOUT CURRENT PATHOLOGICAL FRACTURE: Primary | ICD-10-CM

## 2025-02-25 PROCEDURE — 96372 THER/PROPH/DIAG INJ SC/IM: CPT | Performed by: INTERNAL MEDICINE

## 2025-04-29 ENCOUNTER — OFFICE VISIT (OUTPATIENT)
Dept: CARDIOLOGY | Facility: CLINIC | Age: 89
End: 2025-04-29
Payer: COMMERCIAL

## 2025-04-29 ENCOUNTER — HOSPITAL ENCOUNTER (OUTPATIENT)
Dept: CARDIOLOGY | Facility: CLINIC | Age: 89
Discharge: HOME | End: 2025-04-29
Attending: STUDENT IN AN ORGANIZED HEALTH CARE EDUCATION/TRAINING PROGRAM
Payer: COMMERCIAL

## 2025-04-29 VITALS
DIASTOLIC BLOOD PRESSURE: 80 MMHG | WEIGHT: 150 LBS | OXYGEN SATURATION: 96 % | SYSTOLIC BLOOD PRESSURE: 126 MMHG | BODY MASS INDEX: 29.45 KG/M2 | HEIGHT: 60 IN | HEART RATE: 53 BPM

## 2025-04-29 VITALS
SYSTOLIC BLOOD PRESSURE: 130 MMHG | WEIGHT: 150 LBS | DIASTOLIC BLOOD PRESSURE: 70 MMHG | HEIGHT: 60 IN | BODY MASS INDEX: 29.45 KG/M2

## 2025-04-29 DIAGNOSIS — I45.10 RBBB: ICD-10-CM

## 2025-04-29 DIAGNOSIS — I77.810 DILATED AORTIC ROOT (CMS/HCC): ICD-10-CM

## 2025-04-29 DIAGNOSIS — I25.10 CORONARY ARTERY DISEASE INVOLVING NATIVE CORONARY ARTERY OF NATIVE HEART WITHOUT ANGINA PECTORIS: ICD-10-CM

## 2025-04-29 DIAGNOSIS — I48.0 PAF (PAROXYSMAL ATRIAL FIBRILLATION) (CMS/HCC): ICD-10-CM

## 2025-04-29 DIAGNOSIS — I28.1 PULMONARY ARTERY ANEURYSM (CMS/HCC): ICD-10-CM

## 2025-04-29 DIAGNOSIS — I25.10 CORONARY ARTERY DISEASE INVOLVING NATIVE CORONARY ARTERY OF NATIVE HEART WITHOUT ANGINA PECTORIS: Primary | ICD-10-CM

## 2025-04-29 DIAGNOSIS — E78.2 MIXED HYPERLIPIDEMIA: ICD-10-CM

## 2025-04-29 LAB
AORTIC ROOT ANNULUS: 3.6 CM
ASCENDING AORTA: 3.6 CM
ATRIAL RATE: 60
AV PEAK GRADIENT: 6 MMHG
AV PEAK VELOCITY-S: 1.26 M/S
AV VALVE AREA: 2.25 CM2
BSA FOR ECHO PROCEDURE: 1.7 M2
DOP CALC LVOT STROKE VOLUME: 80.17 CM3
DOP CALC RVOT AREA: 13.2 CM2
DOP CALC RVOT STROKE VOLUME: 225.65 CM3
E WAVE DECELERATION TIME: 158 MS
E/A RATIO: 1.5
E/E' RATIO: 21.7
E/LAT E' RATIO: 8.9
EDV (BP): 52.8 CM3
EF (A4C): 74.1 %
EF A2C: 42.5 %
EJECTION FRACTION: 61.7 %
EST RIGHT VENT SYSTOLIC PRESSURE BY TRICUSPID REGURGITATION JET: 36 MMHG
ESV (BP): 20.2 CM3
FRACTIONAL SHORTENING: 35.82 %
INTERVENTRICULAR SEPTUM: 1.19 CM
LA ESV (BP): 94.8 CM3
LA ESV INDEX (A2C): 47.59 CM3/M2
LA ESV INDEX (BP): 55.76 CM3/M2
LA/AORTA RATIO: 1.47
LAAS-AP2: 24.9 CM2
LAAS-AP4: 29.6 CM2
LAD 2D: 5.3 CM
LAL MED-LAT (A4C): 6.4 CM
LAV-S: 80.9 CM3
LEFT ATRIAL LENGTH SUPERIOR-INFERIOR (APICAL 2-CHAMBER VIEW): 6.09 CM
LEFT ATRIUM VOLUME INDEX: 62.94 CM3/M2
LEFT ATRIUM VOLUME: 107 CM3
LEFT INTERNAL DIMENSION IN SYSTOLE: 2.67 CM (ref 2.34–3.53)
LEFT VENTRICLE DIASTOLIC VOLUME INDEX: 35 CM3/M2
LEFT VENTRICLE DIASTOLIC VOLUME: 59.5 CM3
LEFT VENTRICLE SYSTOLIC VOLUME INDEX: 9.06 CM3/M2
LEFT VENTRICLE SYSTOLIC VOLUME: 15.4 CM3
LEFT VENTRICULAR INTERNAL DIMENSION IN DIASTOLE: 4.16 CM (ref 3.93–5.46)
LEFT VENTRICULAR POSTERIOR WALL IN END DIASTOLE: 1.08 CM (ref 0.51–0.95)
LV DIASTOLIC VOLUME: 44.5 CM3
LV ESV (APICAL 2 CHAMBER): 25.7 CM3
LVAD-AP2: 18.4 CM2
LVAD-AP4: 20.9 CM2
LVAS-AP2: 12.2 CM2
LVAS-AP4: 9.49 CM2
LVEDVI(A2C): 26.18 CM3/M2
LVEDVI(BP): 31.06 CM3/M2
LVESVI(A2C): 15.12 CM3/M2
LVESVI(BP): 11.88 CM3/M2
LVLD-AP2: 6.39 CM
LVLD-AP4: 5.94 CM
LVLS-AP2: 5.12 CM
LVLS-AP4: 4.84 CM
LVOT 2D: 2.2 CM
LVOT A: 3.8 CM2
LVOT MG: 2 MMHG
LVOT MV: 0.63 M/S
LVOT PEAK VELOCITY: 0.95 M/S
LVOT PG: 4 MMHG
LVOT STROKE VOLUME INDEX: 47.16 ML/M2
LVOT VTI: 21.1 CM
MLH CV ECHO AVA INDEX VELOCITY RATIO: 1.3
MV E'TISSUE VEL-LAT: 0.11 M/S
MV E'TISSUE VEL-MED: 0.04 M/S
MV PEAK A VEL: 0.62 M/S
MV PEAK E VEL: 0.96 M/S
MV STENOSIS PRESSURE HALF TIME: 46 MS
MV VALVE AREA P 1/2 METHOD: 4.78 CM2
P AXIS: 84
POSTERIOR WALL: 1.08 CM
PR INTERVAL: 202
PULMONARY REGURGITATION LATE DIASTOLIC VELOCITY: 1.22 M/S
PV PEAK GRADIENT: 3 MMHG
PV PV: 0.82 M/S
PV VALVE AREA: 13.28 CM2
QRS DURATION: 122
QT INTERVAL: 394
QTC CALCULATION(BAZETT): 394
R AXIS: 85
RAP: 3 MMHG
RVOT D: 4.1 CM
RVOT VMAX: 0.83 M/S
RVOT VTI: 17.1 CM
SEPTAL TISSUE DOPPLER FREE WALL LATE DIA VELOCITY (APICAL 4 CHAMBER VIEW): 0.15 M/S
T WAVE AXIS: -62
TR MAX PG: 32.95 MMHG
TRICUSPID VALVE PEAK REGURGITATION VELOCITY: 2.87 M/S
VENTRICULAR RATE: 60
Z-SCORE OF LEFT VENTRICULAR DIMENSION IN END DIASTOLE: -1.08
Z-SCORE OF LEFT VENTRICULAR DIMENSION IN END SYSTOLE: -0.57
Z-SCORE OF LEFT VENTRICULAR POSTERIOR WALL IN END DIASTOLE: 2.29

## 2025-04-29 PROCEDURE — 99214 OFFICE O/P EST MOD 30 MIN: CPT | Performed by: STUDENT IN AN ORGANIZED HEALTH CARE EDUCATION/TRAINING PROGRAM

## 2025-04-29 PROCEDURE — 3008F BODY MASS INDEX DOCD: CPT | Performed by: STUDENT IN AN ORGANIZED HEALTH CARE EDUCATION/TRAINING PROGRAM

## 2025-04-29 PROCEDURE — 93306 TTE W/DOPPLER COMPLETE: CPT | Performed by: STUDENT IN AN ORGANIZED HEALTH CARE EDUCATION/TRAINING PROGRAM

## 2025-04-29 PROCEDURE — 93000 ELECTROCARDIOGRAM COMPLETE: CPT | Performed by: STUDENT IN AN ORGANIZED HEALTH CARE EDUCATION/TRAINING PROGRAM

## 2025-04-29 ASSESSMENT — ENCOUNTER SYMPTOMS
DIAPHORESIS: 0
HEMATURIA: 0
SHORTNESS OF BREATH: 0
PALPITATIONS: 0
STRIDOR: 0
COLOR CHANGE: 0
FATIGUE: 0
LIGHT-HEADEDNESS: 0

## 2025-04-29 NOTE — PROGRESS NOTES
Ervin Khan MD, [facility]  Non-invasive Cardiology    [facility]  [facility] [place]  [address] [place] [address]     [facility]  [address]  [address]  [place] [address]    [phone]    [facility]       2025        Patient Name: Teresa Bird  Account:          [id number]  :               1935        Dear Sergei Cooley MD:     I had the pleasure of seeing your patient, Teresa Bird, on 2025. As you know, she is a 89 y.o. female with medical history as described below.     HPI  89 y.o. female with a history of nonobstructive CAD (mild), pAFib on AC, hyperlipidemia, and dilated pulmonary artery who presents for cardiovascular evaluation.  At today's visit the patient reports feeling well.  We reviewed her prior cardiac history as noted above.  She denies any chest pains, shortness of breath, or other anginal equivalents.  She also denies any palpitations or fast irregular heartbeats nor any episodes of presyncope/syncope.  She overall feels well and has no major complaints for today.        CARDIOVASCULAR STUDIES:     Lab Results   Component Value Date    CHOL 142 2025    CHOL 143 2025    TRIG 73 2025    TRIG 72 2025    HDL 73 2025    HDL 73 2025    LDLCALC 55 2025    LDLCALC 56 2025       ECG: Independently reviewed:      ECHOCARDIOGRAM:  Results for orders placed during the hospital encounter of 25    Transthoracic echo (TTE) complete    Interpretation Summary    Left Ventricle: Normal ventricle size. Mild concentric left ventricular hypertrophy. Estimated EF 60-65%. Wall motion appears grossly normal. Indeterminate diastolic filling pattern.    Right Ventricle: Normal ventricle size. Normal systolic function.    Left Atrium: Severely dilated atrium.    Right Atrium: Mildly dilated atrium.    Aortic Valve: Tricuspid valve.  Sclerotic  leaflets. No regurgitation. No stenosis.    Mitral Valve: Grossly normal leaflet structure. Moderate mitral annular calcification. Mild regurgitation. No stenosis.    Tricuspid Valve: Structure is grossly normal. Mild regurgitation. Estimated RVSP = 36 mmHg.    Pulmonic Valve: Grossly normal structure. Mild to moderate regurgitation. Mildly dilated pulmonary artery.    Aorta: Aortic root normal. Sinuses of Valsalva normal-sized. Ascending aorta normal-sized.    IVC/SVC: Inferior vena cava is <2.1cm. Inferior vena cava demonstrates normal respiratory collapse.    Pericardium: No evidence of pericardial effusion.    No significant change compared to prior study.      Results for orders placed during the hospital encounter of 03/28/24    Transthoracic echo (TTE) complete    Interpretation Summary    Left Ventricle: Normal ventricle size. Mild to moderate concentric left ventricular hypertrophy. Estimated EF 70%. No regional wall motion abnormalities. Indeterminate diastolic filling pattern.    Right Ventricle: Mildly dilated ventricle size. Normal systolic function.    Left Atrium: Severely dilated atrium.    Right Atrium: Mildly dilated atrium.    Aortic Valve: Tricuspid valve.  Sclerotic leaflets. No regurgitation. No stenosis.    Mitral Valve: Normal leaflet structure. Mitral annular calcification. Mild to moderate regurgitation.    Tricuspid Valve: Normal structure. Mild to moderate regurgitation. Estimated RVSP = 46 mmHg.    Pulmonic Valve: Normal structure. Mild to moderate regurgitation. Mildly dilated pulmonary artery.  4.1 cm stable from 2020    Aorta: Ascending aorta normal-sized. Dilatation of the aortic root.  3.8 cm stable from 2020    IVC/SVC: Inferior vena cava is <2.1cm. Inferior vena cava demonstrates normal respiratory collapse.    Pericardium: Normal structure.    No significant change from study of 2020      STRESS TESTS:     Results for orders placed during the hospital encounter of  02/04/19    Echocardiogram stress test    Interpretation Summary  No evidence of infarction or ischemia with stress echocardiogram  The patient completed 10 METS of exercise achieved target heart rate  Baseline EKG right bundle branch block and nonspecific diffuse T wave inversions  T wave inversions worsened with exercise nonspecific finding  No chest pain  Baseline echocardiogram moderately dilated left atrium  Dilated pulmonary artery 4.1 cm unchanged from 2017  Other chamber size is normal  No regional wall motion abnormalities preserved ejection fraction 65%  Mild mitral regurgitation  Mild tricuspid regurgitation estimated pulmonary artery systolic pressure 31 mmHg  All walls became hyperdynamic with ischemia      CORONARY CTA:  Results for orders placed during the hospital encounter of 06/07/21    CTA CORONARY ARTERY WITH IV CONTRAST    Narrative  CLINICAL HISTORY: Coronary calcification and pulmonary artery aneurysms.    COMMENT:  1).  PROCEDURE:  The patient was brought to the CT prep room in stable  condition.  The patient was n.p.o. for 4 hours.  An 18 gauge intracath IV was  started in the left antecubital space.  The patient's heart rate and blood  pressure were recorded.  Sublingual Nitroglycerin administered before contrast  injection.  Multi-detector CT of the chest/heart was then performed following 95  cc of Omnipaque 350 iodine contrast administered intravenously.  A retrospective  ECG gated and 100  kVp technique was utilized. Post processing for 3D evaluation  was completed and sent to [employer] 3D workstation for interpretation.  HeartFlow FFRct Analysis was performed. . The overall quality of the scan was  fair. The patient tolerated the procedure well and was discharged in stable  condition.    2)  CARDIAC FINDINGS:    CORONARY CALCIUM COMPOSITE AGATSTON SCORE:  41  LM: 0                    LAD: 10                    LCX: 31  RCA: 0  This places the patient in the WAYNE 25% risk percentile  for age and gender  matched individuals.    CORONARY ARTERIES:    LEFT MAIN: Patent.    LAD:  Proximal: Patent.  Mid: Calcified plaque causing minimal (less than 30%) stenosis.  Misregistration  artifact.  Distal: Patent.  FFR CT in the distal vessel is 0.86 suggesting there is no flow  limiting stenosis.  DIAG 1: Small vessel.  Patent.  DIAG 2: Small vessel.  Patent.    LCX:  Proximal: Calcified plaque at the origin of the first obtuse marginal causing  mild (30-50%) stenosis.  Mid: Patent.  Distal: Patent.  FFR CT in the distal vessel is 0.94 suggesting no flow limiting  stenosis.  OM1:  Patent.  OM2:  Patent.    RCA:  Proximal: Patent.  Mid: Probably patent.  Misalignment and misregistration artifact.  Distal: Patent.  FFR CT in the distal vessel is 0.88 suggesting no flow limiting  stenosis.  RPDA: Patent.  RPLB: Patent.    DOMINANCE: Right.  **Lesion severity (stenosis):  Normal (patent), Minimal <30%, Mild 30-50%,  Moderate 50-70%, Severe 70-99%, Occluded >99%.    CORONARY COMPOSITE SCORES:    CADS-RADS SCORE: 2    HEARTFLOW FFRct Analysis: Normal.  FFR CT in the distal left anterior descending is 0.86 suggesting no flow  limiting stenosis.  FFR CT in the distal left circumflex is 0.94 suggesting no flow limiting  stenosis.  FFR CT in the distal RCA is 0.88 suggesting no flow limiting stenosis.    CARDIAC MORPHOLOGY:  LEFT VENTRICLE: Small left ventricular cavity size with concentric left  ventricular hypertrophy.  RIGHT VENTRICLE: Normal.  LEFT ATRIUM: Dilated.  RIGHT ATRIUM: Dilated.  AORTIC VALVE: Focally calcified trileaflet aortic valve.  MITRAL VALVE: Normal.  TRICUSPID VALVE: Normal.  PERICARDIUM: Normal.    ANTERIOR MEDIASTINUM  AORTA: Mildly dilated aortic root at 3.9 cm sinuses of Valsalva.  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    VENTRICULAR FUNCTION AND WALL MOTION:  LV EJECTION FRACTION: 58%.  LV WALL MOTION: Normal .    SUMMARY:  1.  Coronary  Arteries: Mild nonobstructive coronary artery disease.  No evidence  of flow-limiting stenosis by FFR CT.  2.  Cardiac Structures: Small left ventricular cavity with concentric left  ventricular hypertrophy.  Bi-atrial enlargement.  Focally calcified trileaflet  aortic valve.  3.  Mildly dilated aortic root at the sinuses of Valsalva.  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  4.  Normal left ventricular systolic function.  5.  Coronary calcium score 41.  This places the patient in the WAYNE 25% risk  percentile for age and gender matched individuals.  6.  Overall quality of the scan was fair.  There was ectopy during image  acquisition causing misregistration artifact.    Cardiac interpretation by Michael Valentino, M.D. on 6/7/2021.  ___________________________________________________________________________  This cardiac CT evaluation is a focused examination and does not include the  entire extent of structures listed below:  CT DOSE:  One or more dose reduction techniques (e.g. automated exposure  control, adjustment of the mA and/or kV according to patient size, use of  iterative reconstruction technique) utilized for this examination.    3) NON-CARDIAC FINDINGS    AORTA: Unremarkable.  PULMONARY VEINS: Unremarkable.  PULMONARY ARTERIES: There is redemonstration of aneurysmal dilatation of the  main pulmonary artery measuring up to 48 mm in diameter, and dilatation of the  right pulmonary artery measuring up to 32 mm in diameter and left pulmonary  artery measuring up to 26 mm in diameter. This is grossly stable allowing  differences in measurement technique and slice selection.  SVC: Unremarkable.  IVC: Unremarkable.  LUNG BRUNER: Previously described right upper lobe nodule is somewhat less  prominent on today's examination measuring approximately 4 mm, previously  measuring 6 mm. Dependent hypoventilatory changes are noted.  LYMPH NODES: No  pathologically enlarged lymph nodes visualized.  OSSEOUS STRUCTURES: No evidence of suspicious destructive bony lesions.    --    Impression  1. See above cardiologist summary.  2. Stable appearance of aneurysmal dilatation of the pulmonary arteries as  described above.    CT chest interpretation by Joe Echeverria MD on 6/7/2021.    HOLTER  Results for orders placed during the hospital encounter of 07/20/21    [facility] TAPP Holter monitor - 24 hour    Narrative  1. The patient was monitored for 24 hours.  2. The predominant rhythm was sinus bradycardia. Ectopic atrial rhythm was noted.  3. The average heart rate was 59 bpm.  4. The minimum heart rate was 49 bpm.  5. The maximum heart rate was 111 bpm.  6. There were 35 supraventricular beats. There were 9 SVE tachycardias. The longest lasted 16 beatd 6.7 secs with an average  heart rate of 142 bpm at  4:22 pm on 7/20/2021. The fastest lasted 3 beats 1.2 secs with an average heart rate of 148 bpm at  5:11 pm on 7/20/2021.  7. There were 156 premature ventricular beats. There were 11 ventricular bigeminy cycles. There was 1 ventricular couplet.  8. Patients' diary was not submitted.  7/23/2021  Confirmed by EZRA TENORIO MD ([id number]) on 7/23/2021 4:17:34 PM          Past Medical History:   Diagnosis Date    A-fib (CMS/HCC)     Arthritis     Closed right ankle fracture age 62    Endometrial cancer (CMS/HCC) 12/06/2013    GERD (gastroesophageal reflux disease)     Lipid disorder     difficulty tolerating statins    Melanoma (CMS/HCC)     left shoulder    Osteoporosis     did not tolerate fosamax    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HCC)     gets follow up CT scans regularly    SVT (supraventricular tachycardia) (CMS/HCC) 08/2021    Thyroid goiter         Past Surgical History   Procedure Laterality Date    Bunionectomy Bilateral     Cataract extraction, bilateral  2006    Cholecystectomy      Hysterectomy  12/2013    Knee surgery Left 10/2023    Left Total Knee Arthroplasty Left 10/11/2023     Performed by [facility]ley MD at [facility] OR [facility] Mohs surgery      Skin cancer excision          Family History   Problem Relation Name Age of Onset    Heart attack Biological Mother      Diabetes Biological Mother      Lung cancer Biological Mother      COPD Biological Mother      Diabetes Biological Father      Prostate cancer Biological Brother          Social History     Socioeconomic History    Marital status:      Spouse name: None    Number of children: None    Years of education: None    Highest education level: None   Tobacco Use    Smoking status: Former     Current packs/day: 0.00     Average packs/day: 0.5 packs/day for 25.0 years (12.5 ttl pk-yrs)     Types: Cigarettes     Start date:      Quit date:      Years since quittin.3    Smokeless tobacco: Never   Vaping Use    Vaping status: Never Used   Substance and Sexual Activity    Alcohol use: Yes     Alcohol/week: 2.0 standard drinks of alcohol     Types: 2 Standard drinks or equivalent per week     Comment: social    Drug use: No    Sexual activity: Defer     Social Drivers of Health     Financial Resource Strain: Low Risk  (10/12/2023)    Overall Financial Resource Strain (CARDIA)     Difficulty of Paying Living Expenses: Not hard at all   Food Insecurity: No Food Insecurity (2024)    Hunger Vital Sign     Worried About Running Out of Food in the Last Year: Never true     Ran Out of Food in the Last Year: Never true   Transportation Needs: No Transportation Needs (10/12/2023)    PRAPARE - Transportation     Lack of Transportation (Medical): No     Lack of Transportation (Non-Medical): No   Housing Stability: Low Risk  (10/12/2023)    Housing Stability Vital Sign     Unable to Pay for Housing in the Last Year: No     Number of Places Lived in the Last Year: 1     Unstable Housing in the Last Year: No        Current Outpatient Medications   Medication Sig Dispense Refill    alirocumab (PRALUENT PEN) 150 mg/mL pen injector  Inject 1 ml (150 mg) under skin every 14 days 6 mL 3    azithromycin (ZITHROMAX) 500 mg tablet Take one tablet 30-60 minutes before procedure 1 tablet 0    cetirizine (ZyrTEC) 5 mg tablet Take 1 tablet (5 mg total) by mouth daily as needed for allergies.      denosumab (PROLIA) 60 mg/mL syringe Hold for 2 weeks      ergocalciferol (VITAMIN D2) 50,000 unit(1250 mcg) capsule Take 1 capsule (50,000 Units total) by mouth once a week. 12 capsule 0    LORazepam (ATIVAN) 1 mg tablet Take 0.5 tablets (0.5 mg total) by mouth daily as needed for anxiety. 30 tablet 0    metoprolol succinate XL (TOPROL-XL) 25 mg 24 hr tablet TAKE 1 TABLET BY MOUTH TWICE A  tablet 3    rivaroxaban (XARELTO) 15 mg tablet TAKE 1 TABLET(20 MG) BY MOUTH DAILY WITH DINNER 90 tablet 3     No current facility-administered medications for this visit.        Allergies:  Penicillin, Pravastatin sodium, Rosuvastatin calcium, and Zetia [ezetimibe]       Review of Systems   Constitutional:  Negative for diaphoresis and fatigue.   HENT:  Negative for nosebleeds.    Respiratory:  Negative for shortness of breath and stridor.    Cardiovascular:  Negative for chest pain and palpitations.   Genitourinary:  Negative for hematuria.   Skin:  Negative for color change.   Neurological:  Negative for syncope and light-headedness.        Vitals:    04/29/25 1406   BP: 126/80   BP Location: Left upper arm   Patient Position: Sitting   Pulse: (!) 53   SpO2: 96%   Weight: 68 kg (150 lb)   Height: 1.524 m (5')     Body mass index is 29.29 kg/m².     Physical Exam  Constitutional:       General: She is not in acute distress.     Appearance: Normal appearance. She is not ill-appearing.   HENT:      Head: Normocephalic and atraumatic.      Nose: Nose normal.   Eyes:      General:         Right eye: No discharge.         Left eye: No discharge.      Conjunctiva/sclera: Conjunctivae normal.   Neck:      Vascular: No carotid bruit.   Cardiovascular:      Rate and Rhythm:  Normal rate and regular rhythm.      Heart sounds: No murmur heard.     No friction rub. No gallop.   Pulmonary:      Effort: No respiratory distress.      Breath sounds: No stridor. No wheezing, rhonchi or rales.   Musculoskeletal:      Right lower leg: No edema.      Left lower leg: No edema.   Skin:     General: Skin is warm and dry.   Neurological:      Mental Status: She is alert.   Psychiatric:         Mood and Affect: Mood normal.         Behavior: Behavior normal.             Diagnosis Plan   1. Coronary artery disease involving native coronary artery of native heart without angina pectoris  [facility] MUSE ECG 12 lead (clinic performed)      2. 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HCC)  [facility] MUSE ECG 12 lead (clinic performed)    CT ANGIOGRAPHY CHEST WITH AND WITHOUT IV CONTRAST    Basic metabolic panel    Basic metabolic panel      3. PAF (paroxysmal atrial fibrillation) (CMS/HCC)  [facility] MUSE ECG 12 lead (clinic performed)      4. Mixed hyperlipidemia            ASSESSMENT AND PLAN:     89 y.o. female with a history of nonobstructive CAD (mild), pAFib on AC, hyperlipidemia, and dilated pulmonary artery who presents for cardiovascular evaluation.      # Nonobstructive CAD (mild)  Stable, free of anginal symptomatology.  Continue Praluent 150 mg every 2 weeks.  On Xarelto as below.     # Paroxysmal A-fib on AC  Continue Xarelto 15 mg daily; creatinine clearance has been borderline around 50 and as a result previously her Xarelto was decreased to 15 mg daily.  Continue metoprolol succinate 25 mg twice daily.     # HLD  Praluent as above     # Dilated pulmonary artery  Stable on echocardiogram  Check CTA for monitoring.       Return in about 6 months (around 10/29/2025).     I thank you for the opportunity to take part in the care of Teresa Bird.  Please do not hesitate to contact me with any questions or concerns.     Sincerely,  Ervin Khan MD  Cardiovascular Disease  4/29/2025      This note was  generated using speech recognition software. Please excuse any typographical errors. Please contact me with any questions or concerns.

## 2025-05-09 ENCOUNTER — TELEPHONE (OUTPATIENT)
Dept: SCHEDULING | Facility: CLINIC | Age: 89
End: 2025-05-09
Payer: COMMERCIAL

## 2025-05-14 ENCOUNTER — TELEPHONE (OUTPATIENT)
Dept: SCHEDULING | Facility: CLINIC | Age: 89
End: 2025-05-14
Payer: COMMERCIAL

## 2025-05-14 DIAGNOSIS — I71.21 ANEURYSM OF ASCENDING AORTA WITHOUT RUPTURE (CMS/HCC): Primary | ICD-10-CM

## 2025-06-09 ENCOUNTER — TELEPHONE (OUTPATIENT)
Dept: INTERNAL MEDICINE | Facility: CLINIC | Age: 89
End: 2025-06-09
Payer: COMMERCIAL

## 2025-06-09 DIAGNOSIS — Z12.31 VISIT FOR SCREENING MAMMOGRAM: Primary | ICD-10-CM

## 2025-06-09 NOTE — TELEPHONE ENCOUNTER
Patient calling requesting script for annual screening mammogram.   She has an appmt on 6/11/25 at [facility].   Last Mammo done 6/10/24.

## 2025-06-11 ENCOUNTER — HOSPITAL ENCOUNTER (OUTPATIENT)
Dept: RADIOLOGY | Age: 89
End: 2025-06-11
Payer: COMMERCIAL

## 2025-06-20 ENCOUNTER — OFFICE VISIT (OUTPATIENT)
Dept: INTERNAL MEDICINE | Facility: CLINIC | Age: 89
End: 2025-06-20
Payer: COMMERCIAL

## 2025-06-20 VITALS
HEART RATE: 80 BPM | BODY MASS INDEX: 29.49 KG/M2 | WEIGHT: 151 LBS | DIASTOLIC BLOOD PRESSURE: 86 MMHG | SYSTOLIC BLOOD PRESSURE: 122 MMHG

## 2025-06-20 DIAGNOSIS — I25.10 CORONARY ARTERY DISEASE INVOLVING NATIVE CORONARY ARTERY OF NATIVE HEART WITHOUT ANGINA PECTORIS: ICD-10-CM

## 2025-06-20 DIAGNOSIS — S02.2XXD CLOSED FRACTURE OF NASAL BONE WITH ROUTINE HEALING: Primary | ICD-10-CM

## 2025-06-20 DIAGNOSIS — M81.0 AGE-RELATED OSTEOPOROSIS WITHOUT CURRENT PATHOLOGICAL FRACTURE: ICD-10-CM

## 2025-06-20 DIAGNOSIS — R29.898 LEFT LEG WEAKNESS: ICD-10-CM

## 2025-06-20 DIAGNOSIS — I48.0 PAF (PAROXYSMAL ATRIAL FIBRILLATION) (CMS/HCC): ICD-10-CM

## 2025-06-20 PROBLEM — M17.11 PRIMARY OSTEOARTHRITIS OF RIGHT KNEE: Status: ACTIVE | Noted: 2023-04-11

## 2025-06-20 PROCEDURE — G22XX PR COMPLEXITY INHERENT TO E&M -ADD ON NON-BILLABLE: HCPCS | Performed by: INTERNAL MEDICINE

## 2025-06-20 PROCEDURE — 3008F BODY MASS INDEX DOCD: CPT | Performed by: INTERNAL MEDICINE

## 2025-06-20 PROCEDURE — 99214 OFFICE O/P EST MOD 30 MIN: CPT | Performed by: INTERNAL MEDICINE

## 2025-06-20 ASSESSMENT — ENCOUNTER SYMPTOMS
FREQUENCY: 0
NAUSEA: 0
COUGH: 0
FATIGUE: 0
CHILLS: 0
FEVER: 0
DYSURIA: 0
CONSTIPATION: 0
SHORTNESS OF BREATH: 0
VOMITING: 0
DIZZINESS: 0
PALPITATIONS: 0
CONFUSION: 0
BACK PAIN: 0
DIARRHEA: 0
HEADACHES: 0
SORE THROAT: 0
ABDOMINAL PAIN: 0

## 2025-06-20 ASSESSMENT — PATIENT HEALTH QUESTIONNAIRE - PHQ9: SUM OF ALL RESPONSES TO PHQ9 QUESTIONS 1 & 2: 0

## 2025-06-20 NOTE — PATIENT INSTRUCTIONS
Problem List Items Addressed This Visit          Nervous    Left leg weakness    Likely from your back (arthritis or stenosis)  Will check a lumbar MRI         Relevant Orders    MRI LUMBAR SPINE WITHOUT CONTRAST       Circulatory    PAF (paroxysmal atrial fibrillation) (CMS/HCC)    Continue Metoprolol and Xarelto         Relevant Medications    rivaroxaban (XARELTO) 15 mg tablet    Coronary artery disease involving native coronary artery of native heart without angina pectoris    Continue Metoprolol, Praluent         Relevant Orders    CBC    Comprehensive metabolic panel    Lipid panel       Musculoskeletal    Closed fracture of nasal bone with routine healing - Primary    Doing OK         Age-related osteoporosis without current pathological fracture    Continue Prolia         Relevant Orders    DEXA BONE DENSITY    Vitamin D 25 hydroxy       Sergei Cooley MD  6/20/2025

## 2025-06-20 NOTE — PROGRESS NOTES
Subjective      Patient ID: Teresa Bird is a 89 y.o. female.    Patient seen for PAF, OP, fall  She fell and went to .  She was checked out and sent to the ER.  CT showed nasal bone fracture.  She had nasal cautery.  She has no headache, dizziness, nausea, increased fatigue.  Fall sounds mechanical.  She drove herself to the  and ER  She is getting injections in her right knee.      Current Outpatient Medications   Medication Sig Dispense Refill    alirocumab (PRALUENT PEN) 150 mg/mL pen injector Inject 1 ml (150 mg) under skin every 14 days 6 mL 3    azithromycin (ZITHROMAX) 500 mg tablet Take one tablet 30-60 minutes before procedure 1 tablet 0    cetirizine (ZyrTEC) 5 mg tablet Take 1 tablet (5 mg total) by mouth daily as needed for allergies.      denosumab (PROLIA) 60 mg/mL syringe Hold for 2 weeks      LORazepam (ATIVAN) 1 mg tablet Take 0.5 tablets (0.5 mg total) by mouth daily as needed for anxiety. 30 tablet 0    metoprolol succinate XL (TOPROL-XL) 25 mg 24 hr tablet TAKE 1 TABLET BY MOUTH TWICE A  tablet 3    rivaroxaban (XARELTO) 15 mg tablet Take 1 tablet (15 mg total) by mouth daily with dinner.      ergocalciferol (VITAMIN D2) 50,000 unit(1250 mcg) capsule Take 1 capsule (50,000 Units total) by mouth once a week. 12 capsule 0     No current facility-administered medications for this visit.     Allergies   Allergen Reactions    Penicillin Other (see comments)     Fainted and PCP said not to take    Pravastatin Sodium GI intolerance    Rosuvastatin Calcium GI intolerance     Other reaction(s): muscle cramps    Zetia [Ezetimibe]      myalgias     Past Medical History:   Diagnosis Date    A-fib (CMS/[facility]ly [facility])     Arthritis     Closed right ankle fracture age 62    Endometrial cancer (CMS/[facility]ly [facility]) 12/06/2013    GERD (gastroesophageal reflux disease)     Lipid disorder     difficulty tolerating statins    Melanoma (CMS/[facility])     left shoulder    Osteoporosis     did not tolerate fosamax    Pulmonary  artery aneurysm (CMS/HCC)     gets follow up CT scans regularly    SVT (supraventricular tachycardia) (CMS/HCC) 2021    Thyroid goiter      Past Surgical History   Procedure Laterality Date    Bunionectomy Bilateral     Cataract extraction, bilateral  2006    Cholecystectomy      Hysterectomy  2013    Knee surgery Left 10/2023    Left Total Knee Arthroplasty Left 10/11/2023    Performed by Tonie Dudley MD at [facility]    Mohs surgery      Skin cancer excision       Social History     Socioeconomic History    Marital status:      Spouse name: Not on file    Number of children: Not on file    Years of education: Not on file    Highest education level: Not on file   Occupational History    Not on file   Tobacco Use    Smoking status: Former     Current packs/day: 0.00     Average packs/day: 0.5 packs/day for 25.0 years (12.5 ttl pk-yrs)     Types: Cigarettes     Start date:      Quit date:      Years since quittin.4    Smokeless tobacco: Never   Vaping Use    Vaping status: Never Used   Substance and Sexual Activity    Alcohol use: Yes     Alcohol/week: 2.0 standard drinks of alcohol     Types: 2 Standard drinks or equivalent per week     Comment: social    Drug use: No    Sexual activity: Defer   Other Topics Concern    Not on file   Social History Narrative    Not on file     Social Drivers of Health     Financial Resource Strain: Low Risk  (10/12/2023)    Overall Financial Resource Strain (CARDIA)     Difficulty of Paying Living Expenses: Not hard at all   Food Insecurity: No Food Insecurity (2024)    Hunger Vital Sign     Worried About Running Out of Food in the Last Year: Never true     Ran Out of Food in the Last Year: Never true   Transportation Needs: No Transportation Needs (10/12/2023)    PRAPARE - Transportation     Lack of Transportation (Medical): No     Lack of Transportation (Non-Medical): No   Physical Activity: Not on file   Stress: Not on file   Social Connections:  Not on file   Intimate Partner Violence: Not on file   Housing Stability: Low Risk  (10/12/2023)    Housing Stability Vital Sign     Unable to Pay for Housing in the Last Year: No     Number of Places Lived in the Last Year: 1     Unstable Housing in the Last Year: No     Family History   Problem Relation Name Age of Onset    Heart attack Biological Mother      Diabetes Biological Mother      Lung cancer Biological Mother      COPD Biological Mother      Diabetes Biological Father      Prostate cancer Biological Brother           The following have been reviewed and updated as appropriate in this visit:   Meds  Problems       Review of Systems   Constitutional:  Negative for chills, fatigue and fever.   HENT:  Negative for congestion, postnasal drip and sore throat.    Eyes:  Negative for visual disturbance.   Respiratory:  Negative for cough and shortness of breath.    Cardiovascular:  Negative for chest pain and palpitations.   Gastrointestinal:  Negative for abdominal pain, constipation, diarrhea, nausea and vomiting.   Genitourinary:  Negative for dysuria and frequency.   Musculoskeletal:  Negative for back pain.   Skin:  Negative for rash.   Neurological:  Negative for dizziness and headaches.   Psychiatric/Behavioral:  Negative for confusion.        Objective     Vitals:    06/20/25 0904   BP: 122/86   BP Location: Left upper arm   Patient Position: Sitting   Pulse: 80   Weight: 68.5 kg (151 lb)     Body mass index is 29.49 kg/m².    Physical Exam  Vitals reviewed.   Constitutional:       Appearance: Normal appearance.   HENT:      Head: Normocephalic and atraumatic.      Mouth/Throat:      Mouth: Mucous membranes are moist.      Pharynx: Oropharynx is clear. No posterior oropharyngeal erythema.   Eyes:      General: No scleral icterus.     Conjunctiva/sclera: Conjunctivae normal.   Cardiovascular:      Rate and Rhythm: Normal rate and regular rhythm.      Heart sounds: No murmur heard.  Pulmonary:       Effort: No respiratory distress.      Breath sounds: Normal breath sounds. No wheezing.   Abdominal:      Palpations: Abdomen is soft.      Tenderness: There is no abdominal tenderness. There is no guarding or rebound.   Musculoskeletal:         General: Normal range of motion.      Cervical back: Normal range of motion.      Comments: Left great toe dorsiflexion 0/5   Skin:     General: Skin is warm.      Findings: Bruising present.      Comments: Raccoon eyes   Neurological:      Mental Status: She is alert and oriented to person, place, and time.         Orders Placed This Encounter   Procedures    DEXA BONE DENSITY     Standing Status:   Future     Expected Date:   6/20/2025     Expiration Date:   6/20/2026     Release to patient:   Immediate [1]    MRI LUMBAR SPINE WITHOUT CONTRAST     Standing Status:   Future     Expected Date:   6/20/2025     Expiration Date:   6/20/2026     Release to patient:   Immediate [1]    CBC     Standing Status:   Future     Expected Date:   10/20/2025     Expiration Date:   6/20/2026     Release to patient:   Immediate [1]    Comprehensive metabolic panel     Standing Status:   Future     Expected Date:   10/20/2025     Expiration Date:   6/20/2026     Has the patient fasted?:   Yes     Release to patient:   Immediate [1]    Lipid panel     Standing Status:   Future     Expected Date:   10/20/2025     Expiration Date:   6/20/2026     Has the patient fasted?:   Yes     Release to patient:   Immediate [1]    Vitamin D 25 hydroxy     Standing Status:   Future     Expected Date:   10/20/2025     Expiration Date:   6/20/2026     Release to patient:   Immediate [1]       Assessment/Plan   Problem List Items Addressed This Visit          Nervous    Left leg weakness    Likely from your back (arthritis or stenosis)  Will check a lumbar MRI         Relevant Orders    MRI LUMBAR SPINE WITHOUT CONTRAST       Circulatory    PAF (paroxysmal atrial fibrillation) (CMS/HCC)    Continue Metoprolol  and Xarelto         Relevant Medications    rivaroxaban (XARELTO) 15 mg tablet    Coronary artery disease involving native coronary artery of native heart without angina pectoris    Continue Metoprolol, Praluent         Relevant Orders    CBC    Comprehensive metabolic panel    Lipid panel       Musculoskeletal    Closed fracture of nasal bone with routine healing - Primary    Doing OK         Age-related osteoporosis without current pathological fracture    Continue Prolia         Relevant Orders    DEXA BONE DENSITY    Vitamin D 25 hydroxy       Sergei Cooley MD  6/20/2025    I attest that this visit supports the complexity inherent to evaluation and management associated with medical care services that serve as the continuing focal point for all needed health care services and/or medical care services that are part of ongoing care related to this patient's single, serious condition or a complex condition.

## 2025-06-23 ENCOUNTER — HOSPITAL ENCOUNTER (OUTPATIENT)
Dept: RADIOLOGY | Age: 89
Discharge: HOME | End: 2025-06-23
Attending: INTERNAL MEDICINE
Payer: COMMERCIAL

## 2025-06-23 ENCOUNTER — RESULTS FOLLOW-UP (OUTPATIENT)
Dept: INTERNAL MEDICINE | Facility: CLINIC | Age: 89
End: 2025-06-23

## 2025-06-23 DIAGNOSIS — Z12.31 VISIT FOR SCREENING MAMMOGRAM: ICD-10-CM

## 2025-06-23 PROCEDURE — 77067 SCR MAMMO BI INCL CAD: CPT

## 2025-07-02 DIAGNOSIS — I48.0 PAF (PAROXYSMAL ATRIAL FIBRILLATION) (CMS/HCC): ICD-10-CM

## 2025-07-08 ENCOUNTER — HOSPITAL ENCOUNTER (OUTPATIENT)
Dept: RADIOLOGY | Facility: HOSPITAL | Age: 89
Discharge: HOME | End: 2025-07-08
Attending: INTERNAL MEDICINE
Payer: COMMERCIAL

## 2025-07-08 DIAGNOSIS — R29.898 LEFT LEG WEAKNESS: ICD-10-CM

## 2025-07-10 ENCOUNTER — RESULTS FOLLOW-UP (OUTPATIENT)
Dept: INTERNAL MEDICINE | Facility: CLINIC | Age: 89
End: 2025-07-10
Payer: COMMERCIAL

## 2025-07-11 RX ORDER — METOPROLOL SUCCINATE 25 MG/1
25 TABLET, EXTENDED RELEASE ORAL 2 TIMES DAILY
Qty: 180 TABLET | Refills: 3 | Status: SHIPPED | OUTPATIENT
Start: 2025-07-11

## 2025-07-16 DIAGNOSIS — M48.062 SPINAL STENOSIS OF LUMBAR REGION WITH NEUROGENIC CLAUDICATION: Primary | ICD-10-CM

## 2025-07-16 PROBLEM — M43.06 LUMBAR SPONDYLOLYSIS: Status: ACTIVE | Noted: 2025-07-16

## 2025-07-16 NOTE — TELEPHONE ENCOUNTER
Pt called back. She would like to do PT at [facility]. Can you write pt a rx for PT? Pt is also asking who she should see for a spine consult. Pt can be reached at [phone].

## 2025-07-26 LAB
BUN SERPL-MCNC: 17 MG/DL (ref 8–27)
BUN/CREAT SERPL: 22 (ref 12–28)
CALCIUM SERPL-MCNC: 9.8 MG/DL (ref 8.7–10.3)
CHLORIDE SERPL-SCNC: 103 MMOL/L (ref 96–106)
CO2 SERPL-SCNC: 24 MMOL/L (ref 20–29)
CREAT SERPL-MCNC: 0.77 MG/DL (ref 0.57–1)
EGFRCR SERPLBLD CKD-EPI 2021: 75 ML/MIN/1.73
GLUCOSE SERPL-MCNC: 87 MG/DL (ref 70–99)
POTASSIUM SERPL-SCNC: 5.6 MMOL/L (ref 3.5–5.2)
SODIUM SERPL-SCNC: 142 MMOL/L (ref 134–144)

## 2025-08-04 ENCOUNTER — HOSPITAL ENCOUNTER (OUTPATIENT)
Dept: RADIOLOGY | Facility: HOSPITAL | Age: 89
Discharge: HOME | End: 2025-08-04
Attending: STUDENT IN AN ORGANIZED HEALTH CARE EDUCATION/TRAINING PROGRAM
Payer: COMMERCIAL

## 2025-08-04 DIAGNOSIS — I28.1 PULMONARY ARTERY ANEURYSM (CMS/HCC): ICD-10-CM

## 2025-08-04 PROCEDURE — 71275 CT ANGIOGRAPHY CHEST: CPT

## 2025-08-04 PROCEDURE — 63600105 HC IODINE BASED CONTRAST: Mod: JZ | Performed by: STUDENT IN AN ORGANIZED HEALTH CARE EDUCATION/TRAINING PROGRAM

## 2025-08-04 RX ORDER — IOPAMIDOL 755 MG/ML
100 INJECTION, SOLUTION INTRAVASCULAR
Status: COMPLETED | OUTPATIENT
Start: 2025-08-04 | End: 2025-08-04

## 2025-08-04 RX ADMIN — IOPAMIDOL 100 ML: 755 INJECTION, SOLUTION INTRAVENOUS at 11:36

## 2025-08-07 ENCOUNTER — TELEPHONE (OUTPATIENT)
Dept: CARDIOLOGY | Facility: CLINIC | Age: 89
End: 2025-08-07
Payer: COMMERCIAL

## 2025-08-07 DIAGNOSIS — I48.0 PAF (PAROXYSMAL ATRIAL FIBRILLATION) (CMS/HCC): Primary | ICD-10-CM

## 2025-08-07 NOTE — TELEPHONE ENCOUNTER
Received a fax from Dr. Ryo requesting cardiac clearance for a lumbar epidural injection with local anesthesia.  Xarelto hold. Last OV 4/29/2025. Does she need a CCV?

## 2025-08-12 ENCOUNTER — HOSPITAL ENCOUNTER (OUTPATIENT)
Dept: PHYSICAL THERAPY | Age: 89
Setting detail: THERAPIES SERIES
Discharge: HOME | End: 2025-08-12
Attending: INTERNAL MEDICINE
Payer: COMMERCIAL

## 2025-08-12 DIAGNOSIS — M48.062 SPINAL STENOSIS OF LUMBAR REGION WITH NEUROGENIC CLAUDICATION: ICD-10-CM

## 2025-08-12 PROCEDURE — 97161 PT EVAL LOW COMPLEX 20 MIN: CPT | Mod: GP

## 2025-08-12 PROCEDURE — 97530 THERAPEUTIC ACTIVITIES: CPT | Mod: GP

## 2025-08-14 ENCOUNTER — APPOINTMENT (OUTPATIENT)
Dept: URBAN - METROPOLITAN AREA CLINIC 374 | Facility: CLINIC | Age: 89
Setting detail: DERMATOLOGY
End: 2025-08-14

## 2025-08-14 DIAGNOSIS — Z71.89 OTHER SPECIFIED COUNSELING: ICD-10-CM

## 2025-08-14 DIAGNOSIS — Z85.820 PERSONAL HISTORY OF MALIGNANT MELANOMA OF SKIN: ICD-10-CM

## 2025-08-14 DIAGNOSIS — Z85.828 PERSONAL HISTORY OF OTHER MALIGNANT NEOPLASM OF SKIN: ICD-10-CM

## 2025-08-14 DIAGNOSIS — L81.4 OTHER MELANIN HYPERPIGMENTATION: ICD-10-CM | Status: UNCHANGED

## 2025-08-14 DIAGNOSIS — D18.0 HEMANGIOMA: ICD-10-CM | Status: UNCHANGED

## 2025-08-14 DIAGNOSIS — L81.5 LEUKODERMA, NOT ELSEWHERE CLASSIFIED: ICD-10-CM | Status: UNCHANGED

## 2025-08-14 DIAGNOSIS — L82.1 OTHER SEBORRHEIC KERATOSIS: ICD-10-CM | Status: UNCHANGED

## 2025-08-14 DIAGNOSIS — D22 MELANOCYTIC NEVI: ICD-10-CM | Status: UNCHANGED

## 2025-08-14 DIAGNOSIS — Z80.8 FAMILY HISTORY OF MALIGNANT NEOPLASM OF OTHER ORGANS OR SYSTEMS: ICD-10-CM

## 2025-08-14 DIAGNOSIS — L91.8 OTHER HYPERTROPHIC DISORDERS OF THE SKIN: ICD-10-CM | Status: UNCHANGED

## 2025-08-14 DIAGNOSIS — L57.0 ACTINIC KERATOSIS: ICD-10-CM | Status: INADEQUATELY CONTROLLED

## 2025-08-14 DIAGNOSIS — L81.2 FRECKLES: ICD-10-CM | Status: UNCHANGED

## 2025-08-14 PROBLEM — D22.5 MELANOCYTIC NEVI OF TRUNK: Status: ACTIVE | Noted: 2025-08-14

## 2025-08-14 PROBLEM — D18.01 HEMANGIOMA OF SKIN AND SUBCUTANEOUS TISSUE: Status: ACTIVE | Noted: 2025-08-14

## 2025-08-14 PROCEDURE — ? DIAGNOSIS COMMENT

## 2025-08-14 PROCEDURE — ? SUNSCREEN RECOMMENDATIONS

## 2025-08-14 PROCEDURE — ? TREATMENT REGIMEN

## 2025-08-14 PROCEDURE — ? LIQUID NITROGEN

## 2025-08-14 PROCEDURE — ? COUNSELING

## 2025-08-14 PROCEDURE — ? FULL BODY SKIN EXAM

## 2025-08-14 ASSESSMENT — TOTAL NUMBER OF LESIONS: # OF LESIONS?: 1

## 2025-08-14 ASSESSMENT — LOCATION ZONE DERM
LOCATION ZONE: TRUNK
LOCATION ZONE: HAND
LOCATION ZONE: FACE
LOCATION ZONE: LEG
LOCATION ZONE: NECK
LOCATION ZONE: AXILLAE
LOCATION ZONE: ARM

## 2025-08-14 ASSESSMENT — LOCATION DETAILED DESCRIPTION DERM
LOCATION DETAILED: RIGHT RADIAL DORSAL HAND
LOCATION DETAILED: RIGHT INFERIOR LATERAL NECK
LOCATION DETAILED: LEFT SUPERIOR UPPER BACK
LOCATION DETAILED: LEFT DISTAL DORSAL FOREARM
LOCATION DETAILED: RIGHT MEDIAL UPPER BACK
LOCATION DETAILED: LEFT PROXIMAL PRETIBIAL REGION
LOCATION DETAILED: LEFT AXILLARY VAULT
LOCATION DETAILED: RIGHT MEDIAL SUPERIOR CHEST
LOCATION DETAILED: RIGHT CENTRAL MALAR CHEEK
LOCATION DETAILED: INFERIOR THORACIC SPINE
LOCATION DETAILED: LEFT RADIAL DORSAL HAND
LOCATION DETAILED: RIGHT AXILLARY VAULT
LOCATION DETAILED: RIGHT PROXIMAL DORSAL FOREARM
LOCATION DETAILED: LEFT INFERIOR LATERAL NECK
LOCATION DETAILED: LEFT POSTERIOR SHOULDER
LOCATION DETAILED: LEFT INFERIOR MEDIAL MALAR CHEEK
LOCATION DETAILED: RIGHT PROXIMAL PRETIBIAL REGION
LOCATION DETAILED: RIGHT DISTAL DORSAL FOREARM
LOCATION DETAILED: RIGHT POSTERIOR SHOULDER
LOCATION DETAILED: EPIGASTRIC SKIN
LOCATION DETAILED: LEFT PROXIMAL DORSAL FOREARM
LOCATION DETAILED: SUPERIOR THORACIC SPINE

## 2025-08-14 ASSESSMENT — LOCATION SIMPLE DESCRIPTION DERM
LOCATION SIMPLE: CHEST
LOCATION SIMPLE: RIGHT HAND
LOCATION SIMPLE: RIGHT PRETIBIAL REGION
LOCATION SIMPLE: LEFT UPPER BACK
LOCATION SIMPLE: LEFT AXILLARY VAULT
LOCATION SIMPLE: RIGHT FOREARM
LOCATION SIMPLE: RIGHT AXILLARY VAULT
LOCATION SIMPLE: LEFT SHOULDER
LOCATION SIMPLE: RIGHT CHEEK
LOCATION SIMPLE: LEFT FOREARM
LOCATION SIMPLE: RIGHT UPPER BACK
LOCATION SIMPLE: UPPER BACK
LOCATION SIMPLE: LEFT ANTERIOR NECK
LOCATION SIMPLE: LEFT CHEEK
LOCATION SIMPLE: ABDOMEN
LOCATION SIMPLE: LEFT HAND
LOCATION SIMPLE: LEFT PRETIBIAL REGION
LOCATION SIMPLE: RIGHT ANTERIOR NECK
LOCATION SIMPLE: RIGHT SHOULDER

## 2025-08-15 ENCOUNTER — HOSPITAL ENCOUNTER (OUTPATIENT)
Dept: PHYSICAL THERAPY | Age: 89
Setting detail: THERAPIES SERIES
Discharge: HOME | End: 2025-08-15
Attending: INTERNAL MEDICINE
Payer: COMMERCIAL

## 2025-08-15 DIAGNOSIS — M48.062 SPINAL STENOSIS OF LUMBAR REGION WITH NEUROGENIC CLAUDICATION: Primary | ICD-10-CM

## 2025-08-15 PROCEDURE — 97110 THERAPEUTIC EXERCISES: CPT | Mod: GP

## 2025-08-15 PROCEDURE — 97140 MANUAL THERAPY 1/> REGIONS: CPT | Mod: GP

## 2025-08-20 LAB
BUN SERPL-MCNC: 20 MG/DL (ref 8–27)
BUN/CREAT SERPL: 25 (ref 12–28)
CALCIUM SERPL-MCNC: 9.8 MG/DL (ref 8.7–10.3)
CHLORIDE SERPL-SCNC: 102 MMOL/L (ref 96–106)
CO2 SERPL-SCNC: 22 MMOL/L (ref 20–29)
CREAT SERPL-MCNC: 0.8 MG/DL (ref 0.57–1)
EGFRCR SERPLBLD CKD-EPI 2021: 71 ML/MIN/1.73
GLUCOSE SERPL-MCNC: 86 MG/DL (ref 70–99)
POTASSIUM SERPL-SCNC: 5.1 MMOL/L (ref 3.5–5.2)
SODIUM SERPL-SCNC: 140 MMOL/L (ref 134–144)

## 2025-08-22 ENCOUNTER — HOSPITAL ENCOUNTER (OUTPATIENT)
Dept: PHYSICAL THERAPY | Age: 89
Setting detail: THERAPIES SERIES
Discharge: HOME | End: 2025-08-22
Attending: INTERNAL MEDICINE
Payer: COMMERCIAL

## 2025-08-22 DIAGNOSIS — M48.062 SPINAL STENOSIS OF LUMBAR REGION WITH NEUROGENIC CLAUDICATION: Primary | ICD-10-CM

## 2025-08-22 PROCEDURE — 97110 THERAPEUTIC EXERCISES: CPT | Mod: GP

## 2025-08-25 DIAGNOSIS — E78.2 MIXED HYPERLIPIDEMIA: ICD-10-CM

## 2025-08-25 RX ORDER — ALIROCUMAB 150 MG/ML
INJECTION, SOLUTION SUBCUTANEOUS
Qty: 6 ML | Refills: 3 | Status: SHIPPED | OUTPATIENT
Start: 2025-08-25

## 2025-08-27 ENCOUNTER — CLINICAL SUPPORT (OUTPATIENT)
Dept: INTERNAL MEDICINE | Facility: CLINIC | Age: 89
End: 2025-08-27
Payer: COMMERCIAL

## 2025-08-27 ENCOUNTER — TELEPHONE (OUTPATIENT)
Dept: INTERNAL MEDICINE | Facility: CLINIC | Age: 89
End: 2025-08-27

## 2025-08-27 DIAGNOSIS — M81.0 AGE-RELATED OSTEOPOROSIS WITHOUT CURRENT PATHOLOGICAL FRACTURE: Primary | ICD-10-CM

## 2025-08-27 DIAGNOSIS — M81.0 AGE RELATED OSTEOPOROSIS, UNSPECIFIED PATHOLOGICAL FRACTURE PRESENCE: Primary | ICD-10-CM

## 2025-09-03 ENCOUNTER — HOSPITAL ENCOUNTER (OUTPATIENT)
Dept: PHYSICAL THERAPY | Age: 89
Setting detail: THERAPIES SERIES
Discharge: HOME | End: 2025-09-03
Attending: INTERNAL MEDICINE
Payer: COMMERCIAL

## 2025-09-03 DIAGNOSIS — M48.062 SPINAL STENOSIS OF LUMBAR REGION WITH NEUROGENIC CLAUDICATION: Primary | ICD-10-CM

## 2025-09-03 PROCEDURE — 97140 MANUAL THERAPY 1/> REGIONS: CPT | Mod: GP

## 2025-09-03 PROCEDURE — 97110 THERAPEUTIC EXERCISES: CPT | Mod: GP

## 2025-09-05 ENCOUNTER — HOSPITAL ENCOUNTER (OUTPATIENT)
Dept: RADIOLOGY | Age: 89
Discharge: HOME | End: 2025-09-05
Attending: INTERNAL MEDICINE
Payer: COMMERCIAL

## 2025-09-05 DIAGNOSIS — M81.0 AGE-RELATED OSTEOPOROSIS WITHOUT CURRENT PATHOLOGICAL FRACTURE: ICD-10-CM

## 2025-09-05 PROCEDURE — 77080 DXA BONE DENSITY AXIAL: CPT

## (undated) DEVICE — BLANKET UPPER BODY BAIR HUGGER

## (undated) DEVICE — NEEDLE DISP HYPO 21GX1 1/2IN

## (undated) DEVICE — CUFF TOURNIQUET DISP 34 X 4

## (undated) DEVICE — SET HANDPIECE INTERPULSE

## (undated) DEVICE — PAD GROUND ELECTROSURGICAL W/CORD

## (undated) DEVICE — SUTURE STRATAFIX PDO 1 OS-8 CUTTING 36CM

## (undated) DEVICE — DRAPE U/ SHT SPLIT PLASTIC ST 24/CS

## (undated) DEVICE — SLEEVE SCD COMFORT KNEE LENGTH MED

## (undated) DEVICE — GLOVE SZ 7.5 PROTEXIS PI

## (undated) DEVICE — DRAPE EXTREMITY 89X128 ST 12/CS

## (undated) DEVICE — MIX-EVAC HIGH VACUUM KIT

## (undated) DEVICE — PACK TOTAL JOINT

## (undated) DEVICE — HOOD T7 PLUS

## (undated) DEVICE — SOLN IRRIG STER WATER 1000ML

## (undated) DEVICE — SUTURE POLYSORB 2-0 UNDYED 1X30 P-17

## (undated) DEVICE — SOLN IRRIG .9%SOD 1000ML

## (undated) DEVICE — STOCKINETTE 8IN STERILE MEDC

## (undated) DEVICE — SUTURE POLYSORB 1 UNDYED 1X30 GS-12

## (undated) DEVICE — TUBING SMOKE EVAC PENCIL COATED

## (undated) DEVICE — BLADE SAGITTAL #127 STABLECUT

## (undated) DEVICE — GLOVE SZ 8 PROTEXIS PI

## (undated) DEVICE — GLOVE SZ 8 LINER PROTEXIS PI BL

## (undated) DEVICE — ADHESIVE SKIN DERMABOND ADVANCED 0.7ML

## (undated) DEVICE — NOZZLE 90 DEGREE TIBIAL PRESSURIZER

## (undated) DEVICE — DRAPE-U NON-STERILE

## (undated) DEVICE — MANIFOLD FOUR PORT NEPTUNE

## (undated) DEVICE — VEST STERILE

## (undated) DEVICE — DRESSING OPTIFOAM 4 X 10IN POST-OP

## (undated) DEVICE — BANDAGE ESMARK 6X12 LATEX FREE

## (undated) DEVICE — SUCTION 18FR FRAZIER DISPOSABLE

## (undated) DEVICE — APPLICATOR CHLORAPREP 26ML ORANGE TINT

## (undated) DEVICE — SYRINGE RED DISP LUER LOK 20CC

## (undated) DEVICE — SUTURE MONOCRYL 4-0  Y496G PS-2 I8IN

## (undated) DEVICE — DRAPE 3/4 REINFORCED

## (undated) DEVICE — SOLN IV 0.9% NSS 1000ML

## (undated) DEVICE — BLADE SCALPEL #10

## (undated) DEVICE — COVER LIGHTHANDLE (STERILE SINGLE PA